# Patient Record
Sex: FEMALE | Race: WHITE | NOT HISPANIC OR LATINO | Employment: OTHER | URBAN - METROPOLITAN AREA
[De-identification: names, ages, dates, MRNs, and addresses within clinical notes are randomized per-mention and may not be internally consistent; named-entity substitution may affect disease eponyms.]

---

## 2017-01-23 ENCOUNTER — AMBULATORY - HEALTHEAST (OUTPATIENT)
Dept: LAB | Facility: CLINIC | Age: 62
End: 2017-01-23

## 2017-01-23 DIAGNOSIS — Z48.298 AFTERCARE FOLLOWING ORGAN TRANSPLANT: ICD-10-CM

## 2017-01-23 DIAGNOSIS — Z94.0 KIDNEY REPLACED BY TRANSPLANT: ICD-10-CM

## 2017-01-23 DIAGNOSIS — Z79.899 ENCOUNTER FOR LONG-TERM CURRENT USE OF MEDICATION: ICD-10-CM

## 2017-01-23 PROCEDURE — 80158 DRUG ASSAY CYCLOSPORINE: CPT | Performed by: INTERNAL MEDICINE

## 2017-01-24 LAB
CYCLOSPORINE BLD LC/MS/MS-MCNC: 109 UG/L (ref 50–400)
TME LAST DOSE: NORMAL H

## 2017-01-26 ENCOUNTER — AMBULATORY - HEALTHEAST (OUTPATIENT)
Dept: LAB | Facility: CLINIC | Age: 62
End: 2017-01-26

## 2017-01-26 DIAGNOSIS — Z94.0 KIDNEY REPLACED BY TRANSPLANT: ICD-10-CM

## 2017-01-26 DIAGNOSIS — Z79.899 ENCOUNTER FOR LONG-TERM (CURRENT) USE OF OTHER MEDICATIONS: ICD-10-CM

## 2017-01-26 DIAGNOSIS — Z48.288 ENCOUNTER FOR AFTERCARE FOLLOWING MULTIPLE ORGAN TRANSPLANT: ICD-10-CM

## 2017-02-27 ENCOUNTER — AMBULATORY - HEALTHEAST (OUTPATIENT)
Dept: LAB | Facility: CLINIC | Age: 62
End: 2017-02-27

## 2017-02-27 DIAGNOSIS — Z48.288 ENCOUNTER FOR AFTERCARE FOLLOWING MULTIPLE ORGAN TRANSPLANT: ICD-10-CM

## 2017-02-27 DIAGNOSIS — Z48.298 AFTERCARE FOLLOWING ORGAN TRANSPLANT: ICD-10-CM

## 2017-02-27 DIAGNOSIS — Z94.0 KIDNEY REPLACED BY TRANSPLANT: ICD-10-CM

## 2017-02-27 DIAGNOSIS — Z79.899 ENCOUNTER FOR LONG-TERM CURRENT USE OF MEDICATION: ICD-10-CM

## 2017-02-27 DIAGNOSIS — Z79.899 ENCOUNTER FOR LONG-TERM (CURRENT) USE OF OTHER MEDICATIONS: ICD-10-CM

## 2017-02-27 PROCEDURE — 80158 DRUG ASSAY CYCLOSPORINE: CPT | Performed by: INTERNAL MEDICINE

## 2017-02-28 LAB
CYCLOSPORINE BLD LC/MS/MS-MCNC: 82 UG/L (ref 50–400)
TME LAST DOSE: NORMAL H

## 2017-03-13 DIAGNOSIS — Z94.0 KIDNEY TRANSPLANTED: ICD-10-CM

## 2017-03-13 DIAGNOSIS — Z94.0 KIDNEY REPLACED BY TRANSPLANT: Primary | ICD-10-CM

## 2017-03-13 DIAGNOSIS — Z79.899 HIGH RISK MEDICATIONS (NOT ANTICOAGULANTS) LONG-TERM USE: ICD-10-CM

## 2017-03-14 DIAGNOSIS — Z79.899 HIGH RISK MEDICATIONS (NOT ANTICOAGULANTS) LONG-TERM USE: ICD-10-CM

## 2017-03-14 DIAGNOSIS — Z94.0 KIDNEY TRANSPLANTED: ICD-10-CM

## 2017-03-14 DIAGNOSIS — Z94.0 KIDNEY REPLACED BY TRANSPLANT: ICD-10-CM

## 2017-03-14 RX ORDER — CYCLOSPORINE 25 MG/1
100 CAPSULE, LIQUID FILLED ORAL 2 TIMES DAILY
Qty: 720 CAPSULE | Refills: 3 | Status: SHIPPED | OUTPATIENT
Start: 2017-03-14 | End: 2017-12-22

## 2017-03-14 RX ORDER — MYCOPHENOLATE MOFETIL 250 MG/1
1000 CAPSULE ORAL 2 TIMES DAILY
Qty: 720 CAPSULE | Refills: 3 | Status: SHIPPED | OUTPATIENT
Start: 2017-03-14 | End: 2017-03-14

## 2017-03-14 RX ORDER — MYCOPHENOLATE MOFETIL 250 MG/1
250 CAPSULE ORAL 2 TIMES DAILY
Qty: 180 CAPSULE | Refills: 3 | Status: SHIPPED | OUTPATIENT
Start: 2017-03-14 | End: 2018-04-27

## 2017-03-27 ENCOUNTER — AMBULATORY - HEALTHEAST (OUTPATIENT)
Dept: LAB | Facility: CLINIC | Age: 62
End: 2017-03-27

## 2017-03-27 DIAGNOSIS — Z48.298 AFTERCARE FOLLOWING ORGAN TRANSPLANT: ICD-10-CM

## 2017-03-27 DIAGNOSIS — Z79.899 ENCOUNTER FOR LONG-TERM CURRENT USE OF MEDICATION: ICD-10-CM

## 2017-03-27 DIAGNOSIS — Z79.899 ENCOUNTER FOR LONG-TERM (CURRENT) USE OF OTHER MEDICATIONS: ICD-10-CM

## 2017-03-27 DIAGNOSIS — Z94.0 KIDNEY REPLACED BY TRANSPLANT: ICD-10-CM

## 2017-03-27 DIAGNOSIS — Z48.288 ENCOUNTER FOR AFTERCARE FOLLOWING MULTIPLE ORGAN TRANSPLANT: ICD-10-CM

## 2017-03-27 PROCEDURE — 80158 DRUG ASSAY CYCLOSPORINE: CPT | Performed by: INTERNAL MEDICINE

## 2017-03-28 LAB
CYCLOSPORINE BLD LC/MS/MS-MCNC: 99 UG/L (ref 50–400)
TME LAST DOSE: NORMAL H

## 2017-04-03 ENCOUNTER — TELEPHONE (OUTPATIENT)
Dept: TRANSPLANT | Facility: CLINIC | Age: 62
End: 2017-04-03

## 2017-04-03 NOTE — LETTER
PHYSICIAN ORDERS      DATE & TIME ISSUED: April 3, 2017 12:56 PM  PATIENT NAME: Zulma Lovell   : 1955     [facility] MR#  [id number]     DIAGNOSIS:  Kidney Transplant  ICD-10 CODE: Z94.0      Please have the following lab checked within the next 2 weeks of 4-3-2017    EBV PCR QT    Any questions please call: [phone]    Please fax these results to [phone].    .

## 2017-04-03 NOTE — TELEPHONE ENCOUNTER
Call placed to patient: Voice message left informing patient of the need to have lab work done. Order sent.

## 2017-04-03 NOTE — TELEPHONE ENCOUNTER
Patient returned call to verbalize understanding to have her EBV PCR QT level checked in 2 weeks.

## 2017-04-18 ENCOUNTER — RECORDS - HEALTHEAST (OUTPATIENT)
Dept: LAB | Facility: CLINIC | Age: 62
End: 2017-04-18

## 2017-04-20 LAB
MISCELLANEOUS TEST DEPT. - HE HISTORICAL: NORMAL
PERFORMING LAB: NORMAL
SPECIMEN STATUS: NORMAL
TEST NAME: NORMAL

## 2017-04-28 ENCOUNTER — AMBULATORY - HEALTHEAST (OUTPATIENT)
Dept: LAB | Facility: CLINIC | Age: 62
End: 2017-04-28

## 2017-04-28 DIAGNOSIS — Z94.0 KIDNEY REPLACED BY TRANSPLANT: ICD-10-CM

## 2017-04-28 DIAGNOSIS — Z48.298 AFTERCARE FOLLOWING ORGAN TRANSPLANT: ICD-10-CM

## 2017-04-28 DIAGNOSIS — Z79.899 ENCOUNTER FOR LONG-TERM CURRENT USE OF MEDICATION: ICD-10-CM

## 2017-04-28 DIAGNOSIS — Z48.288 ENCOUNTER FOR AFTERCARE FOLLOWING MULTIPLE ORGAN TRANSPLANT: ICD-10-CM

## 2017-04-28 DIAGNOSIS — Z79.899 ENCOUNTER FOR LONG-TERM (CURRENT) USE OF OTHER MEDICATIONS: ICD-10-CM

## 2017-04-28 PROCEDURE — 80158 DRUG ASSAY CYCLOSPORINE: CPT | Performed by: INTERNAL MEDICINE

## 2017-04-29 LAB
CYCLOSPORINE BLD LC/MS/MS-MCNC: 94 UG/L (ref 50–400)
TME LAST DOSE: NORMAL H

## 2017-05-02 ENCOUNTER — AMBULATORY - HEALTHEAST (OUTPATIENT)
Dept: LAB | Facility: CLINIC | Age: 62
End: 2017-05-02

## 2017-05-02 DIAGNOSIS — Z79.899 ENCOUNTER FOR LONG-TERM (CURRENT) USE OF OTHER MEDICATIONS: ICD-10-CM

## 2017-05-02 DIAGNOSIS — Z94.0 KIDNEY REPLACED BY TRANSPLANT: ICD-10-CM

## 2017-05-03 ENCOUNTER — TELEPHONE (OUTPATIENT)
Dept: TRANSPLANT | Facility: CLINIC | Age: 62
End: 2017-05-03

## 2017-05-03 ENCOUNTER — DOCUMENTATION ONLY (OUTPATIENT)
Dept: TRANSPLANT | Facility: CLINIC | Age: 62
End: 2017-05-03

## 2017-05-03 NOTE — TELEPHONE ENCOUNTER
I spoke with rossana she is taking 100mg 2x aday.  She understands to decrease her CSA to 75mg 2x aday. And recheck in one-two weeks.

## 2017-05-03 NOTE — LETTER
OUTPATIENT LABORATORY TEST ORDER    DATE & TIME ISSUED: May 3, 2017 1:18 PM  PATIENT NAME: Zulma Lovell   : 1955     [facility] MR#  [id number]     DIAGNOSIS / ICD - 10 CODES    Kidney Transplanted (Z94.0)    After Care Following Organ Transplant (Z48.298)    Long Term Use of Medication (Z79.899)    Complications Kidney Transplant (T86.10)    Creatinine Elevation (R79.89)    Kidney Transplant Rejection (T86.11)      Recheck Cyclosporine levels in 1 - 2 weeks.      Patient should release information to the [facility].   Please fax to the Transplant Center at [phone].  Any questions please call [phone].

## 2017-05-03 NOTE — PROGRESS NOTES
TXP External Lab Result   Status:  Edited Result - FINAL   Visible to patient:  Yes (MyChart) Order: [id number]       Notes Recorded by Rod Lopez MD on 4/29/2017 at 9:34 AM  Decreased EBV viremia, of low level now.  Would target cyclosporine level at 50-75.  ------  Notes Recorded by Stephanie Schmidt RN on 4/24/2017 at 4:03 PM  4/18/17 result: EBV (7580)     PLAN:  Need to target CSA 50 -75. Last level 94.  Confirm current dose is 100 mg BID and decrease to 75 mg BID.  Will recheck CSA levels in 1 - 2 weeks after dose change.  Message left re: plan and instructions to call back to confirm message was received.    Lab orders faxed to:  [facility] - MEDICAL LABORATORY [phone] (Phone)  [phone] (Fax)

## 2017-05-16 ENCOUNTER — HOSPITAL ENCOUNTER (OUTPATIENT)
Dept: MAMMOGRAPHY | Facility: CLINIC | Age: 62
Discharge: HOME | End: 2017-05-16
Attending: FAMILY MEDICINE

## 2017-05-16 DIAGNOSIS — Z12.31 VISIT FOR SCREENING MAMMOGRAM: ICD-10-CM

## 2017-05-22 ENCOUNTER — AMBULATORY - HEALTHEAST (OUTPATIENT)
Dept: LAB | Facility: CLINIC | Age: 62
End: 2017-05-22

## 2017-05-22 DIAGNOSIS — Z94.9 TRANSPLANT: ICD-10-CM

## 2017-05-22 DIAGNOSIS — Z79.899 ENCOUNTER FOR LONG-TERM (CURRENT) USE OF OTHER MEDICATIONS: ICD-10-CM

## 2017-05-22 DIAGNOSIS — Z94.0 KIDNEY REPLACED BY TRANSPLANT: ICD-10-CM

## 2017-05-22 DIAGNOSIS — Z48.298 AFTERCARE FOLLOWING ORGAN TRANSPLANT: ICD-10-CM

## 2017-05-22 DIAGNOSIS — Z79.899 ENCOUNTER FOR LONG-TERM CURRENT USE OF MEDICATION: ICD-10-CM

## 2017-05-22 PROCEDURE — 80158 DRUG ASSAY CYCLOSPORINE: CPT | Performed by: INTERNAL MEDICINE

## 2017-05-23 LAB
CYCLOSPORINE BLD LC/MS/MS-MCNC: 61 UG/L (ref 50–400)
TME LAST DOSE: NORMAL H

## 2017-06-20 ENCOUNTER — AMBULATORY - HEALTHEAST (OUTPATIENT)
Dept: LAB | Facility: CLINIC | Age: 62
End: 2017-06-20

## 2017-06-20 DIAGNOSIS — Z79.899 ENCOUNTER FOR LONG-TERM (CURRENT) USE OF OTHER MEDICATIONS: ICD-10-CM

## 2017-06-20 DIAGNOSIS — Z48.288 ENCOUNTER FOR AFTERCARE FOLLOWING MULTIPLE ORGAN TRANSPLANT: ICD-10-CM

## 2017-06-20 DIAGNOSIS — Z94.0 KIDNEY REPLACED BY TRANSPLANT: ICD-10-CM

## 2017-06-27 ENCOUNTER — TELEPHONE (OUTPATIENT)
Dept: TRANSPLANT | Facility: CLINIC | Age: 62
End: 2017-06-27

## 2017-06-28 NOTE — TELEPHONE ENCOUNTER
Call received from Stephanie with [facility]: Lab not able to run test on blood in lab. Call placed to patient: Patient verbalize understanding to complete lab work with in one week. New order sent.

## 2017-06-28 NOTE — TELEPHONE ENCOUNTER
EBV PCR quantitative  [facility] - MEDICAL LABORATORY [phone] (Phone)  [phone] (Fax)

## 2017-06-28 NOTE — TELEPHONE ENCOUNTER
Call placed to [facility]: Spoke to representative, states that she will call [facility] to check to see if they could run the QT on blood in lab. Ana will return call with the information.

## 2017-07-07 ENCOUNTER — AMBULATORY - HEALTHEAST (OUTPATIENT)
Dept: LAB | Facility: CLINIC | Age: 62
End: 2017-07-07

## 2017-07-07 DIAGNOSIS — Z94.0 KIDNEY REPLACED BY TRANSPLANT: ICD-10-CM

## 2017-07-13 ENCOUNTER — TELEPHONE (OUTPATIENT)
Dept: TRANSPLANT | Facility: CLINIC | Age: 62
End: 2017-07-13

## 2017-07-13 NOTE — TELEPHONE ENCOUNTER
Please phone patients lab and ask if they still have blood from the EBV drawn on 7/7/17.   We need a EBV quantitative PCR.  Resend lab letter indicating we need EBV Quantitive PCR.

## 2017-08-03 ENCOUNTER — TELEPHONE (OUTPATIENT)
Dept: NEPHROLOGY | Facility: CLINIC | Age: 62
End: 2017-08-03

## 2017-08-03 NOTE — TELEPHONE ENCOUNTER
Pt called back. Accidentally deleted VM left. Requesting call back at [phone]. Sent to STEPHEN Mendoza.

## 2017-08-03 NOTE — TELEPHONE ENCOUNTER
Spoke with patient. States she will get labs done tomorrow. Denied any hospitalizations, infections, or fevers over the last year.     Kelly Almodovar RN

## 2017-08-04 ENCOUNTER — AMBULATORY - HEALTHEAST (OUTPATIENT)
Dept: LAB | Facility: CLINIC | Age: 62
End: 2017-08-04

## 2017-08-04 DIAGNOSIS — Z94.0 KIDNEY REPLACED BY TRANSPLANT: ICD-10-CM

## 2017-08-04 DIAGNOSIS — Z48.298 AFTERCARE FOLLOWING ORGAN TRANSPLANT: ICD-10-CM

## 2017-08-04 DIAGNOSIS — Z48.288 ENCOUNTER FOR AFTERCARE FOLLOWING MULTIPLE ORGAN TRANSPLANT: ICD-10-CM

## 2017-08-04 DIAGNOSIS — Z79.899 ENCOUNTER FOR LONG-TERM (CURRENT) USE OF OTHER MEDICATIONS: ICD-10-CM

## 2017-08-04 DIAGNOSIS — Z79.899 ENCOUNTER FOR LONG-TERM CURRENT USE OF MEDICATION: ICD-10-CM

## 2017-08-04 PROCEDURE — 80158 DRUG ASSAY CYCLOSPORINE: CPT | Performed by: INTERNAL MEDICINE

## 2017-08-05 LAB
CYCLOSPORINE BLD LC/MS/MS-MCNC: 56 UG/L (ref 50–400)
TME LAST DOSE: NORMAL H

## 2017-08-06 ASSESSMENT — ENCOUNTER SYMPTOMS
SEIZURES: 0
MUSCLE WEAKNESS: 1
DOUBLE VISION: 0
EYE IRRITATION: 1
DIZZINESS: 1
EYE PAIN: 0
TREMORS: 0
SINUS PAIN: 0
EYE WATERING: 0
SPEECH CHANGE: 0
TASTE DISTURBANCE: 0
HOARSE VOICE: 0
BACK PAIN: 1
DISTURBANCES IN COORDINATION: 0
SORE THROAT: 0
JOINT SWELLING: 0
STIFFNESS: 1
EYE REDNESS: 0
WEAKNESS: 0
MEMORY LOSS: 0
PARALYSIS: 0
NUMBNESS: 1
SINUS CONGESTION: 0
ARTHRALGIAS: 0
MUSCLE CRAMPS: 1
TINGLING: 0
MYALGIAS: 1
SMELL DISTURBANCE: 0
HEADACHES: 0
NECK PAIN: 1
LOSS OF CONSCIOUSNESS: 0
TROUBLE SWALLOWING: 0
NECK MASS: 0

## 2017-08-07 ENCOUNTER — OFFICE VISIT (OUTPATIENT)
Dept: NEPHROLOGY | Facility: CLINIC | Age: 62
End: 2017-08-07
Attending: INTERNAL MEDICINE
Payer: COMMERCIAL

## 2017-08-07 VITALS
BODY MASS INDEX: 24.21 KG/M2 | HEIGHT: 64 IN | OXYGEN SATURATION: 98 % | DIASTOLIC BLOOD PRESSURE: 85 MMHG | WEIGHT: 141.8 LBS | SYSTOLIC BLOOD PRESSURE: 154 MMHG | HEART RATE: 55 BPM

## 2017-08-07 DIAGNOSIS — B27.00 EBV (EPSTEIN-BARR VIRUS) VIREMIA: ICD-10-CM

## 2017-08-07 DIAGNOSIS — Z94.0 KIDNEY REPLACED BY TRANSPLANT: ICD-10-CM

## 2017-08-07 DIAGNOSIS — D63.1 ANEMIA IN STAGE 3 CHRONIC KIDNEY DISEASE (H): ICD-10-CM

## 2017-08-07 DIAGNOSIS — N18.30 ANEMIA IN STAGE 3 CHRONIC KIDNEY DISEASE (H): ICD-10-CM

## 2017-08-07 DIAGNOSIS — E55.9 VITAMIN D DEFICIENCY: ICD-10-CM

## 2017-08-07 DIAGNOSIS — D84.9 IMMUNOSUPPRESSED STATUS (H): Primary | ICD-10-CM

## 2017-08-07 DIAGNOSIS — Z48.298 AFTERCARE FOLLOWING ORGAN TRANSPLANT: ICD-10-CM

## 2017-08-07 LAB
DEPRECATED CALCIDIOL+CALCIFEROL SERPL-MC: 50 UG/L (ref 20–75)
MISCELLANEOUS TEST DEPT. - HE HISTORICAL: NORMAL
PERFORMING LAB: NORMAL
SPECIMEN STATUS: NORMAL
TEST NAME: NORMAL

## 2017-08-07 PROCEDURE — 99212 OFFICE O/P EST SF 10 MIN: CPT | Mod: ZF

## 2017-08-07 PROCEDURE — 82306 VITAMIN D 25 HYDROXY: CPT | Performed by: INTERNAL MEDICINE

## 2017-08-07 PROCEDURE — 87799 DETECT AGENT NOS DNA QUANT: CPT | Performed by: INTERNAL MEDICINE

## 2017-08-07 PROCEDURE — 36415 COLL VENOUS BLD VENIPUNCTURE: CPT | Performed by: INTERNAL MEDICINE

## 2017-08-07 ASSESSMENT — PAIN SCALES - GENERAL: PAINLEVEL: NO PAIN (0)

## 2017-08-07 NOTE — LETTER
"8/7/2017      RE: Zulma Lovell  [address]  [facility] [place] [address]       Assessment and Plan:  1. DDKT - baseline Cr ~ 0.9-1.1, which has remained stable.  Minimal proteinuria.  No DSA with last check.  Will make no changes in immunosuppression.  2. BP - okay control at target of less than 140/90 off antihypertensive medications.  No changes.  3. Anemia in chronic renal disease - stable Hgb, near normal.  Will follow.  4. Vitamin D deficiency - will recheck vitamin D level and continue on cholecalciferol.  5. 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  Will recheck EBV PCR.  6. Fatigue - improved over the last year.  7. Skin cancer risk - no new skin lesions.  Recommend regular follow up with Dermatology.  8. Recommend return visit in 12 months.    Assessment and plan was discussed with patient and she voiced her understanding and agreement.    Reason for Visit:  Ms. Lovell is here for routine follow up.    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         Transplant Hx:       Tx: DDKT  Date: 8/19/07       Present Maintenance IS: Cyclosporine and Mycophenolate mofetil       Baseline Creatinine: 0.9-1.1       Recent DSA: No  Date last checked: 8/2013       Biopsy: No    Ms. Lovell reports feeling good overall with some medical complaints.  Her energy level has been a bit better over the last year and pretty much near normal now.  She is active and gets a little exercise.  Denies any chest pain or shortness of breath with exertion.  Appetite is \"too good\" and weight has been stable.  No nausea, vomiting or diarrhea.  No fever, sweats or chills.  No night sweats.  No leg swelling.    Home BP: Not checked, but generally runs 130/70s with recent outside MD visits.      ROS:   A comprehensive review of systems was obtained and negative, except as noted in the HPI or PMH.    Active Medical Problems:  Patient " "Active Problem List   Diagnosis     Kidney replaced by transplant     Immunosuppressed status (H)     Anemia in chronic renal disease     Dyslipidemia     Aftercare following organ transplant     EBV (Bandar-Barr virus) viremia     Vitamin D deficiency       Personal Hx:  Social History     Social History     Marital status:      Spouse name: N/A     Number of children: N/A     Years of education: N/A     Occupational History     Not on file.     Social History Main Topics     Smoking status: Never Smoker     Smokeless tobacco: Not on file     Alcohol use No     Drug use: No     Sexual activity: Not on file     Other Topics Concern     Not on file     Social History Narrative       Allergies:  Allergies   Allergen Reactions     Simvastatin Cramps and Muscle Pain (Myalgia)     Tricor        Medications:  Prior to Admission medications    Medication Sig Start Date End Date Taking? Authorizing Provider   cycloSPORINE modified (GENGRAF) 100 MG capsule Take 1 capsule (100 mg) by mouth 2 times daily 8/12/13  Yes Rod Lopez MD   mycophenolate (CELLCEPT-GENERIC EQUIVALENT) 250 MG capsule Take 4 capsules by mouth 2 times daily. 4/5/13  Yes Rod Lopez MD   sulfamethoxazole-trimethoprim (BACTRIM,SEPTRA) 400-80 MG per tablet Take 1 tablet by mouth three times a week. 2/7/13  Yes Rod Lopez MD   B Complex-C-Folic Acid (RENAL SOFTGELS) 1 MG CAPS Take  by mouth.   Yes Reported, Patient   Cholecalciferol (VITAMIN D-3 PO) Take 1,000 Units by mouth.   Yes Reported, Patient   simvastatin (ZOCOR) 10 MG tablet Take 1 tablet by mouth daily.   Yes Reported, Patient   Omega-3-acid Ethyl Esters (LOVAZA PO) Take 2 capsules by mouth 2 times daily.   Yes Reported, Patient   amoxicillin (AMOXIL) 500 MG tablet Take 4 tablets by mouth. Prior to dental work   Yes Reported, Patient       Vitals:  /85  Pulse 55  Ht 1.619 m (5' 3.75\")  Wt 64.3 kg (141 lb 12.8 oz)  SpO2 98%  BMI 24.53 kg/m2    Exam:   GENERAL " APPEARANCE: alert and no distress  HENT: mouth without ulcers or lesions  LYMPHATICS: no cervical or supraclavicular nodes  RESP: lungs clear to auscultation - no rales, rhonchi or wheezes  CV: regular rhythm, normal rate, no rub, no murmur  EDEMA: no LE edema bilaterally  ABDOMEN: soft, nontender, and bowel sounds normal  MS: extremities normal - no gross deformities noted, no evidence of inflammation in joints, no muscle tenderness  SKIN: no rash  TX KIDNEY: normal    Results:   Recent Results (from the past 336 hour(s))   Cyclosporine    Collection Time: 08/04/17  9:33 AM   Result Value Ref Range    Cyclosporine Last Dose 08/03/2017 10:45     Cyclosporine Level 56 50 - 400 ug/L       Rod Lopez MD

## 2017-08-07 NOTE — PROGRESS NOTES
"Assessment and Plan:  1. DDKT - baseline Cr ~ 0.9-1.1, which has remained stable.  Minimal proteinuria.  No DSA with last check.  Will make no changes in immunosuppression.  2. BP - okay control at target of less than 140/90 off antihypertensive medications.  No changes.  3. Anemia in chronic renal disease - stable Hgb, near normal.  Will follow.  4. Vitamin D deficiency - will recheck vitamin D level and continue on cholecalciferol.  5. 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  Will recheck EBV PCR.  6. Fatigue - improved over the last year.  7. Skin cancer risk - no new skin lesions.  Recommend regular follow up with Dermatology.  8. Recommend return visit in 12 months.    Assessment and plan was discussed with patient and she voiced her understanding and agreement.    Reason for Visit:  Ms. Lovell is here for routine follow up.    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         Transplant Hx:       Tx: DDKT  Date: 8/19/07       Present Maintenance IS: Cyclosporine and Mycophenolate mofetil       Baseline Creatinine: 0.9-1.1       Recent DSA: No  Date last checked: 8/2013       Biopsy: No    Ms. Lovell reports feeling good overall with some medical complaints.  Her energy level has been a bit better over the last year and pretty much near normal now.  She is active and gets a little exercise.  Denies any chest pain or shortness of breath with exertion.  Appetite is \"too good\" and weight has been stable.  No nausea, vomiting or diarrhea.  No fever, sweats or chills.  No night sweats.  No leg swelling.    Home BP: Not checked, but generally runs 130/70s with recent outside MD visits.      ROS:   A comprehensive review of systems was obtained and negative, except as noted in the HPI or PMH.    Active Medical Problems:  Patient Active Problem List   Diagnosis     Kidney replaced by transplant     Immunosuppressed " "status (H)     Anemia in chronic renal disease     Dyslipidemia     Aftercare following organ transplant     EBV (Bandar-Barr virus) viremia     Vitamin D deficiency       Personal Hx:  Social History     Social History     Marital status:      Spouse name: N/A     Number of children: N/A     Years of education: N/A     Occupational History     Not on file.     Social History Main Topics     Smoking status: Never Smoker     Smokeless tobacco: Not on file     Alcohol use No     Drug use: No     Sexual activity: Not on file     Other Topics Concern     Not on file     Social History Narrative       Allergies:  Allergies   Allergen Reactions     Simvastatin Cramps and Muscle Pain (Myalgia)     Tricor        Medications:  Prior to Admission medications    Medication Sig Start Date End Date Taking? Authorizing Provider   cycloSPORINE modified (GENGRAF) 100 MG capsule Take 1 capsule (100 mg) by mouth 2 times daily 8/12/13  Yes Rod Lopez MD   mycophenolate (CELLCEPT-GENERIC EQUIVALENT) 250 MG capsule Take 4 capsules by mouth 2 times daily. 4/5/13  Yes Rod Lopez MD   sulfamethoxazole-trimethoprim (BACTRIM,SEPTRA) 400-80 MG per tablet Take 1 tablet by mouth three times a week. 2/7/13  Yes Rod Lopez MD   B Complex-C-Folic Acid (RENAL SOFTGELS) 1 MG CAPS Take  by mouth.   Yes Reported, Patient   Cholecalciferol (VITAMIN D-3 PO) Take 1,000 Units by mouth.   Yes Reported, Patient   simvastatin (ZOCOR) 10 MG tablet Take 1 tablet by mouth daily.   Yes Reported, Patient   Omega-3-acid Ethyl Esters (LOVAZA PO) Take 2 capsules by mouth 2 times daily.   Yes Reported, Patient   amoxicillin (AMOXIL) 500 MG tablet Take 4 tablets by mouth. Prior to dental work   Yes Reported, Patient       Vitals:  /85  Pulse 55  Ht 1.619 m (5' 3.75\")  Wt 64.3 kg (141 lb 12.8 oz)  SpO2 98%  BMI 24.53 kg/m2    Exam:   GENERAL APPEARANCE: alert and no distress  HENT: mouth without ulcers or lesions  LYMPHATICS: no " cervical or supraclavicular nodes  RESP: lungs clear to auscultation - no rales, rhonchi or wheezes  CV: regular rhythm, normal rate, no rub, no murmur  EDEMA: no LE edema bilaterally  ABDOMEN: soft, nontender, and bowel sounds normal  MS: extremities normal - no gross deformities noted, no evidence of inflammation in joints, no muscle tenderness  SKIN: no rash  TX KIDNEY: normal    Results:   Recent Results (from the past 336 hour(s))   Cyclosporine    Collection Time: 08/04/17  9:33 AM   Result Value Ref Range    Cyclosporine Last Dose 08/03/2017 10:45     Cyclosporine Level 56 50 - 400 ug/L

## 2017-08-07 NOTE — MR AVS SNAPSHOT
After Visit Summary   8/7/2017    Zulma Lovell    MRN: [id number]           Patient Information     Date Of Birth          1955        Visit Information        Provider Department      8/7/2017 1:05 PM Rod Lopez MD [facility]        Today's Diagnoses     Immunosuppressed status (H)    -  1    EBV (Bandar-Barr virus) viremia        Vitamin D deficiency           Follow-ups after your visit        Follow-up notes from your care team     Return in about 1 year (around 8/7/2018).      Your next 10 appointments already scheduled     Aug 07, 2017  1:30 PM CDT   Lab with  LAB   [facility] ([facility])    [address] [place] [address]  1st [facility] [id number] [phone]            Aug 06, 2018  1:05 PM CDT   (Arrive by 12:35 PM)   Return Kidney Transplant with Rod Lopez MD   [facility] ([facility])    [address] [place] [address] [phone]              Future tests that were ordered for you today     Open Future Orders        Priority Expected Expires Ordered    EBV DNA PCR Quantitative Whole Blood Routine  9/6/2017 8/7/2017    Vitamin D Deficiency Routine  9/6/2017 8/7/2017            Who to contact     If you have questions or need follow up information about today's clinic visit or your schedule please contact [facility] directly at [phone].  Normal or non-critical lab and imaging results will be communicated to you by MyChart, letter or phone within 4 business days after the clinic has received the results. If you do not hear from us within 7 days, please contact the clinic through MyChart or phone. If you have a critical or abnormal lab result, we will notify you by phone as soon as possible.  Submit refill requests through [employer] [facility] or call your pharmacy and they will forward the refill request to us. Please allow 3 business days for your refill  "to be completed.          Additional Information About Your Visit        [facility] Information     [email or web address] gives you secure access to your electronic health record. If you see a primary care provider, you can also send messages to your care team and make appointments. If you have questions, please call your primary care clinic.  If you do not have a primary care provider, please call [phone] and they will assist you.        Care EveryWhere ID     This is your Care EveryWhere ID. This could be used by other organizations to access your [place] medical records  [id number]        Your Vitals Were     Pulse Height Pulse Oximetry BMI (Body Mass Index)          55 1.619 m (5' 3.75\") 98% 24.53 kg/m2         Blood Pressure from Last 3 Encounters:   08/07/17 154/85   08/01/16 153/80   08/10/15 152/79    Weight from Last 3 Encounters:   08/07/17 64.3 kg (141 lb 12.8 oz)   08/01/16 64 kg (141 lb)   08/10/15 63 kg (139 lb)               Primary Care Provider Office Phone # Fax #    Rene Ruggiero [phone]       [facility] [address] [address]        Equal Access to Services     CORNELIA ADAME AH: Hadii jacob montenegroo Soneelam, waaxda luqadaha, qaybta kaalmada adeegyada, chidi lai. So [facility] [phone].    ATENCIÓN: Si habla español, tiene a valdivia disposición servicios gratuitos de asistencia lingüística. Llame al [phone].    We comply with applicable federal civil rights laws and [place] laws. We do not discriminate on the basis of race, color, national origin, age, disability sex, sexual orientation or gender identity.            Thank you!     Thank you for choosing [facility]  for your care. Our goal is always to provide you with excellent care. Hearing back from our patients is one way we can continue to improve our services. Please take a few minutes to complete the written survey that you may receive in the mail after " your visit with us. Thank you!             Your Updated Medication List - Protect others around you: Learn how to safely use, store and throw away your medicines at www.disposemymeds.org.          This list is accurate as of: 8/7/17  1:11 PM.  Always use your most recent med list.                   Brand Name Dispense Instructions for use Diagnosis    ACETAMINOPHEN PO      Take 1,000 mg by mouth as needed for pain        brimonidine-timolol 0.2-0.5 % ophthalmic solution    COMBIGAN     Place 1 drop into both eyes daily In morning        CENTRUM SILVER per tablet      Take 1 tablet by mouth daily        cycloSPORINE modified 25 MG capsule    GENERIC EQUIVALENT    720 capsule    Take 4 capsules (100 mg) by mouth 2 times daily    Kidney transplanted       latanoprost 0.005 % ophthalmic solution    XALATAN     Place 1 drop Into the left eye At Bedtime        loteprednol 0.5 % ophthalmic susp    LOTEMAX     Place 1 drop Into the left eye 4 times daily        LOVAZA PO      Take 2 capsules by mouth 2 times daily.        LUCENTIS IO      Eye injections given every 11 weeks        mycophenolate 250 MG capsule    CELLCEPT - GENERIC EQUIVALENT    180 capsule    Take 1 capsule (250 mg) by mouth 2 times daily    Kidney replaced by transplant, High risk medications (not anticoagulants) long-term use, Kidney transplanted       NONFORMULARY      daily as needed Myocalm Herbal Muscle relaxer        PRAVASTATIN SODIUM PO      Take 10 mg by mouth daily        sulfamethoxazole-trimethoprim 400-80 MG per tablet    BACTRIM/SEPTRA    39 tablet    Take 1 tablet by mouth three times a week    Kidney replaced by transplant       VITAMIN D-3 PO      Take 1,000 Units by mouth.

## 2017-08-07 NOTE — NURSING NOTE
"Chief Complaint   Patient presents with     RECHECK     Annual follow up for Kidney Tx        Initial /85  Pulse 55  Ht 1.619 m (5' 3.75\")  Wt 64.3 kg (141 lb 12.8 oz)  SpO2 98%  BMI 24.53 kg/m2 Estimated body mass index is 24.53 kg/(m^2) as calculated from the following:    Height as of this encounter: 1.619 m (5' 3.75\").    Weight as of this encounter: 64.3 kg (141 lb 12.8 oz).  Medication Reconciliation: complete   Hermelinda Roth CMA    "

## 2017-08-08 LAB
EBV DNA # SPEC NAA+PROBE: ABNORMAL {COPIES}/ML
EBV DNA SPEC NAA+PROBE-LOG#: 5.2 {LOG_COPIES}/ML

## 2017-08-09 ENCOUNTER — TELEPHONE (OUTPATIENT)
Dept: TRANSPLANT | Facility: CLINIC | Age: 62
End: 2017-08-09

## 2017-08-09 NOTE — TELEPHONE ENCOUNTER
Please arrange for CT of the chest, abdomen and pelvis at [facility] in [place].   Next Monday or Tues appointment would work for patient.  Aug 14 or 15.  Increase in EBV viremia.  Also I made appointment with ID Transplant.

## 2017-08-10 NOTE — TELEPHONE ENCOUNTER
Patient scheduled for CT chest/abdomin.pelvis Monday 8/14/2017 at 12:25 at [facility]. Voice message message left making patient aware.

## 2017-08-14 ENCOUNTER — HOSPITAL ENCOUNTER (OUTPATIENT)
Dept: CT IMAGING | Facility: CLINIC | Age: 62
Discharge: HOME | End: 2017-08-14

## 2017-08-14 DIAGNOSIS — B27.90 EBV INFECTION: ICD-10-CM

## 2017-09-10 ENCOUNTER — HEALTH MAINTENANCE LETTER (OUTPATIENT)
Age: 62
End: 2017-09-10

## 2017-09-27 ENCOUNTER — AMBULATORY - HEALTHEAST (OUTPATIENT)
Dept: LAB | Facility: CLINIC | Age: 62
End: 2017-09-27

## 2017-09-27 ENCOUNTER — TELEPHONE (OUTPATIENT)
Dept: TRANSPLANT | Facility: CLINIC | Age: 62
End: 2017-09-27

## 2017-09-27 DIAGNOSIS — Z79.899 ENCOUNTER FOR LONG-TERM CURRENT USE OF MEDICATION: ICD-10-CM

## 2017-09-27 DIAGNOSIS — Z94.0 KIDNEY REPLACED BY TRANSPLANT: ICD-10-CM

## 2017-09-27 DIAGNOSIS — Z79.899 ENCOUNTER FOR LONG-TERM (CURRENT) USE OF OTHER MEDICATIONS: ICD-10-CM

## 2017-09-27 DIAGNOSIS — Z48.298 AFTERCARE FOLLOWING ORGAN TRANSPLANT: ICD-10-CM

## 2017-09-27 LAB
CYCLOSPORINE BLD LC/MS/MS-MCNC: 48 UG/L (ref 50–400)
TME LAST DOSE: ABNORMAL H

## 2017-09-27 PROCEDURE — 80158 DRUG ASSAY CYCLOSPORINE: CPT | Performed by: INTERNAL MEDICINE

## 2017-09-27 NOTE — TELEPHONE ENCOUNTER
ebv level pt is having ebv checked but lab is only giving pos or neg  And dr arevalo wants quantity please call pt to discuss

## 2017-10-02 DIAGNOSIS — Z94.0 KIDNEY TRANSPLANTED: Primary | ICD-10-CM

## 2017-10-02 NOTE — TELEPHONE ENCOUNTER
Requested that patient have her EBV rechecked at a [facility].    Patient denies ever being contacted for an ID Transplant appointment.    Resent appt.  a message to arrange an appointment with ID Transplant.

## 2017-10-31 ASSESSMENT — ENCOUNTER SYMPTOMS
DECREASED CONCENTRATION: 0
LEG PAIN: 0
COUGH DISTURBING SLEEP: 0
DIZZINESS: 1
PANIC: 0
NERVOUS/ANXIOUS: 0
EYE PAIN: 0
ARTHRALGIAS: 1
POSTURAL DYSPNEA: 0
BACK PAIN: 1
POLYDIPSIA: 0
INCREASED ENERGY: 0
MYALGIAS: 1
DISTURBANCES IN COORDINATION: 0
CHILLS: 0
HOARSE VOICE: 1
SINUS CONGESTION: 1
MUSCLE WEAKNESS: 1
NUMBNESS: 1
MUSCLE CRAMPS: 1
NAUSEA: 0
CONSTIPATION: 0
HYPERTENSION: 0
PARALYSIS: 0
FEVER: 1
LIGHT-HEADEDNESS: 1
SEIZURES: 0
SPUTUM PRODUCTION: 1
TINGLING: 1
ALTERED TEMPERATURE REGULATION: 0
MEMORY LOSS: 0
NECK MASS: 0
EYE IRRITATION: 0
DECREASED APPETITE: 0
HYPOTENSION: 0
EYE REDNESS: 0
ORTHOPNEA: 0
HALLUCINATIONS: 0
HEADACHES: 1
DIFFICULTY URINATING: 0
FATIGUE: 1
EYE WATERING: 0
NECK PAIN: 1
SINUS PAIN: 0
BLOATING: 1
DIARRHEA: 0
EXERCISE INTOLERANCE: 0
LOSS OF CONSCIOUSNESS: 0
HEARTBURN: 1
SORE THROAT: 1
JOINT SWELLING: 0
TASTE DISTURBANCE: 0
ABDOMINAL PAIN: 0
WHEEZING: 0
DEPRESSION: 0
DYSPNEA ON EXERTION: 0
HEMATURIA: 0
PALPITATIONS: 1
TROUBLE SWALLOWING: 0
DYSURIA: 0
SNORES LOUDLY: 1
FLANK PAIN: 0
VOMITING: 0
INSOMNIA: 0
POLYPHAGIA: 0
RECTAL PAIN: 0
SMELL DISTURBANCE: 0
TREMORS: 0
BOWEL INCONTINENCE: 0
JAUNDICE: 0
NIGHT SWEATS: 0
SPEECH CHANGE: 0
DOUBLE VISION: 0
SHORTNESS OF BREATH: 0
BLOOD IN STOOL: 0
SLEEP DISTURBANCES DUE TO BREATHING: 0
WEAKNESS: 1
WEIGHT LOSS: 0
STIFFNESS: 1
COUGH: 0
HEMOPTYSIS: 0
WEIGHT GAIN: 0
SYNCOPE: 0

## 2017-11-03 ENCOUNTER — OFFICE VISIT (OUTPATIENT)
Dept: INFECTIOUS DISEASES | Facility: CLINIC | Age: 62
End: 2017-11-03
Payer: COMMERCIAL

## 2017-11-03 VITALS
OXYGEN SATURATION: 98 % | BODY MASS INDEX: 26.13 KG/M2 | WEIGHT: 147.5 LBS | HEART RATE: 64 BPM | SYSTOLIC BLOOD PRESSURE: 137 MMHG | HEIGHT: 63 IN | DIASTOLIC BLOOD PRESSURE: 84 MMHG

## 2017-11-03 DIAGNOSIS — B27.00 EBV (EPSTEIN-BARR VIRUS) VIREMIA: Primary | ICD-10-CM

## 2017-11-03 DIAGNOSIS — Z94.0 KIDNEY TRANSPLANTED: ICD-10-CM

## 2017-11-03 LAB
BASOPHILS # BLD AUTO: 0 10E9/L (ref 0–0.2)
BASOPHILS NFR BLD AUTO: 0.3 %
CD3 CELLS # BLD: 1412 CELLS/UL (ref 603–2990)
CD3 CELLS NFR BLD: 72 % (ref 49–84)
CD3+CD4+ CELLS # BLD: 469 CELLS/UL (ref 441–2156)
CD3+CD4+ CELLS NFR BLD: 24 % (ref 28–63)
CD3+CD4+ CELLS/CD3+CD8+ CLL BLD: 0.53 % (ref 1.4–2.6)
CD3+CD8+ CELLS # BLD: 893 CELLS/UL (ref 125–1312)
CD3+CD8+ CELLS NFR BLD: 45 % (ref 10–40)
DIFFERENTIAL METHOD BLD: ABNORMAL
EOSINOPHIL # BLD AUTO: 0.1 10E9/L (ref 0–0.7)
EOSINOPHIL NFR BLD AUTO: 1.8 %
ERYTHROCYTE [DISTWIDTH] IN BLOOD BY AUTOMATED COUNT: 12.9 % (ref 10–15)
HCT VFR BLD AUTO: 35.9 % (ref 35–47)
HGB BLD-MCNC: 11.5 G/DL (ref 11.7–15.7)
IFC SPECIMEN: ABNORMAL
IMM GRANULOCYTES # BLD: 0 10E9/L (ref 0–0.4)
IMM GRANULOCYTES NFR BLD: 0 %
LYMPHOCYTES # BLD AUTO: 1.8 10E9/L (ref 0.8–5.3)
LYMPHOCYTES NFR BLD AUTO: 45.3 %
MCH RBC QN AUTO: 29.8 PG (ref 26.5–33)
MCHC RBC AUTO-ENTMCNC: 32 G/DL (ref 31.5–36.5)
MCV RBC AUTO: 93 FL (ref 78–100)
MONOCYTES # BLD AUTO: 0.6 10E9/L (ref 0–1.3)
MONOCYTES NFR BLD AUTO: 15.8 %
NEUTROPHILS # BLD AUTO: 1.5 10E9/L (ref 1.6–8.3)
NEUTROPHILS NFR BLD AUTO: 36.8 %
NRBC # BLD AUTO: 0 10*3/UL
NRBC BLD AUTO-RTO: 0 /100
PLATELET # BLD AUTO: 183 10E9/L (ref 150–450)
RBC # BLD AUTO: 3.86 10E12/L (ref 3.8–5.2)
WBC # BLD AUTO: 4 10E9/L (ref 4–11)

## 2017-11-03 PROCEDURE — 86360 T CELL ABSOLUTE COUNT/RATIO: CPT | Performed by: INTERNAL MEDICINE

## 2017-11-03 PROCEDURE — 90686 IIV4 VACC NO PRSV 0.5 ML IM: CPT | Mod: ZF | Performed by: INTERNAL MEDICINE

## 2017-11-03 PROCEDURE — 25000128 H RX IP 250 OP 636: Mod: ZF | Performed by: INTERNAL MEDICINE

## 2017-11-03 PROCEDURE — 36415 COLL VENOUS BLD VENIPUNCTURE: CPT | Performed by: INTERNAL MEDICINE

## 2017-11-03 PROCEDURE — 85025 COMPLETE CBC W/AUTO DIFF WBC: CPT | Performed by: INTERNAL MEDICINE

## 2017-11-03 PROCEDURE — 87799 DETECT AGENT NOS DNA QUANT: CPT | Performed by: INTERNAL MEDICINE

## 2017-11-03 PROCEDURE — 99212 OFFICE O/P EST SF 10 MIN: CPT

## 2017-11-03 PROCEDURE — 90670 PCV13 VACCINE IM: CPT | Mod: ZF | Performed by: INTERNAL MEDICINE

## 2017-11-03 PROCEDURE — G0009 ADMIN PNEUMOCOCCAL VACCINE: HCPCS | Mod: ZF

## 2017-11-03 PROCEDURE — 86359 T CELLS TOTAL COUNT: CPT | Performed by: INTERNAL MEDICINE

## 2017-11-03 PROCEDURE — G0008 ADMIN INFLUENZA VIRUS VAC: HCPCS | Mod: ZF

## 2017-11-03 RX ADMIN — PNEUMOCOCCAL 13-VALENT CONJUGATE VACCINE 0.5 ML: 2.2; 2.2; 2.2; 2.2; 2.2; 4.4; 2.2; 2.2; 2.2; 2.2; 2.2; 2.2; 2.2 INJECTION, SUSPENSION INTRAMUSCULAR at 12:11

## 2017-11-03 RX ADMIN — INFLUENZA A VIRUS A/MICHIGAN/45/2015 X-275 (H1N1) ANTIGEN (FORMALDEHYDE INACTIVATED), INFLUENZA A VIRUS A/HONG KONG/4801/2014 X-263B (H3N2) ANTIGEN (FORMALDEHYDE INACTIVATED), INFLUENZA B VIRUS B/PHUKET/3073/2013 ANTIGEN (FORMALDEHYDE INACTIVATED), AND INFLUENZA B VIRUS B/BRISBANE/60/2008 ANTIGEN (FORMALDEHYDE INACTIVATED) 0.5 ML: 15; 15; 15; 15 INJECTION, SUSPENSION INTRAMUSCULAR at 12:11

## 2017-11-03 ASSESSMENT — PAIN SCALES - GENERAL: PAINLEVEL: NO PAIN (0)

## 2017-11-03 NOTE — LETTER
11/3/2017       RE: Zulma Lovell  [address]  [facility] [place] [address]     Dear Colleague,    Thank you for referring your patient, Zulma Lovell, to the [facility] AND INFECTIOUS DISEASES at [facility]. Please see a copy of my visit note below.    [facility]  Transplant Infectious Disease Clinic Note - New Patient     Patient:  Zulma Lovell, Date of birth 1955, Medical record number [id number]  Date of Visit:  11/03/2017  Consult requested by Dr. Rod Lopez for evaluation of EBV viremia.         Assessment and Recommendations:   Recommendations:  - CBC with diff, CD4  - EBV DNA PCR serum  - Vaccines for flu and PCV13 given today.  - Return to ID clinic in 3 months.     Transplant Infectious Disease will continue to follow with you. We will call Zulma with results, further management, and follow up.    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 In summer 2018, Zulma had a period of severe fatigue and 8/1/2016 she had an EBV DNA PCR that was 850985 (5.1). Her values have been stable with her most recent level of 912648 (log 5.2). Her physical exam is normal and PET/[facility]s on 8/14/2017 at [facility] that showed no lymphadenopathy. Her immunosuppression was reduced in 2016 and has been stable without any rejection. We discussed possible treatments for EBV if her levels remain high including IVIG and rituximab. We also discussed referral to Dr. Julio Saez.  - QTc interval: 436msec in 8/2007  - Pneumocystis prophylaxis: Bactrim.  - Viral serostatus: CMV D-/R+, EBV D+/R-, VZV+, hep B immune.  - Immunization status: flu and PCV13 given today  - Gamma  globulin status: unknown.  - Isolation status: Good hand hygiene.    Patient seen and discussed with transplant ID attending, Dr. Abril Perez.  Criss Alamo MD, [id number]  Fellow in Pediatric and Adult Infectious Disease  pager:  [phone]     Attestation:  Zulma Lovell was seen in clinic on 11/3/2017, with Criss Alamo MD, Infectious Diseases Fellow, pager [phone]. I reviewed the history & exam, vital signs, medications, labs. Assessment and plan were jointly made. I agree with the fellow's note and plan of care. Zulma Lovell is a 62 year old woman who underwent DDKTx 2007. In her first checks of EBV viremia with our latest assay, her values are running much higher than before, as we have experienced as a center with this particular assay. There is no adenopathy on exam or imaging. Now that she is on lower immune suppression, it is worth rechecking the viral load here, and in addition checking a CD4 count to review what her level is given contributions by several immune suppressing agents. There is no hard treatment direction here: watchful waiting would be fine, IV IG may bring down the viral load (although the reduction in immune suppression will have longer lasting effects), and rituxan could also be used to bring down the viral load if there is a substantial rate of rise in viral load despite immune suppression. Based on results of today's viral load check, we may or may not proceed with a referral to Dr. Julio Saez in hem/onc. Abril Perez MD. 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 Zulma reports that she has been generally healthy following her transplantation without any major complications. She was EBV negative and her donor was EBV positive. She and her  transplant team have been having difficulty with appropriate monitoring laboratories through [facility] ([facility]). Zulma reported having increased fatigue during the summer of 2016 at which point an EBV DNA PCR was checked in serum and found to be 129575 copies (log 5.1) on 8/1/2016. Her immunosuppression was adjusted down, and her fatigue slowly resolved. Most recently, her drug monitoring revealed that her CSA and MMF levels were on the low end of therapeutic. Repeat EBV DNA PCR was checked on 8/7/2017 at which point her level was 501686 copies (log 5.2). Zulma reports that she has been feeling well this year but recently developed an upper respiratory infection one week ago. Since that time she has felt mildly feverish and fatigued. She has a stuffy nose and sinus pressure with a mild non-productive cough. She denied any other new symptoms at this time. She has not noticed any bumps in her neck, armpits, or groin.    Transplants:  8/19/2007 (Kidney), Postoperative day:  3729.  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  ENT:  negative for acute hearing loss, tinnitus, sore throat, currently with stuffy nose and sinus pressure for 1 week  RESPIRATORY:  negative for cough, sputum or dyspnea  CARDIOVASCULAR:  negative for chest pain, palpitations  GASTROINTESTINAL:  negative for nausea, vomiting, diarrhea or constipation  GENITOURINARY:  negative for dysuria or hematuria  HEME:  No easy bruising or bleeding  INTEGUMENT:  negative for rash or pruritus  NEURO:  Negative for headache or tremor    Past Medical History:   Diagnosis Date     Anemia in chronic kidney disease(285.21)      Dyslipidemia      High risk medications (not anticoagulants) long-term use      Hypertension       Immunosuppressed status (H)      Kidney replaced by transplant      Shingles        Past Surgical History:   Procedure Laterality Date     CATARACT IOL, RT/LT Bilateral      LAPAROSCOPY         Family History   Problem Relation Age of Onset     Alzheimer Disease Mother      Alzheimer Disease Father        Social History     Social History Narrative    11/3/2017 - [place] with her . Two children that live nearby. Three grandchildren (5 years old to infant). Used to work as a . Daughter has a dog. No other animal exposures. [place] cruise last year. Travelled to [facility] 8 years ago.      Social History   Substance Use Topics     Smoking status: Never Smoker     Smokeless tobacco: Not on file     Alcohol use No       Immunization History   Administered Date(s) Administered     HepA-Adult 07/14/2008     HepB-Adult 02/05/2004, 05/03/2004, 07/14/2008     Influenza (H1N1) 11/06/2009     Influenza (IIV3) 10/11/2011, 10/17/2012, 10/14/2013, 10/20/2014, 10/26/2015, 10/10/2016     Pneumococcal 23 valent 02/03/2013     Poliovirus, inactivated (IPV) 07/14/2008     Td/Tdap (adult) Unspecified Formulation 07/02/1997, 07/14/2008     Typhoid Oral 07/14/2008       Patient Active Problem List   Diagnosis     Kidney replaced by transplant     Immunosuppressed status (H)     Anemia in chronic renal disease     Dyslipidemia     Aftercare following organ transplant     EBV (Bandar-Barr virus) viremia     Vitamin D deficiency            Current Medications & Allergies:     Current Outpatient Prescriptions on File Prior to Visit:  Ranibizumab (LUCENTIS IO) Eye injections given every 11 weeks   cycloSPORINE modified (GENERIC EQUIVALENT) 25 MG capsule Take 4 capsules (100 mg) by mouth 2 times daily   mycophenolate (CELLCEPT - GENERIC EQUIVALENT) 250 MG capsule Take 1 capsule (250 mg) by mouth 2 times daily   brimonidine-timolol (COMBIGAN) 0.2-0.5 % ophthalmic solution Place 1 drop into both eyes  "daily In morning   ACETAMINOPHEN PO Take 1,000 mg by mouth as needed for pain   sulfamethoxazole-trimethoprim (BACTRIM,SEPTRA) 400-80 MG per tablet Take 1 tablet by mouth three times a week   PRAVASTATIN SODIUM PO Take 10 mg by mouth daily   Multiple Vitamins-Minerals (CENTRUM SILVER) per tablet Take 1 tablet by mouth daily   latanoprost (XALATAN) 0.005 % ophthalmic solution Place 1 drop Into the left eye At Bedtime   loteprednol (LOTEMAX) 0.5 % ophthalmic suspension Place 1 drop Into the left eye 4 times daily   NONFORMULARY daily as needed Myocalm Herbal Muscle relaxer   Cholecalciferol (VITAMIN D-3 PO) Take 1,000 Units by mouth.   Omega-3-acid Ethyl Esters (LOVAZA PO) Take 2 capsules by mouth 2 times daily.     No current facility-administered medications on file prior to visit.       Allergies   Allergen Reactions     Fenofibrate      Other reaction(s): Pancreatitis     Simvastatin Cramps and Muscle Pain (Myalgia)     Tricor             Physical Exam:   Vitals were reviewed.  All vitals stable.  /84  Pulse 64  Ht 1.6 m (5' 3\")  Wt 66.9 kg (147 lb 8 oz)  SpO2 98%  BMI 26.13 kg/m2    Physical Examination:   GENERAL:  well-developed, well-nourished, sitting on chair in no acute distress. Breathing comfortably on room air.  HEAD:  Head is normocephalic, atraumatic.  EYES:  Eyes have anicteric sclerae without conjunctival injection.  ENT:  Oropharynx is moist without exudates or ulcers. Tongue is midline. No thrush.  NECK:  Supple.   LUNGS:  Clear to auscultation bilaterally. No increased work of breathing.   CARDIOVASCULAR:  Regular rate and rhythm with no murmurs, gallops or rubs.  ABDOMEN:  Normal bowel sounds, soft, nontender. No appreciable hepatosplenomegaly.  SKIN:  No acute rashes.  NEUROLOGIC:  Grossly nonfocal. Active x4 extremities  LYMPH: no cervical, axillary, or inguinal lymphadenopathy.         Laboratory Data:     Absolute CD4   Date Value Ref Range Status   11/03/2017 464 441 - 0046 " cells/uL Final       Metabolic Studies     Recent Labs   Lab Test  08/01/16   1552  10/09/14   0001   12/21/09 0835   NA  137  138   < >  138   POTASSIUM  4.6  4.2   < >  5.9*   CHLORIDE  104  107   < >  105   CO2  26  25   < >  23   ANIONGAP  7  6   < >  9   BUN  36*  27   < >  55*   CR  1.08*  0.97   < >  1.41*   GFRESTIMATED  52*  59*   < >  39*   GLC  96  61*   < >  103*   AICHA  9.6  9.5   < >  10.3   PHOS   --    --    --   4.2    < > = values in this interval not displayed.       Hepatic Studies  Recent Labs   Lab Test  12/21/09 0835 09/04/09 0743 09/01/09   1526   BILITOTAL  0.7  0.9   --    --    BILIDELTA  0.0  0.0   < >   --    BILICONJ  0.0  0.0   < >   --    ALKPHOS  81  91   --    --    PROTTOTAL  9.1*  8.1   --    --    ALBUMIN  4.7  4.3   --   4.6   AST  34  30   --    --    ALT  12  17   --    --     < > = values in this interval not displayed.       Hematology Studies    Recent Labs   Lab Test  08/01/16   1552  10/31/11   0945  03/31/11 0930  12/21/09 0835 09/04/09 0743 09/01/09   1526   WBC  4.4  3.2  3.4*  3.6*   --   3.2*  4.3   ANEU   --    --    --   2.6   --   1.9   --    ALYM   --    --    --   0.6*   --   0.7*   --    NILSA   --    --    --   0.4   --   0.5   --    AEOS   --    --    --   0.1   --   0.1   --    HGB  10.0*  9.3*  10.0*  9.9*   < >  9.6*  10.2*   HCT  31.8*  27.2  32.2*  32.0*   --   31.3*  32.9*   PLT  155  169  191  212   --   191  212    < > = values in this interval not displayed.       Clotting Studies    Recent Labs   Lab Test  12/21/09   0835  09/04/09   0743   INR  0.95  1.02       Thyroid Studies     Recent Labs   Lab Test  11/08/10   1721   TSH  2.16       Urine Studies   Recent Labs   Lab Test  12/21/09   0820  09/04/09   0907  09/04/09   0721   URINEPH  5.0  5.0  Duplicate request   NITRITE  Negative  Negative  Duplicate request*   LEUKEST  Small*  Large*  Duplicate request*   WBCU  2  37*  Duplicate request*       Medication levels  Recent Labs    Lab Test  09/27/17   0900   12/21/09   0835   CYCLSP  48*   < >  85   MPACID   --    --   1.50   MPAG   --    --   76.8    < > = values in this interval not displayed.       Microbiology:  Last Culture results with specimen source  Culture Micro   Date Value Ref Range Status   12/21/2009 No yeast isolated  Final   12/21/2009 10 to 50,000 colonies/mL Mixed gram positive filemon  Final     Comment:     Multiple species present, probable perineal contamination.  Susceptibility testing not routinely done   09/04/2009 Duplicate request  Final     Comment:     Canceled, Test credited   09/04/2009 No yeast isolated  Final   09/04/2009   Final    <10,000 colonies/mL Lactose fermenting gram negative rods Susceptibility testing     Comment:      not routinely done  <10,000 colonies/mL Non lactose fermenting gram negative rods Susceptibility   testing not routinely done  10 to 50,000 colonies/mL Gram positive cocci in chains Susceptibility testing   not   routinely done  Multiple species present, probable perineal contamination.   10/01/2007 <10,000 colonies/mL Gram positive cocci  Final     Comment:     <10,000 colonies/mL Staphylococcus species  Susceptibility testing not routinely done   10/01/2007 No growth  Final   10/01/2007 No growth  Final   09/12/2007 No growth  Final   09/12/2007 No anaerobes isolated  Final   08/27/2007 No growth  Final   08/26/2007 No growth  Final   08/26/2007 No growth  Final   08/26/2007 No growth  Final   08/26/2007 No growth  Final   08/22/2007 No growth  Final   08/19/2007   Final    10 to 50,000 colonies/mL Staphylococcus species Susceptibility testing not     Comment:      routinely done  <10,000 colonies/mL Gram positive cocci Susceptibility testing not routinely   done    Specimen Description   Date Value Ref Range Status   12/21/2009 Midstream Urine  Final   12/21/2009 Midstream Urine  Final   12/21/2009 Midstream Urine  Final   09/04/2009 Unspecified Urine  Final   09/04/2009 Midstream  Urine  Final   09/04/2009 Midstream Urine  Final   09/04/2009 Midstream Urine  Final   10/01/2007 Midstream Urine  Final   10/01/2007 Blood Left Arm  Final   10/01/2007 Blood Right Arm  Final   09/12/2007 Fluid LYMPHOCELE  Final   09/12/2007 Fluid LYMPHOCELE  Final   09/12/2007 Fluid LYMPHOCELE  Final   08/27/2007 Unspecified Urine  Final   08/26/2007 Blood  Final   08/26/2007 Blood Right Arm  Final   08/26/2007 Blood  Final   08/26/2007 Blood  Final   08/22/2007 Catheterized Urine  Final          Virology:  Log IU/mL of CMVQNT   Date Value Ref Range Status   08/01/2016 Not Calculated <2.1 [Log_IU]/mL Final       EBV DNA Copies/mL   Date Value Ref Range Status   08/07/2017 549418 (A) EBVNEG [Copies]/mL Final   08/01/2016 [id number] (A) EBVNEG [Copies]/mL Final       BK Virus Result   Date Value Ref Range Status   12/21/2009 <1000 <1000 copies/mL Final   09/04/2009 <1000 <1000 copies/mL Final   08/22/2007 <1000 <1000 copies/mL Final        Hepatitis C Antibody   Date Value Ref Range Status   08/19/2007 Negative NEG Final   05/09/2007 Negative NEG Final       CMV IgG Antibody   Date Value Ref Range Status   08/19/2007 >160.0 EU/mL Final     Comment:     Positive for anti-CMV IgG       EBV IgG Antibody Interpretation   Date Value Ref Range Status   08/19/2007 Negative, suggests no immunologic exposure.  Final   05/09/2007 Negative, suggests no immunologic exposure.  Final       Imaging:  None in [facility] since 2007.     [facility] Records   CT/PET Chest, Abdomen, and Pelvis (8/14/2017)   CONCLUSION:  1.  No lymphadenopathy. Normal appearance of the spleen. Multiple small splenules are present, which is unusual.  2.  Colonic diverticulosis.  3.  Native kidney atrophy with a right lower quadrant renal transplant.  4.  Additional findings are detailed above.       Again, thank you for allowing me to participate in the care of your patient.      Sincerely,    Criss Alamo MD

## 2017-11-03 NOTE — PATIENT INSTRUCTIONS
Zulma was seen today (July 24, 2017) at the Transplant Infectious Diseases clinic for evaluation of EBV viremia.    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 Zulma has done remarkably well after her transplantation but did have an episode of severe fatigue in the summer of 2016 at which point she was found to have EBV DNA PCR level of 702885 (log 5.1) in 8/2016. Her values have been stable with her most recent level of 793965 (log 5.2). Her physical exam is normal today. She had recent PET/CTs on 8/14/2017 at [facility] that showed no lymphadenopathy and some diverticulosis. Her immunosuppression was reduced in 2016 and has been stable without any rejection. We discussed possible treatments for EBV if her levels remain high including IVIG and rituximab. We also discussed referral to Dr. Julio Saez.    We ordered the following laboratory tests: CBC with diff, CD4 count, and EBV DNA PCR from serum    We will contact you with any pertinent results as we get them. Meanwhile feel free to contact our clinic at any time with questions and  clarifications.    A follow up appointment wasn't scheduled. We will call you on the phone about next steps depending on the results of these levels.    Thank you,    Criss Alamo MD

## 2017-11-03 NOTE — NURSING NOTE
Zulma Lovell      1.  Has the patient received the information for the influenza vaccine? YES    2.  Does the patient have any of the following contraindications?     Allergy to eggs? No     Allergic reaction to previous influenza vaccines? No     Any other problems to previous influenza vaccines? No     Paralyzed by Guillain-[place] syndrome? No     Currently pregnant? NO     Current moderate or severe illness? No     Allergy to contact lens solution? No    3.  The vaccine has been administered in the usual fashion and the patient was instructed to wait 20 minutes before leaving the building in the event of an allergic reaction: YES    Vaccination given by cindy aldana.  Recorded by Sierra Rodriguez

## 2017-11-03 NOTE — MR AVS SNAPSHOT
After Visit Summary   11/3/2017    Zulma Lovell    MRN: [id number]           Patient Information     Date Of Birth          1955        Visit Information        Provider Department      11/3/2017 11:00 AM Criss Alamo MD [facility] and Infectious Diseases        Today's Diagnoses     EBV (Bandar-Barr virus) viremia    -  1    Kidney transplanted          Care Instructions    Zulma was seen today (July 24, 2017) at the Transplant Infectious Diseases clinic for evaluation of EBV viremia.    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 Zulma has done remarkably well after her transplantation but did have an episode of severe fatigue in the summer of 2016 at which point she was found to have EBV DNA PCR level of 348815 (log 5.1) in 8/2016. Her values have been stable with her most recent level of 691504 (log 5.2). Her physical exam is normal today. She had recent PET/CTs on 8/14/2017 at [facility] that showed no lymphadenopathy and some diverticulosis. Her immunosuppression was reduced in 2016 and has been stable without any rejection. We discussed possible treatments for EBV if her levels remain high including IVIG and rituximab. We also discussed referral to Dr. Julio Saez.    We ordered the following laboratory tests: CBC with diff, CD4 count, and EBV DNA PCR from serum    We will contact you with any pertinent results as we get them. Meanwhile feel free to contact our clinic at any time with questions and  clarifications.    A follow up appointment wasn't scheduled. We will call you on the phone about next steps depending on the results of these levels.    Thank you,    Criss Alamo MD            Follow-ups after your visit        Follow-up notes from your care team     Return in about  "3 months (around 2/3/2018), or if symptoms worsen or fail to improve, for EBV viremia.      Your next 10 appointments already scheduled     Aug 06, 2018  1:05 PM CDT   (Arrive by 12:35 PM)   Return Kidney Transplant with Rod Lopez MD   [facility]ph[facility] ([facility])    [address]  [address] [facility] [address] [phone]              Who to contact     If you have questions or need follow up information about today's clinic visit or your schedule please contact [facility] AND INFECTIOUS DISEASES directly at [phone].  Normal or non-critical lab and imaging results will be communicated to you by MyChart, letter or phone within 4 business days after the clinic has received the results. If you do not hear from us within 7 days, please contact the clinic through [employer] or phone. If you have a critical or abnormal lab result, we will notify you by phone as soon as possible.  Submit refill requests through [employer] or call your pharmacy and they will forward the refill request to us. Please allow 3 business days for your refill to be completed.          Additional Information About Your Visit        [employer] Information     [employer] gives you secure access to your electronic health record. If you see a primary care provider, you can also send messages to your care team and make appointments. If you have questions, please call your primary care clinic.  If you do not have a primary care provider, please call [phone] and they will assist you.        Care EveryWhere ID     This is your Care EveryWhere ID. This could be used by other organizations to access your [place] medical records  [id number]        Your Vitals Were     Pulse Height Pulse Oximetry BMI (Body Mass Index)          64 1.6 m (5' 3\") 98% 26.13 kg/m2         Blood Pressure from Last 3 Encounters:   11/03/17 137/84   08/07/17 154/85   08/01/16 153/80    Weight from Last 3 " Encounters:   11/03/17 66.9 kg (147 lb 8 oz)   08/07/17 64.3 kg (141 lb 12.8 oz)   08/01/16 64 kg (141 lb)              Today, you had the following     No orders found for display       Primary Care Provider Office Phone # Fax #    Rene Ruggiero [phone]       [facility] [address] [place] [address]        Equal Access to Services     CORNELIA ADAME AH: Hadii aad ku hadasho Soomaali, waaxda luqadaha, qaybta kaalmada adeegyada, waxay idiin haymelvinn geri lai. So [facility] [phone].    ATENCIÓN: Si ace roy, tiene a valdivia disposición servicios gratuitos de asistencia lingüística. Llame al [phone].    We comply with applicable federal civil rights laws and [place] laws. We do not discriminate on the basis of race, color, national origin, age, disability, sex, sexual orientation, or gender identity.            Thank you!     Thank you for choosing [facility] AND INFECTIOUS DISEASES  for your care. Our goal is always to provide you with excellent care. Hearing back from our patients is one way we can continue to improve our services. Please take a few minutes to complete the written survey that you may receive in the mail after your visit with us. Thank you!             Your Updated Medication List - Protect others around you: Learn how to safely use, store and throw away your medicines at www.disposemymeds.org.          This list is accurate as of: 11/3/17 12:12 PM.  Always use your most recent med list.                   Brand Name Dispense Instructions for use Diagnosis    ACETAMINOPHEN PO      Take 1,000 mg by mouth as needed for pain        brimonidine-timolol 0.2-0.5 % ophthalmic solution    COMBIGAN     Place 1 drop into both eyes daily In morning        CENTRUM SILVER per tablet      Take 1 tablet by mouth daily        cycloSPORINE modified 25 MG capsule    GENERIC EQUIVALENT    720 capsule    Take 4 capsules (100 mg) by mouth 2 times  daily    Kidney transplanted       latanoprost 0.005 % ophthalmic solution    XALATAN     Place 1 drop Into the left eye At Bedtime        loteprednol 0.5 % ophthalmic susp    LOTEMAX     Place 1 drop Into the left eye 4 times daily        LOVAZA PO      Take 2 capsules by mouth 2 times daily.        LUCENTIS IO      Eye injections given every 11 weeks        mycophenolate 250 MG capsule    GENERIC EQUIVALENT    180 capsule    Take 1 capsule (250 mg) by mouth 2 times daily    Kidney replaced by transplant, High risk medications (not anticoagulants) long-term use, Kidney transplanted       NONFORMULARY      daily as needed Myocalm Herbal Muscle relaxer        PRAVASTATIN SODIUM PO      Take 10 mg by mouth daily        sulfamethoxazole-trimethoprim 400-80 MG per tablet    BACTRIM/SEPTRA    39 tablet    Take 1 tablet by mouth three times a week    Kidney replaced by transplant       VITAMIN D-3 PO      Take 1,000 Units by mouth.

## 2017-11-03 NOTE — NURSING NOTE
"Chief Complaint   Patient presents with     Consult For     Referal for Kidney transplanted. Scheduled per Makenzie @Transplant.        Initial /84  Pulse 64  Ht 1.6 m (5' 3\")  Wt 66.9 kg (147 lb 8 oz)  SpO2 98%  BMI 26.13 kg/m2 Estimated body mass index is 26.13 kg/(m^2) as calculated from the following:    Height as of this encounter: 1.6 m (5' 3\").    Weight as of this encounter: 66.9 kg (147 lb 8 oz).  Medication Reconciliation: complete   Steph Seth. CMA    "

## 2017-11-03 NOTE — PROGRESS NOTES
[facility]  Transplant Infectious Disease Clinic Note - New Patient     Patient:  Zulma Lovell, Date of birth 1955, Medical record number [id number]  Date of Visit:  11/03/2017  Consult requested by Dr. Rod Lopez for evaluation of EBV viremia.         Assessment and Recommendations:   Recommendations:  - CBC with diff, CD4  - EBV DNA PCR serum  - Vaccines for flu and PCV13 given today.  - Return to ID clinic in 3 months.     Transplant Infectious Disease will continue to follow with you. We will call Zulma with results, further management, and follow up.    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 In summer 2018, Zulma had a period of severe fatigue and 8/1/2016 she had an EBV DNA PCR that was 763925 (5.1). Her values have been stable with her most recent level of 004423 (log 5.2). Her physical exam is normal and PET/[facility] on 8/14/2017 at [facility] that showed no lymphadenopathy. Her immunosuppression was reduced in 2016 and has been stable without any rejection. We discussed possible treatments for EBV if her levels remain high including IVIG and rituximab. We also discussed referral to Dr. Julio Saez.  - QTc interval: 436msec in 8/2007  - Pneumocystis prophylaxis: Bactrim.  - Viral serostatus: CMV D-/R+, EBV D+/R-, VZV+, hep B immune.  - Immunization status: flu and PCV13 given today  - Gamma globulin status: unknown.  - Isolation status: Good hand hygiene.    Patient seen and discussed with transplant ID attending, Dr. Abril Perez.  Cirss Alamo MD, pgy7  Fellow in Pediatric and Adult Infectious Disease  pager:  [phone]     Attestation:  Zulma Lovell was seen in clinic on 11/3/2017,  with Criss Alamo MD, Infectious Diseases Fellow, pager [phone]. I reviewed the history & exam, vital signs, medications, labs. Assessment and plan were jointly made. I agree with the fellow's note and plan of care. Zulma Lovell is a 62 year old woman who underwent DDKTx 2007. In her first checks of EBV viremia with our latest assay, her values are running much higher than before, as we have experienced as a center with this particular assay. There is no adenopathy on exam or imaging. Now that she is on lower immune suppression, it is worth rechecking the viral load here, and in addition checking a CD4 count to review what her level is given contributions by several immune suppressing agents. There is no hard treatment direction here: watchful waiting would be fine, IV IG may bring down the viral load (although the reduction in immune suppression will have longer lasting effects), and rituxan could also be used to bring down the viral load if there is a substantial rate of rise in viral load despite immune suppression. Based on results of today's viral load check, we may or may not proceed with a referral to Dr. Julio Saez in hem/onc. Abril Perez MD. 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 Zulma reports that she has been generally healthy following her transplantation without any major complications. She was EBV negative and her donor was EBV positive. She and her transplant team have been having difficulty with appropriate monitoring laboratories through [facility] [employer]). Zulma reported having increased fatigue during the summer of 2016 at which point an EBV DNA PCR was checked in serum and found to be 814884 copies (log 5.1) on 8/1/2016. Her  immunosuppression was adjusted down, and her fatigue slowly resolved. Most recently, her drug monitoring revealed that her CSA and MMF levels were on the low end of therapeutic. Repeat EBV DNA PCR was checked on 8/7/2017 at which point her level was 965314 copies (log 5.2). Zulma reports that she has been feeling well this year but recently developed an upper respiratory infection one week ago. Since that time she has felt mildly feverish and fatigued. She has a stuffy nose and sinus pressure with a mild non-productive cough. She denied any other new symptoms at this time. She has not noticed any bumps in her neck, armpits, or groin.    Transplants:  8/19/2007 (Kidney), Postoperative day:  3729.  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  ENT:  negative for acute hearing loss, tinnitus, sore throat, currently with stuffy nose and sinus pressure for 1 week  RESPIRATORY:  negative for cough, sputum or dyspnea  CARDIOVASCULAR:  negative for chest pain, palpitations  GASTROINTESTINAL:  negative for nausea, vomiting, diarrhea or constipation  GENITOURINARY:  negative for dysuria or hematuria  HEME:  No easy bruising or bleeding  INTEGUMENT:  negative for rash or pruritus  NEURO:  Negative for headache or tremor    Past Medical History:   Diagnosis Date     Anemia in chronic kidney disease(285.21)      Dyslipidemia      High risk medications (not anticoagulants) long-term use      Hypertension      Immunosuppressed status (H)      Kidney replaced by transplant      Shingles        Past Surgical History:   Procedure Laterality Date     CATARACT IOL, RT/LT Bilateral      LAPAROSCOPY         Family History   Problem Relation Age of Onset     Alzheimer Disease Mother      Alzheimer Disease Father        Social  History     Social History Narrative    11/3/2017 - [place] with her . Two children that live nearby. Three grandchildren (5 years old to infant). Used to work as a . Daughter has a dog. No other animal exposures. [place] cruise last year. Travelled to [place] [place] 8 years ago.      Social History   Substance Use Topics     Smoking status: Never Smoker     Smokeless tobacco: Not on file     Alcohol use No       Immunization History   Administered Date(s) Administered     HepA-Adult 07/14/2008     HepB-Adult 02/05/2004, 05/03/2004, 07/14/2008     Influenza (H1N1) 11/06/2009     Influenza (IIV3) 10/11/2011, 10/17/2012, 10/14/2013, 10/20/2014, 10/26/2015, 10/10/2016     Pneumococcal 23 valent 02/03/2013     Poliovirus, inactivated (IPV) 07/14/2008     Td/Tdap (adult) Unspecified Formulation 07/02/1997, 07/14/2008     Typhoid Oral 07/14/2008       Patient Active Problem List   Diagnosis     Kidney replaced by transplant     Immunosuppressed status (H)     Anemia in chronic renal disease     Dyslipidemia     Aftercare following organ transplant     EBV (Bandar-Barr virus) viremia     Vitamin D deficiency            Current Medications & Allergies:     Current Outpatient Prescriptions on File Prior to Visit:  Ranibizumab (LUCENTIS IO) Eye injections given every 11 weeks   cycloSPORINE modified (GENERIC EQUIVALENT) 25 MG capsule Take 4 capsules (100 mg) by mouth 2 times daily   mycophenolate (CELLCEPT - GENERIC EQUIVALENT) 250 MG capsule Take 1 capsule (250 mg) by mouth 2 times daily   brimonidine-timolol (COMBIGAN) 0.2-0.5 % ophthalmic solution Place 1 drop into both eyes daily In morning   ACETAMINOPHEN PO Take 1,000 mg by mouth as needed for pain   sulfamethoxazole-trimethoprim (BACTRIM,SEPTRA) 400-80 MG per tablet Take 1 tablet by mouth three times a week   PRAVASTATIN SODIUM PO Take 10 mg by mouth daily   Multiple Vitamins-Minerals (CENTRUM SILVER) per tablet Take 1 tablet by  "mouth daily   latanoprost (XALATAN) 0.005 % ophthalmic solution Place 1 drop Into the left eye At Bedtime   loteprednol (LOTEMAX) 0.5 % ophthalmic suspension Place 1 drop Into the left eye 4 times daily   NONFORMULARY daily as needed Myocalm Herbal Muscle relaxer   Cholecalciferol (VITAMIN D-3 PO) Take 1,000 Units by mouth.   Omega-3-acid Ethyl Esters (LOVAZA PO) Take 2 capsules by mouth 2 times daily.     No current facility-administered medications on file prior to visit.       Allergies   Allergen Reactions     Fenofibrate      Other reaction(s): Pancreatitis     Simvastatin Cramps and Muscle Pain (Myalgia)     Tricor             Physical Exam:   Vitals were reviewed.  All vitals stable.  /84  Pulse 64  Ht 1.6 m (5' 3\")  Wt 66.9 kg (147 lb 8 oz)  SpO2 98%  BMI 26.13 kg/m2    Physical Examination:   GENERAL:  well-developed, well-nourished, sitting on chair in no acute distress. Breathing comfortably on room air.  HEAD:  Head is normocephalic, atraumatic.  EYES:  Eyes have anicteric sclerae without conjunctival injection.  ENT:  Oropharynx is moist without exudates or ulcers. Tongue is midline. No thrush.  NECK:  Supple.   LUNGS:  Clear to auscultation bilaterally. No increased work of breathing.   CARDIOVASCULAR:  Regular rate and rhythm with no murmurs, gallops or rubs.  ABDOMEN:  Normal bowel sounds, soft, nontender. No appreciable hepatosplenomegaly.  SKIN:  No acute rashes.  NEUROLOGIC:  Grossly nonfocal. Active x4 extremities  LYMPH: no cervical, axillary, or inguinal lymphadenopathy.         Laboratory Data:     Absolute CD4   Date Value Ref Range Status   11/03/2017 469 441 - 2156 cells/uL Final       Metabolic Studies     Recent Labs   Lab Test  08/01/16   1552  10/09/14   0001   12/21/09   0835   NA  137  138   < >  138   POTASSIUM  4.6  4.2   < >  5.9*   CHLORIDE  104  107   < >  105   CO2  26  25   < >  23   ANIONGAP  7  6   < >  9   BUN  36*  27   < >  55*   CR  1.08*  0.97   < >  1.41* "   GFRESTIMATED  52*  59*   < >  39*   GLC  96  61*   < >  103*   AICHA  9.6  9.5   < >  10.3   PHOS   --    --    --   4.2    < > = values in this interval not displayed.       Hepatic Studies  Recent Labs   Lab Test  12/21/09 0835 09/04/09 0743 09/01/09   1526   BILITOTAL  0.7  0.9   --    --    BILIDELTA  0.0  0.0   < >   --    BILICONJ  0.0  0.0   < >   --    ALKPHOS  81  91   --    --    PROTTOTAL  9.1*  8.1   --    --    ALBUMIN  4.7  4.3   --   4.6   AST  34  30   --    --    ALT  12  17   --    --     < > = values in this interval not displayed.       Hematology Studies    Recent Labs   Lab Test  08/01/16   1552  10/31/11   0945  03/31/11 0930  12/21/09 0835 09/04/09 0743  09/01/09   1526   WBC  4.4  3.2  3.4*  3.6*   --   3.2*  4.3   ANEU   --    --    --   2.6   --   1.9   --    ALYM   --    --    --   0.6*   --   0.7*   --    NILSA   --    --    --   0.4   --   0.5   --    AEOS   --    --    --   0.1   --   0.1   --    HGB  10.0*  9.3*  10.0*  9.9*   < >  9.6*  10.2*   HCT  31.8*  27.2  32.2*  32.0*   --   31.3*  32.9*   PLT  155  169  191  212   --   191  212    < > = values in this interval not displayed.       Clotting Studies    Recent Labs   Lab Test  12/21/09 0835 09/04/09   0743   INR  0.95  1.02       Thyroid Studies     Recent Labs   Lab Test  11/08/10   1721   TSH  2.16       Urine Studies   Recent Labs   Lab Test  12/21/09 0820 09/04/09   0907  09/04/09   0721   URINEPH  5.0  5.0  Duplicate request   NITRITE  Negative  Negative  Duplicate request*   LEUKEST  Small*  Large*  Duplicate request*   WBCU  2  37*  Duplicate request*       Medication levels  Recent Labs   Lab Test  09/27/17   0900   12/21/09   0835   CYCLSP  48*   < >  85   MPACID   --    --   1.50   MPAG   --    --   76.8    < > = values in this interval not displayed.       Microbiology:  Last Culture results with specimen source  Culture Micro   Date Value Ref Range Status   12/21/2009 No yeast isolated  Final    12/21/2009 10 to 50,000 colonies/mL Mixed gram positive filemon  Final     Comment:     Multiple species present, probable perineal contamination.  Susceptibility testing not routinely done   09/04/2009 Duplicate request  Final     Comment:     Canceled, Test credited   09/04/2009 No yeast isolated  Final   09/04/2009   Final    <10,000 colonies/mL Lactose fermenting gram negative rods Susceptibility testing     Comment:      not routinely done  <10,000 colonies/mL Non lactose fermenting gram negative rods Susceptibility   testing not routinely done  10 to 50,000 colonies/mL Gram positive cocci in chains Susceptibility testing   not   routinely done  Multiple species present, probable perineal contamination.   10/01/2007 <10,000 colonies/mL Gram positive cocci  Final     Comment:     <10,000 colonies/mL Staphylococcus species  Susceptibility testing not routinely done   10/01/2007 No growth  Final   10/01/2007 No growth  Final   09/12/2007 No growth  Final   09/12/2007 No anaerobes isolated  Final   08/27/2007 No growth  Final   08/26/2007 No growth  Final   08/26/2007 No growth  Final   08/26/2007 No growth  Final   08/26/2007 No growth  Final   08/22/2007 No growth  Final   08/19/2007   Final    10 to 50,000 colonies/mL Staphylococcus species Susceptibility testing not     Comment:      routinely done  <10,000 colonies/mL Gram positive cocci Susceptibility testing not routinely   done    Specimen Description   Date Value Ref Range Status   12/21/2009 Midstream Urine  Final   12/21/2009 Midstream Urine  Final   12/21/2009 Midstream Urine  Final   09/04/2009 Unspecified Urine  Final   09/04/2009 Midstream Urine  Final   09/04/2009 Midstream Urine  Final   09/04/2009 Midstream Urine  Final   10/01/2007 Midstream Urine  Final   10/01/2007 Blood Left Arm  Final   10/01/2007 Blood Right Arm  Final   09/12/2007 Fluid LYMPHOCELE  Final   09/12/2007 Fluid LYMPHOCELE  Final   09/12/2007 Fluid LYMPHOCELE  Final   08/27/2007  Unspecified Urine  Final   08/26/2007 Blood  Final   08/26/2007 Blood Right Arm  Final   08/26/2007 Blood  Final   08/26/2007 Blood  Final   08/22/2007 Catheterized Urine  Final          Virology:  Log IU/mL of CMVQNT   Date Value Ref Range Status   08/01/2016 Not Calculated <2.1 [Log_IU]/mL Final       EBV DNA Copies/mL   Date Value Ref Range Status   08/07/2017 880251 (A) EBVNEG [Copies]/mL Final   08/01/2016 894995 (A) EBVNEG [Copies]/mL Final       BK Virus Result   Date Value Ref Range Status   12/21/2009 <1000 <1000 copies/mL Final   09/04/2009 <1000 <1000 copies/mL Final   08/22/2007 <1000 <1000 copies/mL Final        Hepatitis C Antibody   Date Value Ref Range Status   08/19/2007 Negative NEG Final   05/09/2007 Negative NEG Final       CMV IgG Antibody   Date Value Ref Range Status   08/19/2007 >160.0 EU/mL Final     Comment:     Positive for anti-CMV IgG       EBV IgG Antibody Interpretation   Date Value Ref Range Status   08/19/2007 Negative, suggests no immunologic exposure.  Final   05/09/2007 Negative, suggests no immunologic exposure.  Final       Imaging:  None in [facility] since 2007.     [facility] Records   CT/PET Chest, Abdomen, and Pelvis (8/14/2017)   CONCLUSION:  1.  No lymphadenopathy. Normal appearance of the spleen. Multiple small splenules are present, which is unusual.  2.  Colonic diverticulosis.  3.  Native kidney atrophy with a right lower quadrant renal transplant.  4.  Additional findings are detailed above.

## 2017-11-06 ENCOUNTER — RECORDS - HEALTHEAST (OUTPATIENT)
Dept: LAB | Facility: CLINIC | Age: 62
End: 2017-11-06

## 2017-11-06 LAB
CHOLEST SERPL-MCNC: 219 MG/DL
EBV DNA # SPEC NAA+PROBE: ABNORMAL {COPIES}/ML
EBV DNA SPEC NAA+PROBE-LOG#: 5.1 {LOG_COPIES}/ML
FASTING STATUS PATIENT QL REPORTED: ABNORMAL
HDLC SERPL-MCNC: 49 MG/DL
LDLC SERPL CALC-MCNC: 143 MG/DL
TRIGL SERPL-MCNC: 134 MG/DL

## 2017-11-07 NOTE — PROGRESS NOTES
Zulma's EBV level is similar to the previous values obtained with our newer assay despite her reduced immunosuppression. Her absolute CD4 is 469, which means it would likely be safe to stop PJP prophylaxis. We will refer Zulma to Dr. Saez for further evaluation and treatment of her EBV viremia. We discussed possible treatments with Zulma, including watchful waiting, IVIG, and rituximab.

## 2017-11-09 DIAGNOSIS — B27.00 EBV (EPSTEIN-BARR VIRUS) VIREMIA: Primary | ICD-10-CM

## 2017-11-13 ENCOUNTER — TELEPHONE (OUTPATIENT)
Dept: TRANSPLANT | Facility: CLINIC | Age: 62
End: 2017-11-13

## 2017-11-20 ENCOUNTER — TELEPHONE (OUTPATIENT)
Dept: TRANSPLANT | Facility: CLINIC | Age: 62
End: 2017-11-20

## 2017-11-20 DIAGNOSIS — Z48.298 AFTERCARE FOLLOWING ORGAN TRANSPLANT: Primary | ICD-10-CM

## 2017-11-20 DIAGNOSIS — Z94.0 KIDNEY REPLACED BY TRANSPLANT: ICD-10-CM

## 2017-11-20 DIAGNOSIS — Z79.899 ENCOUNTER FOR LONG-TERM CURRENT USE OF MEDICATION: ICD-10-CM

## 2017-11-20 NOTE — TELEPHONE ENCOUNTER
Patient Call: Transplant Lab  Reason for call: Annual lab reorder   Is now using [facility] Is going to have labs drawn tomorrow 11/21/17  Please add labs into EPIC

## 2017-11-22 DIAGNOSIS — Z94.0 KIDNEY REPLACED BY TRANSPLANT: ICD-10-CM

## 2017-11-22 DIAGNOSIS — Z48.298 AFTERCARE FOLLOWING ORGAN TRANSPLANT: ICD-10-CM

## 2017-11-22 DIAGNOSIS — Z79.899 ENCOUNTER FOR LONG-TERM CURRENT USE OF MEDICATION: ICD-10-CM

## 2017-11-22 LAB
ANION GAP SERPL CALCULATED.3IONS-SCNC: 6 MMOL/L (ref 3–14)
BUN SERPL-MCNC: 27 MG/DL (ref 7–30)
CALCIUM SERPL-MCNC: 9.6 MG/DL (ref 8.5–10.1)
CHLORIDE SERPL-SCNC: 107 MMOL/L (ref 94–109)
CO2 SERPL-SCNC: 24 MMOL/L (ref 20–32)
CREAT SERPL-MCNC: 1.06 MG/DL (ref 0.52–1.04)
CYCLOSPORINE BLD LC/MS/MS-MCNC: 49 UG/L (ref 50–400)
ERYTHROCYTE [DISTWIDTH] IN BLOOD BY AUTOMATED COUNT: 12.2 % (ref 10–15)
GFR SERPL CREATININE-BSD FRML MDRD: 53 ML/MIN/1.7M2
GLUCOSE SERPL-MCNC: 115 MG/DL (ref 70–99)
HCT VFR BLD AUTO: 35.5 % (ref 35–47)
HGB BLD-MCNC: 11.4 G/DL (ref 11.7–15.7)
MCH RBC QN AUTO: 30.2 PG (ref 26.5–33)
MCHC RBC AUTO-ENTMCNC: 32.1 G/DL (ref 31.5–36.5)
MCV RBC AUTO: 94 FL (ref 78–100)
PLATELET # BLD AUTO: 185 10E9/L (ref 150–450)
POTASSIUM SERPL-SCNC: 5.1 MMOL/L (ref 3.4–5.3)
RBC # BLD AUTO: 3.78 10E12/L (ref 3.8–5.2)
SODIUM SERPL-SCNC: 137 MMOL/L (ref 133–144)
TME LAST DOSE: ABNORMAL H
WBC # BLD AUTO: 3.9 10E9/L (ref 4–11)

## 2017-11-22 PROCEDURE — 80180 DRUG SCRN QUAN MYCOPHENOLATE: CPT | Performed by: INTERNAL MEDICINE

## 2017-11-22 PROCEDURE — 87799 DETECT AGENT NOS DNA QUANT: CPT | Performed by: INTERNAL MEDICINE

## 2017-11-22 PROCEDURE — 80048 BASIC METABOLIC PNL TOTAL CA: CPT | Performed by: INTERNAL MEDICINE

## 2017-11-22 PROCEDURE — 85027 COMPLETE CBC AUTOMATED: CPT | Performed by: INTERNAL MEDICINE

## 2017-11-22 PROCEDURE — 80158 DRUG ASSAY CYCLOSPORINE: CPT | Performed by: INTERNAL MEDICINE

## 2017-11-22 PROCEDURE — 36415 COLL VENOUS BLD VENIPUNCTURE: CPT | Performed by: INTERNAL MEDICINE

## 2017-11-23 LAB
MYCOPHENOLATE SERPL LC/MS/MS-MCNC: 0.49 MG/L (ref 1–3.5)
MYCOPHENOLATE-G SERPL LC/MS/MS-MCNC: 16.5 MG/L (ref 30–95)
TME LAST DOSE: ABNORMAL H

## 2017-11-24 LAB
EBV DNA # SPEC NAA+PROBE: ABNORMAL {COPIES}/ML
EBV DNA SPEC NAA+PROBE-LOG#: 5.2 {LOG_COPIES}/ML

## 2017-11-27 DIAGNOSIS — Z79.899 ENCOUNTER FOR LONG-TERM CURRENT USE OF MEDICATION: ICD-10-CM

## 2017-11-27 DIAGNOSIS — Z94.0 KIDNEY REPLACED BY TRANSPLANT: ICD-10-CM

## 2017-11-27 DIAGNOSIS — Z48.298 AFTERCARE FOLLOWING ORGAN TRANSPLANT: ICD-10-CM

## 2017-11-27 LAB
PROT UR-MCNC: 0.06 G/L
PROT/CREAT 24H UR: 0.16 G/G CR (ref 0–0.2)

## 2017-11-27 PROCEDURE — 84156 ASSAY OF PROTEIN URINE: CPT | Performed by: INTERNAL MEDICINE

## 2017-11-30 ENCOUNTER — OFFICE VISIT (OUTPATIENT)
Dept: TRANSPLANT | Facility: CLINIC | Age: 62
End: 2017-11-30
Attending: INTERNAL MEDICINE
Payer: COMMERCIAL

## 2017-11-30 VITALS
SYSTOLIC BLOOD PRESSURE: 155 MMHG | OXYGEN SATURATION: 98 % | RESPIRATION RATE: 16 BRPM | HEART RATE: 72 BPM | WEIGHT: 146.4 LBS | TEMPERATURE: 98.2 F | DIASTOLIC BLOOD PRESSURE: 90 MMHG | HEIGHT: 63 IN | BODY MASS INDEX: 25.94 KG/M2

## 2017-11-30 DIAGNOSIS — B27.00 EBV (EPSTEIN-BARR VIRUS) VIREMIA: ICD-10-CM

## 2017-11-30 PROCEDURE — 99212 OFFICE O/P EST SF 10 MIN: CPT | Mod: ZF

## 2017-11-30 ASSESSMENT — PAIN SCALES - GENERAL: PAINLEVEL: NO PAIN (0)

## 2017-11-30 NOTE — MR AVS SNAPSHOT
After Visit Summary   11/30/2017    Zulma Lovell    MRN: [id number]           Patient Information     Date Of Birth          1955        Visit Information        Provider Department      11/30/2017 8:00 AM Taye Saez MD [facility] Blood and Marrow Transplant        Today's Diagnoses     EBV (Bandar-Barr virus) viremia              [facility] ([facility])  [address] [place] [address]  Phone: [phone]  Clinic Hours:   Monday-Thursday:7am to 7pm   Friday: 7am to 5pm   Weekends and holidays:    8am to noon (in general)  If your fever is 100.5  or greater,   call the clinic.  After hours call the   hospital at [phone] or   [phone]. Ask for the BMT   fellow on-call            Follow-ups after your visit        Your next 10 appointments already scheduled     Aug 06, 2018  1:05 PM CDT   (Arrive by 12:35 PM)   Return Kidney Transplant with Rod Lopez MD   [facility] Nephrology ([facility])    [address] [place] [id number] [phone]              Who to contact     If you have questions or need follow up information about today's clinic visit or your schedule please contact [facility] BLOOD AND MARROW TRANSPLANT directly at [phone].  Normal or non-critical lab and imaging results will be communicated to you by [employer], letter or phone within 4 business days after the clinic has received the results. If you do not hear from us within 7 days, please contact the clinic through [employer] or phone. If you have a critical or abnormal lab result, we will notify you by phone as soon as possible.  Submit refill requests through [employer] or call your pharmacy and they will forward the refill request to us. Please allow 3 business days for your refill to be completed.          Additional Information About Your Visit        [employer] Information     [employer] gives you secure access to your electronic  "health record. If you see a primary care provider, you can also send messages to your care team and make appointments. If you have questions, please call your primary care clinic.  If you do not have a primary care provider, please call [phone] and they will assist you.        Care EveryWhere ID     This is your Care EveryWhere ID. This could be used by other organizations to access your [place] medical records  [id number]        Your Vitals Were     Pulse Temperature Respirations Height Last Period Pulse Oximetry    72 98.2  F (36.8  C) (Oral) 16 1.6 m (5' 2.99\") (LMP Unknown) 98%    Breastfeeding? BMI (Body Mass Index)                No 25.94 kg/m2           Blood Pressure from Last 3 Encounters:   11/30/17 155/90   11/03/17 137/84   08/07/17 154/85    Weight from Last 3 Encounters:   11/30/17 66.4 kg (146 lb 6.4 oz)   11/03/17 66.9 kg (147 lb 8 oz)   08/07/17 64.3 kg (141 lb 12.8 oz)              Today, you had the following     No orders found for display       Recent Review Flowsheet Data     BMT Recent Results Latest Ref Rng & Units 12/21/2009 3/31/2011 10/31/2011 10/9/2014 8/1/2016 11/3/2017 11/22/2017    WBC 4.0 - 11.0 10e9/L 3.6(L) 3.4(L) 3.2 - 4.4 4.0 3.9(L)    Hemoglobin 11.7 - 15.7 g/dL 9.9(L) 10.0(L) 9.3(A) - 10.0(L) 11.5(L) 11.4(L)    Platelet Count 150 - 450 10e9/L 212 191 169 - 155 183 185    Neutrophils (Absolute) 1.6 - 8.3 10e9/L 2.6 - - - - 1.5(L) -    INR 0.86 - 1.14 0.95 - - - - - -    Sodium 133 - 144 mmol/L 138 139 133 138 137 - 137    Potassium 3.4 - 5.3 mmol/L 5.9(H) 6.6(HH) 5.3 4.2 4.6 - 5.1    Chloride 94 - 109 mmol/L 105 103 103 107 104 - 107    Glucose 70 - 99 mg/dL 103(H) 54(L) 98 61(L) 96 - 115(H)    Urea Nitrogen 7 - 30 mg/dL 55(H) 38(H) 49 27 36(H) - 27    Creatinine 0.52 - 1.04 mg/dL 1.41(H) 1.39(H) 1.62 0.97 1.08(H) - 1.06(H)    Calcium (Total) 8.5 - 10.1 mg/dL 10.3 10.6(H) 9.9 9.5 9.6 - 9.6    Protein (Total) 6.8 - 8.8 g/dL 9.1(H) - - - - - -    Albumin 3.9 - 5.1 g/dL 4.7 " - - - - - -    Alkaline Phosphatase 40 - 150 U/L 81 - - - - - -    AST 0 - 45 U/L 34 - - - - - -    ALT 0 - 50 U/L 12 - - - - - -    MCV 78 - 100 fl 91 91 - - 94 93 94               Primary Care Provider Office Phone # Fax #    Rene Ruggiero [phone]       [facility] [address] [facility] [address]        Equal Access to Services     CORNELIA ADAME : Hadii aad ku hadasho Soomaali, waaxda luqadaha, qaybta kaalmada adeegyada, waxay jonathanin haymelvinn geri lairdyovanyrani soliz . So [facility] [phone].    ATENCIÓN: Si ace roy, tiene a valdivia disposición servicios gratuitos de asistencia lingüística. [facility] [phone].    We comply with applicable federal civil rights laws and [place] laws. We do not discriminate on the basis of race, color, national origin, age, disability, sex, sexual orientation, or gender identity.            Thank you!     Thank you for choosing [facility] BLOOD AND MARROW TRANSPLANT  for your care. Our goal is always to provide you with excellent care. Hearing back from our patients is one way we can continue to improve our services. Please take a few minutes to complete the written survey that you may receive in the mail after your visit with us. Thank you!             Your Updated Medication List - Protect others around you: Learn how to safely use, store and throw away your medicines at www.disposemymeds.org.          This list is accurate as of: 11/30/17 11:59 PM.  Always use your most recent med list.                   Brand Name Dispense Instructions for use Diagnosis    ACETAMINOPHEN PO      Take 1,000 mg by mouth as needed for pain        brimonidine-timolol 0.2-0.5 % ophthalmic solution    COMBIGAN     Place 1 drop into both eyes daily In morning        CENTRUM SILVER per tablet      Take 1 tablet by mouth daily        cycloSPORINE modified 25 MG capsule    GENERIC EQUIVALENT    720 capsule    Take 4 capsules (100 mg) by mouth 2 times daily    Kidney  transplanted       latanoprost 0.005 % ophthalmic solution    XALATAN     Place 1 drop Into the left eye At Bedtime        loteprednol 0.5 % ophthalmic susp    LOTEMAX     Place 1 drop Into the left eye 4 times daily        LOVAZA PO      Take 2 capsules by mouth 2 times daily.        LUCENTIS IO      Eye injections given every 11 weeks        mycophenolate 250 MG capsule    GENERIC EQUIVALENT    180 capsule    Take 1 capsule (250 mg) by mouth 2 times daily    Kidney replaced by transplant, High risk medications (not anticoagulants) long-term use, Kidney transplanted       NONFORMULARY      daily as needed Myocalm Herbal Muscle relaxer        PRAVASTATIN SODIUM PO      Take 10 mg by mouth daily        sulfamethoxazole-trimethoprim 400-80 MG per tablet    BACTRIM/SEPTRA    39 tablet    Take 1 tablet by mouth three times a week    Kidney replaced by transplant       VITAMIN D-3 PO      Take 1,000 Units by mouth.

## 2017-11-30 NOTE — PROGRESS NOTES
REASON FOR VISIT:  Referral for history of EBV viremia.        REFERRING PHYSICIANS:  Dr. Abril Perez, Dr. Lopez     HISTORY OF PRESENT ILLNESS/REVIEW OF SYSTEMS:  Ms. Lovell is a very pleasant 62-year-old female with a prior history of end-stage renal disease due to acute pancreatitis (attributed to tricor).  She received a  donor kidney transplant back in  with immunosuppression including cyclosporine and mycophenolate.  Apparently, her mycophenolate dose was reported to be 250 mg b.i.d., and she was taking cyclosporine 100 mg b.i.d. up until August of this year, when immunosuppression was reduced due to EBV viremia.      The patient had noticed feeling more fatigued for the past year.  Apparently, her EBV titers were checked in 2016 demonstrating elevated titers at around 138,000 copies.  Her subsequent viral load in August of this year was in the 150,000 range, with repeat values fairly similar in November of this year.      Upon further discussion with the patient and her spouse, her energy level has been slightly decreased for the past few years since she has been retired.        She denied any fevers, chills or drenching night sweats.  She reports no recurrent infections for the past year.  She reported no weight loss.  She is unaware about any lumps across her body.  She did undergo a CT scan with contrast in 2017 at [facility], which demonstrated no lymphadenopathy, and evidence of some diverticular disease.      She has been recently seen and evaluated by our Infectious Disease colleagues for her persistent CMV viremia, and presents for a second opinion here in the Lymphoma Clinic at the [facility].      The rest of 12-points of ROS were reviewed and found to be negative, unless as mentioned above.      Of note, her donor kidney CMV serostatus was donor negative, recipient positive, and EBV serostatus was donor positive and recipient negative.      PAST MEDICAL  HISTORY:  History of osteoarthritis.      PAST SURGICAL HISTORY:  See Epic.      ALLERGIES:  Tricor, simvastatin, fenofibrate.      FAMILY HISTORY:     - No history of hematologic malignancies in first-degree relatives.     - The patient has 2 children in good health.      SOCIAL HISTORY:     - No prior smoking or ethanol use.   - No reported occupational exposures.      PHYSICAL EXAMINATION:   VITAL SIGNS:  Reviewed in Epic and found to be acceptable.   GENERAL:  Not in acute distress, alert and oriented, well-nourished and hydrated.   ECOG PERFORMANCE STATUS:  0.   KPS:  100%.   HEENT:  Moist mucous membranes with good dentition.  Pupils are equally round and reactive to light with no conjunctival erythema or jaundice.   PULMONARY:  Clear to auscultation bilaterally.   CARDIOVASCULAR:  Regular rate and rhythm, no murmurs.   ABDOMEN:  Soft, nontender and nondistended, no palpable hepatosplenomegaly.   EXTREMITIES:  No lower extremity edema.   LYMPHATICS:  No palpable lymph nodes in the axilla, groin or neck.   NEUROLOGIC:  Grossly nonfocal.      LABORATORY DATA:     - I reviewed available labs from early November demonstrating slight anemia, but otherwise unremarkable.   - LDH was ordered from today and pending.   - Her EBV titer from 11/22/2017 was 143,000 copies.   - T-cell subsets from early November demonstrated an absolute CD4 count of 469, but decreased percent of CD4 T helpers and increase of CD8 suppresser T-cells with impaired CD4/CD8 ratio of 0.5.      IMAGING:  See [facility] for outside CT of the chest, abdomen and pelvis in 08/2017 with no evidence of lymphadenopathy.      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  Presenting for a second opinion on  treatment of her EBV viremia.     - EBV reactivation:  We discussed with the patient the natural history of EBV reactivation in yje solid organ transplant setting with a particular emphasis on the risk of development of PTLD. I explained to the patient that most of late-onset PTLDs are actually EBV negative; however, given her discordant EBV serostatus at the time of transplant, she is certainly at risk for EBV reactivation post-transplant with EBV-associated lymphoproliferative disorder.  There has been no evidence of lymphoma based on her symptoms and exam today.  In addition, her most recent CT imaging did not demonstrate any lymphadenopathy.  Her viral titers have remained stable for the past 3 months.  Apparently, the patient underwent reduction of immunosuppression with no interval meaningful improvement in her EBV viremia control.  We then discussed the data on preemptive therapy of EBV viremia with Rituxan in the setting of solid organ transplant.  I recommended giving a single infusion of Rituxan consideration, given persistent viremia, no response to reduction of immunosuppression, and certainly potential risk of PTLD development down the road given discordant serostatus between donor and recipient to EBV.  The patient had an opportunity to ask multiple questions, all of which were answered to the best of my ability.  She voiced understanding and agreement with this plan.  I will communicate with Dr. Perez and Spong my recommendation and agreement to proceed with a single infusion of Rituxan with repeat check of her EBV titers.  Should her titers resolve, I would recommend early discontinuation of Rituxan.  Otherwise, it can be continued up to 4 infusions.   Thank you very much for this referral, I will be glad to contribute to the care of this pt in the future should she be found to have a PTLD.     I spent altogether over 50% of this over an hour encounter in reviewing her complex medical situation and  providing recommendations.      Taye Saez MD  PhD  Hematology, Oncology and Transplantation     [facility]

## 2017-11-30 NOTE — NURSING NOTE
"Oncology Rooming Note    November 30, 2017 8:29 AM   Zulma Lovell is a 62 year old female who presents for:    Chief Complaint   Patient presents with     RECHECK     Immunosuppressed status      Initial Vitals: /90 (BP Location: Right arm, Patient Position: Chair, Cuff Size: Adult Regular)  Pulse 72  Temp 98.2  F (36.8  C) (Oral)  Resp 16  Ht 1.6 m (5' 2.99\")  Wt 66.4 kg (146 lb 6.4 oz)  LMP  (LMP Unknown)  SpO2 98%  Breastfeeding? No  BMI 25.94 kg/m2 Estimated body mass index is 25.94 kg/(m^2) as calculated from the following:    Height as of this encounter: 1.6 m (5' 2.99\").    Weight as of this encounter: 66.4 kg (146 lb 6.4 oz). Body surface area is 1.72 meters squared.  No Pain (0) Comment: Data Unavailable   No LMP recorded (lmp unknown). Patient is postmenopausal.  Allergies reviewed: Yes  Medications reviewed: Yes    Medications: Medication refills not needed today.  Pharmacy name entered into [facility]:    [facility] - [place], [place] - [address] [facility] [place], [place] - [address]    Clinical concerns:  No new concerns  Provider was notified.    7 minutes for nursing intake (face to face time)     Mely Herrera MA                        "

## 2017-11-30 NOTE — LETTER
2017       RE: Zulma Lovell  [address]  [facility] [place] [address]     Dear Colleague,    Thank you for referring your patient, Zulma Lovell, to the [facility] BLOOD AND MARROW TRANSPLANT at [facility]. Please see a copy of my visit note below.    REASON FOR VISIT:  Referral for history of EBV viremia.        REFERRING PHYSICIANS:  Dr. Abril Perez, Dr. Lopez     HISTORY OF PRESENT ILLNESS/REVIEW OF SYSTEMS:  Ms. Lovell is a very pleasant 62-year-old female with a prior history of end-stage renal disease due to acute pancreatitis (attributed to tricor).  She received a  donor kidney transplant back in  with immunosuppression including cyclosporine and mycophenolate.  Apparently, her mycophenolate dose was reported to be 250 mg b.i.d., and she was taking cyclosporine 100 mg b.i.d. up until August of this year, when immunosuppression was reduced due to EBV viremia.      The patient had noticed feeling more fatigued for the past year.  Apparently, her EBV titers were checked in 2016 demonstrating elevated titers at around 138,000 copies.  Her subsequent viral load in August of this year was in the 150,000 range, with repeat values fairly similar in November of this year.      Upon further discussion with the patient and her spouse, her energy level has been slightly decreased for the past few years since she has been retired.        She denied any fevers, chills or drenching night sweats.  She reports no recurrent infections for the past year.  She reported no weight loss.  She is unaware about any lumps across her body.  She did undergo a CT scan with contrast in 2017 at [facility] [place] [facility], which demonstrated no lymphadenopathy, and evidence of some diverticular disease.      She has been recently seen and evaluated by our Infectious Disease colleagues for her persistent CMV viremia, and presents for a second opinion here in the Lymphoma Clinic  at the [facility].      The rest of 12-points of ROS were reviewed and found to be negative, unless as mentioned above.      Of note, her donor kidney CMV serostatus was donor negative, recipient positive, and EBV serostatus was donor positive and recipient negative.      PAST MEDICAL HISTORY:  History of osteoarthritis.      PAST SURGICAL HISTORY:  See Epic.      ALLERGIES:  Tricor, simvastatin, fenofibrate.      FAMILY HISTORY:     - No history of hematologic malignancies in first-degree relatives.     - The patient has 2 children in good health.      SOCIAL HISTORY:     - No prior smoking or ethanol use.   - No reported occupational exposures.      PHYSICAL EXAMINATION:   VITAL SIGNS:  Reviewed in Epic and found to be acceptable.   GENERAL:  Not in acute distress, alert and oriented, well-nourished and hydrated.   ECOG PERFORMANCE STATUS:  0.   KPS:  100%.   HEENT:  Moist mucous membranes with good dentition.  Pupils are equally round and reactive to light with no conjunctival erythema or jaundice.   PULMONARY:  Clear to auscultation bilaterally.   CARDIOVASCULAR:  Regular rate and rhythm, no murmurs.   ABDOMEN:  Soft, nontender and nondistended, no palpable hepatosplenomegaly.   EXTREMITIES:  No lower extremity edema.   LYMPHATICS:  No palpable lymph nodes in the axilla, groin or neck.   NEUROLOGIC:  Grossly nonfocal.      LABORATORY DATA:     - I reviewed available labs from early November demonstrating slight anemia, but otherwise unremarkable.   - LDH was ordered from today and pending.   - Her EBV titer from 11/22/2017 was 143,000 copies.   - T-cell subsets from early November demonstrated an absolute CD4 count of 469, but decreased percent of CD4 T helpers and increase of CD8 suppresser T-cells with impaired CD4/CD8 ratio of 0.5.      IMAGING:  See [facility] for outside CT of the chest, abdomen and pelvis in 08/2017 with no evidence of lymphadenopathy.      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  Presenting for a second opinion on treatment of her EBV viremia.     - EBV reactivation:  We discussed with the patient the natural history of EBV reactivation in yje solid organ transplant setting with a particular emphasis on the risk of development of PTLD. I explained to the patient that most of late-onset PTLDs are actually EBV negative; however, given her discordant EBV serostatus at the time of transplant, she is certainly at risk for EBV reactivation post-transplant with EBV-associated lymphoproliferative disorder.  There has been no evidence of lymphoma based on her symptoms and exam today.  In addition, her most recent CT imaging did not demonstrate any lymphadenopathy.  Her viral titers have remained stable for the past 3 months.  Apparently, the patient underwent reduction of immunosuppression with no interval meaningful improvement in her EBV viremia control.  We then discussed the data on preemptive therapy of EBV viremia with Rituxan in the setting of solid organ transplant.  I recommended giving a single infusion of Rituxan consideration, given persistent viremia, no response to reduction of immunosuppression, and certainly potential risk of PTLD development down the road given discordant serostatus between donor and recipient to EBV.  The patient had an opportunity to ask multiple questions, all of which were answered to the best of my ability.  She voiced understanding and agreement with this plan.  I will communicate with Dr. Perez and Spong my recommendation and agreement to proceed with a single infusion of Rituxan with repeat check of her EBV titers.  Should her titers resolve, I would recommend early discontinuation of Rituxan.   Otherwise, it can be continued up to 4 infusions.   Thank you very much for this referral, I will be glad to contribute to the care of this pt in the future should she be found to have a PTLD.     I spent altogether over 50% of this over an hour encounter in reviewing her complex medical situation and providing recommendations.      Taye Saez MD  PhD  Hematology, Oncology and Transplantation     [facility]

## 2017-12-21 DIAGNOSIS — Z48.298 AFTERCARE FOLLOWING ORGAN TRANSPLANT: ICD-10-CM

## 2017-12-21 DIAGNOSIS — Z79.899 ENCOUNTER FOR LONG-TERM CURRENT USE OF MEDICATION: ICD-10-CM

## 2017-12-21 DIAGNOSIS — Z94.0 KIDNEY REPLACED BY TRANSPLANT: ICD-10-CM

## 2017-12-21 LAB
ANION GAP SERPL CALCULATED.3IONS-SCNC: 10 MMOL/L (ref 3–14)
BUN SERPL-MCNC: 28 MG/DL (ref 7–30)
CALCIUM SERPL-MCNC: 9.7 MG/DL (ref 8.5–10.1)
CHLORIDE SERPL-SCNC: 107 MMOL/L (ref 94–109)
CO2 SERPL-SCNC: 24 MMOL/L (ref 20–32)
CREAT SERPL-MCNC: 1.1 MG/DL (ref 0.52–1.04)
CYCLOSPORINE BLD LC/MS/MS-MCNC: 46 UG/L (ref 50–400)
ERYTHROCYTE [DISTWIDTH] IN BLOOD BY AUTOMATED COUNT: 12.2 % (ref 10–15)
GFR SERPL CREATININE-BSD FRML MDRD: 50 ML/MIN/1.7M2
GLUCOSE SERPL-MCNC: 91 MG/DL (ref 70–99)
HCT VFR BLD AUTO: 36.9 % (ref 35–47)
HGB BLD-MCNC: 11.7 G/DL (ref 11.7–15.7)
MCH RBC QN AUTO: 30 PG (ref 26.5–33)
MCHC RBC AUTO-ENTMCNC: 31.7 G/DL (ref 31.5–36.5)
MCV RBC AUTO: 95 FL (ref 78–100)
PLATELET # BLD AUTO: 182 10E9/L (ref 150–450)
POTASSIUM SERPL-SCNC: 5.2 MMOL/L (ref 3.4–5.3)
RBC # BLD AUTO: 3.9 10E12/L (ref 3.8–5.2)
SODIUM SERPL-SCNC: 141 MMOL/L (ref 133–144)
TME LAST DOSE: ABNORMAL H
WBC # BLD AUTO: 3.5 10E9/L (ref 4–11)

## 2017-12-21 PROCEDURE — 80180 DRUG SCRN QUAN MYCOPHENOLATE: CPT | Performed by: INTERNAL MEDICINE

## 2017-12-21 PROCEDURE — 87799 DETECT AGENT NOS DNA QUANT: CPT | Performed by: INTERNAL MEDICINE

## 2017-12-21 PROCEDURE — 80158 DRUG ASSAY CYCLOSPORINE: CPT | Performed by: INTERNAL MEDICINE

## 2017-12-21 PROCEDURE — 80048 BASIC METABOLIC PNL TOTAL CA: CPT | Performed by: INTERNAL MEDICINE

## 2017-12-21 PROCEDURE — 36415 COLL VENOUS BLD VENIPUNCTURE: CPT | Performed by: INTERNAL MEDICINE

## 2017-12-21 PROCEDURE — 85027 COMPLETE CBC AUTOMATED: CPT | Performed by: INTERNAL MEDICINE

## 2017-12-22 ENCOUNTER — TELEPHONE (OUTPATIENT)
Dept: TRANSPLANT | Facility: CLINIC | Age: 62
End: 2017-12-22

## 2017-12-22 DIAGNOSIS — Z94.0 KIDNEY TRANSPLANTED: ICD-10-CM

## 2017-12-22 LAB
EBV DNA # SPEC NAA+PROBE: ABNORMAL {COPIES}/ML
EBV DNA SPEC NAA+PROBE-LOG#: 5.3 {LOG_COPIES}/ML

## 2017-12-22 RX ORDER — CYCLOSPORINE 25 MG/1
125 CAPSULE, LIQUID FILLED ORAL 2 TIMES DAILY
Qty: 900 CAPSULE | Refills: 3 | Status: SHIPPED | OUTPATIENT
Start: 2017-12-22 | End: 2018-02-09

## 2017-12-22 RX ORDER — ACETAMINOPHEN 325 MG/1
650 TABLET ORAL ONCE
Status: CANCELLED
Start: 2017-12-22

## 2017-12-22 RX ORDER — METHYLPREDNISOLONE SODIUM SUCCINATE 125 MG/2ML
125 INJECTION, POWDER, LYOPHILIZED, FOR SOLUTION INTRAMUSCULAR; INTRAVENOUS ONCE
Status: CANCELLED | OUTPATIENT
Start: 2017-12-22

## 2017-12-22 RX ORDER — DIPHENHYDRAMINE HCL 25 MG
50 CAPSULE ORAL ONCE
Status: CANCELLED
Start: 2017-12-22

## 2017-12-22 NOTE — LETTER
PHYSICIAN ORDERS      DATE & TIME ISSUED: 2017 12:12 PM  PATIENT NAME: Zulma Lovell   : 1955     [facility] MR# [if applicable]: [id number]     DIAGNOSIS:  Kidney transplant  ICD-9 CODE: Z94.0      Please recheck the following lab work within 1-2 weeks of dose change.   Cyclosporine level   Creatinine level    Any questions please call: [phone]    Please fax these results to [phone].    .

## 2017-12-22 NOTE — TELEPHONE ENCOUNTER
Cyclosporine level 46, previous level 49.  Goal 50-75 for persistent EBV viremia  Mycophenolate reduced to 250 mg twice daily    PLAN:  Increase cyclosporine dose to 125 mg twice daily  Check cyclosporine level and creatinine 1-2 weeks after dose change    TASK:  Call with instructions per plan.

## 2017-12-22 NOTE — PROGRESS NOTES
Received call from patient with question about timing of infusion. She reported that she was awaiting a call from our office to schedule a rituximab infusion following her appointment with Dr. Saez. We discussed the procedure involved with a rituximab infusion as well as the potential amount of time that the infusion can take. Therapy plan was ordered. We will contact the patient with the appointment.    Criss Alamo MD  Pediatric and Adult Transplant ID  Pager [phone]    Patient discussed with Dr. Abril Perez.

## 2017-12-22 NOTE — TELEPHONE ENCOUNTER
Call placed to patient: No answer. Detailed voice message left informing patient to increase cyclosporine dose to 125 mg twice daily and recheck level in 1-2 weeks of dose change

## 2017-12-23 LAB
MYCOPHENOLATE SERPL LC/MS/MS-MCNC: 0.83 MG/L (ref 1–3.5)
MYCOPHENOLATE-G SERPL LC/MS/MS-MCNC: 19.3 MG/L (ref 30–95)
TME LAST DOSE: ABNORMAL H

## 2017-12-26 NOTE — TELEPHONE ENCOUNTER
F\U call placed to patient: Patient verbalize understanding to increase CSA dose to 125 mg twice daily and recheck lab work in 1-2 weeks after dose change. Order/rx sent

## 2017-12-29 DIAGNOSIS — Z94.0 KIDNEY REPLACED BY TRANSPLANT: ICD-10-CM

## 2017-12-29 DIAGNOSIS — Z79.899 ENCOUNTER FOR LONG-TERM CURRENT USE OF MEDICATION: ICD-10-CM

## 2017-12-29 DIAGNOSIS — Z48.298 AFTERCARE FOLLOWING ORGAN TRANSPLANT: ICD-10-CM

## 2017-12-29 LAB
ANION GAP SERPL CALCULATED.3IONS-SCNC: 8 MMOL/L (ref 3–14)
BUN SERPL-MCNC: 28 MG/DL (ref 7–30)
CALCIUM SERPL-MCNC: 9.3 MG/DL (ref 8.5–10.1)
CHLORIDE SERPL-SCNC: 105 MMOL/L (ref 94–109)
CO2 SERPL-SCNC: 26 MMOL/L (ref 20–32)
CREAT SERPL-MCNC: 1.03 MG/DL (ref 0.52–1.04)
CYCLOSPORINE BLD LC/MS/MS-MCNC: 110 UG/L (ref 50–400)
GFR SERPL CREATININE-BSD FRML MDRD: 54 ML/MIN/1.7M2
GLUCOSE SERPL-MCNC: 101 MG/DL (ref 70–99)
POTASSIUM SERPL-SCNC: 4.7 MMOL/L (ref 3.4–5.3)
SODIUM SERPL-SCNC: 139 MMOL/L (ref 133–144)
TME LAST DOSE: NORMAL H

## 2017-12-29 PROCEDURE — 36415 COLL VENOUS BLD VENIPUNCTURE: CPT | Performed by: INTERNAL MEDICINE

## 2017-12-29 PROCEDURE — 80158 DRUG ASSAY CYCLOSPORINE: CPT | Performed by: INTERNAL MEDICINE

## 2017-12-29 PROCEDURE — 80048 BASIC METABOLIC PNL TOTAL CA: CPT | Performed by: INTERNAL MEDICINE

## 2018-01-04 ENCOUNTER — TELEPHONE (OUTPATIENT)
Dept: INFECTIOUS DISEASES | Facility: CLINIC | Age: 63
End: 2018-01-04

## 2018-01-04 NOTE — TELEPHONE ENCOUNTER
Called this morning by Mavis Haji RN, regarding a my chart communication from Ms. Lovell. She has been having two days of congestion and runny nose and is wondering whether or not we should postpone her rituximab infusion. In addition, her insurance company has not yet approved her rituximab infusion which was scheduled for 1/5. I advised that we wait at least a week to see if her symptoms are improving. Her rituximab infusion is not urgent. Mavis will contact Ms. Lovell as well as the infusion center to arrange further details.

## 2018-01-23 ENCOUNTER — TELEPHONE (OUTPATIENT)
Dept: TRANSPLANT | Facility: CLINIC | Age: 63
End: 2018-01-23

## 2018-01-23 ENCOUNTER — INFUSION THERAPY VISIT (OUTPATIENT)
Dept: INFUSION THERAPY | Facility: CLINIC | Age: 63
End: 2018-01-23
Attending: FAMILY MEDICINE
Payer: COMMERCIAL

## 2018-01-23 ENCOUNTER — TELEPHONE (OUTPATIENT)
Dept: INFECTIOUS DISEASES | Facility: CLINIC | Age: 63
End: 2018-01-23

## 2018-01-23 VITALS
HEART RATE: 63 BPM | TEMPERATURE: 99.6 F | RESPIRATION RATE: 16 BRPM | WEIGHT: 144.18 LBS | DIASTOLIC BLOOD PRESSURE: 71 MMHG | OXYGEN SATURATION: 96 % | BODY MASS INDEX: 25.55 KG/M2 | SYSTOLIC BLOOD PRESSURE: 167 MMHG

## 2018-01-23 DIAGNOSIS — Z94.0 KIDNEY REPLACED BY TRANSPLANT: ICD-10-CM

## 2018-01-23 DIAGNOSIS — Z79.899 ENCOUNTER FOR LONG-TERM CURRENT USE OF MEDICATION: ICD-10-CM

## 2018-01-23 DIAGNOSIS — B27.00 EBV (EPSTEIN-BARR VIRUS) VIREMIA: Primary | ICD-10-CM

## 2018-01-23 DIAGNOSIS — Z48.298 AFTERCARE FOLLOWING ORGAN TRANSPLANT: ICD-10-CM

## 2018-01-23 DIAGNOSIS — Z94.0 KIDNEY REPLACED BY TRANSPLANT: Primary | ICD-10-CM

## 2018-01-23 LAB
ANION GAP SERPL CALCULATED.3IONS-SCNC: 6 MMOL/L (ref 3–14)
BUN SERPL-MCNC: 27 MG/DL (ref 7–30)
CALCIUM SERPL-MCNC: 9.2 MG/DL (ref 8.5–10.1)
CHLORIDE SERPL-SCNC: 106 MMOL/L (ref 94–109)
CO2 SERPL-SCNC: 24 MMOL/L (ref 20–32)
CREAT SERPL-MCNC: 1.07 MG/DL (ref 0.52–1.04)
CYCLOSPORINE BLD LC/MS/MS-MCNC: 114 UG/L (ref 50–400)
ERYTHROCYTE [DISTWIDTH] IN BLOOD BY AUTOMATED COUNT: 12.5 % (ref 10–15)
GFR SERPL CREATININE-BSD FRML MDRD: 52 ML/MIN/1.7M2
GLUCOSE SERPL-MCNC: 111 MG/DL (ref 70–99)
HCT VFR BLD AUTO: 33.9 % (ref 35–47)
HGB BLD-MCNC: 10.8 G/DL (ref 11.7–15.7)
MCH RBC QN AUTO: 29.6 PG (ref 26.5–33)
MCHC RBC AUTO-ENTMCNC: 31.9 G/DL (ref 31.5–36.5)
MCV RBC AUTO: 93 FL (ref 78–100)
PLATELET # BLD AUTO: 201 10E9/L (ref 150–450)
POTASSIUM SERPL-SCNC: 4.6 MMOL/L (ref 3.4–5.3)
RBC # BLD AUTO: 3.65 10E12/L (ref 3.8–5.2)
SODIUM SERPL-SCNC: 136 MMOL/L (ref 133–144)
TME LAST DOSE: NORMAL H
WBC # BLD AUTO: 6 10E9/L (ref 4–11)

## 2018-01-23 PROCEDURE — 25000128 H RX IP 250 OP 636: Mod: ZF | Performed by: INTERNAL MEDICINE

## 2018-01-23 PROCEDURE — 96375 TX/PRO/DX INJ NEW DRUG ADDON: CPT

## 2018-01-23 PROCEDURE — 87799 DETECT AGENT NOS DNA QUANT: CPT | Performed by: INTERNAL MEDICINE

## 2018-01-23 PROCEDURE — 85027 COMPLETE CBC AUTOMATED: CPT | Performed by: INTERNAL MEDICINE

## 2018-01-23 PROCEDURE — 80180 DRUG SCRN QUAN MYCOPHENOLATE: CPT | Performed by: INTERNAL MEDICINE

## 2018-01-23 PROCEDURE — 25000132 ZZH RX MED GY IP 250 OP 250 PS 637: Mod: ZF | Performed by: INTERNAL MEDICINE

## 2018-01-23 PROCEDURE — 80158 DRUG ASSAY CYCLOSPORINE: CPT | Performed by: INTERNAL MEDICINE

## 2018-01-23 PROCEDURE — 96415 CHEMO IV INFUSION ADDL HR: CPT

## 2018-01-23 PROCEDURE — 96413 CHEMO IV INFUSION 1 HR: CPT

## 2018-01-23 PROCEDURE — 80048 BASIC METABOLIC PNL TOTAL CA: CPT | Performed by: INTERNAL MEDICINE

## 2018-01-23 RX ORDER — DIPHENHYDRAMINE HYDROCHLORIDE 50 MG/ML
50 INJECTION INTRAMUSCULAR; INTRAVENOUS ONCE
Status: COMPLETED | OUTPATIENT
Start: 2018-01-23 | End: 2018-01-23

## 2018-01-23 RX ORDER — ALBUTEROL SULFATE 90 UG/1
2 AEROSOL, METERED RESPIRATORY (INHALATION)
Status: DISCONTINUED | OUTPATIENT
Start: 2018-01-23 | End: 2018-01-23 | Stop reason: HOSPADM

## 2018-01-23 RX ORDER — DIPHENHYDRAMINE HYDROCHLORIDE 50 MG/ML
50 INJECTION INTRAMUSCULAR; INTRAVENOUS
Status: DISCONTINUED | OUTPATIENT
Start: 2018-01-23 | End: 2018-01-23 | Stop reason: HOSPADM

## 2018-01-23 RX ORDER — METHYLPREDNISOLONE SODIUM SUCCINATE 125 MG/2ML
125 INJECTION, POWDER, LYOPHILIZED, FOR SOLUTION INTRAMUSCULAR; INTRAVENOUS ONCE
Status: COMPLETED | OUTPATIENT
Start: 2018-01-23 | End: 2018-01-23

## 2018-01-23 RX ORDER — METHYLPREDNISOLONE SODIUM SUCCINATE 125 MG/2ML
125 INJECTION, POWDER, LYOPHILIZED, FOR SOLUTION INTRAMUSCULAR; INTRAVENOUS
Status: DISCONTINUED | OUTPATIENT
Start: 2018-01-23 | End: 2018-01-23 | Stop reason: HOSPADM

## 2018-01-23 RX ORDER — METHYLPREDNISOLONE SODIUM SUCCINATE 125 MG/2ML
125 INJECTION, POWDER, LYOPHILIZED, FOR SOLUTION INTRAMUSCULAR; INTRAVENOUS ONCE
Status: CANCELLED | OUTPATIENT
Start: 2018-01-23

## 2018-01-23 RX ORDER — ACETAMINOPHEN 325 MG/1
650 TABLET ORAL ONCE
Status: COMPLETED | OUTPATIENT
Start: 2018-01-23 | End: 2018-01-23

## 2018-01-23 RX ORDER — ALBUTEROL SULFATE 0.83 MG/ML
2.5 SOLUTION RESPIRATORY (INHALATION)
Status: DISCONTINUED | OUTPATIENT
Start: 2018-01-23 | End: 2018-01-23 | Stop reason: HOSPADM

## 2018-01-23 RX ORDER — MEPERIDINE HYDROCHLORIDE 25 MG/ML
25 INJECTION INTRAMUSCULAR; INTRAVENOUS; SUBCUTANEOUS EVERY 30 MIN PRN
Status: DISCONTINUED | OUTPATIENT
Start: 2018-01-23 | End: 2018-01-23 | Stop reason: HOSPADM

## 2018-01-23 RX ORDER — SODIUM CHLORIDE 9 MG/ML
INJECTION, SOLUTION INTRAVENOUS CONTINUOUS PRN
Status: DISCONTINUED | OUTPATIENT
Start: 2018-01-23 | End: 2018-01-23 | Stop reason: HOSPADM

## 2018-01-23 RX ORDER — DIPHENHYDRAMINE HCL 25 MG
50 CAPSULE ORAL ONCE
Status: CANCELLED
Start: 2018-01-23

## 2018-01-23 RX ORDER — ACETAMINOPHEN 325 MG/1
650 TABLET ORAL ONCE
Status: CANCELLED
Start: 2018-01-23

## 2018-01-23 RX ADMIN — SODIUM CHLORIDE: 9 INJECTION, SOLUTION INTRAVENOUS at 09:58

## 2018-01-23 RX ADMIN — METHYLPREDNISOLONE SODIUM SUCCINATE 125 MG: 125 INJECTION, POWDER, FOR SOLUTION INTRAMUSCULAR; INTRAVENOUS at 10:39

## 2018-01-23 RX ADMIN — ACETAMINOPHEN 650 MG: 325 TABLET, FILM COATED ORAL at 11:52

## 2018-01-23 RX ADMIN — RITUXIMAB 640 MG: 10 INJECTION, SOLUTION INTRAVENOUS at 11:55

## 2018-01-23 RX ADMIN — DIPHENHYDRAMINE HYDROCHLORIDE 50 MG: 50 INJECTION INTRAMUSCULAR; INTRAVENOUS at 09:46

## 2018-01-23 NOTE — MR AVS SNAPSHOT
After Visit Summary   1/23/2018    Zulma Lovell    MRN: [id number]           Patient Information     Date Of Birth          1955        Visit Information        Provider Department      1/23/2018 9:00 AM UC 46 ATC; UC SPEC INFUSION [facility]        Today's Diagnoses     EBV (Bandar-Barr virus) viremia    -  1    Kidney replaced by transplant        Aftercare following organ transplant        Encounter for long-term current use of medication          Care Instructions    Dear Zulma Lovell    Thank you for choosing [facility] ([facility]) for your infusion.  The following information is a summary of our appointment as well as important reminders.      POST-INFUSION OF BIOLOGICAL MEDICATION:    Reviewed with patient.  Given biologic medication or medication hand-out. Inform patient if any fever, chills or signs of infection, new symptoms, abdominal pain, heart palpitations, shortness of breath, reaction, weakness, neurological changes, seek medical attention immediately and should not receive infusions. No live virus vaccines prior to or during treatment or up to 6 months post infusion. If the patient has an upcoming procedure or surgery, this should be discussed with the rheumatologist and surgeon or provider.        Rituximab Solution for injection  What is this medicine?  RITUXIMAB (ri TUX i mab) is a monoclonal antibody. This medicine changes the way the body's immune system works. It is used commonly to treat non-Hodgkin lymphoma and other conditions. In cancer cells, this drug targets a specific protein within cancer cells and stops the cancer cells from growing. It is also used to treat rheumatoid arthritis (RA). In RA, this medicine slows the inflammatory process and help reduce joint pain and swelling. This medicine is often used with other cancer or arthritis medications.  This  medicine may be used for other purposes; ask your health care provider or pharmacist if you have questions.  What should I tell my health care provider before I take this medicine?  They need to know if you have any of these conditions:    blood disorders    heart disease    history of hepatitis B    infection (especially a virus infection such as chickenpox, cold sores, or herpes)    irregular heartbeat    kidney disease    lung or breathing disease, like asthma    lupus    an unusual or allergic reaction to rituximab, mouse proteins, other medicines, foods, dyes, or preservatives    pregnant or trying to get pregnant    breast-feeding  How should I use this medicine?  This medicine is for infusion into a vein. It is administered in a hospital or clinic by a specially trained health care professional.  A special MedGuide will be given to you by the pharmacist with each prescription and refill. Be sure to read this information carefully each time.  Talk to your pediatrician regarding the use of this medicine in children. This medicine is not approved for use in children.  Overdosage: If you think you have taken too much of this medicine contact a poison control center or emergency room at once.  NOTE: This medicine is only for you. Do not share this medicine with others.  What if I miss a dose?  It is important not to miss a dose. Call your doctor or health care professional if you are unable to keep an appointment.  What may interact with this medicine?    cisplatin    medicines for blood pressure    some other medicines for arthritis    vaccines  This list may not describe all possible interactions. Give your health care provider a list of all the medicines, herbs, non-prescription drugs, or dietary supplements you use. Also tell them if you smoke, drink alcohol, or use illegal drugs. Some items may interact with your medicine.  What should I watch for while using this medicine?  Report any side effects that you  notice during your treatment right away, such as changes in your breathing, fever, chills, dizziness or lightheadedness. These effects are more common with the first dose.  Visit your prescriber or health care professional for checks on your progress. You will need to have regular blood work. Report any other side effects. The side effects of this medicine can continue after you finish your treatment. Continue your course of treatment even though you feel ill unless your doctor tells you to stop.  Call your doctor or health care professional for advice if you get a fever, chills or sore throat, or other symptoms of a cold or flu. Do not treat yourself. This drug decreases your body's ability to fight infections. Try to avoid being around people who are sick.  This medicine may increase your risk to bruise or bleed. Call your doctor or health care professional if you notice any unusual bleeding.  Be careful brushing and flossing your teeth or using a toothpick because you may get an infection or bleed more easily. If you have any dental work done, tell your dentist you are receiving this medicine.  Avoid taking products that contain aspirin, acetaminophen, ibuprofen, naproxen, or ketoprofen unless instructed by your doctor. These medicines may hide a fever.  Do not become pregnant while taking this medicine. Women should inform their doctor if they wish to become pregnant or think they might be pregnant. There is a potential for serious side effects to an unborn child. Talk to your health care professional or pharmacist for more information. Do not breast-feed an infant while taking this medicine.  What side effects may I notice from receiving this medicine?  Side effects that you should report to your doctor or health care professional as soon as possible:    allergic reactions like skin rash, itching or hives, swelling of the face, lips, or tongue    low blood counts - this medicine may decrease the number of white  blood cells, red blood cells and platelets. You may be at increased risk for infections and bleeding.    signs of infection - fever or chills, cough, sore throat, pain or difficulty passing urine    signs of decreased platelets or bleeding - bruising, pinpoint red spots on the skin, black, tarry stools, blood in the urine    signs of decreased red blood cells - unusually weak or tired, fainting spells, lightheadedness    breathing problems    confused, not responsive    chest pain    fast, irregular heartbeat    feeling faint or lightheaded, falls    mouth sores    redness, blistering, peeling or loosening of the skin, including inside the mouth    stomach pain    swelling of the ankles, feet, or hands    trouble passing urine or change in the amount of urine  Side effects that usually do not require medical attention (report to your doctor or other health care professional if they continue or are bothersome):    anxiety    headache    loss of appetite    muscle aches    nausea    night sweats  This list may not describe all possible side effects. Call your doctor for medical advice about side effects. You may report side effects to FDA at 1-120-FDA-5881.  Where should I keep my medicine?  This drug is given in a hospital or clinic and will not be stored at home.  NOTE:This sheet is a summary. It may not cover all possible information. If you have questions about this medicine, talk to your doctor, pharmacist, or health care provider. Copyright  2016 Gold Standard           We look forward in seeing you on your next appointment here at [facility].  Please don t hesitate to call us at [phone] to reschedule any of your appointments or to speak with one of the [facility] registered nurses.  It was a pleasure taking care of you today.    Sincerely,    [facility]  Specialty Infusion [facility] Procedure Center  [address] [place]  [address]  Phone:  [phone]            Follow-ups after  your visit        Your next 10 appointments already scheduled     Feb 06, 2018 11:00 AM CST   Infusion 360 with UC SPEC INFUSION, UC 44 ATC   [facility] Specialty and Procedure ([facility])    [address]  [address]  [place] [address] [phone]            Aug 06, 2018  1:05 PM CDT   (Arrive by 12:35 PM)   Return Kidney Transplant with Rod Lopez MD   [facility] ([facility])    [address]  [address]  [place] [address] [phone]              Who to contact     If you have questions or need follow up information about today's clinic visit or your schedule please contact [facility] SPECIALTY AND PROCEDURE directly at [phone].  Normal or non-critical lab and imaging results will be communicated to you by Rolltechhart, letter or phone within 4 business days after the clinic has received the results. If you do not hear from us within 7 days, please contact the clinic through Rolltechhart or phone. If you have a critical or abnormal lab result, we will notify you by phone as soon as possible.  Submit refill requests through HomeStay or call your pharmacy and they will forward the refill request to us. Please allow 3 business days for your refill to be completed.          Additional Information About Your Visit        HomeStay Information     HomeStay gives you secure access to your electronic health record. If you see a primary care provider, you can also send messages to your care team and make appointments. If you have questions, please call your primary care clinic.  If you do not have a primary care provider, please call [phone] and they will assist you.        Care EveryWhere ID     This is your Care EveryWhere ID. This could be used by other organizations to access your [place] medical records  [id number]        Your Vitals Were     Pulse Temperature  Respirations Last Period Pulse Oximetry BMI (Body Mass Index)    63 98.7  F (37.1  C) (Oral) 17 (LMP Unknown) 96% 25.55 kg/m2       Blood Pressure from Last 3 Encounters:   01/23/18 145/78   11/30/17 155/90   11/03/17 137/84    Weight from Last 3 Encounters:   01/23/18 65.4 kg (144 lb 2.9 oz)   11/30/17 66.4 kg (146 lb 6.4 oz)   11/03/17 66.9 kg (147 lb 8 oz)              We Performed the Following     Basic metabolic panel     CBC with platelets     Cyclosporine     EBV DNA PCR Quantitative Whole Blood     Mycophenolic acid     Nursing Communication 1     Nursing Communication 1     Nursing Communication 1        Primary Care Provider Office Phone # Fax #    Rene Ruggiero [phone]       [facility] [[email or web address]] [address] [facility] [address]        Equal Access to Services     CORNELIA ADAME : Giselle Cummins, wabrittney garcia, qaybta kaalmada cosmo, chidi soliz . So [facility] [phone].    ATENCIÓN: Si habla español, tiene a valdivia disposición servicios gratuitos de asistencia lingüística. Llame al [phone].    We comply with applicable federal civil rights laws and [place] laws. We do not discriminate on the basis of race, color, national origin, age, disability, sex, sexual orientation, or gender identity.            Thank you!     Thank you for choosing [facility] SPECIALTY AND PROCEDURE  for your care. Our goal is always to provide you with excellent care. Hearing back from our patients is one way we can continue to improve our services. Please take a few minutes to complete the written survey that you may receive in the mail after your visit with us. Thank you!             Your Updated Medication List - Protect others around you: Learn how to safely use, store and throw away your medicines at www.disposemymeds.org.          This list is accurate as of: 1/23/18  2:44 PM.  Always use your most recent med list.                    Brand Name Dispense Instructions for use Diagnosis    ACETAMINOPHEN PO      Take 1,000 mg by mouth as needed for pain        brimonidine-timolol 0.2-0.5 % ophthalmic solution    COMBIGAN     Place 1 drop into both eyes daily In morning        CENTRUM SILVER per tablet      Take 1 tablet by mouth daily        cycloSPORINE modified 25 MG capsule    GENERIC EQUIVALENT    900 capsule    Take 5 capsules (125 mg) by mouth 2 times daily    Kidney transplanted       latanoprost 0.005 % ophthalmic solution    XALATAN     Place 1 drop Into the left eye At Bedtime        loteprednol 0.5 % ophthalmic susp    LOTEMAX     Place 1 drop Into the left eye 4 times daily        LOVAZA PO      Take 2 capsules by mouth 2 times daily.        LUCENTIS IO      Eye injections given every 11 weeks        mycophenolate 250 MG capsule    GENERIC EQUIVALENT    180 capsule    Take 1 capsule (250 mg) by mouth 2 times daily    Kidney replaced by transplant, High risk medications (not anticoagulants) long-term use, Kidney transplanted       NONFORMULARY      daily as needed Myocalm Herbal Muscle relaxer        PRAVASTATIN SODIUM PO      Take 10 mg by mouth daily        sulfamethoxazole-trimethoprim 400-80 MG per tablet    BACTRIM/SEPTRA    39 tablet    Take 1 tablet by mouth three times a week    Kidney replaced by transplant       VITAMIN D-3 PO      Take 1,000 Units by mouth.

## 2018-01-23 NOTE — PROGRESS NOTES
Nursing Note  Zulma Lovell [facility] for:   Chief Complaint   Patient presents with     Infusion     Rituxan infusion     During today's [facility] appointment, orders from Dr. Abril Perez were completed.  Frequency: today is dose 1 of 2 total. Next appt scheduled.    Progress note:  Patient identification verified by name and date of birth.  Assessment completed.  Vitals recorded in Doc Flowsheets.  Patient was provided with education regarding infusion and possible side effects.  Patient verbalized understanding.      needed: No  Premedications: Pt took Tylenol 100 mg po at 0730.   *Benadryl 50 mg administered slow IVP and half way through administration (approximately 25 mg) pt reported feeling severely lightheaded and dry mouth. NS started at 200 cc/hour and MD notified. Remainder of Benadryl NOT administered. Pt denies any difficulty breathing, chest pressure, itching, tingling in mouth or throat.     Dr. Abril Perez notified and gave orders to proceed with the Solu Medrol and continue to monitor pt and update MD with patient status. Order received to proceed with Rituxan infusion and to give pt 650 mg of Tylenol prior to starting Rituxan.     Infusion Rates: infusion starts at 50 ml/hr, then increased by 50 ml/hr every 30 minutes to final rate of 350 ml/hr until infusion complete.   Approximate Infusion length:4 hours.     Labs: were drawn per orders.   Vascular access: peripheral IV placed today.  Treatment Conditions: Biologic/Chemo Checklist     ~~~ NOTE: If the patient answers yes to any of the questions below, hold the infusion and contact ordering provider or on-call provider.    1. Have you recently had an elevated temperature, fever, chills, productive cough, coughing for 3 weeks or longer or hemoptysis,  abnormal vital signs, night sweats,  chest pain or have you noticed a decrease in your appetite,  unexplained weight loss or fatigue? No  2. Do you have any open wounds or new incisions? No  3. Do you have any recent or upcoming hospitalizations, surgeries or dental procedures? No  4. Do you currently have or recently have had any signs of illness or infection or are you on any antibiotics? No  5. Have you had any new, sudden or worsening abdominal pain? No  6. Have you or anyone in your household received a live vaccination in the past 4 weeks? Please note:  No live vaccines while on biologic/chemotherapy until 6 months after the last treatment.  Patient can receive the flu vaccine (shot only) and the pneumovax.  It is optimal for the patient to get these vaccines mid cycle, but they can be given at any time as long as it is not on the day of the infusion. No  7. Have you recently been diagnosed with any new nervous system diseases (ie. Multiple sclerosis, Guillain [place], seizures, neurological changes) or cancer diagnosis? Are you on any form of radiation or chemotherapy? No  8. Are you pregnant or breast feeding or do you have plans of pregnancy in the future? No  9. Have you been having any signs of worsening depression or suicidal ideations?  (benlysta only) No  10. Have there been any other new onset medical symptoms? No     Pt was recently treated with Z-pack for sinus infection approximately 2 weeks ago, not currently taking any antibiotics and     Patient tolerated Rituxan infusion well. No adverse reaction noted and VS remained stable. Informed Dr. Perez that pt tolerated infusion well. New orders received for pt to receive oral Benadryl as pre med for next Rituxan infusion and because pt tolerated Rituxan well we can use the increased rates as well.  Also reviewed lab frequency and per Dr. Perez, pt does not need labs with next infusion and should continue to have her routine transplant labs and EBV drawn monthly per standing orders. Pt notified of the above.         Discharge Plan:   Follow up plan  of care with: ongoing infusions at [facility].  Discharge instructions were reviewed with patient.  Patient/representative verbalized understanding of discharge instructions and all questions answered.  Patient discharged from [facility] in stable condition.    Purvi Baugh RN        BP (!) 169/95  Pulse 61  Temp 98.7  F (37.1  C) (Oral)  Wt 65.4 kg (144 lb 2.9 oz)  LMP  (LMP Unknown)  SpO2 97%  BMI 25.55 kg/m2

## 2018-01-23 NOTE — TELEPHONE ENCOUNTER
Cyclosporine 114, goal 50-75  Please call pt to verify this was a good trough level. Confirm dose 125 mg BID. Repeat level next week. Will make dose changes at that time if necessary

## 2018-01-23 NOTE — TELEPHONE ENCOUNTER
----- Message from Criss Alamo MD sent at 1/23/2018  3:24 PM CST -----  Regarding: RE: SHELLEY Kahn  That sounds fine to me Mavis. Thanks for keeping us posted!    ~Criss~    ----- Message -----     From: Mavis Haji RN     Sent: 1/22/2018  10:25 AM       To: Criss Alamo MD  Subject: SHELLEY KahnZulma is all set for her first Rituxan tomorrow, however she wants you to know she still has runny nose and sinus stuff but she feels much better than she did before but the runny nose and sinus stuff is still working it's way through....     She is planning on going to her Rituxan tomorrow unless you say she should not.     Mavis Haji RN

## 2018-01-23 NOTE — PATIENT INSTRUCTIONS
Dear Zulma Lovell    Thank you for choosing [facility] ([facility]) for your infusion.  The following information is a summary of our appointment as well as important reminders.      POST-INFUSION OF BIOLOGICAL MEDICATION:    Reviewed with patient.  Given biologic medication or medication hand-out. Inform patient if any fever, chills or signs of infection, new symptoms, abdominal pain, heart palpitations, shortness of breath, reaction, weakness, neurological changes, seek medical attention immediately and should not receive infusions. No live virus vaccines prior to or during treatment or up to 6 months post infusion. If the patient has an upcoming procedure or surgery, this should be discussed with the rheumatologist and surgeon or provider.        Rituximab Solution for injection  What is this medicine?  RITUXIMAB (ri TUX i mab) is a monoclonal antibody. This medicine changes the way the body's immune system works. It is used commonly to treat non-Hodgkin lymphoma and other conditions. In cancer cells, this drug targets a specific protein within cancer cells and stops the cancer cells from growing. It is also used to treat rheumatoid arthritis (RA). In RA, this medicine slows the inflammatory process and help reduce joint pain and swelling. This medicine is often used with other cancer or arthritis medications.  This medicine may be used for other purposes; ask your health care provider or pharmacist if you have questions.  What should I tell my health care provider before I take this medicine?  They need to know if you have any of these conditions:    blood disorders    heart disease    history of hepatitis B    infection (especially a virus infection such as chickenpox, cold sores, or herpes)    irregular heartbeat    kidney disease    lung or breathing disease, like asthma    lupus    an unusual or allergic reaction to rituximab, mouse proteins, other  medicines, foods, dyes, or preservatives    pregnant or trying to get pregnant    breast-feeding  How should I use this medicine?  This medicine is for infusion into a vein. It is administered in a hospital or clinic by a specially trained health care professional.  A special MedGuide will be given to you by the pharmacist with each prescription and refill. Be sure to read this information carefully each time.  Talk to your pediatrician regarding the use of this medicine in children. This medicine is not approved for use in children.  Overdosage: If you think you have taken too much of this medicine contact a poison control center or emergency room at once.  NOTE: This medicine is only for you. Do not share this medicine with others.  What if I miss a dose?  It is important not to miss a dose. Call your doctor or health care professional if you are unable to keep an appointment.  What may interact with this medicine?    cisplatin    medicines for blood pressure    some other medicines for arthritis    vaccines  This list may not describe all possible interactions. Give your health care provider a list of all the medicines, herbs, non-prescription drugs, or dietary supplements you use. Also tell them if you smoke, drink alcohol, or use illegal drugs. Some items may interact with your medicine.  What should I watch for while using this medicine?  Report any side effects that you notice during your treatment right away, such as changes in your breathing, fever, chills, dizziness or lightheadedness. These effects are more common with the first dose.  Visit your prescriber or health care professional for checks on your progress. You will need to have regular blood work. Report any other side effects. The side effects of this medicine can continue after you finish your treatment. Continue your course of treatment even though you feel ill unless your doctor tells you to stop.  Call your doctor or health care professional  for advice if you get a fever, chills or sore throat, or other symptoms of a cold or flu. Do not treat yourself. This drug decreases your body's ability to fight infections. Try to avoid being around people who are sick.  This medicine may increase your risk to bruise or bleed. Call your doctor or health care professional if you notice any unusual bleeding.  Be careful brushing and flossing your teeth or using a toothpick because you may get an infection or bleed more easily. If you have any dental work done, tell your dentist you are receiving this medicine.  Avoid taking products that contain aspirin, acetaminophen, ibuprofen, naproxen, or ketoprofen unless instructed by your doctor. These medicines may hide a fever.  Do not become pregnant while taking this medicine. Women should inform their doctor if they wish to become pregnant or think they might be pregnant. There is a potential for serious side effects to an unborn child. Talk to your health care professional or pharmacist for more information. Do not breast-feed an infant while taking this medicine.  What side effects may I notice from receiving this medicine?  Side effects that you should report to your doctor or health care professional as soon as possible:    allergic reactions like skin rash, itching or hives, swelling of the face, lips, or tongue    low blood counts - this medicine may decrease the number of white blood cells, red blood cells and platelets. You may be at increased risk for infections and bleeding.    signs of infection - fever or chills, cough, sore throat, pain or difficulty passing urine    signs of decreased platelets or bleeding - bruising, pinpoint red spots on the skin, black, tarry stools, blood in the urine    signs of decreased red blood cells - unusually weak or tired, fainting spells, lightheadedness    breathing problems    confused, not responsive    chest pain    fast, irregular heartbeat    feeling faint or lightheaded,  falls    mouth sores    redness, blistering, peeling or loosening of the skin, including inside the mouth    stomach pain    swelling of the ankles, feet, or hands    trouble passing urine or change in the amount of urine  Side effects that usually do not require medical attention (report to your doctor or other health care professional if they continue or are bothersome):    anxiety    headache    loss of appetite    muscle aches    nausea    night sweats  This list may not describe all possible side effects. Call your doctor for medical advice about side effects. You may report side effects to FDA at 2-711-PXS-8931.  Where should I keep my medicine?  This drug is given in a hospital or clinic and will not be stored at home.  NOTE:This sheet is a summary. It may not cover all possible information. If you have questions about this medicine, talk to your doctor, pharmacist, or health care provider. Copyright  2016 [employer]           We look forward in seeing you on your next appointment here at [facility].  Please don t hesitate to call us at [phone] to reschedule any of your appointments or to speak with one of the [facility] registered nurses.  It was a pleasure taking care of you today.    Sincerely,    [facility]  [facility]  [address] [place] [address]  [address]  Phone:  [phone]

## 2018-01-23 NOTE — LETTER
PHYSICIAN ORDERS      DATE & TIME ISSUED: 2018 1:42 PM  PATIENT NAME: Zulma Lovell   : 1955     [facility] MR# [if applicable]: [id number]     DIAGNOSIS:  Kidney transplant  ICD-9 CODE: Z94.0      Please repeat the tacrolimus level within one week  Tacrolimus level     Any questions please call: [phone] Option #5    Please fax these results to [phone].

## 2018-01-24 LAB
EBV DNA # SPEC NAA+PROBE: ABNORMAL {COPIES}/ML
EBV DNA SPEC NAA+PROBE-LOG#: 5.3 {LOG_COPIES}/ML

## 2018-01-24 NOTE — TELEPHONE ENCOUNTER
Call placed to patient: Patient confirms current dose and accurate twelve hour lab draw. Patient verbalize understanding to repeat cyclosporine level in one week. Order sent

## 2018-01-25 LAB
MYCOPHENOLATE SERPL LC/MS/MS-MCNC: 0.42 MG/L (ref 1–3.5)
MYCOPHENOLATE-G SERPL LC/MS/MS-MCNC: 19.9 MG/L (ref 30–95)
TME LAST DOSE: ABNORMAL H

## 2018-02-06 ENCOUNTER — INFUSION THERAPY VISIT (OUTPATIENT)
Dept: INFUSION THERAPY | Facility: CLINIC | Age: 63
End: 2018-02-06
Attending: FAMILY MEDICINE
Payer: COMMERCIAL

## 2018-02-06 VITALS
WEIGHT: 144 LBS | RESPIRATION RATE: 16 BRPM | TEMPERATURE: 97.8 F | SYSTOLIC BLOOD PRESSURE: 147 MMHG | OXYGEN SATURATION: 99 % | DIASTOLIC BLOOD PRESSURE: 66 MMHG | BODY MASS INDEX: 25.51 KG/M2 | HEART RATE: 59 BPM

## 2018-02-06 DIAGNOSIS — Z94.0 KIDNEY REPLACED BY TRANSPLANT: ICD-10-CM

## 2018-02-06 DIAGNOSIS — B27.00 EBV (EPSTEIN-BARR VIRUS) VIREMIA: Primary | ICD-10-CM

## 2018-02-06 PROCEDURE — 96375 TX/PRO/DX INJ NEW DRUG ADDON: CPT

## 2018-02-06 PROCEDURE — 25000128 H RX IP 250 OP 636: Mod: ZF | Performed by: INTERNAL MEDICINE

## 2018-02-06 PROCEDURE — 96415 CHEMO IV INFUSION ADDL HR: CPT

## 2018-02-06 PROCEDURE — 25000132 ZZH RX MED GY IP 250 OP 250 PS 637: Mod: ZF | Performed by: INTERNAL MEDICINE

## 2018-02-06 PROCEDURE — 96413 CHEMO IV INFUSION 1 HR: CPT

## 2018-02-06 RX ORDER — ACETAMINOPHEN 325 MG/1
650 TABLET ORAL ONCE
Status: COMPLETED | OUTPATIENT
Start: 2018-02-06 | End: 2018-02-06

## 2018-02-06 RX ORDER — METHYLPREDNISOLONE SODIUM SUCCINATE 125 MG/2ML
125 INJECTION, POWDER, LYOPHILIZED, FOR SOLUTION INTRAMUSCULAR; INTRAVENOUS ONCE
Status: COMPLETED | OUTPATIENT
Start: 2018-02-06 | End: 2018-02-06

## 2018-02-06 RX ORDER — DIPHENHYDRAMINE HCL 25 MG
50 CAPSULE ORAL ONCE
Status: COMPLETED | OUTPATIENT
Start: 2018-02-06 | End: 2018-02-06

## 2018-02-06 RX ORDER — METHYLPREDNISOLONE SODIUM SUCCINATE 125 MG/2ML
125 INJECTION, POWDER, LYOPHILIZED, FOR SOLUTION INTRAMUSCULAR; INTRAVENOUS ONCE
Status: CANCELLED | OUTPATIENT
Start: 2018-02-06

## 2018-02-06 RX ORDER — ACETAMINOPHEN 325 MG/1
650 TABLET ORAL ONCE
Status: CANCELLED
Start: 2018-02-06

## 2018-02-06 RX ORDER — DIPHENHYDRAMINE HCL 25 MG
50 CAPSULE ORAL ONCE
Status: CANCELLED
Start: 2018-02-06

## 2018-02-06 RX ADMIN — METHYLPREDNISOLONE SODIUM SUCCINATE 125 MG: 125 INJECTION, POWDER, FOR SOLUTION INTRAMUSCULAR; INTRAVENOUS at 11:50

## 2018-02-06 RX ADMIN — RITUXIMAB 600 MG: 10 INJECTION, SOLUTION INTRAVENOUS at 12:36

## 2018-02-06 RX ADMIN — DIPHENHYDRAMINE HYDROCHLORIDE 25 MG: 25 CAPSULE ORAL at 11:47

## 2018-02-06 RX ADMIN — ACETAMINOPHEN 650 MG: 325 TABLET, FILM COATED ORAL at 11:47

## 2018-02-06 NOTE — PROGRESS NOTES
Nursing Note  [facility]ul[facility] Lovell [facility] for:   Chief Complaint   Patient presents with     Infusion     Rituxan     During [facility]'s [facility] appointment, orders from Dr. Elida Perez were completed.  Frequency: 14 days apart,  today is dose 2 of 2 total.    Progress note:  Patient identification verified by name and date of birth.  Assessment completed.  Vitals recorded in Doc Flowsheets.  Patient was provided with education regarding infusion and possible side effects.  Patient verbalized understanding.      needed: No  Premedications: administered per order. Due to previous sensitivity to Benadryl, patient prefers to have only 25 mg PO.  Infusion Rates: infusion starts at 100 ml/hr, then increased by 100 ml/hr every 30 minutes to final rate of 400 ml/hr.  Approximate Infusion length:2.5 hours.   Labs: were not ordered for this appointment.  Vascular access: peripheral IV placed today.  Treatment Conditions: Biologic/Chemo Checklist ~~~ NOTE: If the patient answers yes to any of the questions below, hold the infusion and contact ordering provider or on-call provider.    1. Have you recently had an elevated temperature, fever, chills, productive cough, coughing for 3 weeks or longer or hemoptysis,  abnormal vital signs, night sweats,  chest pain or have you noticed a decrease in your appetite, unexplained weight loss or fatigue? No  2. Do you have any open wounds or new incisions? No  3. Do you have any recent or upcoming hospitalizations, surgeries or dental procedures? No  4. Do you currently have or recently have had any signs of illness or infection or are you on any antibiotics? No  5. Have you had any new, sudden or worsening abdominal pain? No  6. Have you or anyone in your household received a live vaccination in the past 4 weeks? Please note:  No live vaccines while on biologic/chemotherapy until 6 months after the last  treatment.  Patient can receive the flu vaccine (shot only) and the pneumovax.  It is optimal for the patient to get these vaccines mid cycle, but they can be given at any time as long as it is not on the day of the infusion. No  7. Have you recently been diagnosed with any new nervous system diseases (ie. Multiple sclerosis, Guillain [place], seizures, neurological changes) or cancer diagnosis? Are you on any form of radiation or chemotherapy? No  8. Are you pregnant or breast feeding or do you have plans of pregnancy in the future? No  9. Have you been having any signs of worsening depression or suicidal ideations?  (benlysta only) N/A  10. Have there been any other new onset medical symptoms? No  Patient reports mildly increased fatigue today. Denies dizziness/ lightheadedness. BP today 140's/ 44-66, pulse 47-50. Started metoprolol 75 mg twice daily approximately 2 weeks ago. Ordering provider is based at outside clinic. Patient will follow up with prescribing provider about possible dose decrease.    Patient tolerated infusion: well.    Discharge Plan:   Follow up plan of care with: primary medical doctor.  Discharge instructions were reviewed with patient.  Patient/representative verbalized understanding of discharge instructions and all questions answered.  [facility] in stable condition.    Natali Perez RN     Administrations This Visit     acetaminophen (TYLENOL) tablet 650 mg     Admin Date Action Dose Route Administered By             02/06/2018 Given 650 mg Oral Natali Perez RN                    diphenhydrAMINE (BENADRYL) capsule 50 mg     Admin Date Action Dose Route Administered By             02/06/2018 Given 25 mg Oral Natali Perez RN                    methylPREDNISolone sodium succinate (solu-MEDROL) injection 125 mg     Admin Date Action Dose Route Administered By             02/06/2018 Given 125 mg Intravenous Natali Perez RN                     riTUXimab (RITUXAN) 600 mg in NaCl 0.9 % 600 mL non-oncology use     Admin Date Action Dose Route Administered By             02/06/2018 New Bag 600 mg Intravenous Natali Perez, RN                        /66 (BP Location: Right arm)  Pulse (!) 47  Temp 97.8  F (36.6  C) (Oral)  Resp 16  LMP  (LMP Unknown)  SpO2 99%

## 2018-02-06 NOTE — PATIENT INSTRUCTIONS
Dear Zulma Lovell    Thank you for choosing [facility] ([facility]) for your infusion.  The following information is a summary of our appointment as well as important reminders.      Please follow up with ordering provider of metoprolol in regards to low blood pressure/pulse.      Rituximab Solution for injection  What is this medicine?  RITUXIMAB (ri TUX i mab) is a monoclonal antibody. This medicine changes the way the body's immune system works. It is used commonly to treat non-Hodgkin lymphoma and other conditions. In cancer cells, this drug targets a specific protein within cancer cells and stops the cancer cells from growing. It is also used to treat rheumatoid arthritis (RA). In RA, this medicine slows the inflammatory process and help reduce joint pain and swelling. This medicine is often used with other cancer or arthritis medications.  This medicine may be used for other purposes; ask your health care provider or pharmacist if you have questions.  What should I tell my health care provider before I take this medicine?  They need to know if you have any of these conditions:    blood disorders    heart disease    history of hepatitis B    infection (especially a virus infection such as chickenpox, cold sores, or herpes)    irregular heartbeat    kidney disease    lung or breathing disease, like asthma    lupus    an unusual or allergic reaction to rituximab, mouse proteins, other medicines, foods, dyes, or preservatives    pregnant or trying to get pregnant    breast-feeding  How should I use this medicine?  This medicine is for infusion into a vein. It is administered in a hospital or clinic by a specially trained health care professional.  A special MedGuide will be given to you by the pharmacist with each prescription and refill. Be sure to read this information carefully each time.  Talk to your pediatrician regarding the use of this medicine in  children. This medicine is not approved for use in children.  Overdosage: If you think you have taken too much of this medicine contact a poison control center or emergency room at once.  NOTE: This medicine is only for you. Do not share this medicine with others.  What if I miss a dose?  It is important not to miss a dose. Call your doctor or health care professional if you are unable to keep an appointment.  What may interact with this medicine?    cisplatin    medicines for blood pressure    some other medicines for arthritis    vaccines  This list may not describe all possible interactions. Give your health care provider a list of all the medicines, herbs, non-prescription drugs, or dietary supplements you use. Also tell them if you smoke, drink alcohol, or use illegal drugs. Some items may interact with your medicine.  What should I watch for while using this medicine?  Report any side effects that you notice during your treatment right away, such as changes in your breathing, fever, chills, dizziness or lightheadedness. These effects are more common with the first dose.  Visit your prescriber or health care professional for checks on your progress. You will need to have regular blood work. Report any other side effects. The side effects of this medicine can continue after you finish your treatment. Continue your course of treatment even though you feel ill unless your doctor tells you to stop.  Call your doctor or health care professional for advice if you get a fever, chills or sore throat, or other symptoms of a cold or flu. Do not treat yourself. This drug decreases your body's ability to fight infections. Try to avoid being around people who are sick.  This medicine may increase your risk to bruise or bleed. Call your doctor or health care professional if you notice any unusual bleeding.  Be careful brushing and flossing your teeth or using a toothpick because you may get an infection or bleed more easily.  If you have any dental work done, tell your dentist you are receiving this medicine.  Avoid taking products that contain aspirin, acetaminophen, ibuprofen, naproxen, or ketoprofen unless instructed by your doctor. These medicines may hide a fever.  Do not become pregnant while taking this medicine. Women should inform their doctor if they wish to become pregnant or think they might be pregnant. There is a potential for serious side effects to an unborn child. Talk to your health care professional or pharmacist for more information. Do not breast-feed an infant while taking this medicine.  What side effects may I notice from receiving this medicine?  Side effects that you should report to your doctor or health care professional as soon as possible:    allergic reactions like skin rash, itching or hives, swelling of the face, lips, or tongue    low blood counts - this medicine may decrease the number of white blood cells, red blood cells and platelets. You may be at increased risk for infections and bleeding.    signs of infection - fever or chills, cough, sore throat, pain or difficulty passing urine    signs of decreased platelets or bleeding - bruising, pinpoint red spots on the skin, black, tarry stools, blood in the urine    signs of decreased red blood cells - unusually weak or tired, fainting spells, lightheadedness    breathing problems    confused, not responsive    chest pain    fast, irregular heartbeat    feeling faint or lightheaded, falls    mouth sores    redness, blistering, peeling or loosening of the skin, including inside the mouth    stomach pain    swelling of the ankles, feet, or hands    trouble passing urine or change in the amount of urine  Side effects that usually do not require medical attention (report to your doctor or other health care professional if they continue or are bothersome):    anxiety    headache    loss of appetite    muscle aches    nausea    night sweats  This list may not  describe all possible side effects. Call your doctor for medical advice about side effects. You may report side effects to FDA at 4-222-SDG-3196.  Where should I keep my medicine?  This drug is given in a hospital or clinic and will not be stored at home.  NOTE:This sheet is a summary. It may not cover all possible information. If you have questions about this medicine, talk to your doctor, pharmacist, or health care provider. Copyright  2016 [employer]          We look forward in seeing you on your next appointment here at [facility].  Please don t hesitate to call us at [phone] to reschedule any of your appointments or to speak with one of the [facility] registered nurses.  It was a pleasure taking care of you today.    Sincerely,    [facility]  [facility]  [address] [place] [address]  [address]  Phone:  [phone]

## 2018-02-06 NOTE — MR AVS SNAPSHOT
After Visit Summary   2/6/2018    Zulma Lovell    MRN: [id number]           Patient Information     Date Of Birth          1955        Visit Information        Provider Department      2/6/2018 11:00 AM  45 ATC;  [facility]        Today's Diagnoses     EBV (Bandar-Barr virus) viremia    -  1    Kidney replaced by transplant          Care Instructions    Dear Zulma Lovell    Thank you for choosing [facility] ([facility]) for your infusion.  The following information is a summary of our appointment as well as important reminders.      Please follow up with ordering provider of metoprolol in regards to low blood pressure/pulse.      Rituximab Solution for injection  What is this medicine?  RITUXIMAB (ri TUX i mab) is a monoclonal antibody. This medicine changes the way the body's immune system works. It is used commonly to treat non-Hodgkin lymphoma and other conditions. In cancer cells, this drug targets a specific protein within cancer cells and stops the cancer cells from growing. It is also used to treat rheumatoid arthritis (RA). In RA, this medicine slows the inflammatory process and help reduce joint pain and swelling. This medicine is often used with other cancer or arthritis medications.  This medicine may be used for other purposes; ask your health care provider or pharmacist if you have questions.  What should I tell my health care provider before I take this medicine?  They need to know if you have any of these conditions:    blood disorders    heart disease    history of hepatitis B    infection (especially a virus infection such as chickenpox, cold sores, or herpes)    irregular heartbeat    kidney disease    lung or breathing disease, like asthma    lupus    an unusual or allergic reaction to rituximab, mouse proteins, other medicines, foods, dyes, or  preservatives    pregnant or trying to get pregnant    breast-feeding  How should I use this medicine?  This medicine is for infusion into a vein. It is administered in a hospital or clinic by a specially trained health care professional.  A special MedGuide will be given to you by the pharmacist with each prescription and refill. Be sure to read this information carefully each time.  Talk to your pediatrician regarding the use of this medicine in children. This medicine is not approved for use in children.  Overdosage: If you think you have taken too much of this medicine contact a poison control center or emergency room at once.  NOTE: This medicine is only for you. Do not share this medicine with others.  What if I miss a dose?  It is important not to miss a dose. Call your doctor or health care professional if you are unable to keep an appointment.  What may interact with this medicine?    cisplatin    medicines for blood pressure    some other medicines for arthritis    vaccines  This list may not describe all possible interactions. Give your health care provider a list of all the medicines, herbs, non-prescription drugs, or dietary supplements you use. Also tell them if you smoke, drink alcohol, or use illegal drugs. Some items may interact with your medicine.  What should I watch for while using this medicine?  Report any side effects that you notice during your treatment right away, such as changes in your breathing, fever, chills, dizziness or lightheadedness. These effects are more common with the first dose.  Visit your prescriber or health care professional for checks on your progress. You will need to have regular blood work. Report any other side effects. The side effects of this medicine can continue after you finish your treatment. Continue your course of treatment even though you feel ill unless your doctor tells you to stop.  Call your doctor or health care professional for advice if you get a  fever, chills or sore throat, or other symptoms of a cold or flu. Do not treat yourself. This drug decreases your body's ability to fight infections. Try to avoid being around people who are sick.  This medicine may increase your risk to bruise or bleed. Call your doctor or health care professional if you notice any unusual bleeding.  Be careful brushing and flossing your teeth or using a toothpick because you may get an infection or bleed more easily. If you have any dental work done, tell your dentist you are receiving this medicine.  Avoid taking products that contain aspirin, acetaminophen, ibuprofen, naproxen, or ketoprofen unless instructed by your doctor. These medicines may hide a fever.  Do not become pregnant while taking this medicine. Women should inform their doctor if they wish to become pregnant or think they might be pregnant. There is a potential for serious side effects to an unborn child. Talk to your health care professional or pharmacist for more information. Do not breast-feed an infant while taking this medicine.  What side effects may I notice from receiving this medicine?  Side effects that you should report to your doctor or health care professional as soon as possible:    allergic reactions like skin rash, itching or hives, swelling of the face, lips, or tongue    low blood counts - this medicine may decrease the number of white blood cells, red blood cells and platelets. You may be at increased risk for infections and bleeding.    signs of infection - fever or chills, cough, sore throat, pain or difficulty passing urine    signs of decreased platelets or bleeding - bruising, pinpoint red spots on the skin, black, tarry stools, blood in the urine    signs of decreased red blood cells - unusually weak or tired, fainting spells, lightheadedness    breathing problems    confused, not responsive    chest pain    fast, irregular heartbeat    feeling faint or lightheaded, falls    mouth  sores    redness, blistering, peeling or loosening of the skin, including inside the mouth    stomach pain    swelling of the ankles, feet, or hands    trouble passing urine or change in the amount of urine  Side effects that usually do not require medical attention (report to your doctor or other health care professional if they continue or are bothersome):    anxiety    headache    loss of appetite    muscle aches    nausea    night sweats  This list may not describe all possible side effects. Call your doctor for medical advice about side effects. You may report side effects to FDA at 7-902-HLH-5586.  Where should I keep my medicine?  This drug is given in a hospital or clinic and will not be stored at home.  NOTE:This sheet is a summary. It may not cover all possible information. If you have questions about this medicine, talk to your doctor, pharmacist, or health care provider. Copyright  2016 Gold Standard          We look forward in seeing you on your next appointment here at [facility].  Please don t hesitate to call us at [phone] to reschedule any of your appointments or to speak with one of the [facility] registered nurses.  It was a pleasure taking care of you today.    Sincerely,    [facility]  Specialty [facility]fusion [facility] Procedure Center  [address] [place] [address]  [address]  Phone:  [phone]            Follow-ups after your visit        Your next 10 appointments already scheduled     Aug 06, 2018  1:05 PM CDT   (Arrive by 12:35 PM)   Return Kidney Transplant with Rod Lopez MD   [facility] ([facility])    [address] [place] [address]  [address]  [facility] [address] [phone]              Who to contact     If you have questions or need follow up information about today's clinic visit or your schedule please contact [facility] PROCED[facility] directly at [phone].  Normal or non-critical lab  and imaging results will be communicated to you by MyChart, letter or phone within 4 business days after the clinic has received the results. If you do not hear from us within 7 days, please contact the clinic through BT[employer] or phone. If you have a critical or abnormal lab result, we will notify you by phone as soon as possible.  Submit refill requests through BTIG or call your pharmacy and they will forward the refill request to us. Please allow 3 business days for your refill to be completed.          Additional Information About Your Visit        [employer] Information     BT[employer] gives you secure access to your electronic health record. If you see a primary care provider, you can also send messages to your care team and make appointments. If you have questions, please call your primary care clinic.  If you do not have a primary care provider, please call [phone] and they will assist you.        Care EveryWhere ID     This is your Care EveryWhere ID. This could be used by other organizations to access your [place] medical records  [id number]        Your Vitals Were     Pulse Temperature Respirations Last Period Pulse Oximetry BMI (Body Mass Index)    47 97.8  F (36.6  C) (Oral) 16 (LMP Unknown) 99% 25.51 kg/m2       Blood Pressure from Last 3 Encounters:   02/06/18 145/66   01/23/18 167/71   11/30/17 155/90    Weight from Last 3 Encounters:   02/06/18 65.3 kg (144 lb)   01/23/18 65.4 kg (144 lb 2.9 oz)   11/30/17 66.4 kg (146 lb 6.4 oz)              We Performed the Following     Nursing Communication 1        Primary Care Provider Office Phone # Fax #    Rene Bahman [phone]       [facility] [address] [facility] [address]        Equal Access to Services     CORNELIA ADAME : Giselle Cummins, viola garcia, chidi grey. So [facility] [phone].    ATENCIÓN: Si ace roy, russ kunz valdivia  disposición servicios gratuitos de asistencia lingüística. Frandy esquivel [phone].    We comply with applicable federal civil rights laws and [place] laws. We do not discriminate on the basis of race, color, national origin, age, disability, sex, sexual orientation, or gender identity.            Thank you!     Thank you for choosing [facility] SPECIALTY AND PROCEDURE  for your care. Our goal is always to provide you with excellent care. Hearing back from our patients is one way we can continue to improve our services. Please take a few minutes to complete the written survey that you may receive in the mail after your visit with us. Thank you!             Your Updated Medication List - Protect others around you: Learn how to safely use, store and throw away your medicines at www.disposemymeds.org.          This list is accurate as of 2/6/18 12:08 PM.  Always use your most recent med list.                   Brand Name Dispense Instructions for use Diagnosis    ACETAMINOPHEN PO      Take 1,000 mg by mouth as needed for pain        brimonidine-timolol 0.2-0.5 % ophthalmic solution    COMBIGAN     Place 1 drop into both eyes daily In morning        CENTRUM SILVER per tablet      Take 1 tablet by mouth daily        cycloSPORINE modified 25 MG capsule    GENERIC EQUIVALENT    900 capsule    Take 5 capsules (125 mg) by mouth 2 times daily    Kidney transplanted       latanoprost 0.005 % ophthalmic solution    XALATAN     Place 1 drop Into the left eye At Bedtime        loteprednol 0.5 % ophthalmic susp    LOTEMAX     Place 1 drop Into the left eye 4 times daily        LOVAZA PO      Take 2 capsules by mouth 2 times daily.        LUCENTIS IO      Eye injections given every 11 weeks        METOPROLOL TARTRATE PO      Take 75 mg by mouth 2 times daily        mycophenolate 250 MG capsule    GENERIC EQUIVALENT    180 capsule    Take 1 capsule (250 mg) by mouth 2 times daily    Kidney replaced by  transplant, High risk medications (not anticoagulants) long-term use, Kidney transplanted       NONFORMULARY      daily as needed Myocalm Herbal Muscle relaxer        PRAVASTATIN SODIUM PO      Take 10 mg by mouth daily        sulfamethoxazole-trimethoprim 400-80 MG per tablet    BACTRIM/SEPTRA    39 tablet    Take 1 tablet by mouth three times a week    Kidney replaced by transplant       VITAMIN D-3 PO      Take 1,000 Units by mouth.

## 2018-02-08 DIAGNOSIS — Z48.298 AFTERCARE FOLLOWING ORGAN TRANSPLANT: ICD-10-CM

## 2018-02-08 DIAGNOSIS — Z94.0 KIDNEY REPLACED BY TRANSPLANT: ICD-10-CM

## 2018-02-08 DIAGNOSIS — Z79.899 ENCOUNTER FOR LONG-TERM CURRENT USE OF MEDICATION: ICD-10-CM

## 2018-02-08 LAB
CYCLOSPORINE BLD LC/MS/MS-MCNC: 127 UG/L (ref 50–400)
ERYTHROCYTE [DISTWIDTH] IN BLOOD BY AUTOMATED COUNT: 12.8 % (ref 10–15)
HCT VFR BLD AUTO: 36.4 % (ref 35–47)
HGB BLD-MCNC: 11.6 G/DL (ref 11.7–15.7)
MCH RBC QN AUTO: 30.6 PG (ref 26.5–33)
MCHC RBC AUTO-ENTMCNC: 31.9 G/DL (ref 31.5–36.5)
MCV RBC AUTO: 96 FL (ref 78–100)
PLATELET # BLD AUTO: 185 10E9/L (ref 150–450)
RBC # BLD AUTO: ABNORMAL 10E12/L (ref 3.8–5.2)
TME LAST DOSE: NORMAL H
WBC # BLD AUTO: 5 10E9/L (ref 4–11)

## 2018-02-08 PROCEDURE — 87799 DETECT AGENT NOS DNA QUANT: CPT | Performed by: INTERNAL MEDICINE

## 2018-02-08 PROCEDURE — 80048 BASIC METABOLIC PNL TOTAL CA: CPT | Performed by: INTERNAL MEDICINE

## 2018-02-08 PROCEDURE — 80158 DRUG ASSAY CYCLOSPORINE: CPT | Performed by: INTERNAL MEDICINE

## 2018-02-08 PROCEDURE — 80180 DRUG SCRN QUAN MYCOPHENOLATE: CPT | Performed by: INTERNAL MEDICINE

## 2018-02-08 PROCEDURE — 85027 COMPLETE CBC AUTOMATED: CPT | Performed by: INTERNAL MEDICINE

## 2018-02-08 PROCEDURE — 36415 COLL VENOUS BLD VENIPUNCTURE: CPT | Performed by: INTERNAL MEDICINE

## 2018-02-09 DIAGNOSIS — Z94.0 KIDNEY TRANSPLANTED: ICD-10-CM

## 2018-02-09 LAB
ANION GAP SERPL CALCULATED.3IONS-SCNC: 5 MMOL/L (ref 3–14)
BUN SERPL-MCNC: 29 MG/DL (ref 7–30)
CALCIUM SERPL-MCNC: 9.6 MG/DL (ref 8.5–10.1)
CHLORIDE SERPL-SCNC: 105 MMOL/L (ref 94–109)
CO2 SERPL-SCNC: 28 MMOL/L (ref 20–32)
CREAT SERPL-MCNC: 1.12 MG/DL (ref 0.52–1.04)
EBV DNA # SPEC NAA+PROBE: 1256 {COPIES}/ML
EBV DNA SPEC NAA+PROBE-LOG#: 3.1 {LOG_COPIES}/ML
GFR SERPL CREATININE-BSD FRML MDRD: 49 ML/MIN/1.7M2
GLUCOSE SERPL-MCNC: 89 MG/DL (ref 70–99)
POTASSIUM SERPL-SCNC: 4.4 MMOL/L (ref 3.4–5.3)
SODIUM SERPL-SCNC: 138 MMOL/L (ref 133–144)

## 2018-02-09 RX ORDER — CYCLOSPORINE 25 MG/1
100 CAPSULE, LIQUID FILLED ORAL 2 TIMES DAILY
Qty: 720 CAPSULE | Refills: 3 | Status: SHIPPED | OUTPATIENT
Start: 2018-02-09 | End: 2019-04-14

## 2018-02-09 NOTE — TELEPHONE ENCOUNTER
Cyclosporine remains elevated.   Please decrease to 100 mg BID. Repeat level in 1-2 weeks. Will make further dose changes as necessary

## 2018-02-09 NOTE — TELEPHONE ENCOUNTER
Patient called to notify [facility] that message was received regarding decreasing her dosage.

## 2018-02-09 NOTE — TELEPHONE ENCOUNTER
Call placed to patient: No answer. Detailed voice message left requesting patient decrease her CSA to 100 BID and repeat level in 1-2 weeks. Order/rx sent

## 2018-02-09 NOTE — LETTER
PHYSICIAN ORDERS      DATE & TIME ISSUED: 2018 10:41 AM  PATIENT NAME: Zulma Lovell   : 1955     [facility] MR# [if applicable]: [id number]     DIAGNOSIS:  Kidney transplant  ICD-9 CODE: Z94.0      Please decrease cyclosporine dose to 100 mg twice daily and repeat level in 1-2 weeks of dose change    Cyclosporine level    Any questions please call: [phone] Option #5    Please fax these results to [phone].

## 2018-02-10 LAB
MYCOPHENOLATE SERPL LC/MS/MS-MCNC: 0.33 MG/L (ref 1–3.5)
MYCOPHENOLATE-G SERPL LC/MS/MS-MCNC: 25.5 MG/L (ref 30–95)
TME LAST DOSE: ABNORMAL H

## 2018-03-01 DIAGNOSIS — Z48.298 AFTERCARE FOLLOWING ORGAN TRANSPLANT: ICD-10-CM

## 2018-03-01 DIAGNOSIS — Z79.899 ENCOUNTER FOR LONG-TERM CURRENT USE OF MEDICATION: ICD-10-CM

## 2018-03-01 DIAGNOSIS — Z94.0 KIDNEY REPLACED BY TRANSPLANT: ICD-10-CM

## 2018-03-01 LAB
CYCLOSPORINE BLD LC/MS/MS-MCNC: 96 UG/L (ref 50–400)
TME LAST DOSE: 2215 H

## 2018-03-01 PROCEDURE — 80158 DRUG ASSAY CYCLOSPORINE: CPT | Performed by: INTERNAL MEDICINE

## 2018-03-01 PROCEDURE — 36415 COLL VENOUS BLD VENIPUNCTURE: CPT | Performed by: INTERNAL MEDICINE

## 2018-03-28 DIAGNOSIS — Z79.899 ENCOUNTER FOR LONG-TERM CURRENT USE OF MEDICATION: ICD-10-CM

## 2018-03-28 DIAGNOSIS — Z94.0 KIDNEY REPLACED BY TRANSPLANT: ICD-10-CM

## 2018-03-28 DIAGNOSIS — Z48.298 AFTERCARE FOLLOWING ORGAN TRANSPLANT: ICD-10-CM

## 2018-03-28 LAB
ERYTHROCYTE [DISTWIDTH] IN BLOOD BY AUTOMATED COUNT: 12.5 % (ref 10–15)
HCT VFR BLD AUTO: 33.5 % (ref 35–47)
HGB BLD-MCNC: 10.8 G/DL (ref 11.7–15.7)
MCH RBC QN AUTO: 30.2 PG (ref 26.5–33)
MCHC RBC AUTO-ENTMCNC: 32.2 G/DL (ref 31.5–36.5)
MCV RBC AUTO: 94 FL (ref 78–100)
PLATELET # BLD AUTO: 179 10E9/L (ref 150–450)
RBC # BLD AUTO: 3.58 10E12/L (ref 3.8–5.2)
WBC # BLD AUTO: 3.4 10E9/L (ref 4–11)

## 2018-03-28 PROCEDURE — 80158 DRUG ASSAY CYCLOSPORINE: CPT | Performed by: INTERNAL MEDICINE

## 2018-03-28 PROCEDURE — 85027 COMPLETE CBC AUTOMATED: CPT | Performed by: INTERNAL MEDICINE

## 2018-03-28 PROCEDURE — 80180 DRUG SCRN QUAN MYCOPHENOLATE: CPT | Performed by: INTERNAL MEDICINE

## 2018-03-28 PROCEDURE — 87799 DETECT AGENT NOS DNA QUANT: CPT | Performed by: INTERNAL MEDICINE

## 2018-03-28 PROCEDURE — 36415 COLL VENOUS BLD VENIPUNCTURE: CPT | Performed by: INTERNAL MEDICINE

## 2018-03-28 PROCEDURE — 80048 BASIC METABOLIC PNL TOTAL CA: CPT | Performed by: INTERNAL MEDICINE

## 2018-03-29 LAB
ANION GAP SERPL CALCULATED.3IONS-SCNC: 7 MMOL/L (ref 3–14)
BUN SERPL-MCNC: 38 MG/DL (ref 7–30)
CALCIUM SERPL-MCNC: 9.1 MG/DL (ref 8.5–10.1)
CHLORIDE SERPL-SCNC: 105 MMOL/L (ref 94–109)
CO2 SERPL-SCNC: 25 MMOL/L (ref 20–32)
CREAT SERPL-MCNC: 1.33 MG/DL (ref 0.52–1.04)
CYCLOSPORINE BLD LC/MS/MS-MCNC: 88 UG/L (ref 50–400)
GFR SERPL CREATININE-BSD FRML MDRD: 41 ML/MIN/1.7M2
GLUCOSE SERPL-MCNC: 110 MG/DL (ref 70–99)
POTASSIUM SERPL-SCNC: 5.6 MMOL/L (ref 3.4–5.3)
SODIUM SERPL-SCNC: 137 MMOL/L (ref 133–144)
TME LAST DOSE: NORMAL H

## 2018-03-30 ENCOUNTER — TELEPHONE (OUTPATIENT)
Dept: TRANSPLANT | Facility: CLINIC | Age: 63
End: 2018-03-30

## 2018-03-30 LAB
EBV DNA # SPEC NAA+PROBE: NORMAL {COPIES}/ML
EBV DNA SPEC NAA+PROBE-LOG#: NORMAL {LOG_COPIES}/ML
MYCOPHENOLATE SERPL LC/MS/MS-MCNC: 0.29 MG/L (ref 1–3.5)
MYCOPHENOLATE-G SERPL LC/MS/MS-MCNC: 16.9 MG/L (ref 30–95)
TME LAST DOSE: ABNORMAL H

## 2018-03-30 NOTE — TELEPHONE ENCOUNTER
Patient Call: Patient Call: Transplant Lab/Orders  Route to LPN  Post Transplant Days: 3876  When patient is less than 60 days post-transplant, route high priority  Reason for Call: Discuss lab results; which results? potassium 03/28/18   =  5.6  Callback needed? Yes  Return Call Needed  Same as documented in contacts section  When to return call?: Same day: Route High Priority

## 2018-03-30 NOTE — TELEPHONE ENCOUNTER
Call returned to patient: Patient verbalize understanding to decrease her intake of high potassium containing foods

## 2018-04-24 DIAGNOSIS — Z79.899 ENCOUNTER FOR LONG-TERM CURRENT USE OF MEDICATION: ICD-10-CM

## 2018-04-24 DIAGNOSIS — Z94.0 KIDNEY REPLACED BY TRANSPLANT: ICD-10-CM

## 2018-04-24 DIAGNOSIS — Z48.298 AFTERCARE FOLLOWING ORGAN TRANSPLANT: ICD-10-CM

## 2018-04-24 LAB
ANION GAP SERPL CALCULATED.3IONS-SCNC: 8 MMOL/L (ref 3–14)
BUN SERPL-MCNC: 37 MG/DL (ref 7–30)
CALCIUM SERPL-MCNC: 9.7 MG/DL (ref 8.5–10.1)
CHLORIDE SERPL-SCNC: 112 MMOL/L (ref 94–109)
CO2 SERPL-SCNC: 20 MMOL/L (ref 20–32)
CREAT SERPL-MCNC: 0.92 MG/DL (ref 0.52–1.04)
CYCLOSPORINE BLD LC/MS/MS-MCNC: 95 UG/L (ref 50–400)
ERYTHROCYTE [DISTWIDTH] IN BLOOD BY AUTOMATED COUNT: 12.5 % (ref 10–15)
GFR SERPL CREATININE-BSD FRML MDRD: 62 ML/MIN/1.7M2
GLUCOSE SERPL-MCNC: 98 MG/DL (ref 70–99)
HCT VFR BLD AUTO: 35.9 % (ref 35–47)
HGB BLD-MCNC: 11.6 G/DL (ref 11.7–15.7)
MCH RBC QN AUTO: 30.1 PG (ref 26.5–33)
MCHC RBC AUTO-ENTMCNC: 32.3 G/DL (ref 31.5–36.5)
MCV RBC AUTO: 93 FL (ref 78–100)
PLATELET # BLD AUTO: 170 10E9/L (ref 150–450)
POTASSIUM SERPL-SCNC: 4.8 MMOL/L (ref 3.4–5.3)
RBC # BLD AUTO: 3.86 10E12/L (ref 3.8–5.2)
SODIUM SERPL-SCNC: 140 MMOL/L (ref 133–144)
TME LAST DOSE: 2215 H
WBC # BLD AUTO: 3.9 10E9/L (ref 4–11)

## 2018-04-24 PROCEDURE — 85027 COMPLETE CBC AUTOMATED: CPT | Performed by: INTERNAL MEDICINE

## 2018-04-24 PROCEDURE — 80158 DRUG ASSAY CYCLOSPORINE: CPT | Performed by: INTERNAL MEDICINE

## 2018-04-24 PROCEDURE — 87799 DETECT AGENT NOS DNA QUANT: CPT | Performed by: INTERNAL MEDICINE

## 2018-04-24 PROCEDURE — 80048 BASIC METABOLIC PNL TOTAL CA: CPT | Performed by: INTERNAL MEDICINE

## 2018-04-24 PROCEDURE — 80180 DRUG SCRN QUAN MYCOPHENOLATE: CPT | Performed by: INTERNAL MEDICINE

## 2018-04-24 PROCEDURE — 36415 COLL VENOUS BLD VENIPUNCTURE: CPT | Performed by: INTERNAL MEDICINE

## 2018-04-24 RX ORDER — SULFAMETHOXAZOLE AND TRIMETHOPRIM 400; 80 MG/1; MG/1
1 TABLET ORAL
Qty: 39 TABLET | Refills: 3 | Status: SHIPPED | OUTPATIENT
Start: 2018-04-24 | End: 2018-08-06

## 2018-04-25 LAB
EBV DNA # SPEC NAA+PROBE: NORMAL {COPIES}/ML
EBV DNA SPEC NAA+PROBE-LOG#: NORMAL {LOG_COPIES}/ML

## 2018-04-27 DIAGNOSIS — Z94.0 KIDNEY TRANSPLANTED: ICD-10-CM

## 2018-04-27 DIAGNOSIS — Z79.899 HIGH RISK MEDICATIONS (NOT ANTICOAGULANTS) LONG-TERM USE: ICD-10-CM

## 2018-04-27 DIAGNOSIS — Z94.0 KIDNEY REPLACED BY TRANSPLANT: ICD-10-CM

## 2018-04-27 LAB
MYCOPHENOLATE SERPL LC/MS/MS-MCNC: <0.25 MG/L (ref 1–3.5)
MYCOPHENOLATE-G SERPL LC/MS/MS-MCNC: 16.2 MG/L (ref 30–95)
TME LAST DOSE: 2215 H

## 2018-04-27 RX ORDER — MYCOPHENOLATE MOFETIL 250 MG/1
500 CAPSULE ORAL 2 TIMES DAILY
Qty: 360 CAPSULE | Refills: 3 | Status: SHIPPED | OUTPATIENT
Start: 2018-04-27 | End: 2019-05-12

## 2018-04-27 RX ORDER — MYCOPHENOLATE MOFETIL 250 MG/1
CAPSULE ORAL
Qty: 180 CAPSULE | Refills: 3 | Status: CANCELLED | OUTPATIENT
Start: 2018-04-27

## 2018-04-27 NOTE — TELEPHONE ENCOUNTER
Call placed to patient: Patient confirms current dose and verbalize understanding to increase Cellcept dose to 500 mg BID and repeat level in one month with standing lab work. Order placed

## 2018-04-27 NOTE — TELEPHONE ENCOUNTER
ISSUE:  MPA level undetectable    PLAN/TASK  Confirm current dose of 250mg BID Cellcept  Inc dose to 500mg BID and recheck MPA level in 2 months with other blood work, per Dr. Lopez  Enter order and change rx thanks!

## 2018-04-27 NOTE — LETTER
PHYSICIAN ORDERS      DATE & TIME ISSUED: 2018 12:58 PM  PATIENT NAME: Zulma Lovell   : 1955     [facility] MR# [if applicable]: [id number]     DIAGNOSIS:  Kidney transplant  ICD-9 CODE: Z94.0      Please repeat Mycophenolate level in one month with next standing lab draw.    Any questions please call: [phone] Option #5    Please fax these results to [phone].

## 2018-04-27 NOTE — TELEPHONE ENCOUNTER
Rod Lopez MD Withrow, Angela, RN                     Okay on q3 month labs.         Called pt back and notified her that she can have all labs q3 months including MPA level with next check.   Orders in place.

## 2018-05-09 ENCOUNTER — RECORDS - HEALTHEAST (OUTPATIENT)
Dept: LAB | Facility: CLINIC | Age: 63
End: 2018-05-09

## 2018-05-09 LAB
ALBUMIN SERPL-MCNC: 3.9 G/DL (ref 3.5–5)
ALP SERPL-CCNC: 58 U/L (ref 45–120)
ALT SERPL W P-5'-P-CCNC: 16 U/L (ref 0–45)
ANION GAP SERPL CALCULATED.3IONS-SCNC: 10 MMOL/L (ref 5–18)
AST SERPL W P-5'-P-CCNC: 24 U/L (ref 0–40)
BILIRUB SERPL-MCNC: 1 MG/DL (ref 0–1)
BUN SERPL-MCNC: 34 MG/DL (ref 8–22)
CALCIUM SERPL-MCNC: 9.9 MG/DL (ref 8.5–10.5)
CHLORIDE BLD-SCNC: 106 MMOL/L (ref 98–107)
CO2 SERPL-SCNC: 21 MMOL/L (ref 22–31)
CREAT SERPL-MCNC: 1.26 MG/DL (ref 0.6–1.1)
GFR SERPL CREATININE-BSD FRML MDRD: 43 ML/MIN/1.73M2
GLUCOSE BLD-MCNC: 104 MG/DL (ref 70–125)
POTASSIUM BLD-SCNC: 5.4 MMOL/L (ref 3.5–5)
PROT SERPL-MCNC: 7.8 G/DL (ref 6–8)
SODIUM SERPL-SCNC: 137 MMOL/L (ref 136–145)

## 2018-05-10 ENCOUNTER — RECORDS - HEALTHEAST (OUTPATIENT)
Dept: LAB | Facility: CLINIC | Age: 63
End: 2018-05-10

## 2018-05-10 LAB
CHOLEST SERPL-MCNC: 249 MG/DL
FASTING STATUS PATIENT QL REPORTED: ABNORMAL
HDLC SERPL-MCNC: 50 MG/DL
LDLC SERPL CALC-MCNC: 154 MG/DL
TRIGL SERPL-MCNC: 223 MG/DL

## 2018-07-23 ENCOUNTER — TELEPHONE (OUTPATIENT)
Dept: NEPHROLOGY | Facility: CLINIC | Age: 63
End: 2018-07-23

## 2018-07-23 DIAGNOSIS — Z94.0 KIDNEY TRANSPLANTED: ICD-10-CM

## 2018-07-23 DIAGNOSIS — Z94.0 KIDNEY REPLACED BY TRANSPLANT: ICD-10-CM

## 2018-07-23 LAB
CYCLOSPORINE BLD LC/MS/MS-MCNC: 83 UG/L (ref 50–400)
ERYTHROCYTE [DISTWIDTH] IN BLOOD BY AUTOMATED COUNT: 11.9 % (ref 10–15)
HCT VFR BLD AUTO: 35.6 % (ref 35–47)
HGB BLD-MCNC: 11.3 G/DL (ref 11.7–15.7)
MCH RBC QN AUTO: 30.4 PG (ref 26.5–33)
MCHC RBC AUTO-ENTMCNC: 31.7 G/DL (ref 31.5–36.5)
MCV RBC AUTO: 96 FL (ref 78–100)
PLATELET # BLD AUTO: 193 10E9/L (ref 150–450)
RBC # BLD AUTO: 3.72 10E12/L (ref 3.8–5.2)
TME LAST DOSE: NORMAL H
WBC # BLD AUTO: 3.3 10E9/L (ref 4–11)

## 2018-07-23 PROCEDURE — 80048 BASIC METABOLIC PNL TOTAL CA: CPT | Performed by: INTERNAL MEDICINE

## 2018-07-23 PROCEDURE — 85027 COMPLETE CBC AUTOMATED: CPT | Performed by: INTERNAL MEDICINE

## 2018-07-23 PROCEDURE — 80158 DRUG ASSAY CYCLOSPORINE: CPT | Performed by: INTERNAL MEDICINE

## 2018-07-23 PROCEDURE — 36415 COLL VENOUS BLD VENIPUNCTURE: CPT | Performed by: INTERNAL MEDICINE

## 2018-07-23 PROCEDURE — 80180 DRUG SCRN QUAN MYCOPHENOLATE: CPT | Performed by: INTERNAL MEDICINE

## 2018-07-23 PROCEDURE — 87799 DETECT AGENT NOS DNA QUANT: CPT | Performed by: INTERNAL MEDICINE

## 2018-07-23 NOTE — TELEPHONE ENCOUNTER
Left voicemail for patient to call back (follow up from last transplant appointment).    Kelly Almodovar RN

## 2018-07-24 LAB
ANION GAP SERPL CALCULATED.3IONS-SCNC: 7 MMOL/L (ref 3–14)
BUN SERPL-MCNC: 31 MG/DL (ref 7–30)
CALCIUM SERPL-MCNC: 9.3 MG/DL (ref 8.5–10.1)
CHLORIDE SERPL-SCNC: 107 MMOL/L (ref 94–109)
CO2 SERPL-SCNC: 26 MMOL/L (ref 20–32)
CREAT SERPL-MCNC: 1 MG/DL (ref 0.52–1.04)
EBV DNA # SPEC NAA+PROBE: NORMAL {COPIES}/ML
EBV DNA SPEC NAA+PROBE-LOG#: NORMAL {LOG_COPIES}/ML
GFR SERPL CREATININE-BSD FRML MDRD: 56 ML/MIN/1.7M2
GLUCOSE SERPL-MCNC: 95 MG/DL (ref 70–99)
POTASSIUM SERPL-SCNC: 4.8 MMOL/L (ref 3.4–5.3)
SODIUM SERPL-SCNC: 140 MMOL/L (ref 133–144)

## 2018-07-25 LAB
MYCOPHENOLATE SERPL LC/MS/MS-MCNC: 1.62 MG/L (ref 1–3.5)
MYCOPHENOLATE-G SERPL LC/MS/MS-MCNC: 51.3 MG/L (ref 30–95)
TME LAST DOSE: NORMAL H

## 2018-08-06 ENCOUNTER — OFFICE VISIT (OUTPATIENT)
Dept: NEPHROLOGY | Facility: CLINIC | Age: 63
End: 2018-08-06
Attending: INTERNAL MEDICINE
Payer: COMMERCIAL

## 2018-08-06 VITALS
SYSTOLIC BLOOD PRESSURE: 137 MMHG | HEART RATE: 55 BPM | BODY MASS INDEX: 24.95 KG/M2 | DIASTOLIC BLOOD PRESSURE: 80 MMHG | WEIGHT: 140.8 LBS | OXYGEN SATURATION: 99 % | TEMPERATURE: 98.9 F

## 2018-08-06 DIAGNOSIS — Z29.89 NEED FOR PNEUMOCYSTIS PROPHYLAXIS: ICD-10-CM

## 2018-08-06 DIAGNOSIS — I15.1 HYPERTENSION SECONDARY TO OTHER RENAL DISORDERS: ICD-10-CM

## 2018-08-06 DIAGNOSIS — Z94.0 KIDNEY REPLACED BY TRANSPLANT: ICD-10-CM

## 2018-08-06 DIAGNOSIS — D72.819 LEUKOPENIA, UNSPECIFIED TYPE: ICD-10-CM

## 2018-08-06 DIAGNOSIS — Z48.298 AFTERCARE FOLLOWING ORGAN TRANSPLANT: Primary | ICD-10-CM

## 2018-08-06 DIAGNOSIS — E55.9 VITAMIN D DEFICIENCY: ICD-10-CM

## 2018-08-06 DIAGNOSIS — D84.9 IMMUNOSUPPRESSION (H): ICD-10-CM

## 2018-08-06 DIAGNOSIS — D70.2 DRUG-INDUCED LEUKOPENIA (H): ICD-10-CM

## 2018-08-06 DIAGNOSIS — E87.5 HYPERKALEMIA: ICD-10-CM

## 2018-08-06 DIAGNOSIS — B27.00 EBV (EPSTEIN-BARR VIRUS) VIREMIA: ICD-10-CM

## 2018-08-06 PROCEDURE — G0463 HOSPITAL OUTPT CLINIC VISIT: HCPCS | Mod: ZF

## 2018-08-06 RX ORDER — CIDER VINEGAR 300 MG
2000 TABLET ORAL 2 TIMES DAILY
COMMUNITY

## 2018-08-06 ASSESSMENT — PAIN SCALES - GENERAL: PAINLEVEL: NO PAIN (0)

## 2018-08-06 NOTE — MR AVS SNAPSHOT
After Visit Summary   8/6/2018    Zulma Lovell    MRN: [id number]           Patient Information     Date Of Birth          1955        Visit Information        Provider Department      8/6/2018 1:05 PM Recipient, [facility]        Today's Diagnoses     Aftercare following organ transplant    -  1    Kidney replaced by transplant        Need for pneumocystis prophylaxis        Immunosuppression (H)        Hypertension secondary to other renal disorders        Leukopenia, unspecified type        Drug-induced leukopenia (H)        Vitamin D deficiency        Hyperkalemia        EBV (Bandar-Barr virus) viremia           Follow-ups after your visit        Follow-up notes from your care team     Return in about 1 year (around 8/6/2019) for Routine Visit.      Your next 10 appointments already scheduled     Aug 05, 2019  1:05 PM CDT   (Arrive by 12:35 PM)   Return Kidney Transplant with Uc Kidney/Pancreas Recipient   [facility] ([facility])    [address] [place] [address]  [address]  [facility] [address] [phone]              Who to contact     If you have questions or need follow up information about today's clinic visit or your schedule please contact [facility] NEPHROLOG[facility] directly at [phone].  Normal or non-critical lab and imaging results will be communicated to you by MyChart, letter or phone within 4 business days after the clinic has received the results. If you do not hear from us within 7 days, please contact the clinic through [employer] or phone. If you have a critical or abnormal lab result, we will notify you by phone as soon as possible.  Submit refill requests through [employer] or call your pharmacy and they will forward the refill request to us. Please allow 3 business days for your refill to be completed.          Additional Information About Your Visit        MyChart Information     [employer] gives you secure access to your  electronic health record. If you see a primary care provider, you can also send messages to your care team and make appointments. If you have questions, please call your primary care clinic.  If you do not have a primary care provider, please call [phone] and they will assist you.        Care EveryWhere ID     This is your Care EveryWhere ID. This could be used by other organizations to access your [place] medical records  [id number]        Your Vitals Were     Pulse Temperature Last Period Pulse Oximetry BMI (Body Mass Index)       55 98.9  F (37.2  C) (Oral) (LMP Unknown) 99% 24.95 kg/m2        Blood Pressure from Last 3 Encounters:   08/06/18 137/80   02/06/18 147/66   01/23/18 167/71    Weight from Last 3 Encounters:   08/06/18 63.9 kg (140 lb 12.8 oz)   02/06/18 65.3 kg (144 lb)   01/23/18 65.4 kg (144 lb 2.9 oz)              Today, you had the following     No orders found for display         Today's Medication Changes          These changes are accurate as of 8/6/18  1:38 PM.  If you have any questions, ask your nurse or doctor.               Stop taking these medicines if you haven't already. Please contact your care team if you have questions.     LOVAZA PO           sulfamethoxazole-trimethoprim 400-80 MG per tablet   Commonly known as:  BACTRIM/SEPTRA                    Primary Care Provider Office Phone # Fax #    Rene Ruggiero [phone]       [place] [address] [address] [facility] [address]        Equal Access to Services     [facility] WENDI AH: Hadii jacob montenegroo Sokenrickali, waaxda luqadaha, qaybta kaalmada cosmo, chidi lai. So [facility] [phone].    ATENCIÓN: Si habla español, tiene a valdivia disposición servicios gratuitos de asistencia lingüística. Llame al [phone].    We comply with applicable federal civil rights laws and [place] laws. We do not discriminate on the basis of race, color, national origin, age, disability,  sex, sexual orientation, or gender identity.            Thank you!     Thank you for choosing [facility]  for your care. Our goal is always to provide you with excellent care. Hearing back from our patients is one way we can continue to improve our services. Please take a few minutes to complete the written survey that you may receive in the mail after your visit with us. Thank you!             Your Updated Medication List - Protect others around you: Learn how to safely use, store and throw away your medicines at www.disposemymeds.org.          This list is accurate as of 8/6/18  1:38 PM.  Always use your most recent med list.                   Brand Name Dispense Instructions for use Diagnosis    ACETAMINOPHEN PO      Take 1,000 mg by mouth as needed for pain        brimonidine-timolol 0.2-0.5 % ophthalmic solution    COMBIGAN     Place 1 drop into both eyes daily In morning        CENTRUM SILVER per tablet      Take 1 tablet by mouth daily        cycloSPORINE modified 25 MG capsule    GENERIC EQUIVALENT    720 capsule    Take 4 capsules (100 mg) by mouth 2 times daily    Kidney transplanted       Fish Oil 1000 MG Cpdr      Take 2,000 mg by mouth 2 times daily        latanoprost 0.005 % ophthalmic solution    XALATAN     Place 1 drop Into the left eye At Bedtime        loteprednol 0.5 % ophthalmic susp    LOTEMAX     Place 1 drop Into the left eye 4 times daily        LUCENTIS IO      Eye injections given every 11 weeks        METOPROLOL TARTRATE PO      Take 75 mg by mouth 2 times daily        mycophenolate 250 MG capsule    GENERIC EQUIVALENT    360 capsule    Take 2 capsules (500 mg) by mouth 2 times daily    Kidney replaced by transplant, High risk medications (not anticoagulants) long-term use, Kidney transplanted       NONFORMULARY      daily as needed Myocalm Herbal Muscle relaxer        PRAVASTATIN SODIUM PO      Take 10 mg by mouth daily        VITAMIN D-3 PO      Take 1,000 Units by mouth.

## 2018-08-06 NOTE — NURSING NOTE
Chief Complaint   Patient presents with     RECHECK     follow up kidney Tx 1 year     /80 (BP Location: Right arm)  Pulse 55  Temp 98.9  F (37.2  C) (Oral)  Wt 63.9 kg (140 lb 12.8 oz)  LMP  (LMP Unknown)  SpO2 99%  BMI 24.95 kg/m2   Darshana Perez

## 2018-08-06 NOTE — LETTER
2018      RE: Zulma Lovell  [address]  [facility] [address]       TRANSPLANT NEPHROLOGY VISIT    Assessment & Plan   # DDKT: basline Cr ~ 0.9-1.1; Stable. Slight increase in creatinine in 3/2018 likely pre-renal effects.  Otherwise things have been stable.  Will continue to monitor.   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 Treated with rituximab 2018 and 2018 and now undetectable.    # Prophylaxis:   - PJP: Bactrim.  CD4 count is normal.  Will stop bactrim.   - CMV: none    # Transplant History:    Transplant: 2007 (Kidney)    Donor Type:  - Brain Death Donor Class: Standard Criteria Donor   Crossmatch at time of Tx: negative    DSA at time of Tx: No    Latest DSA:No Date of last check: 2013   Significant changes in immunosuppression: None    Biopsy: Yes - 2007 with diabetic changes (donor derived) and recurrent FSGS Rejection History:No   CMV IgG Ab Discordance (D+/R-): No    EBV IgG Ab Discordance (D+/R-): No    History of BK Viremia: No    Significant Complications: EBV viremia    Transplant Office Phone Number: [phone]    # Hypertension: controlled; Goal BP: 140/90 on metoprolol 75 mg bid   - Changes: No    # Anemia in chronic renal disease: Hgb: Stable - likely secondary to immunosuppressive medications   - Iron studies: replete    # Mineral Bone Disorder:   - Secondary renal hyperparathyroidism: normal PTH level   - Vitamin D: normal vitamin d level on supplementation   - Calcium: normal calcium level   - Phosphorus: not recently checked    # Electrolytes:    - Magnesium: not recently checked   - Potassium: on eipsode of hyperkalemia 3/2018 with abnormal renal function    # Other Medical Issues: None    Return visit: No Follow-up on  file.    Assessment and plan was discussed with the patient and she voiced her understanding and agreement.    Viral Dwaine Steiner MD    Chief Complaint   Ms. 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 followed by ESRD and transplant 2007  Was treated with ebv viremia in 1/2018 and 2/2018 with rituximab, now undetectable  In march 20188 just came back from [place] and had creatinine up to 1.3 , improved with hydration    Recent Hospitalizations:  [x] No [] Yes    New Medical Issues: [x] No [] Yes    Decreased energy: [] No [x] Yes Still present from last year and , dealing with issues with oa in cervical spine and ddeconditioning   Chest pain or SOB with exertion:  [x] No [] Yes    Appetite change or weight change: [x] No [] Yes    Nausea, vomiting or diarrhea:  [x] No [] Yes    Fever, sweats or chills: [x] No [] Yes    Leg swelling: [x] No [] Yes      Other medical issues:  Yes - chronic oa. Mri 2015 , getting therapyies.    Home BP: 130-140's    Review of Systems   A comprehensive review of systems was obtained and negative, except as noted in the HPI or PMH.    Problem List    Patient Active Problem List   Diagnosis     Kidney replaced by transplant     Immunosuppressed status (H)     Anemia in chronic renal disease     Dyslipidemia     Aftercare following organ transplant     EBV (Bandar-Barr virus) viremia     Vitamin D deficiency        Social History   Social History   Substance Use Topics     Smoking status: Never Smoker     Smokeless tobacco: Not on file     Alcohol use No       Allergies   Allergies   Allergen Reactions     Fenofibrate      Other reaction(s): Pancreatitis     Simvastatin Cramps and Muscle Pain (Myalgia)     Tricor        Medications   Current Outpatient Prescriptions   Medication Sig     ACETAMINOPHEN PO Take 1,000 mg by mouth as needed for pain     brimonidine-timolol (COMBIGAN) 0.2-0.5 % ophthalmic solution  Place 1 drop into both eyes daily In morning     Cholecalciferol (VITAMIN D-3 PO) Take 1,000 Units by mouth.     cycloSPORINE modified (GENERIC EQUIVALENT) 25 MG capsule Take 4 capsules (100 mg) by mouth 2 times daily     latanoprost (XALATAN) 0.005 % ophthalmic solution Place 1 drop Into the left eye At Bedtime     loteprednol (LOTEMAX) 0.5 % ophthalmic suspension Place 1 drop Into the left eye 4 times daily     METOPROLOL TARTRATE PO Take 75 mg by mouth 2 times daily     Multiple Vitamins-Minerals (CENTRUM SILVER) per tablet Take 1 tablet by mouth daily     mycophenolate (GENERIC EQUIVALENT) 250 MG capsule Take 2 capsules (500 mg) by mouth 2 times daily     NONFORMULARY daily as needed Myocalm Herbal Muscle relaxer     Omega-3 Fatty Acids (FISH OIL) 1000 MG CPDR Take 2,000 mg by mouth 2 times daily     PRAVASTATIN SODIUM PO Take 10 mg by mouth daily     Ranibizumab (LUCENTIS IO) Eye injections given every 11 weeks     sulfamethoxazole-trimethoprim (BACTRIM/SEPTRA) 400-80 MG per tablet Take 1 tablet by mouth three times a week     No current facility-administered medications for this visit.      There are no discontinued medications.    Physical Exam   Vital Signs: /80 (BP Location: Right arm)  Pulse 55  Temp 98.9  F (37.2  C) (Oral)  Wt 63.9 kg (140 lb 12.8 oz)  LMP  (LMP Unknown)  SpO2 99%  BMI 24.95 kg/m2  GENERAL APPEARANCE: alert and no distress  HENT: mouth without ulcers or lesions  LYMPHATICS: no cervical or supraclavicular nodes  RESP: lungs clear to auscultation - no rales, rhonchi or wheezes  CV: regular rhythm, normal rate, no rub, no murmur  EDEMA: no LE edema bilaterally  ABDOMEN: soft, nondistended, nontender, bowel sounds normal  MS: extremities normal - no gross deformities noted, no evidence of inflammation in joints, no muscle tenderness  SKIN: no rash  TX KIDNEY: normal    Data   Renal -   Recent Labs   Lab Test  07/23/18   1020  04/24/18   1034  03/28/18   1006   NA  140  140  137    POTASSIUM  4.8  4.8  5.6*   CHLORIDE  107  112*  105   CO2  26  20  25   BUN  31*  37*  38*   CR  1.00  0.92  1.33*   GLC  95  98  110*   AICHA  9.3  9.7  9.1     No lab results found.  No lab results found.  Recent Labs   Lab Test  08/07/17   1324   VITDT  50       CBC -   Recent Labs   Lab Test  07/23/18   1020  04/24/18   1034  03/28/18   1006   WBC  3.3*  3.9*  3.4*   HGB  11.3*  11.6*  10.8*   PLT  193  170  179       LFTs - No lab results found.    Pancreas -No lab results found.  No lab results found.    Iron Panel - No lab results found.    Transplant -   Recent Labs   Lab Test  08/01/16   1551   CSPEC  Plasma, EDTA anticoagulant   CMVQNT  CMV DNA Not Detected   The RENEE AmpliPrep/RENEE TaqMan CMV Test is an FDA-approved in vitro nucleic   acid amplification test for the quantitation of cytomegalovirus DNA in human   plasma (EDTA plasma) using the RENEE AmpliPrep Instrument for automated viral   nucleic acid extraction and the [employer] TaqMan Analyzer or Spire for   automated Real Time amplification and detection of the viral nucleic acid   target.   Titer results are reported in International Units/mL (IU/mL using 1st WHO   International standard for Human Cytomegalovirus for Nucleic Acid Amplification   based assays. The conversion factor between CMV DNA copis/mL (as defined by the   Roche RENEE TaqMan CMV test) and International Units is the CMV DNA   concentration in IU/mL x 1.1 copies/IU = CMV DNA in copies/mL.   This assay has received FDA approval for the testing of human plasma only. The   Infect[facility] Disease Diagnostic Laboratory at the [facility], [place], has validated the pe rformance characteristics of the Roche   CMV assay for plasma, bronchial alveolar lavage/wash and urine.     CMVLOG  Not Calculated     Recent Labs   Lab Test  07/23/18   1020  04/24/18   1034  03/28/18   1006   EBRES  EBV DNA Not Detected  EBV DNA Not Detected  EBV DNA Not Detected   EBLOG   Not Calculated  Not Calculated  Not Calculated     No lab results found.    No lab results found.  Recent Labs   Lab Test  07/23/18   1020  04/24/18   1035  03/28/18   1007   DOSMPA  Not Provided  [id number]   MPACID  1.62  <0.25*  0.29*   MPAG  51.3  16.2*  16.9*       Kidney/Pancreas Recipient

## 2018-08-06 NOTE — PROGRESS NOTES
TRANSPLANT NEPHROLOGY VISIT    Assessment & Plan   # DDKT: basline Cr ~ 0.9-1.1; Stable. Slight increase in creatinine in 3/2018 likely pre-renal effects.  Otherwise things have been stable.  Will continue to monitor.   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 Treated with rituximab 2018 and 2018 and now undetectable.    # Prophylaxis:   - PJP: Bactrim.  CD4 count is normal.  Will stop bactrim.   - CMV: none    # Transplant History:    Transplant: 2007 (Kidney)    Donor Type:  - Brain Death Donor Class: Standard Criteria Donor   Crossmatch at time of Tx: negative    DSA at time of Tx: No    Latest DSA:No Date of last check: 2013   Significant changes in immunosuppression: None    Biopsy: Yes - 2007 with diabetic changes (donor derived) and recurrent FSGS Rejection History:No   CMV IgG Ab Discordance (D+/R-): No    EBV IgG Ab Discordance (D+/R-): No    History of BK Viremia: No    Significant Complications: EBV viremia    Transplant Office Phone Number: [phone]    # Hypertension: controlled; Goal BP: 140/90 on metoprolol 75 mg bid   - Changes: No    # Anemia in chronic renal disease: Hgb: Stable - likely secondary to immunosuppressive medications   - Iron studies: replete    # Mineral Bone Disorder:   - Secondary renal hyperparathyroidism: normal PTH level   - Vitamin D: normal vitamin d level on supplementation   - Calcium: normal calcium level   - Phosphorus: not recently checked    # Electrolytes:    - Magnesium: not recently checked   - Potassium: on eipsode of hyperkalemia 3/2018 with abnormal renal function    # Other Medical Issues: None    Return visit: No Follow-up on file.    Assessment and plan was discussed with the patient and she voiced her understanding and  agreement.    Viral Dwaine Steiner MD    Chief Complaint   Ms. 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 followed by ESRD and transplant 2007  Was treated with ebv viremia in 1/2018 and 2/2018 with rituximab, now undetectable  In march 20188 just came back from [place] and had creatinine up to 1.3 , improved with hydration    Recent Hospitalizations:  [x] No [] Yes    New Medical Issues: [x] No [] Yes    Decreased energy: [] No [x] Yes Still present from last year and , dealing with issues with oa in cervical spine and ddeconditioning   Chest pain or SOB with exertion:  [x] No [] Yes    Appetite change or weight change: [x] No [] Yes    Nausea, vomiting or diarrhea:  [x] No [] Yes    Fever, sweats or chills: [x] No [] Yes    Leg swelling: [x] No [] Yes      Other medical issues:  Yes - chronic oa. Mri 2015 , getting therapyies.    Home BP: 130-140's    Review of Systems   A comprehensive review of systems was obtained and negative, except as noted in the HPI or PMH.    Problem List    Patient Active Problem List   Diagnosis     Kidney replaced by transplant     Immunosuppressed status (H)     Anemia in chronic renal disease     Dyslipidemia     Aftercare following organ transplant     EBV (Bandar-Barr virus) viremia     Vitamin D deficiency        Social History   Social History   Substance Use Topics     Smoking status: Never Smoker     Smokeless tobacco: Not on file     Alcohol use No       Allergies   Allergies   Allergen Reactions     Fenofibrate      Other reaction(s): Pancreatitis     Simvastatin Cramps and Muscle Pain (Myalgia)     Tricor        Medications   Current Outpatient Prescriptions   Medication Sig     ACETAMINOPHEN PO Take 1,000 mg by mouth as needed for pain     brimonidine-timolol (COMBIGAN) 0.2-0.5 % ophthalmic solution Place 1 drop into both eyes daily In morning     Cholecalciferol (VITAMIN D-3 PO) Take 1,000  Units by mouth.     cycloSPORINE modified (GENERIC EQUIVALENT) 25 MG capsule Take 4 capsules (100 mg) by mouth 2 times daily     latanoprost (XALATAN) 0.005 % ophthalmic solution Place 1 drop Into the left eye At Bedtime     loteprednol (LOTEMAX) 0.5 % ophthalmic suspension Place 1 drop Into the left eye 4 times daily     METOPROLOL TARTRATE PO Take 75 mg by mouth 2 times daily     Multiple Vitamins-Minerals (CENTRUM SILVER) per tablet Take 1 tablet by mouth daily     mycophenolate (GENERIC EQUIVALENT) 250 MG capsule Take 2 capsules (500 mg) by mouth 2 times daily     NONFORMULARY daily as needed Myocalm Herbal Muscle relaxer     Omega-3 Fatty Acids (FISH OIL) 1000 MG CPDR Take 2,000 mg by mouth 2 times daily     PRAVASTATIN SODIUM PO Take 10 mg by mouth daily     Ranibizumab (LUCENTIS IO) Eye injections given every 11 weeks     sulfamethoxazole-trimethoprim (BACTRIM/SEPTRA) 400-80 MG per tablet Take 1 tablet by mouth three times a week     No current facility-administered medications for this visit.      There are no discontinued medications.    Physical Exam   Vital Signs: /80 (BP Location: Right arm)  Pulse 55  Temp 98.9  F (37.2  C) (Oral)  Wt 63.9 kg (140 lb 12.8 oz)  LMP  (LMP Unknown)  SpO2 99%  BMI 24.95 kg/m2  GENERAL APPEARANCE: alert and no distress  HENT: mouth without ulcers or lesions  LYMPHATICS: no cervical or supraclavicular nodes  RESP: lungs clear to auscultation - no rales, rhonchi or wheezes  CV: regular rhythm, normal rate, no rub, no murmur  EDEMA: no LE edema bilaterally  ABDOMEN: soft, nondistended, nontender, bowel sounds normal  MS: extremities normal - no gross deformities noted, no evidence of inflammation in joints, no muscle tenderness  SKIN: no rash  TX KIDNEY: normal    Data   Renal -   Recent Labs   Lab Test  07/23/18   1020  04/24/18   1034  03/28/18   1006   NA  140  140  137   POTASSIUM  4.8  4.8  5.6*   CHLORIDE  107  112*  105   CO2  26  20  25   BUN  31*  37*  38*    CR  1.00  0.92  1.33*   GLC  95  98  110*   AICHA  9.3  9.7  9.1     No lab results found.  No lab results found.  Recent Labs   Lab Test  08/07/17   1324   VITDT  50       CBC -   Recent Labs   Lab Test  07/23/18   1020  04/24/18   1034  03/28/18   1006   WBC  3.3*  3.9*  3.4*   HGB  11.3*  11.6*  10.8*   PLT  193  170  179       LFTs - No lab results found.    Pancreas -No lab results found.  No lab results found.    Iron Panel - No lab results found.    Transplant -   Recent Labs   Lab Test  08/01/16   1551   CSPEC  Plasma, EDTA anticoagulant   CMVQNT  CMV DNA Not Detected   The RENEE AmpliPrep/RENEE TaqMan CMV Test is an FDA-approved in vitro nucleic   acid amplification test for the quantitation of cytomegalovirus DNA in human   plasma (EDTA plasma) using the RENEE AmpliPrep Instrument for automated viral   nucleic acid extraction and the RENEE TaqMan Analyzer or [employer] TaqMan for   automated Real Time amplification and detection of the viral nucleic acid   target.   Titer results are reported in International Units/mL (IU/mL using 1st WHO   International standard for Human Cytomegalovirus for Nucleic Acid Amplification   based assays. The conversion factor between CMV DNA copis/mL (as defined by the   Roche RENEE TaqMan CMV test) and International Units is the CMV DNA   concentration in IU/mL x 1.1 copies/IU = CMV DNA in copies/mL.   This assay has received FDA approval for the testing of human plasma only. The   Infectious Disease Diagnostic Laboratory at the [facility], [place], has validated the pe rformance characteristics of the Roche   CMV assay for plasma, bronchial alveolar lavage/wash and urine.     CMVLOG  Not Calculated     Recent Labs   Lab Test  07/23/18   1020  04/24/18   1034  03/28/18   1006   EBRES  EBV DNA Not Detected  EBV DNA Not Detected  EBV DNA Not Detected   EBLOG  Not Calculated  Not Calculated  Not Calculated     No lab results found.    No lab results  found.  Recent Labs   Lab Test  07/23/18   1020  04/24/18   1035  03/28/18   1007   DOSMPA  Not Provided  [id number]   MPACID  1.62  <0.25*  0.29*   MPAG  51.3  16.2*  16.9*

## 2018-09-04 ENCOUNTER — TELEPHONE (OUTPATIENT)
Dept: TRANSPLANT | Facility: CLINIC | Age: 63
End: 2018-09-04

## 2018-09-04 NOTE — TELEPHONE ENCOUNTER
Call returned to pt. I explained that general precautions should be practiced. Good hand hygeine is the number one thing she can practice. I also asked that she use gloves if emptying or handling any bodily waste.   Pt verbalizes understanding of plan, pt has no further questions.

## 2018-09-04 NOTE — TELEPHONE ENCOUNTER
Patient Call: General      Reason for call: Patient called to get information on potential precautions she should take during assisting her sister with her Chemo treatment appointments. She plans on helping her sister during her chemo treatments and wants to make sure she is ok to do help.    Call back needed? Yes    Return Call Needed  Same as documented in contacts section  When to return call?: Greater than one day: Route standard priority

## 2018-09-26 DIAGNOSIS — Z94.0 KIDNEY TRANSPLANTED: ICD-10-CM

## 2018-09-26 DIAGNOSIS — Z94.0 KIDNEY REPLACED BY TRANSPLANT: ICD-10-CM

## 2018-09-26 LAB
ANION GAP SERPL CALCULATED.3IONS-SCNC: 13 MMOL/L (ref 3–14)
BUN SERPL-MCNC: 25 MG/DL (ref 7–30)
CALCIUM SERPL-MCNC: 9.3 MG/DL (ref 8.5–10.1)
CHLORIDE SERPL-SCNC: 106 MMOL/L (ref 94–109)
CO2 SERPL-SCNC: 19 MMOL/L (ref 20–32)
CREAT SERPL-MCNC: 0.92 MG/DL (ref 0.52–1.04)
CYCLOSPORINE BLD LC/MS/MS-MCNC: 66 UG/L (ref 50–400)
ERYTHROCYTE [DISTWIDTH] IN BLOOD BY AUTOMATED COUNT: 11.9 % (ref 10–15)
GFR SERPL CREATININE-BSD FRML MDRD: 62 ML/MIN/1.7M2
GLUCOSE SERPL-MCNC: 86 MG/DL (ref 70–99)
HCT VFR BLD AUTO: 35.3 % (ref 35–47)
HGB BLD-MCNC: 11.3 G/DL (ref 11.7–15.7)
MCH RBC QN AUTO: 30.1 PG (ref 26.5–33)
MCHC RBC AUTO-ENTMCNC: 32 G/DL (ref 31.5–36.5)
MCV RBC AUTO: 94 FL (ref 78–100)
PLATELET # BLD AUTO: 167 10E9/L (ref 150–450)
POTASSIUM SERPL-SCNC: 4.1 MMOL/L (ref 3.4–5.3)
RBC # BLD AUTO: 3.76 10E12/L (ref 3.8–5.2)
SODIUM SERPL-SCNC: 138 MMOL/L (ref 133–144)
TME LAST DOSE: NORMAL H
WBC # BLD AUTO: 3.7 10E9/L (ref 4–11)

## 2018-09-26 PROCEDURE — 80158 DRUG ASSAY CYCLOSPORINE: CPT | Performed by: INTERNAL MEDICINE

## 2018-09-26 PROCEDURE — 80048 BASIC METABOLIC PNL TOTAL CA: CPT | Performed by: INTERNAL MEDICINE

## 2018-09-26 PROCEDURE — 87799 DETECT AGENT NOS DNA QUANT: CPT | Performed by: INTERNAL MEDICINE

## 2018-09-26 PROCEDURE — 85027 COMPLETE CBC AUTOMATED: CPT | Performed by: INTERNAL MEDICINE

## 2018-09-26 PROCEDURE — 36415 COLL VENOUS BLD VENIPUNCTURE: CPT | Performed by: INTERNAL MEDICINE

## 2018-09-27 ENCOUNTER — TELEPHONE (OUTPATIENT)
Dept: TRANSPLANT | Facility: CLINIC | Age: 63
End: 2018-09-27

## 2018-09-27 DIAGNOSIS — Z94.0 KIDNEY REPLACED BY TRANSPLANT: Primary | ICD-10-CM

## 2018-09-27 LAB
EBV DNA # SPEC NAA+PROBE: 8114 {COPIES}/ML
EBV DNA SPEC NAA+PROBE-LOG#: 3.9 {LOG_COPIES}/ML

## 2018-09-27 NOTE — TELEPHONE ENCOUNTER
ISSUE:  EBV back at low level    PLAN:  Call placed to pt. I asked that she collect monthly labs, including EBV PCR for the next 3 months to follow. Orders placed.  Pt verbalizes understanding of plan

## 2018-10-01 ENCOUNTER — DOCUMENTATION ONLY (OUTPATIENT)
Dept: TRANSPLANT | Facility: CLINIC | Age: 63
End: 2018-10-01

## 2018-10-01 ENCOUNTER — HOSPITAL ENCOUNTER (OUTPATIENT)
Dept: MAMMOGRAPHY | Facility: CLINIC | Age: 63
Discharge: HOME | End: 2018-10-01
Attending: OBSTETRICS & GYNECOLOGY

## 2018-10-01 DIAGNOSIS — Z12.31 VISIT FOR SCREENING MAMMOGRAM: ICD-10-CM

## 2018-10-15 ENCOUNTER — RECORDS - HEALTHEAST (OUTPATIENT)
Dept: LAB | Facility: CLINIC | Age: 63
End: 2018-10-15

## 2018-10-15 LAB
ALBUMIN SERPL-MCNC: 4 G/DL (ref 3.5–5)
ALP SERPL-CCNC: 61 U/L (ref 45–120)
ALT SERPL W P-5'-P-CCNC: 21 U/L (ref 0–45)
ANION GAP SERPL CALCULATED.3IONS-SCNC: 12 MMOL/L (ref 5–18)
AST SERPL W P-5'-P-CCNC: 27 U/L (ref 0–40)
BILIRUB SERPL-MCNC: 1.1 MG/DL (ref 0–1)
BUN SERPL-MCNC: 30 MG/DL (ref 8–22)
CALCIUM SERPL-MCNC: 10.2 MG/DL (ref 8.5–10.5)
CHLORIDE BLD-SCNC: 103 MMOL/L (ref 98–107)
CHOLEST SERPL-MCNC: 232 MG/DL
CO2 SERPL-SCNC: 24 MMOL/L (ref 22–31)
CREAT SERPL-MCNC: 1.12 MG/DL (ref 0.6–1.1)
FASTING STATUS PATIENT QL REPORTED: ABNORMAL
GFR SERPL CREATININE-BSD FRML MDRD: 49 ML/MIN/1.73M2
GLUCOSE BLD-MCNC: 108 MG/DL (ref 70–125)
HDLC SERPL-MCNC: 58 MG/DL
LDLC SERPL CALC-MCNC: 147 MG/DL
POTASSIUM BLD-SCNC: 4.8 MMOL/L (ref 3.5–5)
PROT SERPL-MCNC: 7.9 G/DL (ref 6–8)
SODIUM SERPL-SCNC: 139 MMOL/L (ref 136–145)
TRIGL SERPL-MCNC: 137 MG/DL

## 2018-10-26 DIAGNOSIS — Z94.0 KIDNEY REPLACED BY TRANSPLANT: ICD-10-CM

## 2018-10-26 LAB
ANION GAP SERPL CALCULATED.3IONS-SCNC: 8 MMOL/L (ref 3–14)
BUN SERPL-MCNC: 28 MG/DL (ref 7–30)
CALCIUM SERPL-MCNC: 9.5 MG/DL (ref 8.5–10.1)
CHLORIDE SERPL-SCNC: 106 MMOL/L (ref 94–109)
CO2 SERPL-SCNC: 24 MMOL/L (ref 20–32)
CREAT SERPL-MCNC: 0.93 MG/DL (ref 0.52–1.04)
CYCLOSPORINE BLD LC/MS/MS-MCNC: 66 UG/L (ref 50–400)
ERYTHROCYTE [DISTWIDTH] IN BLOOD BY AUTOMATED COUNT: 11.6 % (ref 10–15)
GFR SERPL CREATININE-BSD FRML MDRD: 61 ML/MIN/1.7M2
GLUCOSE SERPL-MCNC: 84 MG/DL (ref 70–99)
HCT VFR BLD AUTO: 35.8 % (ref 35–47)
HGB BLD-MCNC: 11.2 G/DL (ref 11.7–15.7)
MCH RBC QN AUTO: 29.5 PG (ref 26.5–33)
MCHC RBC AUTO-ENTMCNC: 31.3 G/DL (ref 31.5–36.5)
MCV RBC AUTO: 94 FL (ref 78–100)
PLATELET # BLD AUTO: 168 10E9/L (ref 150–450)
POTASSIUM SERPL-SCNC: 4.6 MMOL/L (ref 3.4–5.3)
RBC # BLD AUTO: 3.8 10E12/L (ref 3.8–5.2)
SODIUM SERPL-SCNC: 138 MMOL/L (ref 133–144)
TME LAST DOSE: NORMAL H
WBC # BLD AUTO: 4 10E9/L (ref 4–11)

## 2018-10-26 PROCEDURE — 85027 COMPLETE CBC AUTOMATED: CPT | Performed by: INTERNAL MEDICINE

## 2018-10-26 PROCEDURE — 80158 DRUG ASSAY CYCLOSPORINE: CPT | Performed by: INTERNAL MEDICINE

## 2018-10-26 PROCEDURE — 80048 BASIC METABOLIC PNL TOTAL CA: CPT | Performed by: INTERNAL MEDICINE

## 2018-10-26 PROCEDURE — 36415 COLL VENOUS BLD VENIPUNCTURE: CPT | Performed by: INTERNAL MEDICINE

## 2018-10-26 PROCEDURE — 87799 DETECT AGENT NOS DNA QUANT: CPT | Performed by: INTERNAL MEDICINE

## 2018-10-29 LAB
EBV DNA # SPEC NAA+PROBE: ABNORMAL {COPIES}/ML
EBV DNA SPEC NAA+PROBE-LOG#: 4 {LOG_COPIES}/ML

## 2018-10-31 ENCOUNTER — DOCUMENTATION ONLY (OUTPATIENT)
Dept: TRANSPLANT | Facility: CLINIC | Age: 63
End: 2018-10-31

## 2018-10-31 NOTE — PROGRESS NOTES
Message  Received: Yesterday       Anthony Chacon MD [place], Angela RN                     Repeat in a month if stable repeat in 3 month x 2 if stable change to only as needed for symptoms

## 2018-11-28 DIAGNOSIS — Z94.0 KIDNEY REPLACED BY TRANSPLANT: ICD-10-CM

## 2018-11-28 LAB
ERYTHROCYTE [DISTWIDTH] IN BLOOD BY AUTOMATED COUNT: 11.9 % (ref 10–15)
HCT VFR BLD AUTO: 37.1 % (ref 35–47)
HGB BLD-MCNC: 11.7 G/DL (ref 11.7–15.7)
MCH RBC QN AUTO: 29.4 PG (ref 26.5–33)
MCHC RBC AUTO-ENTMCNC: 31.5 G/DL (ref 31.5–36.5)
MCV RBC AUTO: 93 FL (ref 78–100)
PLATELET # BLD AUTO: 185 10E9/L (ref 150–450)
RBC # BLD AUTO: 3.98 10E12/L (ref 3.8–5.2)
WBC # BLD AUTO: 4 10E9/L (ref 4–11)

## 2018-11-28 PROCEDURE — 85027 COMPLETE CBC AUTOMATED: CPT | Performed by: INTERNAL MEDICINE

## 2018-11-28 PROCEDURE — 80048 BASIC METABOLIC PNL TOTAL CA: CPT | Performed by: INTERNAL MEDICINE

## 2018-11-28 PROCEDURE — 87799 DETECT AGENT NOS DNA QUANT: CPT | Performed by: INTERNAL MEDICINE

## 2018-11-28 PROCEDURE — 80158 DRUG ASSAY CYCLOSPORINE: CPT | Performed by: INTERNAL MEDICINE

## 2018-11-28 PROCEDURE — 36415 COLL VENOUS BLD VENIPUNCTURE: CPT | Performed by: INTERNAL MEDICINE

## 2018-11-29 LAB
ANION GAP SERPL CALCULATED.3IONS-SCNC: 8 MMOL/L (ref 3–14)
BUN SERPL-MCNC: 31 MG/DL (ref 7–30)
CALCIUM SERPL-MCNC: 9.7 MG/DL (ref 8.5–10.1)
CHLORIDE SERPL-SCNC: 106 MMOL/L (ref 94–109)
CO2 SERPL-SCNC: 24 MMOL/L (ref 20–32)
CREAT SERPL-MCNC: 0.94 MG/DL (ref 0.52–1.04)
CYCLOSPORINE BLD LC/MS/MS-MCNC: 58 UG/L (ref 50–400)
EBV DNA # SPEC NAA+PROBE: 8619 {COPIES}/ML
EBV DNA SPEC NAA+PROBE-LOG#: 3.9 {LOG_COPIES}/ML
GFR SERPL CREATININE-BSD FRML MDRD: 60 ML/MIN/1.7M2
GLUCOSE SERPL-MCNC: 98 MG/DL (ref 70–99)
POTASSIUM SERPL-SCNC: 5 MMOL/L (ref 3.4–5.3)
SODIUM SERPL-SCNC: 138 MMOL/L (ref 133–144)
TME LAST DOSE: NORMAL H

## 2019-01-02 DIAGNOSIS — Z94.0 KIDNEY REPLACED BY TRANSPLANT: ICD-10-CM

## 2019-01-02 LAB
ANION GAP SERPL CALCULATED.3IONS-SCNC: 7 MMOL/L (ref 3–14)
BUN SERPL-MCNC: 27 MG/DL (ref 7–30)
CALCIUM SERPL-MCNC: 9.7 MG/DL (ref 8.5–10.1)
CHLORIDE SERPL-SCNC: 106 MMOL/L (ref 94–109)
CO2 SERPL-SCNC: 24 MMOL/L (ref 20–32)
CREAT SERPL-MCNC: 0.94 MG/DL (ref 0.52–1.04)
CYCLOSPORINE BLD LC/MS/MS-MCNC: 73 UG/L (ref 50–400)
ERYTHROCYTE [DISTWIDTH] IN BLOOD BY AUTOMATED COUNT: 11.9 % (ref 10–15)
GFR SERPL CREATININE-BSD FRML MDRD: 65 ML/MIN/{1.73_M2}
GLUCOSE SERPL-MCNC: 102 MG/DL (ref 70–99)
HCT VFR BLD AUTO: 37.8 % (ref 35–47)
HGB BLD-MCNC: 11.8 G/DL (ref 11.7–15.7)
MCH RBC QN AUTO: 29 PG (ref 26.5–33)
MCHC RBC AUTO-ENTMCNC: 31.2 G/DL (ref 31.5–36.5)
MCV RBC AUTO: 93 FL (ref 78–100)
PLATELET # BLD AUTO: 181 10E9/L (ref 150–450)
POTASSIUM SERPL-SCNC: 4.4 MMOL/L (ref 3.4–5.3)
RBC # BLD AUTO: 4.07 10E12/L (ref 3.8–5.2)
SODIUM SERPL-SCNC: 137 MMOL/L (ref 133–144)
TME LAST DOSE: NORMAL H
WBC # BLD AUTO: 3.9 10E9/L (ref 4–11)

## 2019-01-02 PROCEDURE — 85027 COMPLETE CBC AUTOMATED: CPT | Performed by: INTERNAL MEDICINE

## 2019-01-02 PROCEDURE — 36415 COLL VENOUS BLD VENIPUNCTURE: CPT | Performed by: INTERNAL MEDICINE

## 2019-01-02 PROCEDURE — 80158 DRUG ASSAY CYCLOSPORINE: CPT | Performed by: INTERNAL MEDICINE

## 2019-01-02 PROCEDURE — 80048 BASIC METABOLIC PNL TOTAL CA: CPT | Performed by: INTERNAL MEDICINE

## 2019-01-02 PROCEDURE — 87799 DETECT AGENT NOS DNA QUANT: CPT | Performed by: INTERNAL MEDICINE

## 2019-01-03 LAB
EBV DNA # SPEC NAA+PROBE: ABNORMAL {COPIES}/ML
EBV DNA SPEC NAA+PROBE-LOG#: 4.3 {LOG_COPIES}/ML

## 2019-02-27 DIAGNOSIS — Z94.0 KIDNEY TRANSPLANTED: ICD-10-CM

## 2019-02-27 DIAGNOSIS — Z94.0 KIDNEY REPLACED BY TRANSPLANT: ICD-10-CM

## 2019-02-27 LAB
ANION GAP SERPL CALCULATED.3IONS-SCNC: 9 MMOL/L (ref 3–14)
BUN SERPL-MCNC: 31 MG/DL (ref 7–30)
CALCIUM SERPL-MCNC: 9.7 MG/DL (ref 8.5–10.1)
CHLORIDE SERPL-SCNC: 106 MMOL/L (ref 94–109)
CO2 SERPL-SCNC: 22 MMOL/L (ref 20–32)
CREAT SERPL-MCNC: 0.92 MG/DL (ref 0.52–1.04)
CYCLOSPORINE BLD LC/MS/MS-MCNC: 54 UG/L (ref 50–400)
ERYTHROCYTE [DISTWIDTH] IN BLOOD BY AUTOMATED COUNT: 12.1 % (ref 10–15)
GFR SERPL CREATININE-BSD FRML MDRD: 66 ML/MIN/{1.73_M2}
GLUCOSE SERPL-MCNC: 111 MG/DL (ref 70–99)
HCT VFR BLD AUTO: 36.5 % (ref 35–47)
HGB BLD-MCNC: 11.5 G/DL (ref 11.7–15.7)
MCH RBC QN AUTO: 29.3 PG (ref 26.5–33)
MCHC RBC AUTO-ENTMCNC: 31.5 G/DL (ref 31.5–36.5)
MCV RBC AUTO: 93 FL (ref 78–100)
PLATELET # BLD AUTO: 209 10E9/L (ref 150–450)
POTASSIUM SERPL-SCNC: 4.6 MMOL/L (ref 3.4–5.3)
RBC # BLD AUTO: 3.93 10E12/L (ref 3.8–5.2)
SODIUM SERPL-SCNC: 137 MMOL/L (ref 133–144)
TME LAST DOSE: 2230 H
WBC # BLD AUTO: 4.3 10E9/L (ref 4–11)

## 2019-02-27 PROCEDURE — 85027 COMPLETE CBC AUTOMATED: CPT | Performed by: INTERNAL MEDICINE

## 2019-02-27 PROCEDURE — 36415 COLL VENOUS BLD VENIPUNCTURE: CPT | Performed by: INTERNAL MEDICINE

## 2019-02-27 PROCEDURE — 87799 DETECT AGENT NOS DNA QUANT: CPT | Performed by: INTERNAL MEDICINE

## 2019-02-27 PROCEDURE — 80048 BASIC METABOLIC PNL TOTAL CA: CPT | Performed by: INTERNAL MEDICINE

## 2019-02-27 PROCEDURE — 80158 DRUG ASSAY CYCLOSPORINE: CPT | Performed by: INTERNAL MEDICINE

## 2019-02-28 DIAGNOSIS — Z94.0 KIDNEY REPLACED BY TRANSPLANT: Primary | ICD-10-CM

## 2019-02-28 LAB
EBV DNA # SPEC NAA+PROBE: ABNORMAL {COPIES}/ML
EBV DNA SPEC NAA+PROBE-LOG#: 4 {LOG_COPIES}/ML

## 2019-03-18 ENCOUNTER — RECORDS - HEALTHEAST (OUTPATIENT)
Dept: LAB | Facility: CLINIC | Age: 64
End: 2019-03-18

## 2019-03-18 LAB
ALBUMIN SERPL-MCNC: 3.6 G/DL (ref 3.5–5)
ALP SERPL-CCNC: 58 U/L (ref 45–120)
ALT SERPL W P-5'-P-CCNC: 17 U/L (ref 0–45)
ANION GAP SERPL CALCULATED.3IONS-SCNC: 12 MMOL/L (ref 5–18)
AST SERPL W P-5'-P-CCNC: 22 U/L (ref 0–40)
BILIRUB SERPL-MCNC: 0.7 MG/DL (ref 0–1)
BUN SERPL-MCNC: 35 MG/DL (ref 8–22)
CALCIUM SERPL-MCNC: 9.8 MG/DL (ref 8.5–10.5)
CHLORIDE BLD-SCNC: 102 MMOL/L (ref 98–107)
CHOLEST SERPL-MCNC: 195 MG/DL
CO2 SERPL-SCNC: 24 MMOL/L (ref 22–31)
CREAT SERPL-MCNC: 1.13 MG/DL (ref 0.6–1.1)
FASTING STATUS PATIENT QL REPORTED: ABNORMAL
GFR SERPL CREATININE-BSD FRML MDRD: 49 ML/MIN/1.73M2
GLUCOSE BLD-MCNC: 92 MG/DL (ref 70–125)
HDLC SERPL-MCNC: 49 MG/DL
LDLC SERPL CALC-MCNC: 100 MG/DL
POTASSIUM BLD-SCNC: 4.6 MMOL/L (ref 3.5–5)
PROT SERPL-MCNC: 7.6 G/DL (ref 6–8)
SODIUM SERPL-SCNC: 138 MMOL/L (ref 136–145)
TRIGL SERPL-MCNC: 228 MG/DL

## 2019-03-29 ENCOUNTER — TELEPHONE (OUTPATIENT)
Dept: TRANSPLANT | Facility: CLINIC | Age: 64
End: 2019-03-29

## 2019-03-29 NOTE — TELEPHONE ENCOUNTER
Patient Call: Transplant Lab/Orders  Route to LPN  Post Transplant Days: [id number]  When patient is less than 60 days post-transplant, route high priority    Reason for Call: Clarification; which order? Gary- monthly vs every 3 months-Pt went to lab today 3/29/19 at  [facility] and was told her labs where every 3 moths. Please call to verify    Callback needed? Yes    Return Call Needed  Same as documented in contacts section  When to return call?: Greater than one day: Route standard priority

## 2019-03-29 NOTE — TELEPHONE ENCOUNTER
Call placed to patient. No answer. Voice message left informing patient to complete transplant labs including EBV level every 3 months.

## 2019-03-29 NOTE — TELEPHONE ENCOUNTER
Call placed to patient. Patient v\u that based on her being > 5 years post transplant her standing labs should be every 3 months.

## 2019-04-14 DIAGNOSIS — Z94.0 KIDNEY TRANSPLANTED: Primary | ICD-10-CM

## 2019-04-15 RX ORDER — CYCLOSPORINE 25 MG/1
100 CAPSULE, LIQUID FILLED ORAL 2 TIMES DAILY
Qty: 720 CAPSULE | Refills: 3 | Status: SHIPPED | OUTPATIENT
Start: 2019-04-15 | End: 2019-05-28

## 2019-05-12 DIAGNOSIS — Z94.0 KIDNEY REPLACED BY TRANSPLANT: Primary | ICD-10-CM

## 2019-05-12 DIAGNOSIS — Z94.0 KIDNEY TRANSPLANTED: ICD-10-CM

## 2019-05-12 DIAGNOSIS — Z79.899 HIGH RISK MEDICATIONS (NOT ANTICOAGULANTS) LONG-TERM USE: ICD-10-CM

## 2019-05-13 RX ORDER — MYCOPHENOLATE MOFETIL 250 MG/1
500 CAPSULE ORAL 2 TIMES DAILY
Qty: 360 CAPSULE | Refills: 3 | Status: SHIPPED | OUTPATIENT
Start: 2019-05-13 | End: 2020-04-20

## 2019-05-24 DIAGNOSIS — Z94.0 KIDNEY REPLACED BY TRANSPLANT: ICD-10-CM

## 2019-05-24 DIAGNOSIS — Z94.0 KIDNEY TRANSPLANTED: ICD-10-CM

## 2019-05-24 LAB
ANION GAP SERPL CALCULATED.3IONS-SCNC: 8 MMOL/L (ref 3–14)
BUN SERPL-MCNC: 31 MG/DL (ref 7–30)
CALCIUM SERPL-MCNC: 10.4 MG/DL (ref 8.5–10.1)
CHLORIDE SERPL-SCNC: 103 MMOL/L (ref 94–109)
CO2 SERPL-SCNC: 25 MMOL/L (ref 20–32)
CREAT SERPL-MCNC: 1.02 MG/DL (ref 0.52–1.04)
CYCLOSPORINE BLD LC/MS/MS-MCNC: 86 UG/L (ref 50–400)
ERYTHROCYTE [DISTWIDTH] IN BLOOD BY AUTOMATED COUNT: 12.5 % (ref 10–15)
GFR SERPL CREATININE-BSD FRML MDRD: 59 ML/MIN/{1.73_M2}
GLUCOSE SERPL-MCNC: 95 MG/DL (ref 70–99)
HCT VFR BLD AUTO: 36.4 % (ref 35–47)
HGB BLD-MCNC: 11.5 G/DL (ref 11.7–15.7)
MCH RBC QN AUTO: 28.8 PG (ref 26.5–33)
MCHC RBC AUTO-ENTMCNC: 31.6 G/DL (ref 31.5–36.5)
MCV RBC AUTO: 91 FL (ref 78–100)
PLATELET # BLD AUTO: 224 10E9/L (ref 150–450)
POTASSIUM SERPL-SCNC: 4.8 MMOL/L (ref 3.4–5.3)
RBC # BLD AUTO: 4 10E12/L (ref 3.8–5.2)
SODIUM SERPL-SCNC: 136 MMOL/L (ref 133–144)
TME LAST DOSE: NORMAL H
WBC # BLD AUTO: 5.4 10E9/L (ref 4–11)

## 2019-05-24 PROCEDURE — 80048 BASIC METABOLIC PNL TOTAL CA: CPT | Performed by: INTERNAL MEDICINE

## 2019-05-24 PROCEDURE — 36415 COLL VENOUS BLD VENIPUNCTURE: CPT | Performed by: INTERNAL MEDICINE

## 2019-05-24 PROCEDURE — 85027 COMPLETE CBC AUTOMATED: CPT | Performed by: INTERNAL MEDICINE

## 2019-05-24 PROCEDURE — 80158 DRUG ASSAY CYCLOSPORINE: CPT | Performed by: INTERNAL MEDICINE

## 2019-05-24 PROCEDURE — 87799 DETECT AGENT NOS DNA QUANT: CPT | Performed by: INTERNAL MEDICINE

## 2019-05-26 LAB
EBV DNA # SPEC NAA+PROBE: ABNORMAL {COPIES}/ML
EBV DNA SPEC NAA+PROBE-LOG#: 4.7 {LOG_COPIES}/ML

## 2019-05-28 DIAGNOSIS — Z94.0 KIDNEY TRANSPLANTED: ICD-10-CM

## 2019-05-28 RX ORDER — CYCLOSPORINE 25 MG/1
75 CAPSULE, LIQUID FILLED ORAL 2 TIMES DAILY
Qty: 540 CAPSULE | Refills: 3 | Status: ON HOLD | OUTPATIENT
Start: 2019-05-28 | End: 2020-07-30

## 2019-05-28 NOTE — TELEPHONE ENCOUNTER
Notes recorded by Rod Lopez MD on 5/27/2019 at 12:23 PM CDT  Stable kidney function, but increase serum calcium and would stop vitamin D supplement.  Also increased EBV viremia and would target cyclosporine level 50-75 and check an MPA level.    Zulma Nas v/u recommends by Dr. Lopez.  Will decrease CSA to 75 mg bid.     We discuss EBV viremia at length.  Zulma denies any s/s of EBV infection, aside from increased fatigue.  She v/u to recheck labs in 1-2 weeks.    LPN task:    Please update med list.  Send lab order to repeat tx labs as well as EBV and MPA in 1-2 weeks. Thanks.

## 2019-05-28 NOTE — LETTER
PHYSICIAN ORDERS      DATE & TIME ISSUED: May 28, 2019 3:18 PM  PATIENT NAME: Zulma Lovell   : 1955     [facility] MR# [if applicable]: [id number]     DIAGNOSIS:  Kidney transplant   ICD-10 CODE: Z94.0       Please complete the following labs in 1-2 weeks   CYCLOSPORINE LEVEL (ensure 12 hours between last dose and blood draw)  CBC with platelets and differential   BMP  EBV PCR QT  Mycophenolate level (ensure 12 hours between last dose and blood draw)    Any questions please call: [phone] option 5    Please fax results to [phone].

## 2019-05-28 NOTE — LETTER
PHYSICIAN ORDERS      DATE & TIME ISSUED: May 28, 2019 3:18 PM  PATIENT NAME: Zulma Lovell   : 1955     [facility] MR# [if applicable]: [id number]     DIAGNOSIS:  Kidney transplant   ICD-10 CODE: Z94.0      Please complete the following labs in 1-2 weeks  Tacrolimus level (ensure 12 hours between last dose and blood draw)  CBC with platelets and differential   BMP  EBV PCR QT  Mycophenolate level (ensure 12 hours between last dose and blood draw)    Any questions please call: [phone] option 5    Please fax results to [phone].

## 2019-06-11 DIAGNOSIS — Z94.0 KIDNEY REPLACED BY TRANSPLANT: ICD-10-CM

## 2019-06-11 PROCEDURE — 87799 DETECT AGENT NOS DNA QUANT: CPT | Performed by: INTERNAL MEDICINE

## 2019-06-12 LAB
EBV DNA # SPEC NAA+PROBE: ABNORMAL {COPIES}/ML
EBV DNA SPEC NAA+PROBE-LOG#: 4.2 {LOG_COPIES}/ML

## 2019-06-13 ENCOUNTER — TELEPHONE (OUTPATIENT)
Dept: TRANSPLANT | Facility: CLINIC | Age: 64
End: 2019-06-13

## 2019-06-13 DIAGNOSIS — Z48.298 AFTERCARE FOLLOWING ORGAN TRANSPLANT: ICD-10-CM

## 2019-06-13 DIAGNOSIS — Z94.0 KIDNEY TRANSPLANTED: Primary | ICD-10-CM

## 2019-06-13 NOTE — TELEPHONE ENCOUNTER
EBV results from 6/11/19:    EBVNEG^EBV DNA Not Detected Copies/mL 16,277      <2.7 Log_copies/mL 4.2     PLAN:  Recheck mpa levels and confirm if there is room to decrease dose.  Lab orders were faxed to:  [facility] [phone] (Phone)  [phone] (Fax)     Called [facility] lab but nothing pending. Last lab was from October 2018.  Per Zulma Lovell she does not go to that lab, now goes to  [facility].  Txp Orders + mpa level placed in Epic.  States she will schedule a lab appt and have her labs drawn.

## 2019-06-14 DIAGNOSIS — Z48.298 AFTERCARE FOLLOWING ORGAN TRANSPLANT: ICD-10-CM

## 2019-06-14 DIAGNOSIS — Z94.0 KIDNEY TRANSPLANTED: ICD-10-CM

## 2019-06-14 LAB
CYCLOSPORINE BLD LC/MS/MS-MCNC: 43 UG/L (ref 50–400)
ERYTHROCYTE [DISTWIDTH] IN BLOOD BY AUTOMATED COUNT: 12.5 % (ref 10–15)
HCT VFR BLD AUTO: 36.6 % (ref 35–47)
HGB BLD-MCNC: 11.4 G/DL (ref 11.7–15.7)
MCH RBC QN AUTO: 28.8 PG (ref 26.5–33)
MCHC RBC AUTO-ENTMCNC: 31.1 G/DL (ref 31.5–36.5)
MCV RBC AUTO: 92 FL (ref 78–100)
PLATELET # BLD AUTO: 200 10E9/L (ref 150–450)
PROT UR-MCNC: 0.24 G/L
PROT/CREAT 24H UR: 0.31 G/G CR (ref 0–0.2)
RBC # BLD AUTO: 3.96 10E12/L (ref 3.8–5.2)
TME LAST DOSE: 2230 H
WBC # BLD AUTO: 4.6 10E9/L (ref 4–11)

## 2019-06-14 PROCEDURE — 87799 DETECT AGENT NOS DNA QUANT: CPT | Performed by: INTERNAL MEDICINE

## 2019-06-14 PROCEDURE — 84156 ASSAY OF PROTEIN URINE: CPT | Performed by: INTERNAL MEDICINE

## 2019-06-14 PROCEDURE — 80158 DRUG ASSAY CYCLOSPORINE: CPT | Performed by: INTERNAL MEDICINE

## 2019-06-14 PROCEDURE — 85027 COMPLETE CBC AUTOMATED: CPT | Performed by: INTERNAL MEDICINE

## 2019-06-14 PROCEDURE — 36415 COLL VENOUS BLD VENIPUNCTURE: CPT | Performed by: INTERNAL MEDICINE

## 2019-06-14 PROCEDURE — 80048 BASIC METABOLIC PNL TOTAL CA: CPT | Performed by: INTERNAL MEDICINE

## 2019-06-14 PROCEDURE — 80180 DRUG SCRN QUAN MYCOPHENOLATE: CPT | Performed by: INTERNAL MEDICINE

## 2019-06-15 LAB
ANION GAP SERPL CALCULATED.3IONS-SCNC: 9 MMOL/L (ref 3–14)
BUN SERPL-MCNC: 37 MG/DL (ref 7–30)
CALCIUM SERPL-MCNC: 9.3 MG/DL (ref 8.5–10.1)
CHLORIDE SERPL-SCNC: 105 MMOL/L (ref 94–109)
CO2 SERPL-SCNC: 25 MMOL/L (ref 20–32)
CREAT SERPL-MCNC: 0.94 MG/DL (ref 0.52–1.04)
GFR SERPL CREATININE-BSD FRML MDRD: 65 ML/MIN/{1.73_M2}
GLUCOSE SERPL-MCNC: 80 MG/DL (ref 70–99)
MYCOPHENOLATE SERPL LC/MS/MS-MCNC: 0.59 MG/L (ref 1–3.5)
MYCOPHENOLATE-G SERPL LC/MS/MS-MCNC: 31.1 MG/L (ref 30–95)
POTASSIUM SERPL-SCNC: 4.9 MMOL/L (ref 3.4–5.3)
SODIUM SERPL-SCNC: 139 MMOL/L (ref 133–144)
TME LAST DOSE: 2230 H

## 2019-06-17 LAB
EBV DNA # SPEC NAA+PROBE: ABNORMAL {COPIES}/ML
EBV DNA SPEC NAA+PROBE-LOG#: 4.4 {LOG_COPIES}/ML

## 2019-09-30 DIAGNOSIS — Z48.298 AFTERCARE FOLLOWING ORGAN TRANSPLANT: ICD-10-CM

## 2019-09-30 DIAGNOSIS — Z94.0 KIDNEY TRANSPLANTED: ICD-10-CM

## 2019-09-30 LAB
ANION GAP SERPL CALCULATED.3IONS-SCNC: 5 MMOL/L (ref 3–14)
BUN SERPL-MCNC: 32 MG/DL (ref 7–30)
CALCIUM SERPL-MCNC: 9.6 MG/DL (ref 8.5–10.1)
CHLORIDE SERPL-SCNC: 104 MMOL/L (ref 94–109)
CO2 SERPL-SCNC: 26 MMOL/L (ref 20–32)
CREAT SERPL-MCNC: 0.93 MG/DL (ref 0.52–1.04)
CYCLOSPORINE BLD LC/MS/MS-MCNC: 45 UG/L (ref 50–400)
ERYTHROCYTE [DISTWIDTH] IN BLOOD BY AUTOMATED COUNT: 12.3 % (ref 10–15)
GFR SERPL CREATININE-BSD FRML MDRD: 65 ML/MIN/{1.73_M2}
GLUCOSE SERPL-MCNC: 91 MG/DL (ref 70–99)
HCT VFR BLD AUTO: 36.6 % (ref 35–47)
HGB BLD-MCNC: 11.2 G/DL (ref 11.7–15.7)
MCH RBC QN AUTO: 28.6 PG (ref 26.5–33)
MCHC RBC AUTO-ENTMCNC: 30.6 G/DL (ref 31.5–36.5)
MCV RBC AUTO: 94 FL (ref 78–100)
PLATELET # BLD AUTO: 190 10E9/L (ref 150–450)
POTASSIUM SERPL-SCNC: 4.3 MMOL/L (ref 3.4–5.3)
RBC # BLD AUTO: 3.91 10E12/L (ref 3.8–5.2)
SODIUM SERPL-SCNC: 135 MMOL/L (ref 133–144)
TME LAST DOSE: ABNORMAL H
WBC # BLD AUTO: 5.5 10E9/L (ref 4–11)

## 2019-09-30 PROCEDURE — 87799 DETECT AGENT NOS DNA QUANT: CPT | Performed by: INTERNAL MEDICINE

## 2019-09-30 PROCEDURE — 80048 BASIC METABOLIC PNL TOTAL CA: CPT | Performed by: INTERNAL MEDICINE

## 2019-09-30 PROCEDURE — 85027 COMPLETE CBC AUTOMATED: CPT | Performed by: INTERNAL MEDICINE

## 2019-09-30 PROCEDURE — 80158 DRUG ASSAY CYCLOSPORINE: CPT | Performed by: INTERNAL MEDICINE

## 2019-09-30 PROCEDURE — 36415 COLL VENOUS BLD VENIPUNCTURE: CPT | Performed by: INTERNAL MEDICINE

## 2019-10-01 LAB
EBV DNA # SPEC NAA+PROBE: ABNORMAL {COPIES}/ML
EBV DNA SPEC NAA+PROBE-LOG#: 4.6 {LOG_COPIES}/ML

## 2019-10-21 ENCOUNTER — OFFICE VISIT (OUTPATIENT)
Dept: NEPHROLOGY | Facility: CLINIC | Age: 64
End: 2019-10-21
Attending: INTERNAL MEDICINE
Payer: COMMERCIAL

## 2019-10-21 VITALS
SYSTOLIC BLOOD PRESSURE: 133 MMHG | DIASTOLIC BLOOD PRESSURE: 75 MMHG | HEIGHT: 64 IN | RESPIRATION RATE: 18 BRPM | TEMPERATURE: 98.5 F | HEART RATE: 57 BPM | BODY MASS INDEX: 23.03 KG/M2 | WEIGHT: 134.9 LBS | OXYGEN SATURATION: 99 %

## 2019-10-21 DIAGNOSIS — D63.1 ANEMIA IN STAGE 3 CHRONIC KIDNEY DISEASE (H): ICD-10-CM

## 2019-10-21 DIAGNOSIS — Z94.0 HTN, KIDNEY TRANSPLANT RELATED: Primary | ICD-10-CM

## 2019-10-21 DIAGNOSIS — N18.30 ANEMIA IN STAGE 3 CHRONIC KIDNEY DISEASE (H): ICD-10-CM

## 2019-10-21 DIAGNOSIS — D84.9 IMMUNOSUPPRESSION (H): ICD-10-CM

## 2019-10-21 DIAGNOSIS — B27.00 EBV (EPSTEIN-BARR VIRUS) VIREMIA: ICD-10-CM

## 2019-10-21 DIAGNOSIS — Z94.0 KIDNEY REPLACED BY TRANSPLANT: ICD-10-CM

## 2019-10-21 DIAGNOSIS — I15.1 HTN, KIDNEY TRANSPLANT RELATED: Primary | ICD-10-CM

## 2019-10-21 DIAGNOSIS — Z48.298 AFTERCARE FOLLOWING ORGAN TRANSPLANT: ICD-10-CM

## 2019-10-21 PROCEDURE — G0463 HOSPITAL OUTPT CLINIC VISIT: HCPCS | Mod: ZF

## 2019-10-21 RX ORDER — AMLODIPINE BESYLATE 2.5 MG/1
2.5 TABLET ORAL AT BEDTIME
Qty: 90 TABLET | Refills: 3 | Status: SHIPPED | OUTPATIENT
Start: 2019-10-21 | End: 2020-10-02

## 2019-10-21 ASSESSMENT — PAIN SCALES - GENERAL: PAINLEVEL: NO PAIN (0)

## 2019-10-21 ASSESSMENT — MIFFLIN-ST. JEOR: SCORE: 1151.9

## 2019-10-21 NOTE — NURSING NOTE
"Chief Complaint   Patient presents with     RECHECK     S/P Kidney TX       Vital signs:  Temp: 98.5  F (36.9  C) Temp src: Oral BP: 133/75 Pulse: 57   Resp: 18 SpO2: 99 %     Height: 162.6 cm (5' 4\") Weight: 61.2 kg (134 lb 14.4 oz)  Estimated body mass index is 23.16 kg/m  as calculated from the following:    Height as of this encounter: 1.626 m (5' 4\").    Weight as of this encounter: 61.2 kg (134 lb 14.4 oz).          Sofie Rojas, [facility]  10/21/2019 2:49 PM      "

## 2019-10-21 NOTE — LETTER
10/21/2019       RE: Zulma Lovell  [address]  [facility] [place] [address]     Dear Colleague,    Thank you for referring your patient, Zulma Lovell, to the [facility] NEPHROLOGY at [facility]. Please see a copy of my visit note below.    CHRONIC TRANSPLANT NEPHROLOGY VISIT    Assessment & Plan   # DDKT: Stable   - Baseline Cr ~ 0.9-1.1   - Proteinuria: Minimal (0.2-0.5 grams)   - Date DSA Last Checked: Aug/2013      Latest DSA: No   - BK Viremia: Not checked recently due to time from transplant   - Kidney Tx Biopsy: No    # Immunosuppression: Cyclosporine (goal 50-75) and Mycophenolate mofetil (goal not followed)   - Changes: No    # Infection Prophylaxis:   - PJP: Sulfa/TMP (Bactrim)    # Hypertension: Borderline control;  Goal BP: < 130/80   - Changes: Yes - Will start amlodipine 2.5mg once at night    # Anemia in Chronic Renal Disease: Hgb: Stable    - Iron studies: Not checked recently     # 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 Previously treated with rituximab in 1/2018 and 2/2018.     # Skin Cancer Risk:    - No new lesions. Discussed sun protection and recommend regular follow up with Dermatology.    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    Return visit: Return in about 1 year (around 10/21/2020).      Chief Complaint   Ms. Lovell is a 64 year old here for routine follow up.    History of Present Illness   Ms. 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 Patient was last evaluated by Dr. Steiner on 8/6/18; please see that note for further details.     Patient denies any recent hospitalizations or new medical issues. Denies chest pain or shortness of breath with exertion. Patient endorses a low energy level which is unchanged. No recent changes in appetite. No nausea, vomiting, or diarrhea. No fevers, chills, or sweats. No lower extremity edema. No recent skin lesions.     Patient notes that she recently retired and has enjoyed spending time with her grandchildren. Is also helping out her sister who was diagnosed with pancreatic cancer.       Recent Hospitalizations:  [x] No [] Yes    New Medical Issues: [x] No [] Yes    Decreased energy: [] No [x] Yes Unchanged   Chest pain or SOB with exertion:  [x] No [] Yes    Appetite change or weight change: [] No [x] Yes Patient lost about 5-6 pounds   Nausea, vomiting or diarrhea:  [x] No [] Yes    Fever, sweats or chills: [x] No [] Yes    Leg swelling: [x] No [] Yes      Home BP: 130s/70s-80s    Review of Systems   A comprehensive review of systems was obtained and negative, except as noted in the HPI or PMH.    Problem List   Patient Active Problem List   Diagnosis     Kidney replaced by transplant     Immunosuppressed status (H)     Anemia in chronic renal disease     Dyslipidemia     Aftercare following organ transplant     EBV (Bandar-Barr virus) viremia     Vitamin D deficiency       Social History   Social History     Tobacco Use     Smoking status: Never Smoker     Smokeless tobacco: Never Used   Substance Use Topics     Alcohol use: No     Alcohol/week: 0.0 standard drinks     Drug use: No       Allergies   Allergies   Allergen Reactions     Fenofibrate      Other reaction(s): Pancreatitis     Simvastatin Cramps and Muscle Pain (Myalgia)     Tricor        Medications   Current Outpatient Medications   Medication Sig  "    ACETAMINOPHEN PO Take 1,000 mg by mouth as needed for pain     amLODIPine (NORVASC) 2.5 MG tablet Take 1 tablet (2.5 mg) by mouth At Bedtime     brimonidine-timolol (COMBIGAN) 0.2-0.5 % ophthalmic solution Place 1 drop into both eyes daily In morning     cycloSPORINE modified (GENERIC EQUIVALENT) 25 MG capsule Take 3 capsules (75 mg) by mouth 2 times daily     latanoprost (XALATAN) 0.005 % ophthalmic solution Place 1 drop Into the left eye At Bedtime     loteprednol (LOTEMAX) 0.5 % ophthalmic suspension Place 1 drop Into the left eye At Bedtime      METOPROLOL TARTRATE PO Take 25 mg by mouth 2 times daily      Multiple Vitamins-Minerals (CENTRUM SILVER) per tablet Take 1 tablet by mouth daily     mycophenolate (GENERIC EQUIVALENT) 250 MG capsule Take 2 capsules (500 mg) by mouth 2 times daily     NONFORMULARY daily as needed Myocalm Herbal Muscle relaxer     Omega-3 Fatty Acids (FISH OIL) 1000 MG CPDR Take 2,000 mg by mouth 2 times daily     PRAVASTATIN SODIUM PO Take 20 mg by mouth daily      Ranibizumab (LUCENTIS IO) Eye injections given every 11 weeks     No current facility-administered medications for this visit.      There are no discontinued medications.    Physical Exam   Vital Signs: /75 (BP Location: Right arm, Patient Position: Sitting, Cuff Size: Adult Regular)   Pulse 57   Temp 98.5  F (36.9  C) (Oral)   Resp 18   Ht 1.626 m (5' 4\")   Wt 61.2 kg (134 lb 14.4 oz)   LMP  (LMP Unknown)   SpO2 99%   BMI 23.16 kg/m       GENERAL APPEARANCE: alert and no distress  HENT: mouth without ulcers or lesions  LYMPHATICS: no cervical or supraclavicular nodes  RESP: lungs clear to auscultation - no rales, rhonchi or wheezes  CV: regular rhythm, normal rate, no rub, no murmur  EDEMA: no LE edema bilaterally  ABDOMEN: soft, nondistended, nontender, bowel sounds normal  MS: extremities normal - no gross deformities noted, no evidence of inflammation in joints, no muscle tenderness  SKIN: no rash  TX " KIDNEY: normal  DIALYSIS ACCESS:  None      Data     Renal Latest Ref Rng & Units 9/30/2019 6/14/2019 5/24/2019   Na 133 - 144 mmol/L 135 139 136   Na (external) 136 - 145 mmol/L - - -   K 3.4 - 5.3 mmol/L 4.3 4.9 4.8   K (external) 3.5 - 5.0 mmol/L - - -   Cl 94 - 109 mmol/L 104 105 103   Cl (external) 98 - 107 mmol/L - - -   CO2 20 - 32 mmol/L 26 25 25   CO2 (external) 22 - 31 mmol/L - - -   BUN 7 - 30 mg/dL 32(H) 37(H) 31(H)   BUN (external) 8 - 22 mg/dL - - -   Cr 0.52 - 1.04 mg/dL 0.93 0.94 1.02   Cr (external) 0.60 - 1.10 mg/dL - - -   Glucose 70 - 99 mg/dL 91 80 95   Glucose (external) 70 - 125 mg/dL - - -   Ca  8.5 - 10.1 mg/dL 9.6 9.3 10.4(H)   Ca (external) 8.5 - 10.5 mg/dL - - -   Mg 1.6 - 2.3 mg/dL - - -     Bone Health Latest Ref Rng & Units 8/7/2017 12/8/2015 12/21/2009   Phos 2.5 - 4.5 mg/dL - - 4.2   Phos (external) 2.5 - 4.5 mg/dL - 3.1 -   PTHi 12 - 72 pg/mL - - -   Vit D Def 20 - 75 ug/L 50 - -     Heme Latest Ref Rng & Units 9/30/2019 6/14/2019 5/24/2019   WBC 4.0 - 11.0 10e9/L 5.5 4.6 5.4   WBC (external) 4.0 - 11.0 thou/ul - - -   Hgb 11.7 - 15.7 g/dL 11.2(L) 11.4(L) 11.5(L)   Hgb (external) 12.0 - 16.0 g/dL - - -   Plt 150 - 450 10e9/L 190 200 224   Plt (external) 140 - 440 thou/ul - - -     Liver Latest Ref Rng & Units 12/8/2015 7/20/2015 5/27/2015   AP 40 - 150 U/L - - -   AP (external) U/L - 55 -   TBili 0.2 - 1.3 mg/dL - - -   TBili (external) mg/dL - 1.0 -   DBili (external) mg/dL - 0.2 -   ALT 0 - 50 U/L - - -   ALT (external) U/L - 24 -   AST 0 - 45 U/L - - -   AST (external) U/L - 31 -   Tot Protein 6.8 - 8.8 g/dL - - -   Tot Protein (external) g/dL - 8.0 -   Albumin 3.9 - 5.1 g/dL - - -   Albumin (external) 3.5 - 5.0 g/dL 3.7 3.9 3.8     Pancreas Latest Ref Rng & Units 8/26/2007 5/9/2007   A1C 4.3 - 6.0 % - 4.2(L)   Amylase 30 - 110 U/L 58 -   Lipase 20 - 250 U/L 186 -     Iron studies Latest Ref Rng & Units 8/28/2007   Iron 35 - 180 ug/dL 23(L)   Ferritin 10 - 300 ng/mL 903(H)      P Txp Virology Latest Ref Rng & Units 8/1/2016 12/21/2009 9/4/2009   CMV IgG EU/mL - - -   CVM DNA Quant - Plasma, EDTA anticoagulant Whole blood, EDTA anticoagulant -   CMV Quant <100 Copies/mL - <100 -   CMV QT Log <2.0 Log copies/mL - <2.0 -   BK Spec - - Plasma, EDTA anticoagulant Plasma, EDTA anticoagulant   BK Res <1000 copies/mL - <1000 <1000   BK Log <3.0 Log copies/mL - <3.0 <3.0   EBV IgG - - - -   Hep B Core NEG - - -   Hep B Surf 0.0 - 4.9 mIU/mL - - -   HIV 1&2 NEG - - -            Recent Labs   Lab Test 04/24/18  1035 07/23/18  1020 06/14/19  1033   DOSMPA 2,215 Not Provided 2,230   MPACID <0.25* 1.62 0.59*   MPAG 16.2* 51.3 31.1       Scribe Disclosure:  I, Osman Ann, am serving as a scribe to document services personally performed by Dr. Lopez at this visit, based upon the provider's statements to me. All documentation has been reviewed by the aforementioned provider prior to being entered into the official medical record.    Again, thank you for allowing me to participate in the care of your patient.      Sincerely,    Kidney/Pancreas Recipient

## 2019-10-21 NOTE — LETTER
10/21/2019      RE: Zulma Lovell  [address]  [facility] [place] [address]       CHRONIC TRANSPLANT NEPHROLOGY VISIT    Assessment & Plan   # DDKT: Stable   - Baseline Cr ~ 0.9-1.1   - Proteinuria: Minimal (0.2-0.5 grams)   - Date DSA Last Checked: Aug/2013      Latest DSA: No   - BK Viremia: Not checked recently due to time from transplant   - Kidney Tx Biopsy: No    # Immunosuppression: Cyclosporine (goal 50-75) and Mycophenolate mofetil (goal not followed)   - Changes: No    # Infection Prophylaxis:   - PJP: Sulfa/TMP (Bactrim)    # Hypertension: Borderline control;  Goal BP: < 130/80   - Changes: Yes - Will start amlodipine 2.5mg once at night    # Anemia in Chronic Renal Disease: Hgb: Stable    - Iron studies: Not checked recently     # 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 Previously treated with rituximab in 1/2018 and 2/2018.     # Skin Cancer Risk:    - No new lesions. Discussed sun protection and recommend regular follow up with Dermatology.    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    Return visit: Return in about 1 year (around 10/21/2020).      Chief Complaint   Ms. Lovell is a 64 year old here for routine follow up.    History of Present Illness   Ms. 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 Patient was last evaluated by Dr. Steiner on 8/6/18; please see that note for further details.      Patient denies any recent hospitalizations or new medical issues. Denies chest pain or shortness of breath with exertion. Patient endorses a low energy level which is unchanged. No recent changes in appetite. No nausea, vomiting, or diarrhea. No fevers, chills, or sweats. No lower extremity edema. No recent skin lesions.     Patient notes that she recently retired and has enjoyed spending time with her grandchildren. Is also helping out her sister who was diagnosed with pancreatic cancer.       Recent Hospitalizations:  [x] No [] Yes    New Medical Issues: [x] No [] Yes    Decreased energy: [] No [x] Yes Unchanged   Chest pain or SOB with exertion:  [x] No [] Yes    Appetite change or weight change: [] No [x] Yes Patient lost about 5-6 pounds   Nausea, vomiting or diarrhea:  [x] No [] Yes    Fever, sweats or chills: [x] No [] Yes    Leg swelling: [x] No [] Yes      Home BP: 130s/70s-80s    Review of Systems   A comprehensive review of systems was obtained and negative, except as noted in the HPI or PMH.    Problem List   Patient Active Problem List   Diagnosis     Kidney replaced by transplant     Immunosuppressed status (H)     Anemia in chronic renal disease     Dyslipidemia     Aftercare following organ transplant     EBV (Bandar-Barr virus) viremia     Vitamin D deficiency       Social History   Social History     Tobacco Use     Smoking status: Never Smoker     Smokeless tobacco: Never Used   Substance Use Topics     Alcohol use: No     Alcohol/week: 0.0 standard drinks     Drug use: No       Allergies   Allergies   Allergen Reactions     Fenofibrate      Other reaction(s): Pancreatitis     Simvastatin Cramps and Muscle Pain (Myalgia)     Tricor        Medications   Current Outpatient Medications   Medication Sig     ACETAMINOPHEN PO Take 1,000 mg by mouth as needed for pain     amLODIPine (NORVASC) 2.5 MG tablet Take 1 tablet (2.5 mg) by mouth At Bedtime     brimonidine-timolol (COMBIGAN) 0.2-0.5 %  "ophthalmic solution Place 1 drop into both eyes daily In morning     cycloSPORINE modified (GENERIC EQUIVALENT) 25 MG capsule Take 3 capsules (75 mg) by mouth 2 times daily     latanoprost (XALATAN) 0.005 % ophthalmic solution Place 1 drop Into the left eye At Bedtime     loteprednol (LOTEMAX) 0.5 % ophthalmic suspension Place 1 drop Into the left eye At Bedtime      METOPROLOL TARTRATE PO Take 25 mg by mouth 2 times daily      Multiple Vitamins-Minerals (CENTRUM SILVER) per tablet Take 1 tablet by mouth daily     mycophenolate (GENERIC EQUIVALENT) 250 MG capsule Take 2 capsules (500 mg) by mouth 2 times daily     NONFORMULARY daily as needed Myocalm Herbal Muscle relaxer     Omega-3 Fatty Acids (FISH OIL) 1000 MG CPDR Take 2,000 mg by mouth 2 times daily     PRAVASTATIN SODIUM PO Take 20 mg by mouth daily      Ranibizumab (LUCENTIS IO) Eye injections given every 11 weeks     No current facility-administered medications for this visit.      There are no discontinued medications.    Physical Exam   Vital Signs: /75 (BP Location: Right arm, Patient Position: Sitting, Cuff Size: Adult Regular)   Pulse 57   Temp 98.5  F (36.9  C) (Oral)   Resp 18   Ht 1.626 m (5' 4\")   Wt 61.2 kg (134 lb 14.4 oz)   LMP  (LMP Unknown)   SpO2 99%   BMI 23.16 kg/m       GENERAL APPEARANCE: alert and no distress  HENT: mouth without ulcers or lesions  LYMPHATICS: no cervical or supraclavicular nodes  RESP: lungs clear to auscultation - no rales, rhonchi or wheezes  CV: regular rhythm, normal rate, no rub, no murmur  EDEMA: no LE edema bilaterally  ABDOMEN: soft, nondistended, nontender, bowel sounds normal  MS: extremities normal - no gross deformities noted, no evidence of inflammation in joints, no muscle tenderness  SKIN: no rash  TX KIDNEY: normal  DIALYSIS ACCESS:  None      Data     Renal Latest Ref Rng & Units 9/30/2019 6/14/2019 5/24/2019   Na 133 - 144 mmol/L 135 139 136   Na (external) 136 - 145 mmol/L - - -   K 3.4 " - 5.3 mmol/L 4.3 4.9 4.8   K (external) 3.5 - 5.0 mmol/L - - -   Cl 94 - 109 mmol/L 104 105 103   Cl (external) 98 - 107 mmol/L - - -   CO2 20 - 32 mmol/L 26 25 25   CO2 (external) 22 - 31 mmol/L - - -   BUN 7 - 30 mg/dL 32(H) 37(H) 31(H)   BUN (external) 8 - 22 mg/dL - - -   Cr 0.52 - 1.04 mg/dL 0.93 0.94 1.02   Cr (external) 0.60 - 1.10 mg/dL - - -   Glucose 70 - 99 mg/dL 91 80 95   Glucose (external) 70 - 125 mg/dL - - -   Ca  8.5 - 10.1 mg/dL 9.6 9.3 10.4(H)   Ca (external) 8.5 - 10.5 mg/dL - - -   Mg 1.6 - 2.3 mg/dL - - -     Bone Health Latest Ref Rng & Units 8/7/2017 12/8/2015 12/21/2009   Phos 2.5 - 4.5 mg/dL - - 4.2   Phos (external) 2.5 - 4.5 mg/dL - 3.1 -   PTHi 12 - 72 pg/mL - - -   Vit D Def 20 - 75 ug/L 50 - -     Heme Latest Ref Rng & Units 9/30/2019 6/14/2019 5/24/2019   WBC 4.0 - 11.0 10e9/L 5.5 4.6 5.4   WBC (external) 4.0 - 11.0 thou/ul - - -   Hgb 11.7 - 15.7 g/dL 11.2(L) 11.4(L) 11.5(L)   Hgb (external) 12.0 - 16.0 g/dL - - -   Plt 150 - 450 10e9/L 190 200 224   Plt (external) 140 - 440 thou/ul - - -     Liver Latest Ref Rng & Units 12/8/2015 7/20/2015 5/27/2015   AP 40 - 150 U/L - - -   AP (external) U/L - 55 -   TBili 0.2 - 1.3 mg/dL - - -   TBili (external) mg/dL - 1.0 -   DBili (external) mg/dL - 0.2 -   ALT 0 - 50 U/L - - -   ALT (external) U/L - 24 -   AST 0 - 45 U/L - - -   AST (external) U/L - 31 -   Tot Protein 6.8 - 8.8 g/dL - - -   Tot Protein (external) g/dL - 8.0 -   Albumin 3.9 - 5.1 g/dL - - -   Albumin (external) 3.5 - 5.0 g/dL 3.7 3.9 3.8     Pancreas Latest Ref Rng & Units 8/26/2007 5/9/2007   A1C 4.3 - 6.0 % - 4.2(L)   Amylase 30 - 110 U/L 58 -   Lipase 20 - 250 U/L 186 -     Iron studies Latest Ref Rng & Units 8/28/2007   Iron 35 - 180 ug/dL 23(L)   Ferritin 10 - 300 ng/mL 903(H)     [facility] Txp Virology Latest Ref Rng & Units 8/1/2016 12/21/2009 9/4/2009   CMV IgG EU/mL - - -   CVM DNA Quant - Plasma, EDTA anticoagulant Whole blood, EDTA anticoagulant -   CMV Quant <100  Copies/mL - <100 -   CMV QT Log <2.0 Log copies/mL - <2.0 -   BK Spec - - Plasma, EDTA anticoagulant Plasma, EDTA anticoagulant   BK Res <1000 copies/mL - <1000 <1000   BK Log <3.0 Log copies/mL - <3.0 <3.0   EBV IgG - - - -   Hep B Core NEG - - -   Hep B Surf 0.0 - 4.9 mIU/mL - - -   HIV 1&2 NEG - - -            Recent Labs   Lab Test 04/24/18  1035 07/23/18  1020 06/14/19  1033   DOSMPA 2,215 Not Provided 2,230   MPACID <0.25* 1.62 0.59*   MPAG 16.2* 51.3 31.1       Scribe Disclosure:  I, Osman Ann, am serving as a scribe to document services personally performed by Dr. Lopez at this visit, based upon the provider's statements to me. All documentation has been reviewed by the aforementioned provider prior to being entered into the official medical record.    Kidney/Pancreas Recipient

## 2019-10-21 NOTE — PROGRESS NOTES
CHRONIC TRANSPLANT NEPHROLOGY VISIT    Assessment & Plan   # DDKT: Stable   - Baseline Cr ~ 0.9-1.1   - Proteinuria: Minimal (0.2-0.5 grams)   - Date DSA Last Checked: Aug/2013      Latest DSA: No   - BK Viremia: Not checked recently due to time from transplant   - Kidney Tx Biopsy: No    # Immunosuppression: Cyclosporine (goal 50-75) and Mycophenolate mofetil (goal not followed)   - Changes: No    # Infection Prophylaxis:   - PJP: Sulfa/TMP (Bactrim)    # Hypertension: Borderline control;  Goal BP: < 130/80   - Changes: Yes - Will start amlodipine 2.5mg once at night    # Anemia in Chronic Renal Disease: Hgb: Stable    - Iron studies: Not checked recently     # 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 Previously treated with rituximab in 1/2018 and 2/2018.     # Skin Cancer Risk:    - No new lesions. Discussed sun protection and recommend regular follow up with Dermatology.    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    Return visit: Return in about 1 year (around 10/21/2020).      Chief Complaint   Ms. Lovell is a 64 year old here for routine follow up.    History of Present Illness   Ms. 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 Patient was last evaluated by Dr. Steiner on 8/6/18; please see that note for further details.     Patient denies any recent hospitalizations or new medical issues. Denies chest pain or  shortness of breath with exertion. Patient endorses a low energy level which is unchanged. No recent changes in appetite. No nausea, vomiting, or diarrhea. No fevers, chills, or sweats. No lower extremity edema. No recent skin lesions.     Patient notes that she recently retired and has enjoyed spending time with her grandchildren. Is also helping out her sister who was diagnosed with pancreatic cancer.       Recent Hospitalizations:  [x] No [] Yes    New Medical Issues: [x] No [] Yes    Decreased energy: [] No [x] Yes Unchanged   Chest pain or SOB with exertion:  [x] No [] Yes    Appetite change or weight change: [] No [x] Yes Patient lost about 5-6 pounds   Nausea, vomiting or diarrhea:  [x] No [] Yes    Fever, sweats or chills: [x] No [] Yes    Leg swelling: [x] No [] Yes      Home BP: 130s/70s-80s    Review of Systems   A comprehensive review of systems was obtained and negative, except as noted in the HPI or PMH.    Problem List   Patient Active Problem List   Diagnosis     Kidney replaced by transplant     Immunosuppression (H)     Anemia in chronic renal disease     Dyslipidemia     Aftercare following organ transplant     EBV (Bandar-Barr virus) viremia     Vitamin D deficiency     HTN, kidney transplant related       Social History   Social History     Tobacco Use     Smoking status: Never Smoker     Smokeless tobacco: Never Used   Substance Use Topics     Alcohol use: No     Alcohol/week: 0.0 standard drinks     Drug use: No       Allergies   Allergies   Allergen Reactions     Fenofibrate      Other reaction(s): Pancreatitis     Simvastatin Cramps and Muscle Pain (Myalgia)     Tricor        Medications   Current Outpatient Medications   Medication Sig     ACETAMINOPHEN PO Take 1,000 mg by mouth as needed for pain     amLODIPine (NORVASC) 2.5 MG tablet Take 1 tablet (2.5 mg) by mouth At Bedtime     brimonidine-timolol (COMBIGAN) 0.2-0.5 % ophthalmic solution Place 1 drop into both eyes daily In morning      "cycloSPORINE modified (GENERIC EQUIVALENT) 25 MG capsule Take 3 capsules (75 mg) by mouth 2 times daily     latanoprost (XALATAN) 0.005 % ophthalmic solution Place 1 drop Into the left eye At Bedtime     loteprednol (LOTEMAX) 0.5 % ophthalmic suspension Place 1 drop Into the left eye At Bedtime      METOPROLOL TARTRATE PO Take 25 mg by mouth 2 times daily      Multiple Vitamins-Minerals (CENTRUM SILVER) per tablet Take 1 tablet by mouth daily     mycophenolate (GENERIC EQUIVALENT) 250 MG capsule Take 2 capsules (500 mg) by mouth 2 times daily     NONFORMULARY daily as needed Myocalm Herbal Muscle relaxer     Omega-3 Fatty Acids (FISH OIL) 1000 MG CPDR Take 2,000 mg by mouth 2 times daily     PRAVASTATIN SODIUM PO Take 20 mg by mouth daily      Ranibizumab (LUCENTIS IO) Eye injections given every 11 weeks     No current facility-administered medications for this visit.      There are no discontinued medications.    Physical Exam   Vital Signs: /75 (BP Location: Right arm, Patient Position: Sitting, Cuff Size: Adult Regular)   Pulse 57   Temp 98.5  F (36.9  C) (Oral)   Resp 18   Ht 1.626 m (5' 4\")   Wt 61.2 kg (134 lb 14.4 oz)   LMP  (LMP Unknown)   SpO2 99%   BMI 23.16 kg/m      GENERAL APPEARANCE: alert and no distress  HENT: mouth without ulcers or lesions  LYMPHATICS: no cervical or supraclavicular nodes  RESP: lungs clear to auscultation - no rales, rhonchi or wheezes  CV: regular rhythm, normal rate, no rub, no murmur  EDEMA: no LE edema bilaterally  ABDOMEN: soft, nondistended, nontender, bowel sounds normal  MS: extremities normal - no gross deformities noted, no evidence of inflammation in joints, no muscle tenderness  SKIN: no rash  TX KIDNEY: normal  DIALYSIS ACCESS:  None      Data     Renal Latest Ref Rng & Units 9/30/2019 6/14/2019 5/24/2019   Na 133 - 144 mmol/L 135 139 136   Na (external) 136 - 145 mmol/L - - -   K 3.4 - 5.3 mmol/L 4.3 4.9 4.8   K (external) 3.5 - 5.0 mmol/L - - -   Cl 94 " - 109 mmol/L 104 105 103   Cl (external) 98 - 107 mmol/L - - -   CO2 20 - 32 mmol/L 26 25 25   CO2 (external) 22 - 31 mmol/L - - -   BUN 7 - 30 mg/dL 32(H) 37(H) 31(H)   BUN (external) 8 - 22 mg/dL - - -   Cr 0.52 - 1.04 mg/dL 0.93 0.94 1.02   Cr (external) 0.60 - 1.10 mg/dL - - -   Glucose 70 - 99 mg/dL 91 80 95   Glucose (external) 70 - 125 mg/dL - - -   Ca  8.5 - 10.1 mg/dL 9.6 9.3 10.4(H)   Ca (external) 8.5 - 10.5 mg/dL - - -   Mg 1.6 - 2.3 mg/dL - - -     Bone Health Latest Ref Rng & Units 8/7/2017 12/8/2015 12/21/2009   Phos 2.5 - 4.5 mg/dL - - 4.2   Phos (external) 2.5 - 4.5 mg/dL - 3.1 -   PTHi 12 - 72 pg/mL - - -   Vit D Def 20 - 75 ug/L 50 - -     Heme Latest Ref Rng & Units 9/30/2019 6/14/2019 5/24/2019   WBC 4.0 - 11.0 10e9/L 5.5 4.6 5.4   WBC (external) 4.0 - 11.0 thou/ul - - -   Hgb 11.7 - 15.7 g/dL 11.2(L) 11.4(L) 11.5(L)   Hgb (external) 12.0 - 16.0 g/dL - - -   Plt 150 - 450 10e9/L 190 200 224   Plt (external) 140 - 440 thou/ul - - -     Liver Latest Ref Rng & Units 12/8/2015 7/20/2015 5/27/2015   AP 40 - 150 U/L - - -   AP (external) U/L - 55 -   TBili 0.2 - 1.3 mg/dL - - -   TBili (external) mg/dL - 1.0 -   DBili (external) mg/dL - 0.2 -   ALT 0 - 50 U/L - - -   ALT (external) U/L - 24 -   AST 0 - 45 U/L - - -   AST (external) U/L - 31 -   Tot Protein 6.8 - 8.8 g/dL - - -   Tot Protein (external) g/dL - 8.0 -   Albumin 3.9 - 5.1 g/dL - - -   Albumin (external) 3.5 - 5.0 g/dL 3.7 3.9 3.8     Pancreas Latest Ref Rng & Units 8/26/2007 5/9/2007   A1C 4.3 - 6.0 % - 4.2(L)   Amylase 30 - 110 U/L 58 -   Lipase 20 - 250 U/L 186 -     Iron studies Latest Ref Rng & Units 8/28/2007   Iron 35 - 180 ug/dL 23(L)   Ferritin 10 - 300 ng/mL 903(H)     [facility] Txp Virology Latest Ref Rng & Units 8/1/2016 12/21/2009 9/4/2009   CMV IgG EU/mL - - -   CVM DNA Quant - Plasma, EDTA anticoagulant Whole blood, EDTA anticoagulant -   CMV Quant <100 Copies/mL - <100 -   CMV QT Log <2.0 Log copies/mL - <2.0 -   BK Spec - -  Plasma, EDTA anticoagulant Plasma, EDTA anticoagulant   BK Res <1000 copies/mL - <1000 <1000   BK Log <3.0 Log copies/mL - <3.0 <3.0   EBV IgG - - - -   Hep B Core NEG - - -   Hep B Surf 0.0 - 4.9 mIU/mL - - -   HIV 1&2 NEG - - -            Recent Labs   Lab Test 04/24/18  1035 07/23/18  1020 06/14/19  1033   DOSMPA 2,215 Not Provided 2,230   MPACID <0.25* 1.62 0.59*   MPAG 16.2* 51.3 31.1       Scribe Disclosure:  I, Osman Ann, am serving as a scribe to document services personally performed by Dr. Lopez at this visit, based upon the provider's statements to me. All documentation has been reviewed by the aforementioned provider prior to being entered into the official medical record.

## 2019-11-06 PROBLEM — Z94.0 HTN, KIDNEY TRANSPLANT RELATED: Status: ACTIVE | Noted: 2019-11-06

## 2019-11-06 PROBLEM — I15.1 HTN, KIDNEY TRANSPLANT RELATED: Status: ACTIVE | Noted: 2019-11-06

## 2019-11-08 ENCOUNTER — HEALTH MAINTENANCE LETTER (OUTPATIENT)
Age: 64
End: 2019-11-08

## 2019-11-15 ENCOUNTER — TELEPHONE (OUTPATIENT)
Dept: TRANSPLANT | Facility: CLINIC | Age: 64
End: 2019-11-15

## 2019-11-15 NOTE — TELEPHONE ENCOUNTER
RNCC called pt back. Pt okay to take Shingrix vaccination. Can not advise on the mammogram but referred her to her primary care provider to see which she needs & insurance covers.

## 2019-11-15 NOTE — TELEPHONE ENCOUNTER
Patient Call: General    Reason for call: Pt would like to know if she can get the shingles vaccination and if she needs a 3D mammogram or if she can have a regular mammogram     Call back needed? Yes    Return Call Needed  Same as documented in contacts section  When to return call?: Greater than one day: Route standard priority

## 2019-11-25 ENCOUNTER — RECORDS - HEALTHEAST (OUTPATIENT)
Dept: LAB | Facility: CLINIC | Age: 64
End: 2019-11-25

## 2019-11-25 LAB
ALBUMIN SERPL-MCNC: 4.3 G/DL (ref 3.5–5)
ALP SERPL-CCNC: 62 U/L (ref 45–120)
ALT SERPL W P-5'-P-CCNC: 16 U/L (ref 0–45)
ANION GAP SERPL CALCULATED.3IONS-SCNC: 11 MMOL/L (ref 5–18)
AST SERPL W P-5'-P-CCNC: 23 U/L (ref 0–40)
BILIRUB SERPL-MCNC: 0.8 MG/DL (ref 0–1)
BUN SERPL-MCNC: 31 MG/DL (ref 8–22)
CALCIUM SERPL-MCNC: 9.9 MG/DL (ref 8.5–10.5)
CHLORIDE BLD-SCNC: 103 MMOL/L (ref 98–107)
CHOLEST SERPL-MCNC: 176 MG/DL
CO2 SERPL-SCNC: 25 MMOL/L (ref 22–31)
CREAT SERPL-MCNC: 0.97 MG/DL (ref 0.6–1.1)
FASTING STATUS PATIENT QL REPORTED: NORMAL
GFR SERPL CREATININE-BSD FRML MDRD: 58 ML/MIN/1.73M2
GLUCOSE BLD-MCNC: 103 MG/DL (ref 70–125)
HDLC SERPL-MCNC: 55 MG/DL
LDLC SERPL CALC-MCNC: 103 MG/DL
POTASSIUM BLD-SCNC: 4.7 MMOL/L (ref 3.5–5)
PROT SERPL-MCNC: 8.1 G/DL (ref 6–8)
SODIUM SERPL-SCNC: 139 MMOL/L (ref 136–145)
TRIGL SERPL-MCNC: 92 MG/DL

## 2019-12-20 DIAGNOSIS — Z94.0 KIDNEY TRANSPLANTED: ICD-10-CM

## 2019-12-20 DIAGNOSIS — Z48.298 AFTERCARE FOLLOWING ORGAN TRANSPLANT: ICD-10-CM

## 2019-12-20 LAB
ANION GAP SERPL CALCULATED.3IONS-SCNC: 6 MMOL/L (ref 3–14)
BUN SERPL-MCNC: 24 MG/DL (ref 7–30)
CALCIUM SERPL-MCNC: 9.7 MG/DL (ref 8.5–10.1)
CHLORIDE SERPL-SCNC: 104 MMOL/L (ref 94–109)
CO2 SERPL-SCNC: 24 MMOL/L (ref 20–32)
CREAT SERPL-MCNC: 0.96 MG/DL (ref 0.52–1.04)
CYCLOSPORINE BLD LC/MS/MS-MCNC: 56 UG/L (ref 50–400)
ERYTHROCYTE [DISTWIDTH] IN BLOOD BY AUTOMATED COUNT: 12.3 % (ref 10–15)
GFR SERPL CREATININE-BSD FRML MDRD: 63 ML/MIN/{1.73_M2}
GLUCOSE SERPL-MCNC: 93 MG/DL (ref 70–99)
HCT VFR BLD AUTO: 37.1 % (ref 35–47)
HGB BLD-MCNC: 11.5 G/DL (ref 11.7–15.7)
MCH RBC QN AUTO: 28.7 PG (ref 26.5–33)
MCHC RBC AUTO-ENTMCNC: 31 G/DL (ref 31.5–36.5)
MCV RBC AUTO: 93 FL (ref 78–100)
PLATELET # BLD AUTO: 238 10E9/L (ref 150–450)
POTASSIUM SERPL-SCNC: 4.9 MMOL/L (ref 3.4–5.3)
PROT UR-MCNC: 0.15 G/L
PROT/CREAT 24H UR: 0.19 G/G CR (ref 0–0.2)
RBC # BLD AUTO: 4.01 10E12/L (ref 3.8–5.2)
SODIUM SERPL-SCNC: 134 MMOL/L (ref 133–144)
TME LAST DOSE: NORMAL H
WBC # BLD AUTO: 5.5 10E9/L (ref 4–11)

## 2019-12-20 PROCEDURE — 80158 DRUG ASSAY CYCLOSPORINE: CPT | Performed by: INTERNAL MEDICINE

## 2019-12-20 PROCEDURE — 87799 DETECT AGENT NOS DNA QUANT: CPT | Performed by: INTERNAL MEDICINE

## 2019-12-20 PROCEDURE — 36415 COLL VENOUS BLD VENIPUNCTURE: CPT | Performed by: INTERNAL MEDICINE

## 2019-12-20 PROCEDURE — 80048 BASIC METABOLIC PNL TOTAL CA: CPT | Performed by: INTERNAL MEDICINE

## 2019-12-20 PROCEDURE — 85027 COMPLETE CBC AUTOMATED: CPT | Performed by: INTERNAL MEDICINE

## 2019-12-20 PROCEDURE — 80180 DRUG SCRN QUAN MYCOPHENOLATE: CPT | Performed by: INTERNAL MEDICINE

## 2019-12-20 PROCEDURE — 84156 ASSAY OF PROTEIN URINE: CPT | Performed by: INTERNAL MEDICINE

## 2019-12-23 LAB
EBV DNA # SPEC NAA+PROBE: ABNORMAL {COPIES}/ML
EBV DNA SPEC NAA+PROBE-LOG#: 5.9 {LOG_COPIES}/ML

## 2019-12-24 LAB
MYCOPHENOLATE SERPL LC/MS/MS-MCNC: 1.22 MG/L (ref 1–3.5)
MYCOPHENOLATE-G SERPL LC/MS/MS-MCNC: 27.8 MG/L (ref 30–95)
TME LAST DOSE: ABNORMAL H

## 2019-12-26 ENCOUNTER — TELEPHONE (OUTPATIENT)
Dept: TRANSPLANT | Facility: CLINIC | Age: 64
End: 2019-12-26

## 2019-12-26 NOTE — TELEPHONE ENCOUNTER
Notes recorded by Stephanie Schmidt RN on 12/26/2019 at 12:34 PM CST  Mmf at goal on 500 mg bid.  EBV increased.  Sent to Dr. Lopez to review.     Zulma Lovell called asking about her increased EBV levels.  Made aware results have been forwarded to Dr. Lopez.  RNCC will call her with plan.

## 2019-12-27 ENCOUNTER — TELEPHONE (OUTPATIENT)
Dept: TRANSPLANT | Facility: CLINIC | Age: 64
End: 2019-12-27

## 2019-12-27 DIAGNOSIS — D84.9 IMMUNOSUPPRESSED STATUS (H): ICD-10-CM

## 2019-12-27 DIAGNOSIS — B27.00 EBV (EPSTEIN-BARR VIRUS) VIREMIA: Primary | ICD-10-CM

## 2019-12-27 DIAGNOSIS — Z48.298 AFTERCARE FOLLOWING ORGAN TRANSPLANT: ICD-10-CM

## 2019-12-27 DIAGNOSIS — Z79.899 IMMUNOSUPPRESSIVE MANAGEMENT ENCOUNTER FOLLOWING KIDNEY TRANSPLANT: ICD-10-CM

## 2019-12-27 DIAGNOSIS — Z94.0 IMMUNOSUPPRESSIVE MANAGEMENT ENCOUNTER FOLLOWING KIDNEY TRANSPLANT: ICD-10-CM

## 2019-12-27 DIAGNOSIS — Z94.0 KIDNEY TRANSPLANTED: ICD-10-CM

## 2019-12-27 RX ORDER — ACETAMINOPHEN 325 MG/1
650 TABLET ORAL ONCE
Status: CANCELLED
Start: 2019-12-27

## 2019-12-27 RX ORDER — DIPHENHYDRAMINE HCL 25 MG
50 CAPSULE ORAL ONCE
Status: CANCELLED
Start: 2019-12-27

## 2019-12-27 RX ORDER — METHYLPREDNISOLONE SODIUM SUCCINATE 125 MG/2ML
125 INJECTION, POWDER, LYOPHILIZED, FOR SOLUTION INTRAMUSCULAR; INTRAVENOUS ONCE
Status: CANCELLED | OUTPATIENT
Start: 2019-12-27

## 2019-12-27 NOTE — LETTER
PHYSICIAN ORDERS    DATE & TIME ISSUED: 2019 3:04 PM  PATIENT NAME: Zulma Lovell   : 1955     [facility] MR# [if applicable]: [id number]     DIAGNOSIS / ICD - 10 CODES    Kidney Transplanted (Z94.0)    After Care Following Organ Transplant (Z48.298)    Long Term Use of Medication (Z79.899)    Immunosuppressed Status (Z92.25)    EBV viremia (B27.90)      PROCEDURE:     CT chest / abdomen / pelvis without contrast    INDICATION:  Elevated EBV copies  R/O PTLD      Patient should release information to the [facility].   Please fax results to the Transplant Center at [phone].  Any questions please call [phone].      .

## 2019-12-27 NOTE — TELEPHONE ENCOUNTER
Rod Lopez MD Ututalum, Teresa, RN             Needs CT chest/abd/pelvis and referral to transplant ID.  Would obtain prior authorization for rituximab, but not give it yet.  See if she has any fever, sweats, chills or night sweats.  Any lumps or bumps?  Also check LDH.     Monthly EBV PCR.     Let's hold off on immunosuppression changes at this time.     Feliberto    Previous Messages      ----- Message -----   From: Stephanie Schmidt, STEPHEN   Sent: 12/26/2019  12:34 PM CST   To: Rod Lopez MD     Mmf at goal on 500 mg bid.   EBV increased.   Sent to Dr. Lopez to review.           Orders for Therapy plan for Ritux infusion placed.  Message sent to Dept-infusion-Finance-Specialist for Prior Auth.    PLAN:  Call Zulma Lovell and have CT of chest/pelvis/abdomen  Referral to transplant  ID

## 2019-12-30 NOTE — TELEPHONE ENCOUNTER
Spoke with Zulma Lovell and discussed Dr. Lopez's recommendations to complete CT chest/abd/pelvis and referral to Transplant.  Denies fevers, chills or night sweats. Had low grade fevers back last month.   Continues to have congestion. States she must have gotten it from her .  Have not noticed any lumps or bumps.  RNCC recommended rechecking transplant labs next month.  Verbalized understanding and agreement to plan.    Lab orders placed, monthly plus LDH.  Would like CT orders faxed to [facility]

## 2019-12-31 NOTE — TELEPHONE ENCOUNTER
Rod Lopez MD Ututalum, Teresa, RN             Okay to give IV contrast.  She should hydrate before and after the CT.     Would prefer to have CT done here in case we need to follow up on anything, but that is up to her.    Previous Messages      ----- Message -----   From: Stephanie Schmidt, STEPHEN   Sent: 12/30/2019   2:59 PM CST   To: Rod Lopez MD   Subject: confirming CT orders                             Dr. Lopez,     No other symptoms.   Only current issue is congestion.   Negative for influenza.     I have her recheck transplant labs next month.     CT chest/abd/pelvis WITHOUT contrast?   Let me know. She wants it completed at [facility].     Thanks,   Maxim         Called Zulma Lovell and discussed recommendations.  Verbalized undertsanding. Agreed to have CT completed here at the [place].  Orders placed. Message sent to scheduling pool to set up.

## 2020-01-07 ENCOUNTER — TELEPHONE (OUTPATIENT)
Dept: TRANSPLANT | Facility: CLINIC | Age: 65
End: 2020-01-07

## 2020-01-07 DIAGNOSIS — Z94.0 KIDNEY TRANSPLANTED: Primary | ICD-10-CM

## 2020-01-07 DIAGNOSIS — B27.00 EBV (EPSTEIN-BARR VIRUS) VIREMIA: ICD-10-CM

## 2020-01-07 DIAGNOSIS — Z48.298 AFTERCARE FOLLOWING ORGAN TRANSPLANT: ICD-10-CM

## 2020-01-07 NOTE — TELEPHONE ENCOUNTER
"Cuca Blackman Teresa, RN             Pt says she is claustrophobiic and need sedation. Please place order with sedation and I will reschedule tomorrow. I am leaving for the day now.    Previous Messages      ----- Message -----   From: Stephanie Schmidt RN   Sent: 12/31/2019   4:18 PM CST   To: Post Transplant Scheduling Pool   Subject: CT chest abd pelvis w/ contrast                   Appointment Request: CT of chest/abd/pelvis with contrast   Orders Placed: Yes   Patient Aware? Yes.   Physician Override Approved? N/A   Appointment Timeframe Requested: within the next 2-4 weeks.     Thank you,   Stephanie \"DECLAN\" STEPHEN Schmidt         CT chest/abd/pelvis with contrast    "

## 2020-01-09 ENCOUNTER — TELEPHONE (OUTPATIENT)
Dept: TRANSPLANT | Facility: CLINIC | Age: 65
End: 2020-01-09

## 2020-01-09 DIAGNOSIS — T86.10 COMPLICATIONS, KIDNEY TRANSPLANT: ICD-10-CM

## 2020-01-09 DIAGNOSIS — B27.00 EBV (EPSTEIN-BARR VIRUS) VIREMIA: ICD-10-CM

## 2020-01-09 DIAGNOSIS — Z94.0 KIDNEY TRANSPLANTED: Primary | ICD-10-CM

## 2020-01-09 DIAGNOSIS — Z48.298 AFTERCARE FOLLOWING ORGAN TRANSPLANT: ICD-10-CM

## 2020-01-09 RX ORDER — LORAZEPAM 0.5 MG/1
TABLET ORAL
Qty: 1 TABLET | Refills: 0 | Status: SHIPPED | OUTPATIENT
Start: 2020-01-09 | End: 2020-07-10

## 2020-01-09 NOTE — TELEPHONE ENCOUNTER
"Rod Lopez MD Ututalum, Teresa, RN             She can have Ativan 0.5 mg taken 30 minutes prior to test.  She shouldn't drive after taking.     I don't see a Transplant ID appointment yet.     Feliberto    Previous Messages      ----- Message -----   From: Stephanie Schmidt, RN   Sent: 1/8/2020   1:04 PM CST   To: Rod Lopez MD   Subject: claustrophobic                                   Dr. Lopez,     CT ordered. Patient is claustrophobic and requesting sedation. I used to be able to just add in the comments \"under sedation\" but Imaging requesting asking specific orders on what kind of sedation.   Let me know.   Thanks,   Maxim LIZARRAGA           PLAN:  Reorder CT one more time with instructions to take ativan for sedation.  Make sure she is not driving after. Will need to have a .  Call Zulma Lovell and make aware of plan.   Order ID referral again and send message to .    OUTCOME:  States she is scheduled for the 16th unless she hears otherwise.      "

## 2020-01-10 ENCOUNTER — HOSPITAL ENCOUNTER (OUTPATIENT)
Dept: MAMMOGRAPHY | Facility: CLINIC | Age: 65
Discharge: HOME | End: 2020-01-10
Attending: FAMILY MEDICINE

## 2020-01-10 DIAGNOSIS — Z12.31 VISIT FOR SCREENING MAMMOGRAM: ICD-10-CM

## 2020-01-14 ENCOUNTER — TELEPHONE (OUTPATIENT)
Dept: TRANSPLANT | Facility: CLINIC | Age: 65
End: 2020-01-14

## 2020-01-14 NOTE — TELEPHONE ENCOUNTER
Patient Call: Voicemail  Date/Time: 1/14/20 at 3:36 pm  Reason for call: called regarding a call she was to get regarding appointment with infectious Disease.

## 2020-01-15 NOTE — TELEPHONE ENCOUNTER
Called back Zulma Lovell and discussed calling back Transplant Center if nobody calls her regarding setting up ID appointment.  Verbalized understanding and agreement to plan.

## 2020-01-16 ENCOUNTER — TELEPHONE (OUTPATIENT)
Dept: TRANSPLANT | Facility: CLINIC | Age: 65
End: 2020-01-16

## 2020-01-16 ENCOUNTER — ANCILLARY PROCEDURE (OUTPATIENT)
Dept: CT IMAGING | Facility: CLINIC | Age: 65
End: 2020-01-16
Attending: INTERNAL MEDICINE
Payer: COMMERCIAL

## 2020-01-16 DIAGNOSIS — Z48.298 AFTERCARE FOLLOWING ORGAN TRANSPLANT: ICD-10-CM

## 2020-01-16 DIAGNOSIS — Z94.0 KIDNEY TRANSPLANTED: ICD-10-CM

## 2020-01-16 DIAGNOSIS — B27.00 EBV (EPSTEIN-BARR VIRUS) VIREMIA: ICD-10-CM

## 2020-01-16 DIAGNOSIS — T86.10 COMPLICATIONS, KIDNEY TRANSPLANT: ICD-10-CM

## 2020-01-16 RX ORDER — IOPAMIDOL 755 MG/ML
82 INJECTION, SOLUTION INTRAVASCULAR ONCE
Status: COMPLETED | OUTPATIENT
Start: 2020-01-16 | End: 2020-01-16

## 2020-01-16 RX ADMIN — IOPAMIDOL 82 ML: 755 INJECTION, SOLUTION INTRAVASCULAR at 13:40

## 2020-01-16 NOTE — TELEPHONE ENCOUNTER
Call placed to patient. Patient denies completing any imaging prior to [place] [facility]

## 2020-01-16 NOTE — TELEPHONE ENCOUNTER
ISSUE:  CT of chest abdomen pelvis denied    PLAN:  Call Zulma Lovell and ask if she had completed imaging recently and which facility.    OUTCOME:  Unable to complete call, does not received calls from blocked numbers.    LPN task:  Call Zulma Lovell and ask if she had completed CT recently within the last 6 months/ year.   If she has, ask which facility and have those results pushed/set to us.

## 2020-01-20 ENCOUNTER — TELEPHONE (OUTPATIENT)
Dept: TRANSPLANT | Facility: CLINIC | Age: 65
End: 2020-01-20

## 2020-01-20 NOTE — TELEPHONE ENCOUNTER
Result Notes for CT Chest/Abdomen/Pelvis w Contrast     Notes recorded by Rod Lopez MD on 1/19/2020 at 6:35 PM CST  Unremarkable CT scan with no evidence of cancer.  ------    Notes recorded by Stephanie Schmidt RN on 1/16/2020 at 4:41 PM CST  CT results sent to Dr. Lopez to review.

## 2020-01-21 NOTE — TELEPHONE ENCOUNTER
Patient Call: General  Route to LPN    Reason for call: .pt hasd her scan last week  She has still not heard from a  re ID appointment     Call back needed? Yes    Return Call Needed  Same as documented in contacts section  When to return call?: Greater than one day: Route standard priority

## 2020-01-28 DIAGNOSIS — Z94.0 IMMUNOSUPPRESSIVE MANAGEMENT ENCOUNTER FOLLOWING KIDNEY TRANSPLANT: ICD-10-CM

## 2020-01-28 DIAGNOSIS — D84.9 IMMUNOSUPPRESSED STATUS (H): ICD-10-CM

## 2020-01-28 DIAGNOSIS — B27.00 EBV (EPSTEIN-BARR VIRUS) VIREMIA: ICD-10-CM

## 2020-01-28 DIAGNOSIS — Z94.0 KIDNEY TRANSPLANTED: ICD-10-CM

## 2020-01-28 DIAGNOSIS — Z79.899 IMMUNOSUPPRESSIVE MANAGEMENT ENCOUNTER FOLLOWING KIDNEY TRANSPLANT: ICD-10-CM

## 2020-01-28 DIAGNOSIS — Z48.298 AFTERCARE FOLLOWING ORGAN TRANSPLANT: ICD-10-CM

## 2020-01-28 LAB
ERYTHROCYTE [DISTWIDTH] IN BLOOD BY AUTOMATED COUNT: 12.5 % (ref 10–15)
HCT VFR BLD AUTO: 34.5 % (ref 35–47)
HGB BLD-MCNC: 10.8 G/DL (ref 11.7–15.7)
LDH SERPL L TO P-CCNC: 180 U/L (ref 81–234)
MCH RBC QN AUTO: 28.6 PG (ref 26.5–33)
MCHC RBC AUTO-ENTMCNC: 31.3 G/DL (ref 31.5–36.5)
MCV RBC AUTO: 91 FL (ref 78–100)
PLATELET # BLD AUTO: 178 10E9/L (ref 150–450)
RBC # BLD AUTO: 3.78 10E12/L (ref 3.8–5.2)
WBC # BLD AUTO: 4.2 10E9/L (ref 4–11)

## 2020-01-28 PROCEDURE — 83615 LACTATE (LD) (LDH) ENZYME: CPT | Performed by: INTERNAL MEDICINE

## 2020-01-28 PROCEDURE — 36415 COLL VENOUS BLD VENIPUNCTURE: CPT | Performed by: INTERNAL MEDICINE

## 2020-01-28 PROCEDURE — 87799 DETECT AGENT NOS DNA QUANT: CPT | Performed by: INTERNAL MEDICINE

## 2020-01-28 PROCEDURE — 80158 DRUG ASSAY CYCLOSPORINE: CPT | Performed by: INTERNAL MEDICINE

## 2020-01-28 PROCEDURE — 85027 COMPLETE CBC AUTOMATED: CPT | Performed by: INTERNAL MEDICINE

## 2020-01-28 PROCEDURE — 80048 BASIC METABOLIC PNL TOTAL CA: CPT | Performed by: INTERNAL MEDICINE

## 2020-01-29 LAB
ANION GAP SERPL CALCULATED.3IONS-SCNC: 7 MMOL/L (ref 3–14)
BUN SERPL-MCNC: 32 MG/DL (ref 7–30)
CALCIUM SERPL-MCNC: 9.4 MG/DL (ref 8.5–10.1)
CHLORIDE SERPL-SCNC: 106 MMOL/L (ref 94–109)
CO2 SERPL-SCNC: 22 MMOL/L (ref 20–32)
CREAT SERPL-MCNC: 0.87 MG/DL (ref 0.52–1.04)
CYCLOSPORINE BLD LC/MS/MS-MCNC: 60 UG/L (ref 50–400)
GFR SERPL CREATININE-BSD FRML MDRD: 71 ML/MIN/{1.73_M2}
GLUCOSE SERPL-MCNC: 88 MG/DL (ref 70–99)
POTASSIUM SERPL-SCNC: 4.6 MMOL/L (ref 3.4–5.3)
SODIUM SERPL-SCNC: 135 MMOL/L (ref 133–144)
TME LAST DOSE: NORMAL H

## 2020-02-01 LAB
EBV DNA # SPEC NAA+PROBE: ABNORMAL {COPIES}/ML
EBV DNA SPEC NAA+PROBE-LOG#: 5.1 {LOG_COPIES}/ML

## 2020-02-06 ENCOUNTER — TELEPHONE (OUTPATIENT)
Dept: INFECTIOUS DISEASES | Facility: CLINIC | Age: 65
End: 2020-02-06

## 2020-02-06 ENCOUNTER — TELEPHONE (OUTPATIENT)
Dept: TRANSPLANT | Facility: CLINIC | Age: 65
End: 2020-02-06

## 2020-02-06 NOTE — TELEPHONE ENCOUNTER
Discussed with Zulma Lovell.  RNCC recommended having her call [employer] and see if they can fax us paperwork to fill out.  Also provided [employer] number.  Appreciates help.

## 2020-02-06 NOTE — TELEPHONE ENCOUNTER
Spoke with Zulma Lovell.  Reports [employer] requesting Peer to peer review.  States that would start the appeal rolling.  Other data needed:    Current clinical evaluation (not older than 60 days)    Physical exam.    Clinical history    X-rays    Will send message to Dr. Lopez.

## 2020-02-06 NOTE — TELEPHONE ENCOUNTER
Patient Call: Voicemail  Date/Time: 2/5/2020  [id number]  Reason for call: Pt stated had been talking with Mavis ALBERTO  Said she has follow up information  to give her

## 2020-02-06 NOTE — TELEPHONE ENCOUNTER
Patient called 2/4/20 regarding a her insurance needing further documentation to pay CT claim. Phone number to [facility] is [phone]. Fax is [phone]. Please call to see what documentation is needed.

## 2020-02-07 ENCOUNTER — OFFICE VISIT (OUTPATIENT)
Dept: INFECTIOUS DISEASES | Facility: CLINIC | Age: 65
End: 2020-02-07
Attending: INTERNAL MEDICINE
Payer: COMMERCIAL

## 2020-02-07 VITALS
TEMPERATURE: 98.7 F | WEIGHT: 133.8 LBS | OXYGEN SATURATION: 97 % | HEART RATE: 61 BPM | SYSTOLIC BLOOD PRESSURE: 139 MMHG | BODY MASS INDEX: 22.97 KG/M2 | DIASTOLIC BLOOD PRESSURE: 74 MMHG

## 2020-02-07 DIAGNOSIS — Z23 NEED FOR VACCINATION FOR STREP PNEUMONIAE: ICD-10-CM

## 2020-02-07 DIAGNOSIS — Z94.0 KIDNEY TRANSPLANTED: ICD-10-CM

## 2020-02-07 DIAGNOSIS — B27.00 EBV (EPSTEIN-BARR VIRUS) VIREMIA: Primary | ICD-10-CM

## 2020-02-07 DIAGNOSIS — J35.1 LARGE TONSILS: ICD-10-CM

## 2020-02-07 PROCEDURE — G0463 HOSPITAL OUTPT CLINIC VISIT: HCPCS | Mod: 25,ZF

## 2020-02-07 PROCEDURE — 90732 PPSV23 VACC 2 YRS+ SUBQ/IM: CPT | Mod: ZF | Performed by: INTERNAL MEDICINE

## 2020-02-07 PROCEDURE — 25000128 H RX IP 250 OP 636: Mod: ZF | Performed by: INTERNAL MEDICINE

## 2020-02-07 PROCEDURE — G0009 ADMIN PNEUMOCOCCAL VACCINE: HCPCS | Mod: ZF

## 2020-02-07 RX ORDER — TIMOLOL MALEATE 5 MG/ML
1 SOLUTION/ DROPS OPHTHALMIC EVERY MORNING
COMMUNITY
End: 2020-09-04

## 2020-02-07 RX ORDER — BRIMONIDINE TARTRATE 2 MG/ML
1 SOLUTION/ DROPS OPHTHALMIC EVERY MORNING
COMMUNITY

## 2020-02-07 RX ADMIN — PNEUMOCOCCAL VACCINE POLYVALENT 0.5 ML
25; 25; 25; 25; 25; 25; 25; 25; 25; 25; 25; 25; 25; 25; 25; 25; 25; 25; 25; 25; 25; 25; 25 INJECTION, SOLUTION INTRAMUSCULAR; SUBCUTANEOUS at 08:59

## 2020-02-07 ASSESSMENT — PAIN SCALES - GENERAL: PAINLEVEL: NO PAIN (0)

## 2020-02-07 NOTE — PROGRESS NOTES
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  - Pneumovax x 1 today.  - Return to ID clinic after tonsillectomy.     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 Zulma was EBV R-/D+ for her kidney transplantation. Details of viral loads elsewhere in this note. Her tonsils are very large on exam, and this could be contributing to what she thinks are nuisance infections that do not respond to antibiotics. At this point, I think that the tonsils are harboring EBV such that her viral load persists despite lowering of immunosuppression. She needs tonsillectomy to decrease viral loads, which will subsequently decrease her risk of transformation to lymphoma by the circulating EBV.   - Recurrent URI and nuisance infections, could be an anatomic obstruction from large tonsils contributing to recurrence.  - Hx shingles.   - QTc interval: 436 msec on 8/19/2007 EKG  - Pneumocystis prophylaxis: none needed, as CD4 count > 200.  - Viral serostatus: CMV D-/R+, EBV D+/R-, VZV+, hep B immune.  - Immunization status: due for 2nd pneumovax today.   - Gamma globulin status: unknown  - Isolation status: Good hand hygiene.    Abril Perez MD. 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 She had EBV serodiscordance and subsequent. She was EBV negative and her donor was EBV positive. My last visit with her was on 12/22/2017, and medical events reviewed to date. Her labs are done via [facility]. Zulma reported having increased fatigue during the summer of 2016 at which point an EBV DNA PCR was checked in serum and found to be 639538 copies (log 5.1) on 8/1/2016. Her immunosuppression was adjusted down, and her fatigue slowly improved. Followup EBV DNA PCR was checked on 8/7/2017 at which point her level was [id number] copies (log 5.2). With this increase she had upper respiratory infection, mildly feverish, and fatigued. She had 2 doses of rituxan, 1/23/2018 and 2/6/2018. The EBV value came down in blood, and she did feel modestly better, but it is hard to remember exactly how much better she felt. Over the fall of 2019, she was very tired & listless. She had a number of nuisance infections, for which she was treated with amoxicillin (did not help), doxycycline (helped), then flonase for stuffiness. Both her her maxillary sinus areas still feel full, and she points to both cheeks. For most of 2019, her EBV level fluctuated between 10K and 53K. However, at the quarterly check on 12/20/2019, her EBV jumped up to 810,485. She had a 3 mm lung nodule on 1/16/2020 CT imaging, which otherwise showed no massess or LAD. By 1/28/2020, it was down to 130,706. No fevers, chills, sweats, lumps. LDH checked and negative. Regarding immunosuppression dosing, MMF is at goal at 500 mg BID. In addition, cyclosporine goal levels are 50-75, which is low because a normal range at this point after transplant might be . Her actual cyclosporine levels have been running 45-60.       Transplants:  8/19/2007 (Kidney); Postoperative day:  4555.  Coordinator Stephanie Schmidt    Review of Systems:  CONSTITUTIONAL:  No fevers or chills. No night sweats.  EYES: negative for  icterus or acute vision changes. Her vision is affected by glaucoma, treated with drops and injection.   ENT:  Gradual worsening of known hearing loss since 2013, + tinnitus. She has an occ sore throat  RESPIRATORY:  negative for cough other than an occ throat clearing tickle, no sputum production, no dyspnea with mild exertion  CARDIOVASCULAR:  negative for chest pain. Every now and then her heart can flip-flop  GASTROINTESTINAL:  negative for nausea, vomiting, diarrhea. Once in a while constipation  GENITOURINARY:  negative for dysuria or hematuria.  HEME:  No easy bruising or bleeding  INTEGUMENT:  negative for rash, but some pruritus from dry skin   NEURO:  Negative for headache or tremor.    Past Medical History:   Diagnosis Date     Anemia in chronic kidney disease(285.21)      Dyslipidemia      High risk medications (not anticoagulants) long-term use      Hypertension      Immunosuppressed status (H)      Kidney replaced by transplant      Shingles        Past Surgical History:   Procedure Laterality Date     CATARACT IOL, RT/LT Bilateral      LAPAROSCOPY         Family History   Problem Relation Age of Onset     Alzheimer Disease Mother      Alzheimer Disease Father        Social History     Social History Narrative    Zulma lives in [place] with her . Two children that live nearby, 4 grandchildren. Used to work in medical transcription. Children have dogs. No other animal exposures. Foreign travel includes a [place], [place], [place], [place] (2003).      Social History     Tobacco Use     Smoking status: Never Smoker     Smokeless tobacco: Never Used   Substance Use Topics     Alcohol use: No     Alcohol/week: 0.0 standard drinks     Drug use: No       Immunization History   Administered Date(s) Administered     FLU 6-35 months 12/02/2004     Flu, Unspecified 11/08/2010     HepA-Adult 07/14/2008     HepB-Adult 02/05/2004, 05/03/2004, 07/14/2008     Influenza (H1N1) 11/06/2009      Influenza (IIV3) PF 11/24/2006, 10/11/2011, 10/17/2012, 10/14/2013, 10/20/2014, 10/26/2015, 10/10/2016     Influenza Vaccine IM > 6 months Valent IIV4 11/03/2017     Influenza Vaccine, 6+MO IM (QUADRIVALENT W/PRESERVATIVES) 11/16/2018, 10/22/2019     Pneumo Conj 13-V (2010&after) 11/03/2017     Pneumococcal 23 valent 02/03/2013     Poliovirus, inactivated (IPV) 07/14/2008     TDAP Vaccine (Boostrix) 11/16/2018     Tdap (Adult) Unspecified Formulation 07/02/1997, 07/14/2008     Typhoid Oral 07/14/2008       Patient Active Problem List   Diagnosis     Kidney replaced by transplant     Immunosuppression (H)     Anemia in chronic renal disease     Dyslipidemia     Aftercare following organ transplant     EBV (Bandar-Barr virus) viremia     Vitamin D deficiency     HTN, kidney transplant related     Immunosuppressed status (H)       Outpatient Medications Marked as Taking for the 2/7/20 encounter (Office Visit) with Abril Perez MD   Medication Sig     ACETAMINOPHEN PO Take 1,000 mg by mouth as needed for pain     amLODIPine (NORVASC) 2.5 MG tablet Take 1 tablet (2.5 mg) by mouth At Bedtime     brimonidine (ALPHAGAN) 0.2 % ophthalmic solution Place 1 drop Into the left eye daily     cycloSPORINE modified (GENERIC EQUIVALENT) 25 MG capsule Take 3 capsules (75 mg) by mouth 2 times daily     latanoprost (XALATAN) 0.005 % ophthalmic solution Place 1 drop Into the left eye At Bedtime     loteprednol (LOTEMAX) 0.5 % ophthalmic suspension Place 1 drop Into the left eye At Bedtime      METOPROLOL TARTRATE PO Take 25 mg by mouth 2 times daily      Multiple Vitamins-Minerals (CENTRUM SILVER) per tablet Take 1 tablet by mouth daily     mycophenolate (GENERIC EQUIVALENT) 250 MG capsule Take 2 capsules (500 mg) by mouth 2 times daily     NONFORMULARY daily as needed Myocalm Herbal Muscle relaxer     Omega-3 Fatty Acids (FISH OIL) 1000 MG CPDR Take 2,000 mg by mouth 2 times daily     PRAVASTATIN SODIUM PO Take 20 mg by  mouth daily      Ranibizumab (LUCENTIS IO) Eye injections given every 11 weeks     timolol maleate (TIMOPTIC) 0.5 % ophthalmic solution 1 drop daily       Allergies   Allergen Reactions     Fenofibrate Other (See Comments)     Pancreatitis     Tricor Other (See Comments)     Pancreatitis (this is fenofibrate)     Simvastatin Muscle Pain (Myalgia) and Cramps            Physical Exam:   Vitals were reviewed.  All vitals stable  /74 (BP Location: Right arm, Patient Position: Sitting, Cuff Size: Adult Regular)   Pulse 61   Temp 98.7  F (37.1  C) (Oral)   Wt 60.7 kg (133 lb 12.8 oz)   LMP  (LMP Unknown)   SpO2 97%   BMI 22.97 kg/m    Wt Readings from Last 4 Encounters:   02/07/20 60.7 kg (133 lb 12.8 oz)   10/21/19 61.2 kg (134 lb 14.4 oz)   08/06/18 63.9 kg (140 lb 12.8 oz)   02/06/18 65.3 kg (144 lb)       Exam:  GENERAL: well-developed, well-nourished woman, alert, oriented, in no acute distress.  HEAD: Head is normocephalic, atraumatic   EYES: Eyes have anicteric sclerae.    ENT: Oropharynx is dry without exudates or ulcers. Large tonsils. Nasal septum deviates left a little, no erythema or masses, mucosa is pink.   NECK: Supple.  LUNGS: Clear to auscultation.  CARDIOVASCULAR: Regular rate and rhythm with no murmur  ABDOMEN: Normal bowel sounds, soft, nontender over RLQ transplanted kidney.  SKIN: No acute rashes.   NEUROLOGIC: Grossly nonfocal.         Laboratory Data:     Absolute CD4   Date Value Ref Range Status   11/03/2017 469 441 - 2,156 cells/uL Final       Metabolic Studies    Recent Labs   Lab Test 01/28/20  1031 12/20/19  1112 09/30/19  1034  09/27/17  1123    134 135   < >  --    POTASSIUM 4.6 4.9 4.3   < >  --    CHLORIDE 106 104 104   < >  --    CO2 22 24 26   < >  --    ANIONGAP 7 6 5   < >  --    BUN 32* 24 32*   < >  --    CR 0.87 0.96 0.93   < >  --    84436  --   --   --   --  1.18*   GFRESTIMATED 71 63 65   < >  --    GLC 88 93 91   < >  --    AICHA 9.4 9.7 9.6   < >  --     < > =  values in this interval not displayed.       Hepatic Studies    Recent Labs   Lab Test 01/28/20  1031          Hematology Studies     Recent Labs   Lab Test 01/28/20  1031 12/20/19  1112 09/30/19  1034 06/14/19  1033  11/03/17  1035 09/27/17  1123   WBC 4.2 5.5 5.5 4.6   < > 4.0  --    95274  --   --   --   --   --   --  4.6   ANEU  --   --   --   --   --  1.5*  --    ALYM  --   --   --   --   --  1.8  --    NILSA  --   --   --   --   --  0.6  --    AEOS  --   --   --   --   --  0.1  --    HGB 10.8* 11.5* 11.2* 11.4*   < > 11.5*  --    53830  --   --   --   --   --   --  11.8*   HCT 34.5* 37.1 36.6 36.6   < > 35.9  --     238 190 200   < > 183  --    68284  --   --   --   --   --   --  199    < > = values in this interval not displayed.       Medication levels    Recent Labs   Lab Test 01/28/20  1032 12/20/19  1112   CYCLSP 60 56   MPACID  --  1.22   MPAG  --  27.8*       Microbiology:  Last Culture results with specimen source  Culture Micro   Date Value Ref Range Status   12/21/2009 No yeast isolated  Final   12/21/2009 10 to 50,000 colonies/mL Mixed gram positive filemon  Final     Comment:     Multiple species present, probable perineal contamination.  Susceptibility testing not routinely done   09/04/2009 Duplicate request  Final     Comment:     Canceled, Test credited   09/04/2009 No yeast isolated  Final   09/04/2009   Final    <10,000 colonies/mL Lactose fermenting gram negative rods Susceptibility testing     Comment:      not routinely done  <10,000 colonies/mL Non lactose fermenting gram negative rods Susceptibility   testing not routinely done  10 to 50,000 colonies/mL Gram positive cocci in chains Susceptibility testing   not   routinely done  Multiple species present, probable perineal contamination.   10/01/2007 <10,000 colonies/mL Gram positive cocci  Final     Comment:     <10,000 colonies/mL Staphylococcus species  Susceptibility testing not routinely done   10/01/2007 No growth  Final    10/01/2007 No growth  Final   09/12/2007 No growth  Final   09/12/2007 No anaerobes isolated  Final   08/27/2007 No growth  Final   08/26/2007 No growth  Final   08/26/2007 No growth  Final   08/26/2007 No growth  Final   08/26/2007 No growth  Final   08/22/2007 No growth  Final   08/19/2007   Final    10 to 50,000 colonies/mL Staphylococcus species Susceptibility testing not     Comment:      routinely done  <10,000 colonies/mL Gram positive cocci Susceptibility testing not routinely   done    Specimen Description   Date Value Ref Range Status   12/21/2009 Midstream Urine  Final   12/21/2009 Midstream Urine  Final   12/21/2009 Midstream Urine  Final   09/04/2009 Unspecified Urine  Final   09/04/2009 Midstream Urine  Final   09/04/2009 Midstream Urine  Final   09/04/2009 Midstream Urine  Final   10/01/2007 Midstream Urine  Final   10/01/2007 Blood Left Arm  Final   10/01/2007 Blood Right Arm  Final   09/12/2007 Fluid LYMPHOCELE  Final   09/12/2007 Fluid LYMPHOCELE  Final   09/12/2007 Fluid LYMPHOCELE  Final   08/27/2007 Unspecified Urine  Final   08/26/2007 Blood  Final   08/26/2007 Blood Right Arm  Final   08/26/2007 Blood  Final   08/26/2007 Blood  Final   08/22/2007 Catheterized Urine  Final   08/19/2007 Midstream Urine  Final          Virology:  Log IU/mL of CMVQNT   Date Value Ref Range Status   08/01/2016 Not Calculated <2.1 [Log_IU]/mL Final       EBV DNA Copies/mL   Date Value Ref Range Status   01/28/2020 130,706 (A) EBVNEG^EBV DNA Not Detected [Copies]/mL Final   12/20/2019 810,485 (A) EBVNEG^EBV DNA Not Detected [Copies]/mL Final   09/30/2019 35,733 (A) EBVNEG^EBV DNA Not Detected [Copies]/mL Final   06/14/2019 23,015 (A) EBVNEG^EBV DNA Not Detected [Copies]/mL Final   06/11/2019 16,277 (A) EBVNEG^EBV DNA Not Detected [Copies]/mL Final   05/24/2019 53,168 (A) EBVNEG^EBV DNA Not Detected [Copies]/mL Final   02/27/2019 10,323 (A) EBVNEG^EBV DNA Not Detected [Copies]/mL Final   01/02/2019 21,287 (A)  EBVNEG^EBV DNA Not Detected [Copies]/mL Final   11/28/2018 8,619 (A) EBVNEG^EBV DNA Not Detected [Copies]/mL Final   10/26/2018 10,028 (A) EBVNEG^EBV DNA Not Detected [Copies]/mL Final   09/26/2018 8,114 (A) EBVNEG^EBV DNA Not Detected [Copies]/mL Final   07/23/2018 EBV DNA Not Detected EBVNEG^EBV DNA Not Detected [Copies]/mL Final   04/24/2018 EBV DNA Not Detected EBVNEG^EBV DNA Not Detected [Copies]/mL Final   03/28/2018 EBV DNA Not Detected EBVNEG^EBV DNA Not Detected [Copies]/mL Final   02/08/2018 1,256 (A) EBVNEG^EBV DNA Not Detected [Copies]/mL Final   01/23/2018 178,701 (A) EBVNEG^EBV DNA Not Detected [Copies]/mL Final   12/21/2017 184,473 (A) EBVNEG^EBV DNA Not Detected [Copies]/mL Final   11/22/2017 143,449 (A) EBVNEG^EBV DNA Not Detected [Copies]/mL Final   11/03/2017 134,236 (A) EBVNEG^EBV DNA Not Detected [Copies]/mL Final   08/07/2017 154,014 (A) EBVNEG [Copies]/mL Final   08/01/2016 138,022 (A) EBVNEG [Copies]/mL Final       Hepatitis C Antibody   Date Value Ref Range Status   08/19/2007 Negative NEG Final   05/09/2007 Negative NEG Final       CMV IgG Antibody   Date Value Ref Range Status   08/19/2007 >160.0 EU/mL Final     Comment:     Positive for anti-CMV IgG       EBV IgG Antibody Interpretation   Date Value Ref Range Status   08/19/2007 Negative, suggests no immunologic exposure.  Final   05/09/2007 Negative, suggests no immunologic exposure.  Final       Imaging:   EXAMINATION: CT CHEST/ABDOMEN/PELVIS W CONTRAST, 1/16/2020 1:55 PM  HISTORY: ? Lymphoma; Kidney transplanted.  FINDINGS:  Chest: Heterogeneous, mildly enlarged thyroid parenchyma without discrete  nodule. The aortic branching pattern, heart size, pericardium, and  esophagus are normal. The ascending aorta and main pulmonary artery  are not dilated. No large central pulmonary embolism. No thoracic adenopathy.  The central tracheobronchial tree is patent. No pneumothorax or  pleural effusion. No airspace consolidation. Solid 3 mm nodule  in the  right upper lobe (series 6, image 106).  Abdomen and pelvis:   The liver, gallbladder, spleen, and pancreas appear normal. Mild  diffuse benign-appearing thickening of the adrenal glands. Atrophy of  the native kidneys with numerous subcentimeter hypodensities which are  too small to characterize. A punctate calcification in the mid left  kidney may represent a vascular calcification or nonobstructing tiny  stone. Right lower quadrant renal transplant is unremarkable.  Unremarkable CT appearance of the uterus and ovaries.  No intra-abdominal free air or free fluid. No abnormally dilated loops  of bowel. The appendix is not visualized. Mild colonic diverticulosis.  Periampullary duodenal diverticulum measuring up to 2.8 cm. Normal  caliber abdominal aorta. The major abdominal vasculature is patent.  Retroaortic left renal vein. No lymphadenopathy in the abdomen or  pelvis. Minimal mesenteric edema.    Impression    IMPRESSION:   1. No lymphadenopathy in the chest, abdomen, or pelvis.  2. Single 3 mm nodule in the right upper lobe. Consider follow-up CT  in one year if the patient is considered high risk for lung cancer,  correlation with previous outside imaging to determine chronicity.  3. Atrophy of the native kidneys with unremarkable appearance of the  right lower quadrant renal transplant.

## 2020-02-07 NOTE — NURSING NOTE
Chief Complaint   Patient presents with     RECHECK     EBV       /74 (BP Location: Right arm, Patient Position: Sitting, Cuff Size: Adult Regular)   Pulse 61   Temp 98.7  F (37.1  C) (Oral)   Wt 60.7 kg (133 lb 12.8 oz)   LMP  (LMP Unknown)   SpO2 97%   BMI 22.97 kg/m      Елена Orozco CMA    2/7/2020 7:55 AM

## 2020-02-07 NOTE — TELEPHONE ENCOUNTER
FUTURE VISIT INFORMATION      FUTURE VISIT INFORMATION:    Date: 3/5/2020    Time: 10AM    Location: [facility] [place]  REFERRAL INFORMATION:    Referring provider:  Abril Perez MD    Referring providers clinic:  [facility]     Reason for visit/diagnosis  Large tonsils     RECORDS REQUESTED FROM:       Clinic name Comments Records Status Imaging Status   [facility]  2/7/2020 referral and notes with Abril Peerz MD EPIC

## 2020-02-07 NOTE — LETTER
2/7/2020       RE: Zulma Lovell  [address]  [facility] [place] [address]     Dear Colleague,    Thank you for referring your patient, Zulma Lovell, to the [facility] AND INFECTIOUS DISEASES at [facility]. Please see a copy of my visit note below.    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  - Pneumovax x 1 today.  - Return to ID clinic after tonsillectomy.     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 Zulma was EBV R-/D+ for her kidney transplantation. Details of viral loads elsewhere in this note. Her tonsils are very large on exam, and this could be contributing to what she thinks are nuisance infections that do not respond to antibiotics. At this point, I think that the tonsils are harboring EBV such that her viral load persists despite lowering of immunosuppression. She needs tonsillectomy to decrease viral loads, which will subsequently decrease her risk of transformation to lymphoma by the circulating EBV.   - Recurrent URI and nuisance infections, could be an anatomic obstruction from large tonsils contributing to recurrence.  - Hx shingles.   - QTc interval: 436 msec on 8/19/2007 EKG  - Pneumocystis prophylaxis: none needed, as CD4 count > 200.  - Viral serostatus: CMV D-/R+, EBV D+/R-, VZV+, hep B immune.  -  Immunization status: due for 2nd pneumovax today.   - Gamma globulin status: unknown  - Isolation status: Good hand hygiene.    Abril Perez MD. 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 She had EBV serodiscordance and subsequent. She was EBV negative and her donor was EBV positive. My last visit with her was on 12/22/2017, and medical events reviewed to date. Her labs are done via [facility]. Zulma reported having increased fatigue during the summer of 2016 at which point an EBV DNA PCR was checked in serum and found to be 280631 copies (log 5.1) on 8/1/2016. Her immunosuppression was adjusted down, and her fatigue slowly improved. Followup EBV DNA PCR was checked on 8/7/2017 at which point her level was 766993 copies (log 5.2). With this increase she had upper respiratory infection, mildly feverish, and fatigued. She had 2 doses of rituxan, 1/23/2018 and 2/6/2018. The EBV value came down in blood, and she did feel modestly better, but it is hard to remember exactly how much better she felt. Over the fall of 2019, she was very tired & listless. She had a number of nuisance infections, for which she was treated with amoxicillin (did not help), doxycycline (helped), then flonase for stuffiness. Both her her maxillary sinus areas still feel full, and she points to both cheeks. For most of 2019, her EBV level fluctuated between 10K and 53K. However, at the quarterly check on 12/20/2019, her EBV jumped up to 810,485. She had a 3 mm lung nodule on 1/16/2020 CT imaging, which otherwise showed no massess or LAD. By 1/28/2020, it was down to 130,706. No fevers, chills, sweats, lumps. LDH checked and negative. Regarding immunosuppression dosing, MMF is at goal at 500 mg BID. In addition, cyclosporine goal levels are 50-75, which is low  because a normal range at this point after transplant might be . Her actual cyclosporine levels have been running 45-60.       Transplants:  8/19/2007 (Kidney); Postoperative day:  4555.  Coordinator Stephanie Schmidt    Review of Systems:  CONSTITUTIONAL:  No fevers or chills. No night sweats.  EYES: negative for icterus or acute vision changes. Her vision is affected by glaucoma, treated with drops and injection.   ENT:  Gradual worsening of known hearing loss since 2013, + tinnitus. She has an occ sore throat  RESPIRATORY:  negative for cough other than an occ throat clearing tickle, no sputum production, no dyspnea with mild exertion  CARDIOVASCULAR:  negative for chest pain. Every now and then her heart can flip-flop  GASTROINTESTINAL:  negative for nausea, vomiting, diarrhea. Once in a while constipation  GENITOURINARY:  negative for dysuria or hematuria.  HEME:  No easy bruising or bleeding  INTEGUMENT:  negative for rash, but some pruritus from dry skin   NEURO:  Negative for headache or tremor.    Past Medical History:   Diagnosis Date     Anemia in chronic kidney disease(285.21)      Dyslipidemia      High risk medications (not anticoagulants) long-term use      Hypertension      Immunosuppressed status (H)      Kidney replaced by transplant      Shingles        Past Surgical History:   Procedure Laterality Date     CATARACT IOL, RT/LT Bilateral      LAPAROSCOPY         Family History   Problem Relation Age of Onset     Alzheimer Disease Mother      Alzheimer Disease Father        Social History     Social History Narrative    Zulma lives in [place] with her . Two children that live nearby, 4 grandchildren. Used to work in medical transcription. Children have dogs. No other animal exposures. Foreign travel includes a [place] cruise, [place], [place], [place] (2003).      Social History     Tobacco Use     Smoking status: Never Smoker     Smokeless tobacco: Never Used   Substance  Use Topics     Alcohol use: No     Alcohol/week: 0.0 standard drinks     Drug use: No       Immunization History   Administered Date(s) Administered     FLU 6-35 months 12/02/2004     Flu, Unspecified 11/08/2010     HepA-Adult 07/14/2008     HepB-Adult 02/05/2004, 05/03/2004, 07/14/2008     Influenza (H1N1) 11/06/2009     Influenza (IIV3) PF 11/24/2006, 10/11/2011, 10/17/2012, 10/14/2013, 10/20/2014, 10/26/2015, 10/10/2016     Influenza Vaccine IM > 6 months Valent IIV4 11/03/2017     Influenza Vaccine, 6+MO IM (QUADRIVALENT W/PRESERVATIVES) 11/16/2018, 10/22/2019     Pneumo Conj 13-V (2010&after) 11/03/2017     Pneumococcal 23 valent 02/03/2013     Poliovirus, inactivated (IPV) 07/14/2008     TDAP Vaccine (Boostrix) 11/16/2018     Tdap (Adult) Unspecified Formulation 07/02/1997, 07/14/2008     Typhoid Oral 07/14/2008       Patient Active Problem List   Diagnosis     Kidney replaced by transplant     Immunosuppression (H)     Anemia in chronic renal disease     Dyslipidemia     Aftercare following organ transplant     EBV (Bandar-Barr virus) viremia     Vitamin D deficiency     HTN, kidney transplant related     Immunosuppressed status (H)       Outpatient Medications Marked as Taking for the 2/7/20 encounter (Office Visit) with Abril Perez MD   Medication Sig     ACETAMINOPHEN PO Take 1,000 mg by mouth as needed for pain     amLODIPine (NORVASC) 2.5 MG tablet Take 1 tablet (2.5 mg) by mouth At Bedtime     brimonidine (ALPHAGAN) 0.2 % ophthalmic solution Place 1 drop Into the left eye daily     cycloSPORINE modified (GENERIC EQUIVALENT) 25 MG capsule Take 3 capsules (75 mg) by mouth 2 times daily     latanoprost (XALATAN) 0.005 % ophthalmic solution Place 1 drop Into the left eye At Bedtime     loteprednol (LOTEMAX) 0.5 % ophthalmic suspension Place 1 drop Into the left eye At Bedtime      METOPROLOL TARTRATE PO Take 25 mg by mouth 2 times daily      Multiple Vitamins-Minerals (CENTRUM SILVER) per tablet  Take 1 tablet by mouth daily     mycophenolate (GENERIC EQUIVALENT) 250 MG capsule Take 2 capsules (500 mg) by mouth 2 times daily     NONFORMULARY daily as needed Myocalm Herbal Muscle relaxer     Omega-3 Fatty Acids (FISH OIL) 1000 MG CPDR Take 2,000 mg by mouth 2 times daily     PRAVASTATIN SODIUM PO Take 20 mg by mouth daily      Ranibizumab (LUCENTIS IO) Eye injections given every 11 weeks     timolol maleate (TIMOPTIC) 0.5 % ophthalmic solution 1 drop daily       Allergies   Allergen Reactions     Fenofibrate Other (See Comments)     Pancreatitis     Tricor Other (See Comments)     Pancreatitis (this is fenofibrate)     Simvastatin Muscle Pain (Myalgia) and Cramps            Physical Exam:   Vitals were reviewed.  All vitals stable  /74 (BP Location: Right arm, Patient Position: Sitting, Cuff Size: Adult Regular)   Pulse 61   Temp 98.7  F (37.1  C) (Oral)   Wt 60.7 kg (133 lb 12.8 oz)   LMP  (LMP Unknown)   SpO2 97%   BMI 22.97 kg/m     Wt Readings from Last 4 Encounters:   02/07/20 60.7 kg (133 lb 12.8 oz)   10/21/19 61.2 kg (134 lb 14.4 oz)   08/06/18 63.9 kg (140 lb 12.8 oz)   02/06/18 65.3 kg (144 lb)       Exam:  GENERAL: well-developed, well-nourished woman, alert, oriented, in no acute distress.  HEAD: Head is normocephalic, atraumatic   EYES: Eyes have anicteric sclerae.    ENT: Oropharynx is dry without exudates or ulcers. Large tonsils. Nasal septum deviates left a little, no erythema or masses, mucosa is pink.   NECK: Supple.  LUNGS: Clear to auscultation.  CARDIOVASCULAR: Regular rate and rhythm with no murmur  ABDOMEN: Normal bowel sounds, soft, nontender over RLQ transplanted kidney.  SKIN: No acute rashes.   NEUROLOGIC: Grossly nonfocal.         Laboratory Data:     Absolute CD4   Date Value Ref Range Status   11/03/2017 469 441 - 2,156 cells/uL Final       Metabolic Studies    Recent Labs   Lab Test 01/28/20  1031 12/20/19  1112 09/30/19  1034  09/27/17  1123    134 135   < >   --    POTASSIUM 4.6 4.9 4.3   < >  --    CHLORIDE 106 104 104   < >  --    CO2 22 24 26   < >  --    ANIONGAP 7 6 5   < >  --    BUN 32* 24 32*   < >  --    CR 0.87 0.96 0.93   < >  --    72621  --   --   --   --  1.18*   GFRESTIMATED 71 63 65   < >  --    GLC 88 93 91   < >  --    AICHA 9.4 9.7 9.6   < >  --     < > = values in this interval not displayed.       Hepatic Studies    Recent Labs   Lab Test 01/28/20  1031          Hematology Studies     Recent Labs   Lab Test 01/28/20  1031 12/20/19  1112 09/30/19  1034 06/14/19  1033  11/03/17  1035 09/27/17  1123   WBC 4.2 5.5 5.5 4.6   < > 4.0  --    44029  --   --   --   --   --   --  4.6   ANEU  --   --   --   --   --  1.5*  --    ALYM  --   --   --   --   --  1.8  --    NILSA  --   --   --   --   --  0.6  --    AEOS  --   --   --   --   --  0.1  --    HGB 10.8* 11.5* 11.2* 11.4*   < > 11.5*  --    75724  --   --   --   --   --   --  11.8*   HCT 34.5* 37.1 36.6 36.6   < > 35.9  --     238 190 200   < > 183  --    19653  --   --   --   --   --   --  199    < > = values in this interval not displayed.       Medication levels    Recent Labs   Lab Test 01/28/20  1032 12/20/19  1112   CYCLSP 60 56   MPACID  --  1.22   MPAG  --  27.8*       Microbiology:  Last Culture results with specimen source  Culture Micro   Date Value Ref Range Status   12/21/2009 No yeast isolated  Final   12/21/2009 10 to 50,000 colonies/mL Mixed gram positive filemon  Final     Comment:     Multiple species present, probable perineal contamination.  Susceptibility testing not routinely done   09/04/2009 Duplicate request  Final     Comment:     Canceled, Test credited   09/04/2009 No yeast isolated  Final   09/04/2009   Final    <10,000 colonies/mL Lactose fermenting gram negative rods Susceptibility testing     Comment:      not routinely done  <10,000 colonies/mL Non lactose fermenting gram negative rods Susceptibility   testing not routinely done  10 to 50,000 colonies/mL Gram  positive cocci in chains Susceptibility testing   not   routinely done  Multiple species present, probable perineal contamination.   10/01/2007 <10,000 colonies/mL Gram positive cocci  Final     Comment:     <10,000 colonies/mL Staphylococcus species  Susceptibility testing not routinely done   10/01/2007 No growth  Final   10/01/2007 No growth  Final   09/12/2007 No growth  Final   09/12/2007 No anaerobes isolated  Final   08/27/2007 No growth  Final   08/26/2007 No growth  Final   08/26/2007 No growth  Final   08/26/2007 No growth  Final   08/26/2007 No growth  Final   08/22/2007 No growth  Final   08/19/2007   Final    10 to 50,000 colonies/mL Staphylococcus species Susceptibility testing not     Comment:      routinely done  <10,000 colonies/mL Gram positive cocci Susceptibility testing not routinely   done    Specimen Description   Date Value Ref Range Status   12/21/2009 Midstream Urine  Final   12/21/2009 Midstream Urine  Final   12/21/2009 Midstream Urine  Final   09/04/2009 Unspecified Urine  Final   09/04/2009 Midstream Urine  Final   09/04/2009 Midstream Urine  Final   09/04/2009 Midstream Urine  Final   10/01/2007 Midstream Urine  Final   10/01/2007 Blood Left Arm  Final   10/01/2007 Blood Right Arm  Final   09/12/2007 Fluid LYMPHOCELE  Final   09/12/2007 Fluid LYMPHOCELE  Final   09/12/2007 Fluid LYMPHOCELE  Final   08/27/2007 Unspecified Urine  Final   08/26/2007 Blood  Final   08/26/2007 Blood Right Arm  Final   08/26/2007 Blood  Final   08/26/2007 Blood  Final   08/22/2007 Catheterized Urine  Final   08/19/2007 Midstream Urine  Final          Virology:  Log IU/mL of CMVQNT   Date Value Ref Range Status   08/01/2016 Not Calculated <2.1 [Log_IU]/mL Final       EBV DNA Copies/mL   Date Value Ref Range Status   01/28/2020 130,706 (A) EBVNEG^EBV DNA Not Detected [Copies]/mL Final   12/20/2019 810,485 (A) EBVNEG^EBV DNA Not Detected [Copies]/mL Final   09/30/2019 35,733 (A) EBVNEG^EBV DNA Not Detected  [Copies]/mL Final   06/14/2019 23,015 (A) EBVNEG^EBV DNA Not Detected [Copies]/mL Final   06/11/2019 16,277 (A) EBVNEG^EBV DNA Not Detected [Copies]/mL Final   05/24/2019 53,168 (A) EBVNEG^EBV DNA Not Detected [Copies]/mL Final   02/27/2019 10,323 (A) EBVNEG^EBV DNA Not Detected [Copies]/mL Final   01/02/2019 21,287 (A) EBVNEG^EBV DNA Not Detected [Copies]/mL Final   11/28/2018 8,619 (A) EBVNEG^EBV DNA Not Detected [Copies]/mL Final   10/26/2018 10,028 (A) EBVNEG^EBV DNA Not Detected [Copies]/mL Final   09/26/2018 8,114 (A) EBVNEG^EBV DNA Not Detected [Copies]/mL Final   07/23/2018 EBV DNA Not Detected EBVNEG^EBV DNA Not Detected [Copies]/mL Final   04/24/2018 EBV DNA Not Detected EBVNEG^EBV DNA Not Detected [Copies]/mL Final   03/28/2018 EBV DNA Not Detected EBVNEG^EBV DNA Not Detected [Copies]/mL Final   02/08/2018 1,256 (A) EBVNEG^EBV DNA Not Detected [Copies]/mL Final   01/23/2018 178,701 (A) EBVNEG^EBV DNA Not Detected [Copies]/mL Final   12/21/2017 184,473 (A) EBVNEG^EBV DNA Not Detected [Copies]/mL Final   11/22/2017 143,449 (A) EBVNEG^EBV DNA Not Detected [Copies]/mL Final   11/03/2017 134,236 (A) EBVNEG^EBV DNA Not Detected [Copies]/mL Final   08/07/2017 154,014 (A) EBVNEG [Copies]/mL Final   08/01/2016 138,022 (A) EBVNEG [Copies]/mL Final       Hepatitis C Antibody   Date Value Ref Range Status   08/19/2007 Negative NEG Final   05/09/2007 Negative NEG Final       CMV IgG Antibody   Date Value Ref Range Status   08/19/2007 >160.0 EU/mL Final     Comment:     Positive for anti-CMV IgG       EBV IgG Antibody Interpretation   Date Value Ref Range Status   08/19/2007 Negative, suggests no immunologic exposure.  Final   05/09/2007 Negative, suggests no immunologic exposure.  Final       Imaging:   EXAMINATION: CT CHEST/ABDOMEN/PELVIS W CONTRAST, 1/16/2020 1:55 PM  HISTORY: ? Lymphoma; Kidney transplanted.  FINDINGS:  Chest: Heterogeneous, mildly enlarged thyroid parenchyma without discrete  nodule. The aortic  branching pattern, heart size, pericardium, and  esophagus are normal. The ascending aorta and main pulmonary artery  are not dilated. No large central pulmonary embolism. No thoracic adenopathy.  The central tracheobronchial tree is patent. No pneumothorax or  pleural effusion. No airspace consolidation. Solid 3 mm nodule in the  right upper lobe (series 6, image 106).  Abdomen and pelvis:   The liver, gallbladder, spleen, and pancreas appear normal. Mild  diffuse benign-appearing thickening of the adrenal glands. Atrophy of  the native kidneys with numerous subcentimeter hypodensities which are  too small to characterize. A punctate calcification in the mid left  kidney may represent a vascular calcification or nonobstructing tiny  stone. Right lower quadrant renal transplant is unremarkable.  Unremarkable CT appearance of the uterus and ovaries.  No intra-abdominal free air or free fluid. No abnormally dilated loops  of bowel. The appendix is not visualized. Mild colonic diverticulosis.  Periampullary duodenal diverticulum measuring up to 2.8 cm. Normal  caliber abdominal aorta. The major abdominal vasculature is patent.  Retroaortic left renal vein. No lymphadenopathy in the abdomen or  pelvis. Minimal mesenteric edema.    Impression    IMPRESSION:   1. No lymphadenopathy in the chest, abdomen, or pelvis.  2. Single 3 mm nodule in the right upper lobe. Consider follow-up CT  in one year if the patient is considered high risk for lung cancer,  correlation with previous outside imaging to determine chronicity.  3. Atrophy of the native kidneys with unremarkable appearance of the  right lower quadrant renal transplant.        Abril Perez MD

## 2020-02-23 ENCOUNTER — HEALTH MAINTENANCE LETTER (OUTPATIENT)
Age: 65
End: 2020-02-23

## 2020-03-05 ENCOUNTER — PRE VISIT (OUTPATIENT)
Dept: OTOLARYNGOLOGY | Facility: CLINIC | Age: 65
End: 2020-03-05

## 2020-03-05 ENCOUNTER — OFFICE VISIT (OUTPATIENT)
Dept: OTOLARYNGOLOGY | Facility: CLINIC | Age: 65
End: 2020-03-05
Attending: INTERNAL MEDICINE
Payer: COMMERCIAL

## 2020-03-05 VITALS — HEIGHT: 64 IN | BODY MASS INDEX: 22.71 KG/M2 | WEIGHT: 133 LBS

## 2020-03-05 DIAGNOSIS — Z94.0 KIDNEY TRANSPLANTED: ICD-10-CM

## 2020-03-05 DIAGNOSIS — B27.00 EBV (EPSTEIN-BARR VIRUS) VIREMIA: ICD-10-CM

## 2020-03-05 DIAGNOSIS — J35.1 LARGE TONSILS: ICD-10-CM

## 2020-03-05 RX ORDER — FLUTICASONE PROPIONATE 50 MCG
1 SPRAY, SUSPENSION (ML) NASAL DAILY
COMMUNITY
End: 2020-08-13

## 2020-03-05 ASSESSMENT — PAIN SCALES - GENERAL: PAINLEVEL: NO PAIN (0)

## 2020-03-05 ASSESSMENT — MIFFLIN-ST. JEOR: SCORE: 1143.28

## 2020-03-05 NOTE — LETTER
3/5/2020       RE: Zulma Lovell  [address]  [facility] [place] [address]     Dear Colleague,    Thank you for referring your patient, Zulma Lovell, to the [facility] EAR NOSE AND THROAT at [facility]. Please see a copy of my visit note below.    Otolaryngology Adult Consultation    Patient: Zulma Lovell  : 1955        HPI:  Zulma Lovell is a 64 year old female seen today in the Otolaryngology Clinic for tonsillectomy evaluation.  Patient's past medical history is significant for kidney transplant due to end-stage renal disease.  She is maintained on cyclosporine and mycophenolate.  She was EBV negative and her donor was EBV positive.  She has had increasing fatigue during the summer  at which point an EBV DNA PCR was checked in the serum and found to be elevated.  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  An ENT referral was made for tonsillectomy due to persisting EBV viremia.  Felt the tonsils are harboring the EBV.  Her viral load continues to persist despite lowering her immunosuppression.  Patient also reports that she has had issues with recurrent sinus symptoms over the wintertime.    Medications:  Current Outpatient Rx   Medication Sig Dispense Refill     ACETAMINOPHEN PO Take 1,000 mg by mouth as needed for pain       amLODIPine (NORVASC) 2.5 MG tablet Take 1 tablet (2.5 mg) by mouth At Bedtime 90 tablet 3     brimonidine (ALPHAGAN) 0.2 % ophthalmic solution Place 1 drop Into the left eye daily       brimonidine-timolol (COMBIGAN) 0.2-0.5 % ophthalmic solution Place 1 drop into both eyes daily In morning       cycloSPORINE modified (GENERIC EQUIVALENT) 25 MG capsule Take 3 capsules (75 mg) by mouth 2 times daily 540 capsule 3     fluticasone (FLONASE) 50 MCG/ACT nasal spray Spray 1 spray into both nostrils daily        latanoprost (XALATAN) 0.005 % ophthalmic solution Place 1 drop Into the left eye At Bedtime       LORazepam (ATIVAN) 0.5 MG tablet Take 0.5 mg 30 mis prior to scan. 1 tablet 0     loteprednol (LOTEMAX) 0.5 % ophthalmic suspension Place 1 drop Into the left eye At Bedtime        METOPROLOL TARTRATE PO Take 25 mg by mouth 2 times daily        Multiple Vitamins-Minerals (CENTRUM SILVER) per tablet Take 1 tablet by mouth daily       mycophenolate (GENERIC EQUIVALENT) 250 MG capsule Take 2 capsules (500 mg) by mouth 2 times daily 360 capsule 3     NONFORMULARY daily as needed Myocalm Herbal Muscle relaxer       Omega-3 Fatty Acids (FISH OIL) 1000 MG CPDR Take 2,000 mg by mouth 2 times daily       PRAVASTATIN SODIUM PO Take 20 mg by mouth daily        Ranibizumab (LUCENTIS IO) Eye injections given every 11 weeks       timolol maleate (TIMOPTIC) 0.5 % ophthalmic solution 1 drop daily         Allergies: Fenofibrate; Tricor; and Simvastatin     PMH:  Past Medical History:   Diagnosis Date     Anemia in chronic kidney disease(285.21)      Dyslipidemia      High risk medications (not anticoagulants) long-term use      Hypertension      Immunosuppressed status (H)      Kidney replaced by transplant      Shingles        PSH:  Past Surgical History:   Procedure Laterality Date     CATARACT IOL, RT/LT Bilateral      LAPAROSCOPY         FH:  Family History   Problem Relation Age of Onset     Alzheimer Disease Mother      Alzheimer Disease Father         SH:  Social History     Tobacco Use     Smoking status: Never Smoker     Smokeless tobacco: Never Used   Substance Use Topics     Alcohol use: No     Alcohol/week: 0.0 standard drinks     Drug use: No       Review of Systems  [facility] E[facility] ROS 3/3/2020   Constitutional Unexplained fatigue, Problems with sleep   Ears, Nose, Throat Hearing loss, Ear pain, Ringing/noise in ears, Nasal congestion or drainage   Musculoskeletal Neck pain       Physical Exam:    GEN:  The patient is alert,  oriented and in no acute distress.  HEAD:  Head, face scalp is grossly normal.  ORAL:  Oral cavity shows healthy mucosa with out ulceration, masses or other lesions                involving the tongue, palate, buccal mucosa, floor of mouth or gingiva.    Tonsils are 3+, symmetric              Assessment/Plan: patient presents for tonsillectomy in the setting persistent EBV viremia.  Medical management has not been successful.Risks of surgery were discussed with the patient ,which include sever pain, bleeding 7-10 days after surgery (~8% risk), tongue numbness, tongue swelling, taste change, VPI, and nasopharyngeal stenosis. Expected post-operative recovery was also discussed and includes soft diet, pain control, and time off of work (7-14 days).    I did discuss with the patient that 1 of the risks of surgery additionally would be failure to improve her EBV viremia.  It is likely that the tonsils are harboring the virus but it is not 100% certainty.  I also discussed with the patient that tonsillectomy may or may not improve her sinus symptoms as they are technically 2 separate parts of the body and not necessarily affected by 1 another.    Due to her kidney transplant we will not be able to give her nonsteroidal anti-inflammatories.  Additionally due to her kidney transplant I would want to make sure that the patient is able to stay hydrated through her recovery.  I would recommend keeping her in the hospital 1 night to monitor pain and as well as oral intake.    I spent a total of 35 minutes face-to-face with Zulma Lovell during today's office visit.  Over 50% of this time was spent counseling the patient on and/or coordinating care as documented in my assessment and plan.        Again, thank you for allowing me to participate in the care of your patient.      Sincerely,    Tammy Haines MD

## 2020-03-05 NOTE — NURSING NOTE
Teaching Flowsheet - ENT   Relevant Diagnosis: Enlarged tonsils, EBV  Teaching Topic: Pre-surgical teaching for Bilateral Tonsillectomy  Person(s) involved in teaching: patient  Motivation Level:   Asks Questions: Yes  Eager to Learn: Yes  Cooperative: Yes  Receptive (willing/able to accept information): Yes  Comments: Reviewed   Pre-op H&P requirements   NPO guidelines prior to surgery  Pre-op scrub directions (given Hibiclens)   Reviewed post-op cares  Activity and pain.  Patient demonstrates understanding of the following:  Reason for the appointment, diagnosis and treatment plan: Yes  Knowledge of proper use of medications and conditions for which they are ordered (with special attention to potential side effects or drug interactions): Yes  Which situations necessitate calling provider: Yes   Whom to contact: Yes  Nutritional needs and diet plan: Yes  Pain management techniques: Yes  Patient instructed on hand hygiene: Yes  How and/when to access community resources: Yes     Infection Prevention:  Signs and symptoms of infection taught: Yes  Instructional Materials Used/Given: Pre-op booklet, verbal Instruction covering the contents provided.     Patient unsure if she would like to move forward at this time. Patient will call RN should she wish to schedule.     Lilibeth Acosta RN

## 2020-03-05 NOTE — NURSING NOTE
"Chief Complaint   Patient presents with     Consult     Possible tonsillectomy         Height 1.626 m (5' 4\"), weight 60.3 kg (133 lb), not currently breastfeeding.    Janki Ngo, EMT    "

## 2020-03-05 NOTE — PROGRESS NOTES
Otolaryngology Adult Consultation    Patient: Zulma Lovell  : 1955        HPI:  Zulma Lovell is a 64 year old female seen today in the Otolaryngology Clinic for tonsillectomy evaluation.  Patient's past medical history is significant for kidney transplant due to end-stage renal disease.  She is maintained on cyclosporine and mycophenolate.  She was EBV negative and her donor was EBV positive.  She has had increasing fatigue during the summer  at which point an EBV DNA PCR was checked in the serum and found to be elevated.  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  An ENT referral was made for tonsillectomy due to persisting EBV viremia.  Felt the tonsils are harboring the EBV.  Her viral load continues to persist despite lowering her immunosuppression.  Patient also reports that she has had issues with recurrent sinus symptoms over the wintertime.    Medications:  Current Outpatient Rx   Medication Sig Dispense Refill     ACETAMINOPHEN PO Take 1,000 mg by mouth as needed for pain       amLODIPine (NORVASC) 2.5 MG tablet Take 1 tablet (2.5 mg) by mouth At Bedtime 90 tablet 3     brimonidine (ALPHAGAN) 0.2 % ophthalmic solution Place 1 drop Into the left eye daily       brimonidine-timolol (COMBIGAN) 0.2-0.5 % ophthalmic solution Place 1 drop into both eyes daily In morning       cycloSPORINE modified (GENERIC EQUIVALENT) 25 MG capsule Take 3 capsules (75 mg) by mouth 2 times daily 540 capsule 3     fluticasone (FLONASE) 50 MCG/ACT nasal spray Spray 1 spray into both nostrils daily       latanoprost (XALATAN) 0.005 % ophthalmic solution Place 1 drop Into the left eye At Bedtime       LORazepam (ATIVAN) 0.5 MG tablet Take 0.5 mg 30 mis prior to scan. 1 tablet 0     loteprednol (LOTEMAX) 0.5 % ophthalmic suspension Place 1 drop Into the left eye At Bedtime        METOPROLOL  TARTRATE PO Take 25 mg by mouth 2 times daily        Multiple Vitamins-Minerals (CENTRUM SILVER) per tablet Take 1 tablet by mouth daily       mycophenolate (GENERIC EQUIVALENT) 250 MG capsule Take 2 capsules (500 mg) by mouth 2 times daily 360 capsule 3     NONFORMULARY daily as needed Myocalm Herbal Muscle relaxer       Omega-3 Fatty Acids (FISH OIL) 1000 MG CPDR Take 2,000 mg by mouth 2 times daily       PRAVASTATIN SODIUM PO Take 20 mg by mouth daily        Ranibizumab (LUCENTIS IO) Eye injections given every 11 weeks       timolol maleate (TIMOPTIC) 0.5 % ophthalmic solution 1 drop daily         Allergies: Fenofibrate; Tricor; and Simvastatin     PMH:  Past Medical History:   Diagnosis Date     Anemia in chronic kidney disease(285.21)      Dyslipidemia      High risk medications (not anticoagulants) long-term use      Hypertension      Immunosuppressed status (H)      Kidney replaced by transplant      Shingles        PSH:  Past Surgical History:   Procedure Laterality Date     CATARACT IOL, RT/LT Bilateral      LAPAROSCOPY         FH:  Family History   Problem Relation Age of Onset     Alzheimer Disease Mother      Alzheimer Disease Father         SH:  Social History     Tobacco Use     Smoking status: Never Smoker     Smokeless tobacco: Never Used   Substance Use Topics     Alcohol use: No     Alcohol/week: 0.0 standard drinks     Drug use: No       Review of Systems  [facility] ROS 3/3/2020   Constitutional Unexplained fatigue, Problems with sleep   Ears, Nose, Throat Hearing loss, Ear pain, Ringing/noise in ears, Nasal congestion or drainage   Musculoskeletal Neck pain       Physical Exam:    GEN:  The patient is alert, oriented and in no acute distress.  HEAD:  Head, face scalp is grossly normal.  ORAL:  Oral cavity shows healthy mucosa with out ulceration, masses or other lesions                involving the tongue, palate, buccal mucosa, floor of mouth or gingiva.    Tonsils are 3+, symmetric               Assessment/Plan: patient presents for tonsillectomy in the setting persistent EBV viremia.  Medical management has not been successful.Risks of surgery were discussed with the patient ,which include sever pain, bleeding 7-10 days after surgery (~8% risk), tongue numbness, tongue swelling, taste change, VPI, and nasopharyngeal stenosis. Expected post-operative recovery was also discussed and includes soft diet, pain control, and time off of work (7-14 days).    I did discuss with the patient that 1 of the risks of surgery additionally would be failure to improve her EBV viremia.  It is likely that the tonsils are harboring the virus but it is not 100% certainty.  I also discussed with the patient that tonsillectomy may or may not improve her sinus symptoms as they are technically 2 separate parts of the body and not necessarily affected by 1 another.    Due to her kidney transplant we will not be able to give her nonsteroidal anti-inflammatories.  Additionally due to her kidney transplant I would want to make sure that the patient is able to stay hydrated through her recovery.  I would recommend keeping her in the hospital 1 night to monitor pain and as well as oral intake.    I spent a total of 35 minutes face-to-face with Zulma Lovell during today's office visit.  Over 50% of this time was spent counseling the patient on and/or coordinating care as documented in my assessment and plan.

## 2020-03-05 NOTE — PATIENT INSTRUCTIONS
1.  You were seen in the ENT Clinic today by Dr. Haines.  If you have any questions or concerns after your appointment, please call [phone].    2.  Plan is to move forward with a Bilateral Tonsillectomy. This is an outpatient procedure that is done in the operating room. You will need a  the day of surgery.    3. You will need to consult with your primary care MD for a pre-op physical.  Forms for this were sent home with you today.    4. Giovana, our surgery scheduler will call you to schedule surgery. You can also reach her at [phone]    5.  Please return to the clinic 3 weeks after surgery       Please call our clinic for any questions, concerns, and/or worsening symptoms.      Clinic #[phone]       Option 1 for scheduling.    Thank you for allowing us to be apart of your care!    Lilibeth RODRIGUEZ RNCC    If you need to reach me my direct line is: [phone]     I am out of the office on Thursday's.BEFORE SURGERY:    -NO IBUPROFEN , MOTRIN, ALEVE, GARLIC SUPPLEMENTS, ASPIRIN PRODUCTS  OR FISH OIL FOR 7 DAYS PRIOR TO SURGERY. Tylenol is fine, generally.( You will need specific instructions from primary care if on blood thinners).    -Read pre-operative pamphlets/ booklets related to your procedure, call or contact the clinic with questions.    - Pre-op History and Physical to be done by primary care physician within 30 days of surgery date. This form is in the packet.    - Restrictions on food and fluid the day of surgery as per packet. Generally, nothing solid to eat 8 hours prior to the procedure. Okay to have clear liquids up to 2 hours before (this includes; water, apple juice, black coffee without any cream or sugar).    - Pre-op soap, neck down, directions are in your packet. Use the night before surgery and the day of surgery.     - You will need a  the day of surgery.    - You will need someone to remain with you for 24 hours following your procedure.       AFTER SURGERY:    - You will  follow up 3 weeks after surgery    - Call [phone] for scheduling or general questions     For urgent needs after hours call [phone]. You will speak with the hospital  and should ask to have the ENT resident on call paged.    - Please contact our clinic if you note any of the following:          -Foul smelling drainage from your surgical site          -Persistent pain after pain medication          -Fever>100 degrees x 24 hours or longer          -Significant dizziness, especially of new onset          -Any questions or concerns about your care    Lilibeth RODRIGUEZ RN        Patient Education     Adult Tonsillectomy  The tonsils are 2 small masses of tissue at the back of the throat. They are part of the body s immune system. This helps the body fight disease. In some people, the tonsils become infected or enlarged. This can cause severe sore throats, snoring, or other problems. Tonsillectomy is surgery to remove the tonsils. Tonsillectomy may be recommended if you have obstruction causing sleep apnea, or recurring, chronic, or severe infections.     Preparing for surgery  Prepare as you have been told. Tell your healthcare provider about all medicine you take. This includes over-the-counter drugs. It also includes herbs and other supplements. You may need to stop taking some or all of them before surgery as directed by your healthcare provider. Also, follow any directions you re given for not eating or drinking before surgery.  The day of surgery  The surgery takes about 60 minutes. You will likely go home on the same day.    Caring for Yourself after Tonsillectomy / Adenoidectomy     What to expect after surgery:     A low fever (below 101 F or 38.3 C, taken under the tongue).     A sore throat that lasts 7 to 10 days, or as long as 14 days.     Ear, jaw or neck pain. Pain may peak about a week after surgery.     Yellow or white-gray tissue where the tonsils were removed.     A white film on the tongue.  This will go away within 10 to 14 days.     Bad breath for many days as the throat heals. Gentle tooth brushing is allowed. Do not have gargle.    A change in the voice. This will go away in about three weeks.     Snoring: This will usually improve over time.     Stuffy nose: This is normal.   Care after surgery:     Consume a mechanical soft diet for the first week after surgery. Progress to thicker foods as tolerated.  o Macaroni, eggs, mashed potatoes, applesauce, cooked cereal, yogurt, etc.    Avoid rough or crunchy foods for at least 7 days.     Consume plenty of fluids- at least 24 to 64 ounces per day. Cool or lukewarm liquids may feel better at first. Sports drinks are a good choice. Avoid citrus juice as this may burn.     Popsicles, smoothies, and ICEES are good options to sooth the throat.   o NO STRAWS    Chewing gum may help increase saliva and ease pain.   Things to Avoid:     Gargling     Avoid rough or crunchy foods for at least 7 days.     Straws    Heavy or strenuous activity for at least 7 days.   Activity:     You should avoid heavy or strenuous activity for one week.     Refrain from work for at least 2 weeks following your surgery. You may not return if you are still taking prescribed narcotic pain medicine.  Pain:     Pain may start to get better and then get worse again, often peaking on days 3 to 7 after surgery. This is common.     It will hurt to swallow at first. The more you attempt to swallow, the less it will hurt.     You may take prescribed pain medicine as needed. We will tell you how much to take and how often. Expect to take pain medications for at least 7-10 days     After two days, you may replace some or all of the prescribed medicine with liquid Tylenol.     Talk to your doctor before giving ibuprofen (Motrin, Advil) or other potential blood thinning medicines within 10 days following surgery. Some medicines will increase the risk of bleeding.     A humidifier may help ease a  sore throat. You might also try an ice pack on the throat for 20 minutes. (Place a cloth between the skin and the ice pack.)     Follow up:     3 weeks after surgery    When to call us:     Bleeding: if you have any bleeding, call your clinic right away.   o Some bleeding after surgery is normal. Expect blood tinged mucous.   o The risk for bleeding increases approximately 10 days after surgery when the surgery site eschar- gray patches where tonsils were removed ( like a wet scab) begins to fall off.   o If it is after business hours, go to the Emergency Room. Bleeding may occur up to 2 weeks after surgery. Most people will spit out the blood. Some will swallow the blood and then vomit.     Fever over 101 F (38.3 C), taken under the tongue, if the fever lasts more than 48 hours.     Nausea, vomiting or constipation    Breathing problems (more severe than a stuffy nose) go to the ER     Important Phone Numbers:     During office hours: [phone]     Emergency/ After hours: [phone] (ask to page the ENT resident who is on-call)   [facility]/ [facility] Emergency room, or closest emergency room for bleeding, airway concerns.     What to bring to ER- yourself, family member, insurance cards.    Please call/contact with further questions/concerns.

## 2020-04-10 ENCOUNTER — TELEPHONE (OUTPATIENT)
Dept: TRANSPLANT | Facility: CLINIC | Age: 65
End: 2020-04-10

## 2020-04-10 NOTE — TELEPHONE ENCOUNTER
Patient Call: General  Route to LPN    Reason for call: Pt is due for her labs  Wonders if she should do them or OK to wait     Call back needed? Yes    Return Call Needed  Same as documented in contacts section  When to return call?: Greater than one day: Route standard priority

## 2020-04-10 NOTE — TELEPHONE ENCOUNTER
Called back Zulma and discussed other labs look okay but EBV still elevated.  I will call her again next week to see if I can schedule her to get labs drawn at [place].  If she has not heard from RNCC by Thursday she will call back.  States she has an appointment to get shots in her eye that she cannot rechedule.  Discussed calling clinic to check if not she can wear a mask.  States she will do that.

## 2020-04-13 ENCOUNTER — DOCUMENTATION ONLY (OUTPATIENT)
Dept: TRANSPLANT | Facility: CLINIC | Age: 65
End: 2020-04-13

## 2020-04-17 DIAGNOSIS — Z94.0 KIDNEY REPLACED BY TRANSPLANT: Primary | ICD-10-CM

## 2020-04-17 DIAGNOSIS — Z94.0 KIDNEY TRANSPLANTED: ICD-10-CM

## 2020-04-17 DIAGNOSIS — Z79.899 HIGH RISK MEDICATIONS (NOT ANTICOAGULANTS) LONG-TERM USE: ICD-10-CM

## 2020-04-20 RX ORDER — MYCOPHENOLATE MOFETIL 250 MG/1
500 CAPSULE ORAL 2 TIMES DAILY
Qty: 360 CAPSULE | Refills: 3 | Status: ON HOLD | OUTPATIENT
Start: 2020-04-20 | End: 2020-07-30

## 2020-04-21 ENCOUNTER — TELEPHONE (OUTPATIENT)
Dept: TRANSPLANT | Facility: CLINIC | Age: 65
End: 2020-04-21

## 2020-04-21 NOTE — TELEPHONE ENCOUNTER
April 21, 2020 4:08 PM -  AIVERSE1[facility]

## 2020-04-21 NOTE — TELEPHONE ENCOUNTER
Called back Zulma Lovell who reports she has not heard from scheduling.  RNCC will send  Another message back to scheduling pool.  Appreciates call back.

## 2020-04-21 NOTE — TELEPHONE ENCOUNTER
Patient Call: Voicemail  Date/Time: 4/21/20 @ 12:55 PM  Reason for call: Patient called regarding plan for lab appt

## 2020-04-27 DIAGNOSIS — Z48.298 AFTERCARE FOLLOWING ORGAN TRANSPLANT: ICD-10-CM

## 2020-04-27 DIAGNOSIS — B27.00 EBV (EPSTEIN-BARR VIRUS) VIREMIA: ICD-10-CM

## 2020-04-27 DIAGNOSIS — Z94.0 IMMUNOSUPPRESSIVE MANAGEMENT ENCOUNTER FOLLOWING KIDNEY TRANSPLANT: ICD-10-CM

## 2020-04-27 DIAGNOSIS — Z79.899 IMMUNOSUPPRESSIVE MANAGEMENT ENCOUNTER FOLLOWING KIDNEY TRANSPLANT: ICD-10-CM

## 2020-04-27 DIAGNOSIS — Z94.0 KIDNEY TRANSPLANTED: ICD-10-CM

## 2020-04-27 DIAGNOSIS — D84.9 IMMUNOSUPPRESSED STATUS (H): ICD-10-CM

## 2020-04-27 LAB
ANION GAP SERPL CALCULATED.3IONS-SCNC: 6 MMOL/L (ref 3–14)
BUN SERPL-MCNC: 30 MG/DL (ref 7–30)
CALCIUM SERPL-MCNC: 9.9 MG/DL (ref 8.5–10.1)
CHLORIDE SERPL-SCNC: 103 MMOL/L (ref 94–109)
CO2 SERPL-SCNC: 27 MMOL/L (ref 20–32)
CREAT SERPL-MCNC: 0.86 MG/DL (ref 0.52–1.04)
CREAT UR-MCNC: 43 MG/DL
CYCLOSPORINE BLD LC/MS/MS-MCNC: 72 UG/L (ref 50–400)
ERYTHROCYTE [DISTWIDTH] IN BLOOD BY AUTOMATED COUNT: 12.1 % (ref 10–15)
GFR SERPL CREATININE-BSD FRML MDRD: 72 ML/MIN/{1.73_M2}
GLUCOSE SERPL-MCNC: 106 MG/DL (ref 70–99)
HCT VFR BLD AUTO: 39.2 % (ref 35–47)
HGB BLD-MCNC: 12 G/DL (ref 11.7–15.7)
MCH RBC QN AUTO: 28.2 PG (ref 26.5–33)
MCHC RBC AUTO-ENTMCNC: 30.6 G/DL (ref 31.5–36.5)
MCV RBC AUTO: 92 FL (ref 78–100)
PLATELET # BLD AUTO: 177 10E9/L (ref 150–450)
POTASSIUM SERPL-SCNC: 4.4 MMOL/L (ref 3.4–5.3)
PROT UR-MCNC: 0.1 G/L
PROT/CREAT 24H UR: 0.24 G/G CR (ref 0–0.2)
RBC # BLD AUTO: 4.25 10E12/L (ref 3.8–5.2)
SODIUM SERPL-SCNC: 136 MMOL/L (ref 133–144)
TME LAST DOSE: NORMAL H
WBC # BLD AUTO: 3.7 10E9/L (ref 4–11)

## 2020-04-28 LAB
EBV DNA # SPEC NAA+PROBE: ABNORMAL {COPIES}/ML
EBV DNA SPEC NAA+PROBE-LOG#: 4.8 {LOG_COPIES}/ML

## 2020-06-10 ENCOUNTER — MEDICAL CORRESPONDENCE (OUTPATIENT)
Dept: HEALTH INFORMATION MANAGEMENT | Facility: CLINIC | Age: 65
End: 2020-06-10

## 2020-06-10 ENCOUNTER — TRANSCRIBE ORDERS (OUTPATIENT)
Dept: OTHER | Age: 65
End: 2020-06-10

## 2020-06-10 DIAGNOSIS — H91.93 DECREASED HEARING OF BOTH EARS: Primary | ICD-10-CM

## 2020-06-24 ENCOUNTER — PATIENT OUTREACH (OUTPATIENT)
Dept: OTOLARYNGOLOGY | Facility: CLINIC | Age: 65
End: 2020-06-24

## 2020-06-24 NOTE — PROGRESS NOTES
Patient calling because she would like to move forward with scheduling surgery with Dr. Haines. Writer informed patient that she will be contacted by our surgery scheduler to schedule surgery. Patient would like to do surgery by the first week in august to ensure she is recovered prior to an event she has at the end of August. Writer informed patient of guidelines for COVID-19 testing. She is aware that she will be contacted prior to surgery to schedule this for 48-72 hours prior to scheduled surgery. Patient expressed understanding. Patient informed that she has been referred to PAC for pre-op which will be scheduled at the time of scheduling surgery. Patient is appreciative of the call and denies any further questions or concerns at this time.     Lilibeth Acosta RN

## 2020-06-24 NOTE — PROGRESS NOTES
Left message regarding scheduling surgery with Dr. Haines. Call back number provided, [phone].       Giovana Ngo   Perioperative Coordinator  Department of Otolaryngology    Office: [phone]

## 2020-06-25 PROBLEM — J35.1 LARGE TONSILS: Status: ACTIVE | Noted: 2020-06-25

## 2020-06-25 PROBLEM — B27.00 EBV (EPSTEIN-BARR VIRUS) VIREMIA: Status: ACTIVE | Noted: 2017-08-07

## 2020-06-26 ENCOUNTER — TELEPHONE (OUTPATIENT)
Dept: OTOLARYNGOLOGY | Facility: CLINIC | Age: 65
End: 2020-06-26

## 2020-06-26 DIAGNOSIS — Z11.59 ENCOUNTER FOR SCREENING FOR OTHER VIRAL DISEASES: Primary | ICD-10-CM

## 2020-06-26 NOTE — TELEPHONE ENCOUNTER
Patient called back to schedule surgery with Dr. Haines.   Date of Surgery: 07/29/2020  Location: [facility] OR  PAC or PCP:  PAC Appt. Patient had questions about intubation process. Per STEPHEN Gamble patient will be intubated nasally. Patient had further questions on head/neck manipulation from intubation. Patient to discuss with anethesia.  Post op: 3 weeks post op.     Imaging: N/A  Surgery packet given: Mailed out 6/26/2020  Surgery teaching completed: STEPHEN Mcelroy    Sent to Prior Authorization Team: 06/24/2020    Additional comments:   Discussed COVID testing pre-operatively. Patient will be getting phone call. Discussed precautions that we are taking. Visitor restrictions have been lifted for some. Patient to inquire on pre-op phone call.       Giovana Ngo   Perioperative Coordinator   Department of Otolaryngology  P: [phone]

## 2020-06-26 NOTE — TELEPHONE ENCOUNTER
FUTURE VISIT INFORMATION      SURGERY INFORMATION:    Date: 20    Location: [facility] or    Surgeon:  Tammy Haines MD     Anesthesia Type:  general    Procedure: TONSILLECTOMY AND POSSIBLE ADENOIDECTOMY    Consult: ov 3/5    RECORDS REQUESTED FROM:       Primary Care Provider: Nellie Jimenez MD    Pertinent Medical History: hypertension    Most recent EKG+ Tracin2007    Most recent ECHO: 2007

## 2020-07-02 ENCOUNTER — RECORDS - HEALTHEAST (OUTPATIENT)
Dept: LAB | Facility: CLINIC | Age: 65
End: 2020-07-02

## 2020-07-02 LAB
ALBUMIN SERPL-MCNC: 4.2 G/DL (ref 3.5–5)
ALP SERPL-CCNC: 61 U/L (ref 45–120)
ALT SERPL W P-5'-P-CCNC: 18 U/L (ref 0–45)
ANION GAP SERPL CALCULATED.3IONS-SCNC: 12 MMOL/L (ref 5–18)
AST SERPL W P-5'-P-CCNC: 26 U/L (ref 0–40)
BILIRUB SERPL-MCNC: 1.1 MG/DL (ref 0–1)
BUN SERPL-MCNC: 26 MG/DL (ref 8–22)
CALCIUM SERPL-MCNC: 10 MG/DL (ref 8.5–10.5)
CHLORIDE BLD-SCNC: 101 MMOL/L (ref 98–107)
CHOLEST SERPL-MCNC: 180 MG/DL
CO2 SERPL-SCNC: 24 MMOL/L (ref 22–31)
CREAT SERPL-MCNC: 0.91 MG/DL (ref 0.6–1.1)
FASTING STATUS PATIENT QL REPORTED: NORMAL
GFR SERPL CREATININE-BSD FRML MDRD: >60 ML/MIN/1.73M2
GLUCOSE BLD-MCNC: 94 MG/DL (ref 70–125)
HDLC SERPL-MCNC: 60 MG/DL
LDLC SERPL CALC-MCNC: 100 MG/DL
POTASSIUM BLD-SCNC: 4.6 MMOL/L (ref 3.5–5)
PROT SERPL-MCNC: 8.2 G/DL (ref 6–8)
SODIUM SERPL-SCNC: 137 MMOL/L (ref 136–145)
TRIGL SERPL-MCNC: 98 MG/DL

## 2020-07-03 LAB
25(OH)D3 SERPL-MCNC: 41.4 NG/ML (ref 30–80)
HCV AB SERPL QL IA: NEGATIVE

## 2020-07-09 PROBLEM — M47.812 CERVICAL OSTEOARTHRITIS: Status: ACTIVE | Noted: 2020-07-09

## 2020-07-09 PROBLEM — E78.5 HYPERLIPIDEMIA: Status: ACTIVE | Noted: 2020-07-09

## 2020-07-09 PROBLEM — H91.90 HEARING LOSS: Status: ACTIVE | Noted: 2020-07-09

## 2020-07-09 PROBLEM — H40.9 GLAUCOMA: Status: ACTIVE | Noted: 2020-07-09

## 2020-07-10 ENCOUNTER — OFFICE VISIT (OUTPATIENT)
Dept: SURGERY | Facility: CLINIC | Age: 65
End: 2020-07-10
Payer: COMMERCIAL

## 2020-07-10 ENCOUNTER — PRE VISIT (OUTPATIENT)
Dept: SURGERY | Facility: CLINIC | Age: 65
End: 2020-07-10

## 2020-07-10 ENCOUNTER — ANESTHESIA EVENT (OUTPATIENT)
Dept: SURGERY | Facility: CLINIC | Age: 65
End: 2020-07-10

## 2020-07-10 VITALS
HEART RATE: 60 BPM | SYSTOLIC BLOOD PRESSURE: 148 MMHG | DIASTOLIC BLOOD PRESSURE: 87 MMHG | BODY MASS INDEX: 22.53 KG/M2 | TEMPERATURE: 99 F | OXYGEN SATURATION: 99 % | WEIGHT: 132 LBS | HEIGHT: 64 IN | RESPIRATION RATE: 15 BRPM

## 2020-07-10 DIAGNOSIS — B27.00 EBV (EPSTEIN-BARR VIRUS) VIREMIA: ICD-10-CM

## 2020-07-10 DIAGNOSIS — Z01.818 PRE-OP EVALUATION: Primary | ICD-10-CM

## 2020-07-10 ASSESSMENT — LIFESTYLE VARIABLES: TOBACCO_USE: 0

## 2020-07-10 ASSESSMENT — PAIN SCALES - GENERAL: PAINLEVEL: NO PAIN (0)

## 2020-07-10 ASSESSMENT — ENCOUNTER SYMPTOMS: SEIZURES: 1

## 2020-07-10 ASSESSMENT — MIFFLIN-ST. JEOR: SCORE: 1138.75

## 2020-07-10 NOTE — PATIENT INSTRUCTIONS
Preparing for Your Surgery      Name:  Zulma Lovell   MRN:  [id number]   :  1955   Today's Date:  7/10/2020     Arriving for surgery:  Surgery date:  20  Arrival time:  11:00 AM    Restrictions due to COVID 19:  Patients are allowed one visitor in the pre-op period  All visitors must wear a mask  No visitors under 18  No ill visitors   parking is not available     Please come to:       [facility]   [address] [place]  [address]    -    Please proceed to the Surgery Lounge on the 3rd floor. [phone]?     - ?If you are in need of directions, wheelchair or escort please stop at the Information Desk in the lobby.  Inform the information person that you are here for surgery; a wheelchair and escort will be provided to the Surgery Lounge .?     What can I eat or drink?  -  You may eat and drink normally for up to 8 hours before your surgery.   -  You may have clear liquids until 2 hours before surgery.   Examples of clear liquids:  Water  Clear broth  Juices (apple, white grape, white cranberry  and cider) without pulp  Noncarbonated, powder based beverages  (lemonade and Tom-Aid)  Sodas (Sprite, 7-Up, ginger ale and seltzer)  Coffee or tea (without milk or cream)  Gatorade    -  No Alcohol for at least 24 hours before surgery     Which medicines can I take?    Hold Aspirin for 7 days before surgery.   Hold Multivitamins for 7 days before surgery.  Hold Supplements for 7 days before surgery.  Hold Ibuprofen (Advil, Motrin) for 1 day before surgery--unless otherwise directed by surgeon.  Hold Naproxen (Aleve) for 4 days before surgery.  -  PLEASE TAKE these medications the day of surgery:  Tylenol if needed; take all scheduled medications normally taken in the morning.    How do I prepare myself?  - Please take 2 showers before surgery using Scrubcare or Hibiclens soap.    Use this soap only from the neck to your toes.     Leave the soap on your  skin for one minute--then rinse thoroughly.      You may use your own shampoo and conditioner; no other hair products.   - Please remove all jewelry and body piercings.  - No lotions, deodorants or fragrance.  - No makeup or fingernail polish.   - Bring your ID and insurance card.    ALL PATIENTS GOING HOME THE SAME DAY OF SURGERY ARE REQUIRED TO HAVE A RESPONSIBLE ADULT TO DRIVE AND BE IN ATTENDANCE WITH THEM FOR 24 HOURS FOLLOWING SURGERY     Questions or Concerns:    - For any questions regarding the day of surgery or your hospital stay, please contact the Pre Admission Nursing Office at [phone].       - If you have health changes between today and your surgery please call your surgeon.       For questions after surgery please call your surgeons office.

## 2020-07-10 NOTE — H&P
Pre-Operative H & P     CC:  Preoperative exam to assess for increased cardiopulmonary risk while undergoing surgery and anesthesia.    Date of Encounter: 7/10/2020  Primary Care Physician:  Nellie Jimenez  Associated diagnosis: EBV    HPI  Zulma Lovell is a 64 year old female who presents for pre-operative H & P in preparation for Tonsillectomy, possible adenoidectomy with Dr. Haines on 7/29/20 at [facility]. Patient is being evaluated for comorbid conditions of hypertension, anemia, ESRD s/p kidney transplant 2007    Ms. Lovell has a history of a kidney transplant in 2007 complicated by EBV. She is followed closely by her transplant team.  She was referred to Dr. Redman as she has been feeling fatigued and her EBV has jumped significantly. Viral load has persisted despite lowering immunosuppression.  Above procedure is planned.     History is obtained from the patient and chart review.      Past Medical History  Past Medical History:   Diagnosis Date     Anemia in chronic kidney disease(285.21)      Dyslipidemia      High risk medications (not anticoagulants) long-term use      Hypertension      Immunosuppressed status (H)      Kidney replaced by transplant      Shingles        Past Surgical History  Past Surgical History:   Procedure Laterality Date     APPENDECTOMY       CATARACT IOL, RT/LT Bilateral      kidney transplant         Hx of Blood transfusions/reactions: Patient has a history of transfusion, but denies reactions     Hx of abnormal bleeding or anti-platelet use: denies    Menstrual history: No LMP recorded (lmp unknown). Patient is postmenopausal.    Steroid use in the last year: denies    Personal or FH with difficulty with Anesthesia:  denies    Prior to Admission Medications  Current Outpatient Medications   Medication Sig Dispense Refill     ACETAMINOPHEN PO Take 1,000 mg by mouth as needed for pain       amLODIPine (NORVASC) 2.5 MG tablet  Take 1 tablet (2.5 mg) by mouth At Bedtime 90 tablet 3     brimonidine (ALPHAGAN) 0.2 % ophthalmic solution Place 1 drop Into the left eye every morning        cycloSPORINE modified (GENERIC EQUIVALENT) 25 MG capsule Take 3 capsules (75 mg) by mouth 2 times daily 540 capsule 3     latanoprost (XALATAN) 0.005 % ophthalmic solution Place 1 drop Into the left eye At Bedtime       loteprednol (LOTEMAX) 0.5 % ophthalmic suspension Place 1 drop Into the left eye At Bedtime        METOPROLOL TARTRATE PO Take 25 mg by mouth 2 times daily        Multiple Vitamins-Minerals (CENTRUM SILVER) per tablet Take 1 tablet by mouth every morning        mycophenolate (GENERIC EQUIVALENT) 250 MG capsule Take 2 capsules (500 mg) by mouth 2 times daily 360 capsule 3     Omega-3 Fatty Acids (FISH OIL) 1000 MG CPDR Take 2,000 mg by mouth 2 times daily       PRAVASTATIN SODIUM PO Take 20 mg by mouth At Bedtime        Ranibizumab (LUCENTIS IO) Eye injections given every 11 weeks       timolol maleate (TIMOPTIC) 0.5 % ophthalmic solution Place 1 drop into both eyes every morning        fluticasone (FLONASE) 50 MCG/ACT nasal spray Spray 1 spray into both nostrils daily         Allergies  Allergies   Allergen Reactions     Fenofibrate Other (See Comments)     Pancreatitis     Tricor Other (See Comments)     Pancreatitis (this is fenofibrate)     Simvastatin Muscle Pain (Myalgia) and Cramps       Social History  Social History     Socioeconomic History     Marital status:      Spouse name: Not on file     Number of children: Not on file     Years of education: Not on file     Highest education level: Not on file   Occupational History     Not on file   Social Needs     Financial resource strain: Not on file     Food insecurity     Worry: Not on file     Inability: Not on file     Transportation needs     Medical: Not on file     Non-medical: Not on file   Tobacco Use     Smoking status: Never Smoker     Smokeless tobacco: Never Used    Substance and Sexual Activity     Alcohol use: No     Alcohol/week: 0.0 standard drinks     Drug use: No     Sexual activity: Not on file   Lifestyle     Physical activity     Days per week: Not on file     Minutes per session: Not on file     Stress: Not on file   Relationships     Social connections     Talks on phone: Not on file     Gets together: Not on file     Attends Temple service: Not on file     Active member of club or organization: Not on file     Attends meetings of clubs or organizations: Not on file     Relationship status: Not on file     Intimate partner violence     Fear of current or ex partner: Not on file     Emotionally abused: Not on file     Physically abused: Not on file     Forced sexual activity: Not on file   Other Topics Concern     Not on file   Social History Narrative    Zulma lives in [place] with her . Two children that live nearby, 4 grandchildren. Used to work in medical transcription. Children have dogs. No other animal exposures. Foreign travel includes a [place] cruise, [place], [place], [place] (2003).        Family History  Family History   Problem Relation Age of Onset     Alzheimer Disease Mother      Alzheimer Disease Father      Anesthesia Reaction No family hx of      Deep Vein Thrombosis (DVT) No family hx of      ROS/MED HX    ENT/Pulmonary:     (+)allergic rhinitis, , . .   (-) tobacco use   Neurologic:  - neg neurologic ROS     Cardiovascular:     (+) hypertension----. : . . . :. . Previous cardiac testing      (-) taking anticoagulants/antiplatelets   METS/Exercise Tolerance:  >4   Hematologic:  - neg hematologic  ROS      (-) History of Transfusion   Musculoskeletal:  - neg musculoskeletal ROS       GI/Hepatic:  - neg GI/hepatic ROS       Renal/Genitourinary:     (+) chronic renal disease, Pt does not require dialysis, Pt has history of transplant, date: 2007,       Endo:  - neg endo ROS       Psychiatric:  - neg psychiatric ROS      "  Infectious Disease: Comment: Chronic EBV- above procedure planned         Malignancy:      - no malignancy   Other:             The complete review of systems is negative other than noted in the HPI or here.   Temp: 99  F (37.2  C) Temp src: Oral BP: (!) 148/87 Pulse: 60   Resp: 15 SpO2: 99 %         132 lbs 0 oz  5' 4\"   Body mass index is 22.66 kg/m .       Physical Exam  Constitutional: Awake, alert, cooperative, no apparent distress, and appears stated age.  Eyes: Pupils equal, round and reactive to light, extra ocular muscles intact, sclera clear, conjunctiva normal.  HENT: Normocephalic, oral pharynx with moist mucus membranes, good dentition. No goiter appreciated.   Respiratory: Clear to auscultation bilaterally, no crackles or wheezing.  Cardiovascular: Regular rate and rhythm, normal S1 and S2, and no murmur noted.  Carotids no bruits. No edema. Palpable pulses to radial arteries.   GI: Normal bowel sounds, soft, non-distended, non-tender  Lymph/Hematologic: No cervical lymphadenopathy and no supraclavicular lymphadenopathy.  Genitourinary:  deferred  Skin: Warm and dry.   Musculoskeletal: Full ROM of neck. There is no redness, warmth, or swelling of the exposed joints. Gross motor strength is normal.    Neurologic: Awake, alert, oriented to name, place and time. Cranial nerves II-XII are grossly intact. Gait is normal.   Neuropsychiatric: Calm, cooperative. Normal affect.     Labs: (personally reviewed)  Component      Latest Ref Rng & Units 4/27/2020   Sodium      133 - 144 mmol/L 136   Potassium      3.4 - 5.3 mmol/L 4.4   Chloride      94 - 109 mmol/L 103   Carbon Dioxide      20 - 32 mmol/L 27   Anion Gap      3 - 14 mmol/L 6   Glucose      70 - 99 mg/dL 106 (H)   Urea Nitrogen      7 - 30 mg/dL 30   Creatinine      0.52 - 1.04 mg/dL 0.86   GFR Estimate      >60 mL/min/1.73:m2 72   GFR Estimate If Black      >60 mL/min/1.73:m2 83   Calcium      8.5 - 10.1 mg/dL 9.9   WBC      4.0 - 11.0 10e9/L 3.7 " (L)   RBC Count      3.8 - 5.2 10e12/L 4.25   Hemoglobin      11.7 - 15.7 g/dL 12.0   Hematocrit      35.0 - 47.0 % 39.2   MCV      78 - 100 fl 92   MCH      26.5 - 33.0 pg 28.2   MCHC      31.5 - 36.5 g/dL 30.6 (L)   RDW      10.0 - 15.0 % 12.1   Platelet Count      150 - 450 10e9/L 177       EKG and stress test in 2007- normal    EKG 7/10/20  Sinus bartolo, otherwise normal    Outside records reviewed from: care everywhere    ASSESSMENT and PLAN  Zulma Lovell is a 64 year old female scheduled for Tonsillectomy, possible adenoidectomy on 7/29/20 by Dr. Haines in treatment of EBV.  PAC referral for risk assessment and optimization for anesthesia with comorbid conditions of hypertension, anemia, ESRD s/p kidney transplant 2007:    Pre-operative considerations:  1.  Cardiac:  Functional status- METS >4. hypertension  Using norvasc and metoprolol (continue DOS). Can continue pravastatin. Endorses intermittent palpitations.  She has a family history of a. Fib. EKG in clinic today shows sinus bradycardia, otherwise normal. low risk surgery with 0.4% (RCRI #) risk of major adverse cardiac event.   2.  Pulm:  Airway feasible.  EAMON risk: low. denies pulmonary symptoms. COVID test ordered per surgery.  3.  GI:  Risk of PONV score = 3.  If > 2, anti-emetic intervention recommended.  4.  : h/o ESRD s/p kidney transplant in 2007. Complicated by EBV- above procedure is planned. Can continue mycophenolate and    VTE risk: 0.5%    Patient is optimized and is acceptable candidate for the proposed procedure.  No further diagnostic evaluation is needed.     Camila Robledo PA-C  Preoperative Assessment Center  [facility]  Clinic and Surgery Center  Phone: [phone]  Fax: [phone]

## 2020-07-10 NOTE — ANESTHESIA PREPROCEDURE EVALUATION
"Anesthesia Pre-Procedure Evaluation    Patient: Zulma Lovell   MRN:     [id number] Gender:   female   Age:    64 year old :      1955        Preoperative Diagnosis: * No surgery found *        LABS:  CBC:   Lab Results   Component Value Date    WBC 3.7 (L) 2020    WBC 4.2 2020    HGB 12.0 2020    HGB 10.8 (L) 2020    HCT 39.2 2020    HCT 34.5 (L) 2020     2020     2020     BMP:   Lab Results   Component Value Date     2020     2020    POTASSIUM 4.4 2020    POTASSIUM 4.6 2020    CHLORIDE 103 2020    CHLORIDE 106 2020    CO2 27 2020    CO2 22 2020    BUN 30 2020    BUN 32 (H) 2020    CR 0.86 2020    CR 0.87 2020     (H) 2020    GLC 88 2020     COAGS:   Lab Results   Component Value Date    PTT 34 2007    INR 0.95 2009    FIBR 410 2007     POC:   Lab Results   Component Value Date     (H) 2007    HCGS Negative 2007     OTHER:   Lab Results   Component Value Date    A1C 4.2 (L) 2007    AICHA 9.9 2020    PHOS 4.2 2009    MAG 1.6 09/10/2007    ALBUMIN 4.7 2009    PROTTOTAL 9.1 (H) 2009    ALT 12 2009    AST 34 2009    ALKPHOS 81 2009    BILITOTAL 0.7 2009    LIPASE 186 2007    AMYLASE 58 2007    TSH 2.16 2010    CRP 44.8 (H) 2007        Preop Vitals    BP Readings from Last 3 Encounters:   20 139/74   10/21/19 133/75   18 137/80    Pulse Readings from Last 3 Encounters:   20 61   10/21/19 57   18 55      Resp Readings from Last 3 Encounters:   10/21/19 18   18 16   18 16    SpO2 Readings from Last 3 Encounters:   20 97%   10/21/19 99%   18 99%      Temp Readings from Last 1 Encounters:   20 98.7  F (37.1  C) (Oral)    Ht Readings from Last 1 Encounters:   20 1.626 m (5' 4\")    " "  Wt Readings from Last 1 Encounters:   03/05/20 60.3 kg (133 lb)    Estimated body mass index is 22.83 kg/m  as calculated from the following:    Height as of 3/5/20: 1.626 m (5' 4\").    Weight as of 3/5/20: 60.3 kg (133 lb).     LDA:        Past Medical History:   Diagnosis Date     Anemia in chronic kidney disease(285.21)      Dyslipidemia      High risk medications (not anticoagulants) long-term use      Hypertension      Immunosuppressed status (H)      Kidney replaced by transplant      Shingles       Past Surgical History:   Procedure Laterality Date     CATARACT IOL, RT/LT Bilateral      LAPAROSCOPY        Allergies   Allergen Reactions     Fenofibrate Other (See Comments)     Pancreatitis     Tricor Other (See Comments)     Pancreatitis (this is fenofibrate)     Simvastatin Muscle Pain (Myalgia) and Cramps        Anesthesia Evaluation     . Pt has had prior anesthetic.     No history of anesthetic complications          ROS/MED HX    ENT/Pulmonary:     (+)allergic rhinitis, , . .   (-) tobacco use   Neurologic:     (+)seizures last seizure: prior to transplant so 2006     Cardiovascular:     (+) hypertension----. : . . . :. . Previous cardiac testing date:results:date: results:ECG reviewed date:7/10/20 results:Sinus bradycardia, otherwise normal date: results:         (-) taking anticoagulants/antiplatelets   METS/Exercise Tolerance:  >4 METS   Hematologic:     (+) History of Transfusion no previous transfusion reaction -      Musculoskeletal: Comment: Cervical OA- C5-7        GI/Hepatic:  - neg GI/hepatic ROS       Renal/Genitourinary:     (+) chronic renal disease, Pt does not require dialysis, Pt has history of transplant, date: 2007,       Endo:  - neg endo ROS       Psychiatric:  - neg psychiatric ROS       Infectious Disease: Comment: Chronic EBV- above procedure planned         Malignancy:      - no malignancy   Other:                         PHYSICAL EXAM:   Mental Status/Neuro: A/A/O   Airway: " Facies: Feasible  Mallampati: III  Mouth/Opening: Full  TM distance: > 6 cm  Neck ROM: Full   Respiratory: Auscultation: CTAB     Resp. Rate: Normal     Resp. Effort: Normal      CV: Rhythm: Regular  Heart: Normal Sounds  Edema: None   Comments:      Dental: Normal Dentition                JZG FV AN PLAN NO PONV RULE       PAC Discussion and Assessment    ASA Classification: 3  Case is suitable for: [place]  Anesthetic techniques and relevant risks discussed: GA  Invasive monitoring and risk discussed:   Types:   Possibility and Risk of blood transfusion discussed:   NPO instructions given:   Additional anesthetic preparation and risks discussed:   Needs early admission to pre-op area:   Other:     PAC Resident/NP Anesthesia Assessment:  Zulma Lovell is a 64 year old female scheduled for Tonsillectomy, possible adenoidectomy on 7/29/20 by Dr. Haines in treatment of EBV.  PAC referral for risk assessment and optimization for anesthesia with comorbid conditions of hypertension, anemia, ESRD s/p kidney transplant 2007:    Pre-operative considerations:  1.  Cardiac:  Functional status- METS >4. hypertension  Using norvasc and metoprolol (continue DOS). Can continue pravastatin. Endorses intermittent palpitations.  She has a family history of a. Fib. EKG in clinic today shows sinus bradycardia, otherwise normal. low risk surgery with 0.4% (RCRI #) risk of major adverse cardiac event.   2.  Pulm:  Airway feasible.  EAMON risk: low. denies pulmonary symptoms. COVID test ordered per surgery.  3.  GI:  Risk of PONV score = 3.  If > 2, anti-emetic intervention recommended.  4.  : h/o ESRD s/p kidney transplant in 2007. Complicated by EBV- above procedure is planned. Can continue mycophenolate and    VTE risk: 0.5%    Patient is optimized and is acceptable candidate for the proposed procedure.  No further diagnostic evaluation is needed.       **For further details of assessment, testing, and physical exam please see H and P  completed on same date.      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 This tonsillectomy in hopes of clearing the infection.    Patient/case discussed with ALIREZA/resident; agree with above assessment. No need to see patient. Patient is appropriate for the planned procedure without further work-up or medical management.    Adia Caballero MD        Anesthesiologist:   Date:   Time:   Pass/Fail:   Disposition:     PAC Pharmacist Assessment:        Pharmacist:   Date:   Time:    Camila Robledo PA-C

## 2020-07-24 ENCOUNTER — AMBULATORY - HEALTHEAST (OUTPATIENT)
Dept: FAMILY MEDICINE | Facility: CLINIC | Age: 65
End: 2020-07-24

## 2020-07-24 DIAGNOSIS — Z11.59 ENCOUNTER FOR SCREENING FOR OTHER VIRAL DISEASES: ICD-10-CM

## 2020-07-26 ENCOUNTER — AMBULATORY - HEALTHEAST (OUTPATIENT)
Dept: FAMILY MEDICINE | Facility: CLINIC | Age: 65
End: 2020-07-26

## 2020-07-26 DIAGNOSIS — Z11.59 ENCOUNTER FOR SCREENING FOR OTHER VIRAL DISEASES: ICD-10-CM

## 2020-07-28 ENCOUNTER — ANESTHESIA EVENT (OUTPATIENT)
Dept: SURGERY | Facility: CLINIC | Age: 65
End: 2020-07-28
Payer: COMMERCIAL

## 2020-07-28 ENCOUNTER — COMMUNICATION - HEALTHEAST (OUTPATIENT)
Dept: SCHEDULING | Facility: CLINIC | Age: 65
End: 2020-07-28

## 2020-07-29 ENCOUNTER — HOSPITAL ENCOUNTER (OUTPATIENT)
Facility: CLINIC | Age: 65
Setting detail: OBSERVATION
Discharge: HOME | End: 2020-07-30
Attending: OTOLARYNGOLOGY | Admitting: OTOLARYNGOLOGY
Payer: COMMERCIAL

## 2020-07-29 ENCOUNTER — ANESTHESIA (OUTPATIENT)
Dept: SURGERY | Facility: CLINIC | Age: 65
End: 2020-07-29
Payer: COMMERCIAL

## 2020-07-29 DIAGNOSIS — B27.00 EBV (EPSTEIN-BARR VIRUS) VIREMIA: ICD-10-CM

## 2020-07-29 DIAGNOSIS — J35.1 LARGE TONSILS: ICD-10-CM

## 2020-07-29 PROBLEM — J98.8 AIRWAY OBSTRUCTION: Status: ACTIVE | Noted: 2020-07-29

## 2020-07-29 LAB — GLUCOSE BLDC GLUCOMTR-MCNC: 109 MG/DL (ref 70–99)

## 2020-07-29 PROCEDURE — 25000125 ZZHC RX 250: Performed by: STUDENT IN AN ORGANIZED HEALTH CARE EDUCATION/TRAINING PROGRAM

## 2020-07-29 PROCEDURE — 00000458 ZZHCL STATISTIC INTERP CGEN PF  88291: Performed by: OTOLARYNGOLOGY

## 2020-07-29 PROCEDURE — 88313 SPECIAL STAINS GROUP 2: CPT | Performed by: OTOLARYNGOLOGY

## 2020-07-29 PROCEDURE — 25000125 ZZHC RX 250: Performed by: NURSE ANESTHETIST, CERTIFIED REGISTERED

## 2020-07-29 PROCEDURE — 88184 FLOWCYTOMETRY/ TC 1 MARKER: CPT | Mod: XS | Performed by: OTOLARYNGOLOGY

## 2020-07-29 PROCEDURE — 71000015 ZZH RECOVERY PHASE 1 LEVEL 2 EA ADDTL HR: Performed by: OTOLARYNGOLOGY

## 2020-07-29 PROCEDURE — 27210794 ZZH OR GENERAL SUPPLY STERILE: Performed by: OTOLARYNGOLOGY

## 2020-07-29 PROCEDURE — 40001005 ZZHCL STATISTIC FLOW >15 ABY TC 88189: Performed by: OTOLARYNGOLOGY

## 2020-07-29 PROCEDURE — 25000125 ZZHC RX 250: Performed by: OTOLARYNGOLOGY

## 2020-07-29 PROCEDURE — 88341 IMHCHEM/IMCYTCHM EA ADD ANTB: CPT | Performed by: OTOLARYNGOLOGY

## 2020-07-29 PROCEDURE — 37000008 ZZH ANESTHESIA TECHNICAL FEE, 1ST 30 MIN: Performed by: OTOLARYNGOLOGY

## 2020-07-29 PROCEDURE — 88239 TISSUE CULTURE TUMOR: CPT | Performed by: OTOLARYNGOLOGY

## 2020-07-29 PROCEDURE — 25000128 H RX IP 250 OP 636: Performed by: NURSE ANESTHETIST, CERTIFIED REGISTERED

## 2020-07-29 PROCEDURE — 88304 TISSUE EXAM BY PATHOLOGIST: CPT | Performed by: OTOLARYNGOLOGY

## 2020-07-29 PROCEDURE — 96375 TX/PRO/DX INJ NEW DRUG ADDON: CPT | Mod: 59

## 2020-07-29 PROCEDURE — 88365 INSITU HYBRIDIZATION (FISH): CPT | Performed by: OTOLARYNGOLOGY

## 2020-07-29 PROCEDURE — 40000170 ZZH STATISTIC PRE-PROCEDURE ASSESSMENT II: Performed by: OTOLARYNGOLOGY

## 2020-07-29 PROCEDURE — 36000051 ZZH SURGERY LEVEL 2 1ST 30 MIN - UMMC: Performed by: OTOLARYNGOLOGY

## 2020-07-29 PROCEDURE — G0378 HOSPITAL OBSERVATION PER HR: HCPCS

## 2020-07-29 PROCEDURE — 00000146 ZZHCL STATISTIC GLUCOSE BY METER IP

## 2020-07-29 PROCEDURE — 25000132 ZZH RX MED GY IP 250 OP 250 PS 637: Performed by: STUDENT IN AN ORGANIZED HEALTH CARE EDUCATION/TRAINING PROGRAM

## 2020-07-29 PROCEDURE — 25000128 H RX IP 250 OP 636: Performed by: ANESTHESIOLOGY

## 2020-07-29 PROCEDURE — 88364 INSITU HYBRIDIZATION (FISH): CPT | Performed by: OTOLARYNGOLOGY

## 2020-07-29 PROCEDURE — 71000014 ZZH RECOVERY PHASE 1 LEVEL 2 FIRST HR: Performed by: OTOLARYNGOLOGY

## 2020-07-29 PROCEDURE — 88185 FLOWCYTOMETRY/TC ADD-ON: CPT | Performed by: OTOLARYNGOLOGY

## 2020-07-29 PROCEDURE — 36000053 ZZH SURGERY LEVEL 2 EA 15 ADDTL MIN - UMMC: Performed by: OTOLARYNGOLOGY

## 2020-07-29 PROCEDURE — 25800030 ZZH RX IP 258 OP 636: Performed by: ANESTHESIOLOGY

## 2020-07-29 PROCEDURE — 00000159 ZZHCL STATISTIC H-SEND OUTS PREP: Performed by: OTOLARYNGOLOGY

## 2020-07-29 PROCEDURE — 25000565 ZZH ISOFLURANE, EA 15 MIN: Performed by: OTOLARYNGOLOGY

## 2020-07-29 PROCEDURE — 96374 THER/PROPH/DIAG INJ IV PUSH: CPT

## 2020-07-29 PROCEDURE — 00000160 ZZHCL STATISTIC H-SPECIAL HANDLING: Performed by: OTOLARYNGOLOGY

## 2020-07-29 PROCEDURE — 37000009 ZZH ANESTHESIA TECHNICAL FEE, EACH ADDTL 15 MIN: Performed by: OTOLARYNGOLOGY

## 2020-07-29 PROCEDURE — 88342 IMHCHEM/IMCYTCHM 1ST ANTB: CPT | Performed by: OTOLARYNGOLOGY

## 2020-07-29 PROCEDURE — 25000128 H RX IP 250 OP 636: Performed by: STUDENT IN AN ORGANIZED HEALTH CARE EDUCATION/TRAINING PROGRAM

## 2020-07-29 PROCEDURE — 88264 CHROMOSOME ANALYSIS 20-25: CPT | Performed by: OTOLARYNGOLOGY

## 2020-07-29 PROCEDURE — 88280 CHROMOSOME KARYOTYPE STUDY: CPT | Performed by: OTOLARYNGOLOGY

## 2020-07-29 RX ORDER — METOCLOPRAMIDE HYDROCHLORIDE 5 MG/ML
10 INJECTION INTRAMUSCULAR; INTRAVENOUS EVERY 6 HOURS PRN
Status: DISCONTINUED | OUTPATIENT
Start: 2020-07-29 | End: 2020-07-30 | Stop reason: HOSPADM

## 2020-07-29 RX ORDER — CYCLOSPORINE 100 MG/ML
75 SOLUTION ORAL
Status: DISCONTINUED | OUTPATIENT
Start: 2020-07-29 | End: 2020-07-30 | Stop reason: HOSPADM

## 2020-07-29 RX ORDER — FLUTICASONE PROPIONATE 50 MCG
1 SPRAY, SUSPENSION (ML) NASAL DAILY PRN
Status: DISCONTINUED | OUTPATIENT
Start: 2020-07-29 | End: 2020-07-30 | Stop reason: HOSPADM

## 2020-07-29 RX ORDER — PROCHLORPERAZINE MALEATE 10 MG
10 TABLET ORAL EVERY 6 HOURS PRN
Status: DISCONTINUED | OUTPATIENT
Start: 2020-07-29 | End: 2020-07-30 | Stop reason: HOSPADM

## 2020-07-29 RX ORDER — NALOXONE HYDROCHLORIDE 0.4 MG/ML
.1-.4 INJECTION, SOLUTION INTRAMUSCULAR; INTRAVENOUS; SUBCUTANEOUS
Status: DISCONTINUED | OUTPATIENT
Start: 2020-07-29 | End: 2020-07-30 | Stop reason: HOSPADM

## 2020-07-29 RX ORDER — CHLORHEXIDINE GLUCONATE ORAL RINSE 1.2 MG/ML
15 SOLUTION DENTAL 4 TIMES DAILY
Status: DISCONTINUED | OUTPATIENT
Start: 2020-07-29 | End: 2020-07-30 | Stop reason: HOSPADM

## 2020-07-29 RX ORDER — SODIUM CHLORIDE, SODIUM LACTATE, POTASSIUM CHLORIDE, CALCIUM CHLORIDE 600; 310; 30; 20 MG/100ML; MG/100ML; MG/100ML; MG/100ML
INJECTION, SOLUTION INTRAVENOUS CONTINUOUS
Status: DISCONTINUED | OUTPATIENT
Start: 2020-07-29 | End: 2020-07-29 | Stop reason: HOSPADM

## 2020-07-29 RX ORDER — ONDANSETRON 4 MG/1
4 TABLET, ORALLY DISINTEGRATING ORAL EVERY 6 HOURS PRN
Status: DISCONTINUED | OUTPATIENT
Start: 2020-07-29 | End: 2020-07-30 | Stop reason: HOSPADM

## 2020-07-29 RX ORDER — LIDOCAINE HYDROCHLORIDE 20 MG/ML
INJECTION, SOLUTION INFILTRATION; PERINEURAL PRN
Status: DISCONTINUED | OUTPATIENT
Start: 2020-07-29 | End: 2020-07-29

## 2020-07-29 RX ORDER — DIPHENHYDRAMINE HYDROCHLORIDE AND LIDOCAINE HYDROCHLORIDE AND ALUMINUM HYDROXIDE AND MAGNESIUM HYDRO
10 KIT EVERY 6 HOURS PRN
Status: DISCONTINUED | OUTPATIENT
Start: 2020-07-29 | End: 2020-07-30 | Stop reason: HOSPADM

## 2020-07-29 RX ORDER — PROPOFOL 10 MG/ML
INJECTION, EMULSION INTRAVENOUS PRN
Status: DISCONTINUED | OUTPATIENT
Start: 2020-07-29 | End: 2020-07-29

## 2020-07-29 RX ORDER — LATANOPROST 50 UG/ML
1 SOLUTION/ DROPS OPHTHALMIC AT BEDTIME
Status: DISCONTINUED | OUTPATIENT
Start: 2020-07-29 | End: 2020-07-30 | Stop reason: HOSPADM

## 2020-07-29 RX ORDER — BRIMONIDINE TARTRATE 2 MG/ML
1 SOLUTION/ DROPS OPHTHALMIC EVERY MORNING
Status: DISCONTINUED | OUTPATIENT
Start: 2020-07-30 | End: 2020-07-30 | Stop reason: HOSPADM

## 2020-07-29 RX ORDER — OXYCODONE HCL 5 MG/5 ML
5-10 SOLUTION, ORAL ORAL EVERY 4 HOURS PRN
Status: DISCONTINUED | OUTPATIENT
Start: 2020-07-29 | End: 2020-07-30 | Stop reason: HOSPADM

## 2020-07-29 RX ORDER — PROCHLORPERAZINE 25 MG
25 SUPPOSITORY, RECTAL RECTAL EVERY 12 HOURS PRN
Status: DISCONTINUED | OUTPATIENT
Start: 2020-07-29 | End: 2020-07-30 | Stop reason: HOSPADM

## 2020-07-29 RX ORDER — BUPIVACAINE HYDROCHLORIDE AND EPINEPHRINE 2.5; 5 MG/ML; UG/ML
INJECTION, SOLUTION EPIDURAL; INFILTRATION; INTRACAUDAL; PERINEURAL PRN
Status: DISCONTINUED | OUTPATIENT
Start: 2020-07-29 | End: 2020-07-29 | Stop reason: HOSPADM

## 2020-07-29 RX ORDER — OXYCODONE HYDROCHLORIDE 5 MG/1
5 TABLET ORAL EVERY 4 HOURS PRN
Status: DISCONTINUED | OUTPATIENT
Start: 2020-07-29 | End: 2020-07-29

## 2020-07-29 RX ORDER — EPHEDRINE SULFATE 50 MG/ML
INJECTION, SOLUTION INTRAMUSCULAR; INTRAVENOUS; SUBCUTANEOUS PRN
Status: DISCONTINUED | OUTPATIENT
Start: 2020-07-29 | End: 2020-07-29

## 2020-07-29 RX ORDER — DEXAMETHASONE SODIUM PHOSPHATE 10 MG/ML
10 INJECTION INTRAMUSCULAR; INTRAVENOUS EVERY 8 HOURS
Status: COMPLETED | OUTPATIENT
Start: 2020-07-29 | End: 2020-07-30

## 2020-07-29 RX ORDER — AMLODIPINE BESYLATE 2.5 MG/1
2.5 TABLET ORAL AT BEDTIME
Status: DISCONTINUED | OUTPATIENT
Start: 2020-07-29 | End: 2020-07-30 | Stop reason: HOSPADM

## 2020-07-29 RX ORDER — FENTANYL CITRATE 50 UG/ML
25-50 INJECTION, SOLUTION INTRAMUSCULAR; INTRAVENOUS
Status: DISCONTINUED | OUTPATIENT
Start: 2020-07-29 | End: 2020-07-29 | Stop reason: HOSPADM

## 2020-07-29 RX ORDER — ONDANSETRON 2 MG/ML
4 INJECTION INTRAMUSCULAR; INTRAVENOUS EVERY 6 HOURS PRN
Status: DISCONTINUED | OUTPATIENT
Start: 2020-07-29 | End: 2020-07-30 | Stop reason: HOSPADM

## 2020-07-29 RX ORDER — METOCLOPRAMIDE 5 MG/1
10 TABLET ORAL EVERY 6 HOURS PRN
Status: DISCONTINUED | OUTPATIENT
Start: 2020-07-29 | End: 2020-07-30 | Stop reason: HOSPADM

## 2020-07-29 RX ORDER — SODIUM CHLORIDE, SODIUM LACTATE, POTASSIUM CHLORIDE, CALCIUM CHLORIDE 600; 310; 30; 20 MG/100ML; MG/100ML; MG/100ML; MG/100ML
INJECTION, SOLUTION INTRAVENOUS CONTINUOUS
Status: DISCONTINUED | OUTPATIENT
Start: 2020-07-29 | End: 2020-07-29

## 2020-07-29 RX ORDER — OXYCODONE HCL 5 MG/5 ML
5-10 SOLUTION, ORAL ORAL EVERY 6 HOURS PRN
Qty: 210 ML | Refills: 0 | Status: SHIPPED | OUTPATIENT
Start: 2020-07-29 | End: 2020-08-04

## 2020-07-29 RX ORDER — ESMOLOL HYDROCHLORIDE 10 MG/ML
INJECTION INTRAVENOUS PRN
Status: DISCONTINUED | OUTPATIENT
Start: 2020-07-29 | End: 2020-07-29

## 2020-07-29 RX ORDER — POLYETHYLENE GLYCOL 3350 17 G/17G
17 POWDER, FOR SOLUTION ORAL DAILY PRN
Status: DISCONTINUED | OUTPATIENT
Start: 2020-07-29 | End: 2020-07-30 | Stop reason: HOSPADM

## 2020-07-29 RX ORDER — DEXAMETHASONE SODIUM PHOSPHATE 4 MG/ML
INJECTION, SOLUTION INTRA-ARTICULAR; INTRALESIONAL; INTRAMUSCULAR; INTRAVENOUS; SOFT TISSUE PRN
Status: DISCONTINUED | OUTPATIENT
Start: 2020-07-29 | End: 2020-07-29

## 2020-07-29 RX ORDER — LIDOCAINE 40 MG/G
CREAM TOPICAL
Status: DISCONTINUED | OUTPATIENT
Start: 2020-07-29 | End: 2020-07-29 | Stop reason: HOSPADM

## 2020-07-29 RX ORDER — OXYCODONE HCL 5 MG/5 ML
5-10 SOLUTION, ORAL ORAL EVERY 6 HOURS PRN
Qty: 210 ML | Refills: 0 | Status: SHIPPED | OUTPATIENT
Start: 2020-08-05 | End: 2020-08-11

## 2020-07-29 RX ORDER — FENTANYL CITRATE 50 UG/ML
INJECTION, SOLUTION INTRAMUSCULAR; INTRAVENOUS PRN
Status: DISCONTINUED | OUTPATIENT
Start: 2020-07-29 | End: 2020-07-29

## 2020-07-29 RX ORDER — HYDROMORPHONE HYDROCHLORIDE 1 MG/ML
.3-.5 INJECTION, SOLUTION INTRAMUSCULAR; INTRAVENOUS; SUBCUTANEOUS EVERY 5 MIN PRN
Status: DISCONTINUED | OUTPATIENT
Start: 2020-07-29 | End: 2020-07-29 | Stop reason: HOSPADM

## 2020-07-29 RX ORDER — LOTEPREDNOL ETABONATE 5 MG/ML
1 SUSPENSION/ DROPS OPHTHALMIC AT BEDTIME
Status: DISCONTINUED | OUTPATIENT
Start: 2020-07-29 | End: 2020-07-30 | Stop reason: HOSPADM

## 2020-07-29 RX ORDER — SODIUM CHLORIDE, SODIUM LACTATE, POTASSIUM CHLORIDE, CALCIUM CHLORIDE 600; 310; 30; 20 MG/100ML; MG/100ML; MG/100ML; MG/100ML
INJECTION, SOLUTION INTRAVENOUS CONTINUOUS
Status: DISCONTINUED | OUTPATIENT
Start: 2020-07-29 | End: 2020-07-30

## 2020-07-29 RX ORDER — ACETAMINOPHEN 650 MG/1
650 SUPPOSITORY RECTAL EVERY 4 HOURS PRN
Status: DISCONTINUED | OUTPATIENT
Start: 2020-07-29 | End: 2020-07-30 | Stop reason: HOSPADM

## 2020-07-29 RX ORDER — MORPHINE SULFATE 2 MG/ML
1-2 INJECTION, SOLUTION INTRAMUSCULAR; INTRAVENOUS
Status: DISCONTINUED | OUTPATIENT
Start: 2020-07-29 | End: 2020-07-30

## 2020-07-29 RX ORDER — HYDRALAZINE HYDROCHLORIDE 20 MG/ML
10 INJECTION INTRAMUSCULAR; INTRAVENOUS EVERY 10 MIN PRN
Status: DISCONTINUED | OUTPATIENT
Start: 2020-07-29 | End: 2020-07-29 | Stop reason: HOSPADM

## 2020-07-29 RX ORDER — TIMOLOL MALEATE 5 MG/ML
1 SOLUTION/ DROPS OPHTHALMIC EVERY MORNING
Status: DISCONTINUED | OUTPATIENT
Start: 2020-07-30 | End: 2020-07-30 | Stop reason: HOSPADM

## 2020-07-29 RX ORDER — ACETAMINOPHEN 325 MG/1
975 TABLET ORAL ONCE
Status: DISCONTINUED | OUTPATIENT
Start: 2020-07-29 | End: 2020-07-29 | Stop reason: HOSPADM

## 2020-07-29 RX ORDER — ONDANSETRON 2 MG/ML
4 INJECTION INTRAMUSCULAR; INTRAVENOUS EVERY 30 MIN PRN
Status: DISCONTINUED | OUTPATIENT
Start: 2020-07-29 | End: 2020-07-29 | Stop reason: HOSPADM

## 2020-07-29 RX ORDER — ONDANSETRON 2 MG/ML
INJECTION INTRAMUSCULAR; INTRAVENOUS PRN
Status: DISCONTINUED | OUTPATIENT
Start: 2020-07-29 | End: 2020-07-29

## 2020-07-29 RX ORDER — ONDANSETRON 4 MG/1
4 TABLET, ORALLY DISINTEGRATING ORAL EVERY 30 MIN PRN
Status: DISCONTINUED | OUTPATIENT
Start: 2020-07-29 | End: 2020-07-29 | Stop reason: HOSPADM

## 2020-07-29 RX ORDER — MYCOPHENOLATE MOFETIL 200 MG/ML
500 POWDER, FOR SUSPENSION ORAL 2 TIMES DAILY
Status: DISCONTINUED | OUTPATIENT
Start: 2020-07-29 | End: 2020-07-30 | Stop reason: HOSPADM

## 2020-07-29 RX ADMIN — ONDANSETRON 4 MG: 2 INJECTION INTRAMUSCULAR; INTRAVENOUS at 16:36

## 2020-07-29 RX ADMIN — ESMOLOL HYDROCHLORIDE 30 MG: 10 INJECTION, SOLUTION INTRAVENOUS at 17:07

## 2020-07-29 RX ADMIN — ACETAMINOPHEN 650 MG: 325 SOLUTION ORAL at 21:42

## 2020-07-29 RX ADMIN — SODIUM CHLORIDE, POTASSIUM CHLORIDE, SODIUM LACTATE AND CALCIUM CHLORIDE: 600; 310; 30; 20 INJECTION, SOLUTION INTRAVENOUS at 16:06

## 2020-07-29 RX ADMIN — SUGAMMADEX 200 MG: 100 INJECTION, SOLUTION INTRAVENOUS at 16:59

## 2020-07-29 RX ADMIN — FENTANYL CITRATE 150 MCG: 50 INJECTION, SOLUTION INTRAMUSCULAR; INTRAVENOUS at 16:14

## 2020-07-29 RX ADMIN — CHLORHEXIDINE GLUCONATE 0.12% ORAL RINSE 15 ML: 1.2 LIQUID ORAL at 21:42

## 2020-07-29 RX ADMIN — FENTANYL CITRATE 50 MCG: 50 INJECTION, SOLUTION INTRAMUSCULAR; INTRAVENOUS at 16:30

## 2020-07-29 RX ADMIN — DEXAMETHASONE SODIUM PHOSPHATE 10 MG: 10 INJECTION INTRAMUSCULAR; INTRAVENOUS at 21:42

## 2020-07-29 RX ADMIN — PROPOFOL 120 MG: 10 INJECTION, EMULSION INTRAVENOUS at 16:14

## 2020-07-29 RX ADMIN — LIDOCAINE HYDROCHLORIDE 60 MG: 20 INJECTION, SOLUTION INFILTRATION; PERINEURAL at 16:14

## 2020-07-29 RX ADMIN — ROCURONIUM BROMIDE 70 MG: 10 INJECTION INTRAVENOUS at 16:15

## 2020-07-29 RX ADMIN — SUGAMMADEX 200 MG: 100 INJECTION, SOLUTION INTRAVENOUS at 16:55

## 2020-07-29 RX ADMIN — HYDRALAZINE HYDROCHLORIDE 10 MG: 20 INJECTION INTRAMUSCULAR; INTRAVENOUS at 18:00

## 2020-07-29 RX ADMIN — ONDANSETRON 4 MG: 2 INJECTION INTRAMUSCULAR; INTRAVENOUS at 20:39

## 2020-07-29 RX ADMIN — AMLODIPINE BESYLATE 2.5 MG: 2.5 TABLET ORAL at 21:42

## 2020-07-29 RX ADMIN — Medication 5 MG: at 16:14

## 2020-07-29 RX ADMIN — ESMOLOL HYDROCHLORIDE 20 MG: 10 INJECTION, SOLUTION INTRAVENOUS at 16:38

## 2020-07-29 RX ADMIN — PROCHLORPERAZINE EDISYLATE 10 MG: 5 INJECTION INTRAMUSCULAR; INTRAVENOUS at 22:26

## 2020-07-29 RX ADMIN — METOPROLOL TARTRATE 25 MG: 100 TABLET ORAL at 21:42

## 2020-07-29 RX ADMIN — DEXAMETHASONE SODIUM PHOSPHATE 10 MG: 4 INJECTION, SOLUTION INTRA-ARTICULAR; INTRALESIONAL; INTRAMUSCULAR; INTRAVENOUS; SOFT TISSUE at 16:27

## 2020-07-29 ASSESSMENT — ENCOUNTER SYMPTOMS: SEIZURES: 1

## 2020-07-29 ASSESSMENT — PAIN DESCRIPTION - DESCRIPTORS: DESCRIPTORS: SORE

## 2020-07-29 ASSESSMENT — MIFFLIN-ST. JEOR: SCORE: 1135

## 2020-07-29 ASSESSMENT — LIFESTYLE VARIABLES: TOBACCO_USE: 0

## 2020-07-29 NOTE — BRIEF OP NOTE
[facility], [place]    Brief Operative Note    Pre-operative diagnosis: EBV (Bandar-Barr virus) viremia [B27.00]  Large tonsils [J35.1]  Post-operative diagnosis: EBV (Bandar-Barr virus) viremia [B27.00]  Large tonsils [J35.1]    Procedure: Tonsillectomy and adenoidectomy  Surgeon: Surgeon(s) and Role:     * Tammy Haines MD - Primary        Solomon Guevara MD - Resident  Anesthesia: General   Estimated blood loss: ~10cc  Drains: None  Specimens: * No specimens in log *  Findings:  Tonsillar hypertrophy, 2+ bilaterally; adenoid hypertrophy, 2+  Complications: None.  Implants: * No implants in log *       Otolaryngology Plan: Admission to Observation-    - Neuro: oxycodone 5-10mg PO PRN; morphine 1 - 2mg IV PRN; magic mouthwash PRN    - HEENT: clear liquid diet tonight; peridex QID; decadron 10mg x2 doses; HOB at 30 degrees; PTA ophthalmic drops    - CV/Heme: PMH of HTN- PTA amlodipine and metoprolol ordered in liquid form    - Pulm: no issues    - FEN/GI: LR for mIVF; clear liquid diet on POD#0; miralax qd; zofran PRN; BMP on POD#1    - : PMH of kidney transplantation in 2007- avoid NSAIDs; PTA mycophenolate and cyclosporine ordered in liquid forms with assistance of pharmacy    - Endo: no issues    - ID: immunosuppressed status but no indication for antibiosis    - Ppx: SCDs only    - Dispo: likely tomorrow      Solomon Guevara MD  Otolaryngology- Head and Neck Surgery  Please contact ENT with questions by dialing [phone] and entering job code [phone] when prompted.

## 2020-07-29 NOTE — PROGRESS NOTES
[id number] Dr. Del Toro MDA paged with update that Patient consumed PO solid food at 0430 this morning and that  currently; Dr. Del Toro returned page and no new orders were placed at this time; will continue to monitor.

## 2020-07-29 NOTE — DISCHARGE SUMMARY
"Otolaryngology Discharge Summary  07/29/20    Date of Admission: 7/29/2020  Date of Discharge: 7/30/2020    Admitting Diagnosis: EBV (Bandar-Barr virus) viremia [B27.00]  Large tonsils [J35.1]  Dsicharge Diagnosis: Same    Service: Otolaryngology-Head and Neck Surgery   Attending: Dr. Haines    Procedures:  7/29- bilateral tonsillectomy and adenoidectomy     HPI: Zulma Lovell is a 64-year-old female with PMH of kidney transplant on cyclosporine and mycophenolate and EBV viremia. Her EBV viremia was medically managed until December 20, 2019 when her EBV serology increased dramatically. An ENT referral was made for tonsillectomy in case the EBV source were her tonsils.     Hospital Course: The patient tolerated the procedure well, was extubated in the OR, and transferred to the PACU in good condition. Given her renal history she was then admitted to the hospital for routine post-operative observation and fluid replacement. Her post-op course was unremarkable. She slowly advanced her diet, and was tolerating a full liquid diet at time of discharge. Additionally her pain and nausea were well-controlled and she was voiding on her own. She will discharge to home on a mechanical soft diet.     Exam: BP (!) 141/84   Pulse 66   Temp 98.6  F (37  C) (Oral)   Resp 15   Ht 1.626 m (5' 4\")   Wt 59.5 kg (131 lb 2.8 oz)   LMP  (LMP Unknown)   SpO2 99%   BMI 22.52 kg/m    General: laying in bed, no acute distress  Face: symmetrical, CN VII intact bilaterally (HB 1), no swelling, edema, or erythema. Sensation V1-V3 intact and equal bilaterally.   Eyes: EOMI    Oral cavity/oropharynx: Moist, tongue midline and symmetric. No dried blood in oral cavity. Bilateral tonsillectomy defect can be seen. Wound bed is dry-no active bleeding or drainage.   Neck: no LAD, trachea midline  Neuro: cranial nerves 2-12 grossly intact       Zulma Lovell   Home Medication Instructions YESENIA:[id number]    Printed on:07/29/20 1859   Medication " Information                      ACETAMINOPHEN PO  Take 1,000 mg by mouth as needed for pain             amLODIPine (NORVASC) 2.5 MG tablet  Take 1 tablet (2.5 mg) by mouth At Bedtime             brimonidine (ALPHAGAN) 0.2 % ophthalmic solution  Place 1 drop Into the left eye every morning              cycloSPORINE modified (GENERIC EQUIVALENT) 25 MG capsule  Take 3 capsules (75 mg) by mouth 2 times daily             fluticasone (FLONASE) 50 MCG/ACT nasal spray  Spray 1 spray into both nostrils daily             latanoprost (XALATAN) 0.005 % ophthalmic solution  Place 1 drop Into the left eye At Bedtime             loteprednol (LOTEMAX) 0.5 % ophthalmic suspension  Place 1 drop Into the left eye At Bedtime              METOPROLOL TARTRATE PO  Take 25 mg by mouth 2 times daily              Multiple Vitamins-Minerals (CENTRUM SILVER) per tablet  Take 1 tablet by mouth every morning              mycophenolate (GENERIC EQUIVALENT) 250 MG capsule  Take 2 capsules (500 mg) by mouth 2 times daily             Omega-3 Fatty Acids (FISH OIL) 1000 MG CPDR  Take 2,000 mg by mouth 2 times daily             oxyCODONE (ROXICODONE) 5 MG/5ML solution  Take 5-10 mLs (5-10 mg) by mouth every 6 hours as needed for pain             oxyCODONE (ROXICODONE) 5 MG/5ML solution  Take 5-10 mLs (5-10 mg) by mouth every 6 hours as needed for pain             PRAVASTATIN SODIUM PO  Take 20 mg by mouth At Bedtime              Ranibizumab (LUCENTIS IO)  Eye injections given every 11 weeks             timolol maleate (TIMOPTIC) 0.5 % ophthalmic solution  Place 1 drop into both eyes every morning                Follow-up: Dr. Haines on 8/20 at 10:05 AM    Disposition: Home. All patients questions and concerns were answered prior to discharge.     BOOGIE Velasquez    The documentation recorded by the above mentioned medical student acting as scribe accurately reflects the services I personally performed and the decisions made by me.    Claudine Justin MD  PGY-1  Otolaryngology-Head & Neck Surgery  Please contact ENT by dialing [phone] and entering job code [phone].

## 2020-07-29 NOTE — PROGRESS NOTES
Brief Otolaryngology Note  7/29/2020    Subjective and Interval Events:  Patient has had no acute events. She has been saturating well on nasal cannula. She is tolerating her secretions well and has had no bleeding per mouth.    Objective:  - Patient resting comfortably and not in acute distress  - No bleeding per mouth  - Breathing comfortably on nasal cannula without issue      Assessment & Plan:  Patient is recovering appropriately.    - Admission to Observation  - Can lock mIVF if patient taking in enough fluids  - Clear liquid diet overnight  - Continuous pulse ox monitoring      Solomon Guevara MD  Otolaryngology- Head and Neck Surgery  Please contact ENT with questions by dialing * [phone] and entering job code [phone] when prompted.

## 2020-07-29 NOTE — ANESTHESIA CARE TRANSFER NOTE
Patient: Zulma Lovell    Procedure(s):  BILATERAL TONSILLECTOMY AND ADENOIDECTOMY    Diagnosis: EBV (Bandar-Barr virus) viremia [B27.00]  Large tonsils [J35.1]  Diagnosis Additional Information: No value filed.    Anesthesia Type:   General     Note:  Airway :Nasal Cannula  Patient transferred to:PACU  Comments: VSS< report to RN Handoff Report: Identifed the Patient, Identified the Reponsible Provider, Reviewed the pertinent medical history, Discussed the surgical course, Reviewed Intra-OP anesthesia mangement and issues during anesthesia, Set expectations for post-procedure period and Allowed opportunity for questions and acknowledgement of understanding      Vitals: (Last set prior to Anesthesia Care Transfer)    CRNA VITALS  7/29/2020 1643 - 7/29/2020 1720      7/29/2020             Pulse:  72    Ht Rate:  72    SpO2:  100 %    Resp Rate (observed):  (!) 2                Electronically Signed By: NEGIN Florence CRNA  July 29, 2020  5:20 PM

## 2020-07-29 NOTE — ANESTHESIA PREPROCEDURE EVALUATION
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]  Large tonsils [J35.1]   Procedure(s):  TONSILLECTOMY AND POSSIBLE ADENOIDECTOMY     LABS:  CBC:   Lab Results   Component Value Date    WBC 3.7 (L) 2020    WBC 4.2 2020    HGB 12.0 2020    HGB 10.8 (L) 2020    HCT 39.2 2020    HCT 34.5 (L) 2020     2020     2020     BMP:   Lab Results   Component Value Date     2020     2020    POTASSIUM 4.4 2020    POTASSIUM 4.6 2020    CHLORIDE 103 2020    CHLORIDE 106 2020    CO2 27 2020    CO2 22 2020    BUN 30 2020    BUN 32 (H) 2020    CR 0.86 2020    CR 0.87 2020     (H) 2020    GLC 88 2020     COAGS:   Lab Results   Component Value Date    PTT 34 2007    INR 0.95 2009    FIBR 410 2007     POC:   Lab Results   Component Value Date     (H) 2020    HCGS Negative 2007     OTHER:   Lab Results   Component Value Date    A1C 4.2 (L) 2007    AICHA 9.9 2020    PHOS 4.2 2009    MAG 1.6 09/10/2007    ALBUMIN 4.7 2009    PROTTOTAL 9.1 (H) 2009    ALT 12 2009    AST 34 2009    ALKPHOS 81 2009    BILITOTAL 0.7 2009    LIPASE 186 2007    AMYLASE 58 2007    TSH 2.16 2010    CRP 44.8 (H) 2007        Preop Vitals    BP Readings from Last 3 Encounters:   20 (!) 172/80   07/10/20 (!) 148/87   20 139/74    Pulse Readings from Last 3 Encounters:   20 53   07/10/20 60   20 61      Resp Readings from Last 3 Encounters:   20 16   07/10/20 15   10/21/19 18    SpO2 Readings from Last 3 Encounters:   20 100%   07/10/20 99%   20 97%      Temp Readings from Last 1 Encounters:  "  07/29/20 37.1  C (98.8  F) (Oral)    Ht Readings from Last 1 Encounters:   07/29/20 1.626 m (5' 4\")      Wt Readings from Last 1 Encounters:   07/29/20 59.5 kg (131 lb 2.8 oz)    Estimated body mass index is 22.52 kg/m  as calculated from the following:    Height as of this encounter: 1.626 m (5' 4\").    Weight as of this encounter: 59.5 kg (131 lb 2.8 oz).     LDA:  Peripheral IV 07/29/20 Right Lower forearm (Active)   Number of days: 0        Past Medical History:   Diagnosis Date     Anemia in chronic kidney disease(285.21)      Dyslipidemia      High risk medications (not anticoagulants) long-term use      Hypertension      Immunosuppressed status (H)      Kidney replaced by transplant      Shingles       Past Surgical History:   Procedure Laterality Date     APPENDECTOMY       CATARACT IOL, RT/LT Bilateral      kidney transplant        Allergies   Allergen Reactions     Fenofibrate Other (See Comments)     Pancreatitis     Tricor Other (See Comments)     Pancreatitis (this is fenofibrate)     Simvastatin Muscle Pain (Myalgia) and Cramps        Anesthesia Evaluation     . Pt has had prior anesthetic.     No history of anesthetic complications          ROS/MED HX    ENT/Pulmonary:     (+)allergic rhinitis, , . .   (-) tobacco use   Neurologic:     (+)seizures last seizure: prior to transplant so 2006 - dialysis related     Cardiovascular:     (+) hypertension----. : . . . :. . Previous cardiac testing date:results:date: results:ECG reviewed date:7/10/20 results:Sinus bradycardia, otherwise normal date: results:         (-) taking anticoagulants/antiplatelets   METS/Exercise Tolerance:  >4 METS   Hematologic:     (+) History of Transfusion no previous transfusion reaction -      Musculoskeletal: Comment: Cervical OA- C5-7        GI/Hepatic:  - neg GI/hepatic ROS       Renal/Genitourinary:     (+) chronic renal disease, Pt does not require dialysis, Pt has history of transplant, date: 2007,       Endo:  - neg " endo ROS       Psychiatric:  - neg psychiatric ROS       Infectious Disease: Comment: Chronic EBV- above procedure planned         Malignancy:      - no malignancy   Other:                         PHYSICAL EXAM:   Mental Status/Neuro: Age Appropriate; A/A/O   Airway: Facies: Feasible  Mallampati: I  Mouth/Opening: Full  TM distance: > 6 cm  Neck ROM: Full   Respiratory: Auscultation: CTAB     Resp. Rate: Normal     Resp. Effort: Normal      CV: Rhythm: Regular  Rate: Age appropriate  Heart: Normal Sounds  Edema: None   Comments:      Dental: Normal Dentition                Assessment:   ASA SCORE: 3    H&P: History and physical reviewed and following examination; no interval change.   Smoking Status:  Non-Smoker/Unknown   NPO Status: NPO Appropriate     Plan:   Anes. Type:  General   Pre-Medication: None   Induction:  IV (Standard)   Airway: ETT; Oral   Access/Monitoring: PIV   Maintenance: Balanced     Postop Plan:   Postop Pain: Opioids  Postop Sedation/Airway: Not planned  Disposition: Outpatient     PONV Management:   Adult Risk Factors: Female, Non-Smoker, Postop Opioids   Prevention: Ondansetron, Dexamethasone     CONSENT: Direct conversation   Plan and risks discussed with: Patient   Blood Products: Consent Deferred (Minimal Blood Loss)                   Lesli Lopez MD

## 2020-07-29 NOTE — OP NOTE
Pre-operative diagnosis: 1. Tonsillar hypertrophy                                          2. Bandar-Barr Virus Viremia    Post-operative procedure:  Same    Procedure: bilateral tonsillectomy and adenoidectomy    Surgeons: Tammy Haines MD    Assistants: Shiraz Irving MD, resident    Anesthesia: general    EBL: 10ml    Specimens: right and left tonsils for fresh, lymphoma workup    Complications: none    Indications: 64 year old female with history of kidney transplant and increase EBV viral load.        Findings: 2+ tonsils, 2+ adenoid pad    Description: Patient was brought into the operating room and placed on the operating table.  A member of the department of anesthesia was present and intubated the patient without difficult.  A time-out was taken to identify the procedure and patient. The table was turned 90 degrees and a shoulder roll was placed.  A McIvor mouth retractor was placed and used to expose the oropharynx.  5 ml of 0.25% bupivicaine with epi was injected into the soft palate.  The left tonsil was grasped with an allis clamp and medialized.  An incision was made along the anterior tonsillar pillar and the tonsillar capsule was identified.  The tonsil was dissected away from the underlying musculature using bovie cautery.  The left tonsil was passed off the field.  The right tonsil was removed in a similar fashion.      We then turned our attention to adenoidectomy.  A McIvor retractor was placed and used to expose the oropharynx.  A catheter was used to suspend the soft palate.  The adenoids were visualize with a mirror.  Adenoid was removed using suction bovie cautery.       A catheter was used to suspend the soft palate.  No significant adenoid tissue was seen in the nasopharynx.    We inspected the tonsillar fossae for bleed and ensured good hemostasis.    Patient tolerated the procedure well and was transferred to the PACU in good condition.

## 2020-07-29 NOTE — ANESTHESIA POSTPROCEDURE EVALUATION
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     Anesthesia Type: General       Disposition: Outpatient   Postop Pain Control: Uneventful            Sign Out: Well controlled pain   PONV: No   Neuro/Psych: Uneventful            Sign Out: Acceptable/Baseline neuro status   Airway/Respiratory: Uneventful            Sign Out: Acceptable/Baseline resp. status   CV/Hemodynamics: Uneventful            Sign Out: Acceptable CV status   Other NRE: NONE   DID A NON-ROUTINE EVENT OCCUR? No    Event details/Postop Comments:  BP was in the 180's on extubation - treated with Hydralazine in PACU. BP appropriate after treatment. Pt denies: N/V, recall.         Last Anesthesia Record Vitals:  CRNA VITALS  2020 1643 - 2020 1743      2020             Pulse:  72    Ht Rate:  72    SpO2:  100 %    Resp Rate (observed):  (!) 2          Last PACU Vitals:  Vitals Value Taken Time   /68 2020  6:30 PM   Temp 36.9  C (98.4  F) 2020  5:45 PM   Pulse 66 2020  6:30 PM   Resp 15 2020  6:00 PM   SpO2 99 % 2020  6:31 PM   Temp src     NIBP     Pulse     SpO2     Resp     Temp     Ht Rate     Temp 2     Vitals shown include unvalidated device data.      Electronically Signed By: Sarah Wachter, MD, 2020, 6:32 PM

## 2020-07-30 ENCOUNTER — PATIENT OUTREACH (OUTPATIENT)
Dept: CARE COORDINATION | Facility: CLINIC | Age: 65
End: 2020-07-30

## 2020-07-30 VITALS
TEMPERATURE: 99.2 F | DIASTOLIC BLOOD PRESSURE: 65 MMHG | OXYGEN SATURATION: 98 % | RESPIRATION RATE: 18 BRPM | SYSTOLIC BLOOD PRESSURE: 127 MMHG | WEIGHT: 131.17 LBS | BODY MASS INDEX: 22.39 KG/M2 | HEART RATE: 74 BPM | HEIGHT: 64 IN

## 2020-07-30 LAB
ANION GAP SERPL CALCULATED.3IONS-SCNC: 7 MMOL/L (ref 3–14)
BUN SERPL-MCNC: 22 MG/DL (ref 7–30)
CALCIUM SERPL-MCNC: 9.7 MG/DL (ref 8.5–10.1)
CHLORIDE SERPL-SCNC: 104 MMOL/L (ref 94–109)
CO2 SERPL-SCNC: 25 MMOL/L (ref 20–32)
COPATH REPORT: NORMAL
COPATH REPORT: NORMAL
CREAT SERPL-MCNC: 0.81 MG/DL (ref 0.52–1.04)
GFR SERPL CREATININE-BSD FRML MDRD: 77 ML/MIN/{1.73_M2}
GLUCOSE SERPL-MCNC: 185 MG/DL (ref 70–99)
POTASSIUM SERPL-SCNC: 4.4 MMOL/L (ref 3.4–5.3)
SODIUM SERPL-SCNC: 136 MMOL/L (ref 133–144)

## 2020-07-30 PROCEDURE — 25000132 ZZH RX MED GY IP 250 OP 250 PS 637: Performed by: STUDENT IN AN ORGANIZED HEALTH CARE EDUCATION/TRAINING PROGRAM

## 2020-07-30 PROCEDURE — 25000125 ZZHC RX 250: Performed by: STUDENT IN AN ORGANIZED HEALTH CARE EDUCATION/TRAINING PROGRAM

## 2020-07-30 PROCEDURE — 25000131 ZZH RX MED GY IP 250 OP 636 PS 637: Performed by: STUDENT IN AN ORGANIZED HEALTH CARE EDUCATION/TRAINING PROGRAM

## 2020-07-30 PROCEDURE — G0378 HOSPITAL OBSERVATION PER HR: HCPCS

## 2020-07-30 PROCEDURE — 96376 TX/PRO/DX INJ SAME DRUG ADON: CPT

## 2020-07-30 PROCEDURE — 96375 TX/PRO/DX INJ NEW DRUG ADDON: CPT

## 2020-07-30 PROCEDURE — 25000128 H RX IP 250 OP 636: Performed by: STUDENT IN AN ORGANIZED HEALTH CARE EDUCATION/TRAINING PROGRAM

## 2020-07-30 PROCEDURE — 36415 COLL VENOUS BLD VENIPUNCTURE: CPT | Performed by: STUDENT IN AN ORGANIZED HEALTH CARE EDUCATION/TRAINING PROGRAM

## 2020-07-30 PROCEDURE — 80048 BASIC METABOLIC PNL TOTAL CA: CPT | Performed by: STUDENT IN AN ORGANIZED HEALTH CARE EDUCATION/TRAINING PROGRAM

## 2020-07-30 RX ORDER — CHLORHEXIDINE GLUCONATE ORAL RINSE 1.2 MG/ML
15 SOLUTION DENTAL 4 TIMES DAILY
Qty: 420 ML | Refills: 0 | Status: SHIPPED | OUTPATIENT
Start: 2020-07-30 | End: 2020-08-06

## 2020-07-30 RX ORDER — MYCOPHENOLATE MOFETIL 200 MG/ML
500 POWDER, FOR SUSPENSION ORAL 2 TIMES DAILY
Qty: 70 ML | Refills: 0 | Status: SHIPPED | OUTPATIENT
Start: 2020-07-30 | End: 2020-08-13 | Stop reason: ALTCHOICE

## 2020-07-30 RX ORDER — CYCLOSPORINE 100 MG/ML
75 SOLUTION ORAL 2 TIMES DAILY
Qty: 21 ML | Refills: 0 | Status: SHIPPED | OUTPATIENT
Start: 2020-07-30 | End: 2020-08-13 | Stop reason: ALTCHOICE

## 2020-07-30 RX ORDER — ONDANSETRON 4 MG/1
4 TABLET, ORALLY DISINTEGRATING ORAL EVERY 6 HOURS PRN
Qty: 6 TABLET | Refills: 1 | Status: SHIPPED | OUTPATIENT
Start: 2020-07-30 | End: 2020-08-13

## 2020-07-30 RX ORDER — DIPHENHYDRAMINE HYDROCHLORIDE AND LIDOCAINE HYDROCHLORIDE AND ALUMINUM HYDROXIDE AND MAGNESIUM HYDRO
10 KIT EVERY 6 HOURS PRN
Qty: 119 ML | Refills: 1 | Status: SHIPPED | OUTPATIENT
Start: 2020-07-30 | End: 2020-08-13

## 2020-07-30 RX ORDER — POLYETHYLENE GLYCOL 3350 17 G/17G
17 POWDER, FOR SOLUTION ORAL DAILY PRN
Qty: 12 PACKET | Refills: 1 | Status: SHIPPED | OUTPATIENT
Start: 2020-07-30 | End: 2020-08-09

## 2020-07-30 RX ADMIN — CYCLOSPORINE 75 MG: 100 SOLUTION ORAL at 01:12

## 2020-07-30 RX ADMIN — MYCOPHENOLATE MOFETIL 500 MG: 200 POWDER, FOR SUSPENSION ORAL at 01:12

## 2020-07-30 RX ADMIN — MYCOPHENOLATE MOFETIL 500 MG: 200 POWDER, FOR SUSPENSION ORAL at 12:35

## 2020-07-30 RX ADMIN — ACETAMINOPHEN 650 MG: 325 SOLUTION ORAL at 11:35

## 2020-07-30 RX ADMIN — DEXAMETHASONE SODIUM PHOSPHATE 10 MG: 10 INJECTION INTRAMUSCULAR; INTRAVENOUS at 05:25

## 2020-07-30 RX ADMIN — ONDANSETRON 4 MG: 2 INJECTION INTRAMUSCULAR; INTRAVENOUS at 05:42

## 2020-07-30 RX ADMIN — METOPROLOL TARTRATE 25 MG: 100 TABLET ORAL at 08:01

## 2020-07-30 RX ADMIN — CHLORHEXIDINE GLUCONATE 0.12% ORAL RINSE 15 ML: 1.2 LIQUID ORAL at 08:01

## 2020-07-30 RX ADMIN — CHLORHEXIDINE GLUCONATE 0.12% ORAL RINSE 15 ML: 1.2 LIQUID ORAL at 12:36

## 2020-07-30 RX ADMIN — ACETAMINOPHEN 650 MG: 325 SOLUTION ORAL at 05:25

## 2020-07-30 RX ADMIN — CYCLOSPORINE 75 MG: 100 SOLUTION ORAL at 13:36

## 2020-07-30 NOTE — PROVIDER NOTIFICATION
"Text paged provider: re\"Fyi- pt had ~40 cc's of dark red/brown emesis after taking Tylenol. Thanks! Katie [phone]\"  "

## 2020-07-30 NOTE — PLAN OF CARE
"Outpatient/Observation goals to be met before discharge home:      (1) tolerating PO intake -In progress, awaiting for pt to tolerate full liquid  (2) pain well-controlled with oral analgesics -met, tylenol x1 for mild soreness in throat  (3) saturating > 92% on room air -met, O2 sats upper 90's on RA       /65 (BP Location: Right arm)   Pulse 74   Temp 99.2  F (37.3  C) (Oral)   Resp 18   Ht 1.626 m (5' 4\")   Wt 59.5 kg (131 lb 2.8 oz)   LMP  (LMP Unknown)   SpO2 98%   BMI 22.52 kg/m      "

## 2020-07-30 NOTE — PLAN OF CARE
Outpatient/Observation goals to be met before discharge home:     (1) tolerating PO intake -met, tolerating clears  (2) pain well-controlled with oral analgesics -met, tylenol x1 for mild soreness in throat  (3) saturating > 92% on room air -met, O2 sats upper 90's on RA     -pt had episode of emesis x1 after taking meds, relieved with compazine, states she thinks it is due to taking meds on an empty stomach, was able to take sips of water and tea, voiding, no bleeding noted from incision, HOB at 30 degrees, resting comfortably

## 2020-07-30 NOTE — PROVIDER NOTIFICATION
"Text paged provider re:\"Patient is vomiting and cannot get Zofran yet. Can you put in an order for Compazine? Looks like it was discontinued. Thanks! Katie [phone]\"  "

## 2020-07-30 NOTE — PLAN OF CARE
Outpatient/Observation goals to be met before discharge home:     (1) tolerating PO intake -tolerating sips of water and tea, has had emesis x2 after taking tylenol, was able to take transplant meds without issue   (2) pain well-controlled with oral analgesics -met, reports mild soreness in her throat  (3) saturating > 92% on room air -met, O2 sats 97-99% on RA      -pt resting between cares, voiding, LR infusing at 75 ml/hr until pt able to drink more fluids (will turn off this morning), small episodes of emesis x2 relieved with antiemetics, no apparent bleeding in mouth, denies swallowing difficulty or frequency, will continue to monitor

## 2020-07-30 NOTE — PLAN OF CARE
Outpatient/Observation goals to be met before discharge home:      (1) tolerating PO intake -In progress, awaiting for pt to tolerate full liquid  (2) pain well-controlled with oral analgesics -met, tylenol x1 for mild soreness in throat  (3) saturating > 92% on room air -met, O2 sats upper 90's on RA    EMS/Patient

## 2020-07-30 NOTE — PROGRESS NOTES
A&O. Pain managed with tylenol. Voiding. Ambulating. Pt tolerated soft diet. Nausea improved. DC instructions given to pt and  verbalized understanding.  All belongings with pt, IV DC'd and documented. Pt discharged home,  providing ride.

## 2020-07-31 NOTE — PROGRESS NOTES
[facility]: Post-Discharge Note  SITUATION                                                      Admission:    Admission Date: 07/29/20   Reason for Admission: bilateral tonsillectomy and adenoidectomy  Discharge:   Discharge Date: 07/30/20  Discharge Diagnosis: bilateral tonsillectomy and adenoidectomy    BACKGROUND                                                       Zulma Lovell is a 64-year-old female with PMH of kidney transplant on cyclosporine and mycophenolate and EBV viremia. Her EBV viremia was medically managed until December 20, 2019 when her EBV serology increased dramatically. An ENT referral was made for tonsillectomy in case the EBV source were her tonsils.    ASSESSMENT      Discharge Assessment  Patient reports symptoms are: Improved  Does the patient have all of their medications?: No  Does patient know what their new medications are for?: Yes  Does patient have a follow-up appointment scheduled?: Yes  Does patient have any other questions or concerns?: No    Post-op  Did the patient have surgery or a procedure: Yes  Incision: healing  Drainage: No  Bleeding: none  Fever: No  Chills: No  Redness: No  Warmth: No  Swelling: Yes  Incision site pain: Yes  Eating & Drinking: unable to tolerate solid foods  PO Intake: full liquids;soft foods  Bowel Function: normal  Urinary Status: voiding without complaint/concerns        PLAN                                                      Outpatient Plan:  Follow-up: Dr. Haines on 8/20 at 10:05 AM    Future Appointments   Date Time Provider Department Center   8/20/2020 10:05 AM Tammy Haines MD [facility]   10/13/2020  1:30 PM 1,  [facility]           Sarah Swain CMA

## 2020-08-02 DIAGNOSIS — Z94.0 KIDNEY TRANSPLANTED: Primary | ICD-10-CM

## 2020-08-03 RX ORDER — CYCLOSPORINE 25 MG/1
75 CAPSULE, LIQUID FILLED ORAL 2 TIMES DAILY
Qty: 540 CAPSULE | Refills: 0 | Status: SHIPPED | OUTPATIENT
Start: 2020-08-03 | End: 2020-11-16

## 2020-08-04 ENCOUNTER — DOCUMENTATION ONLY (OUTPATIENT)
Dept: OTHER | Facility: CLINIC | Age: 65
End: 2020-08-04

## 2020-08-04 ENCOUNTER — AMBULATORY - HEALTHEAST (OUTPATIENT)
Dept: OTHER | Facility: CLINIC | Age: 65
End: 2020-08-04

## 2020-08-07 LAB — COPATH REPORT: NORMAL

## 2020-08-10 NOTE — RESULT ENCOUNTER NOTE
I reviewed all the pathology results, surgical path and leukemia/lymphoma evaluation.  I called the patient and informed her of the results.  EBV was seen in the tonsils, but no suspicion for cancer, leukemia, or lymphoma.  Patient reports that her throat is feeling much better in the last couple of days.

## 2020-08-12 ENCOUNTER — OFFICE VISIT (OUTPATIENT)
Dept: AUDIOLOGY | Facility: CLINIC | Age: 65
End: 2020-08-12
Payer: COMMERCIAL

## 2020-08-12 DIAGNOSIS — H90.3 SENSORY HEARING LOSS, BILATERAL: Primary | ICD-10-CM

## 2020-08-12 LAB — COPATH REPORT: NORMAL

## 2020-08-12 NOTE — PROGRESS NOTES
AUDIOLOGY REPORT    SUBJECTIVE:  Zulma Lovell is a 64 year old female who was seen was seen in Audiology at the [facility] for audiologic evaluation, referred by Nellie Edwards.  Patient reports known bilateral hearing loss. Has tried hearing aids about 3 yrs ago at an outside clinic. Returned them as they were too hard to use with her job, which involved wearing a headset. She is now retired and interested in trying again. Denies pain, drainage,and dizziness. Bilateral tinnitus.     OBJECTIVE:  Abuse Screening:  Do you feel unsafe at home or work/school? No  Do you feel threatened by someone? No  Does anyone try to keep you from having contact with others, or doing things outside of your home? No  Physical signs of abuse present? No    Fall Risk Screen:  1. Have you fallen two or more times in the past year? No  2. Have you fallen and had an injury in the past year? No    Otoscopic exam indicates ears are clear of cerumen bilaterally     Pure Tone Thresholds assessed using conventional audiometry with good  reliability from 250-8000 Hz bilaterally using insert earphones and circumaural headphones     RIGHT:  mild sloping to severe sensorineural hearing loss    LEFT:    mild sloping tosevere sensorineural hearing loss (small minimal conductive component noted at 4 kHz only)    Tympanogram:    RIGHT: normal eardrum mobility    LEFT:   normal eardrum mobility    Reflexes (reported by stimulus ear):  RIGHT: Ipsilateral is present at normal levels  RIGHT: Contralateral is present at normal levels  LEFT:   Ipsilateral is present at normal levels  LEFT:   Contralateral is present at normal levels      Speech Reception Threshold:    RIGHT: 35 dB HL    LEFT:   30 dB HL  Word Recognition Score:     RIGHT: 84% at 75 dB HL using NU-6 recorded word list.    LEFT:   96% at 75 dB HL using NU-6 recorded word list.      ASSESSMENT:   Mild sloping to severe primarily  sensorineural hearing loss bilaterally. Today s results were discussed with the patient in detail.     PLAN:  Patient was counseled regarding hearing loss and impact on communication. Patient is a good candidate for amplification at this time.  It is recommended that the patient return for a hearing aid consult.  Please call this clinic with questions regarding these results or recommendations.        Shaunna Crowley., [facility]  Licensed Audiologist  [place] #[id number]

## 2020-08-20 ENCOUNTER — OFFICE VISIT (OUTPATIENT)
Dept: OTOLARYNGOLOGY | Facility: CLINIC | Age: 65
End: 2020-08-20
Payer: COMMERCIAL

## 2020-08-20 VITALS — BODY MASS INDEX: 22.14 KG/M2 | TEMPERATURE: 99.1 F | HEART RATE: 58 BPM | OXYGEN SATURATION: 95 % | WEIGHT: 129 LBS

## 2020-08-20 DIAGNOSIS — Z90.89 S/P TONSILLECTOMY: ICD-10-CM

## 2020-08-20 DIAGNOSIS — B27.00 EBV (EPSTEIN-BARR VIRUS) VIREMIA: Primary | ICD-10-CM

## 2020-08-20 ASSESSMENT — PAIN SCALES - GENERAL: PAINLEVEL: NO PAIN (0)

## 2020-08-20 NOTE — LETTER
8/20/2020       RE: Zulma Lovell  [address]  [facility] [place] [address]     Dear Colleague,    Thank you for referring your patient, Zulma Lovell, to the [facility] at [facility]. Please see a copy of my visit note below.    CC: Status post tonsillectomy and adenoidectomy on July 29, 2020    HPI: Patient returns to clinic for her first postoperative visit.  She reports that she had pretty significant throat pain for 12 days after surgery.  And then after that she turned the corner and has not had any significant pain.    PE:  GEN:nad  O/C: Tonsillar fossa's are well-healed.  No sign of granulation tissue or scabs.    I previously reviewed the patient's pathology results with her.No sign of lymphoma or leukemia on the cytology work-up.  Surgical pathology did show some EBV positive cells    A/P:  Patient has healed well after surgery.  Pathology did not show any evidence of lymphoma or cancers.  We will see if removal of the tonsils help with her EBV titers overall.  At this point she is free to resume all normal activity and to eat a regular diet.  She may follow-up with us as needed.    Again, thank you for allowing me to participate in the care of your patient.      Sincerely,    Tammy Haines MD

## 2020-08-20 NOTE — PROGRESS NOTES
CC: Status post tonsillectomy and adenoidectomy on July 29, 2020    HPI: Patient returns to clinic for her first postoperative visit.  She reports that she had pretty significant throat pain for 12 days after surgery.  And then after that she turned the corner and has not had any significant pain.    PE:  GEN:nad  O/C: Tonsillar fossa's are well-healed.  No sign of granulation tissue or scabs.    I previously reviewed the patient's pathology results with her.No sign of lymphoma or leukemia on the cytology work-up.  Surgical pathology did show some EBV positive cells    A/P:  Patient has healed well after surgery.  Pathology did not show any evidence of lymphoma or cancers.  We will see if removal of the tonsils help with her EBV titers overall.  At this point she is free to resume all normal activity and to eat a regular diet.  She may follow-up with us as needed.

## 2020-08-20 NOTE — NURSING NOTE
Chief Complaint   Patient presents with     Post-op Visit     DOS 7/29/20         Pulse 58, temperature 99.1  F (37.3  C), temperature source Temporal, weight 129 lb (58.5 kg), SpO2 95 %, not currently breastfeeding.    Janki Ngo, EMT

## 2020-09-03 ASSESSMENT — ENCOUNTER SYMPTOMS
EYE WATERING: 0
POLYPHAGIA: 0
NECK MASS: 0
FEVER: 0
EYE IRRITATION: 0
EYE PAIN: 0
TASTE DISTURBANCE: 0
WEIGHT LOSS: 0
POLYDIPSIA: 0
HALLUCINATIONS: 0
HOARSE VOICE: 1
SMELL DISTURBANCE: 0
DOUBLE VISION: 0
EYE REDNESS: 1
TROUBLE SWALLOWING: 1
DECREASED APPETITE: 0
SINUS CONGESTION: 1
INCREASED ENERGY: 1
WEIGHT GAIN: 0
NIGHT SWEATS: 0
ALTERED TEMPERATURE REGULATION: 0
CHILLS: 0
SINUS PAIN: 0
SORE THROAT: 1
FATIGUE: 1

## 2020-09-04 ENCOUNTER — OFFICE VISIT (OUTPATIENT)
Dept: INFECTIOUS DISEASES | Facility: CLINIC | Age: 65
End: 2020-09-04
Attending: INTERNAL MEDICINE
Payer: COMMERCIAL

## 2020-09-04 VITALS
HEART RATE: 60 BPM | BODY MASS INDEX: 22.76 KG/M2 | WEIGHT: 132.6 LBS | OXYGEN SATURATION: 98 % | DIASTOLIC BLOOD PRESSURE: 66 MMHG | SYSTOLIC BLOOD PRESSURE: 126 MMHG

## 2020-09-04 DIAGNOSIS — Z94.0 KIDNEY REPLACED BY TRANSPLANT: ICD-10-CM

## 2020-09-04 DIAGNOSIS — B27.00 EBV (EPSTEIN-BARR VIRUS) VIREMIA: ICD-10-CM

## 2020-09-04 RX ORDER — METOPROLOL TARTRATE 25 MG/1
25 TABLET, FILM COATED ORAL 2 TIMES DAILY
COMMUNITY
Start: 2020-08-13 | End: 2022-07-13

## 2020-09-04 RX ORDER — MYCOPHENOLATE MOFETIL 250 MG/1
500 CAPSULE ORAL 2 TIMES DAILY
Qty: 720 CAPSULE | Refills: 3 | COMMUNITY
Start: 2020-08-13 | End: 2020-12-18

## 2020-09-04 RX ORDER — TIMOLOL MALEATE 5 MG/ML
1 SOLUTION/ DROPS OPHTHALMIC EVERY MORNING
COMMUNITY
Start: 2020-02-08 | End: 2023-06-12

## 2020-09-04 NOTE — PROGRESS NOTES
[facility]  Transplant Infectious Disease Clinic Note     Patient:  Zulma Lovell, Date of birth 1955, Medical record number [id number]  Date of Visit:  09/04/2020         Assessment and Recommendations:   Recommendations:  - With her transplant blooddraw on 9/9/2020, will check an EBV level. Orders in place.   - I will follow her EBV levels that are being drawn quarterly. If no rise after about a year, then EBV level could then be checked annually.   - Due for seasonal influenza vaccination.   - Return to ID clinic is not specifically scheduled. She has hearing loss and it helps her tremendously to have in person appointments, so if she needs to be scheduled in, we will do so in person.     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 Zulma was EBV R-/D+ for her kidney transplantation. Her tonsils were large on exam, they were removed 7/29/2020. EBV+ cells seen on pathology of tonsils. She is snoring much less since removal of the tonsils. First EBV viral load post-tonsillectomy will be drawn 9/9/2020. I will follow her EBV levels that are being drawn quarterly. If no rise after about a year, then EBV level could then be checked annually.   - Recurrent URI and nuisance infections, could be an anatomic obstruction from large tonsils contributing to recurrence.  - Hx shingles.   - QTc interval: 436 msec on 8/19/2007 EKG  - Pneumocystis prophylaxis: none needed, as CD4 count > 200.  - Viral serostatus: CMV D-/R+, EBV D+/R-, VZV+, hep B immune.  - Immunization status: due for influenza  - Gamma globulin status: unknown  - Isolation status: Good hand hygiene.    Abril Perez MD. Pager  [phone]         Interval History:   Since Zulma was last seen by ID on 2/7/2020, she had her tonsils out 7/29/2020. The pain did not let up until 13 days after the operation. On the second day home, her  said that her breathing is completely different, no snoring, not labored. She knew that she had not been sleeping good, but to know that her breathing is better and to know that her sleeping is better is of some help. Zulma wonders if the fatigue that she has now is related to cervical arthritis.     Transplants:  8/19/2007 (Kidney); Postoperative day:  4765.  Coordinator Stephanie Schmitd    Review of Systems:  CONSTITUTIONAL:  No fevers or chills. No night sweats. Weight is not changing.  EYES: negative for icterus or acute vision changes. Her vision is affected by glaucoma, treated with drops and injection. She has broken blood vessel in the left eye since 8/30/2020.   ENT:  Gradual worsening of known hearing loss since 2013, + tinnitus. No sore throat. There is a little tickle in her throat every now and then.  RESPIRATORY:  negative for cough other than an occ throat clearing tickle, no sputum production. Perhaps a little dyspnea with mild exertion  CARDIOVASCULAR:  negative for chest pain. Every now and then her heart can flip-flop  GASTROINTESTINAL:  negative for nausea, vomiting, diarrhea. Once in a while constipation  GENITOURINARY:  negative for dysuria or hematuria.  HEME:  No easy bruising or bleeding  INTEGUMENT:  negative for rash, but some pruritus from dry skin   NEURO:  Negative for headache or tremor, other than an occ headache from arthritis.    Past Medical History:   Diagnosis Date     Anemia in chronic kidney disease(285.21)      Dyslipidemia      High risk medications (not anticoagulants) long-term use      Hypertension      Immunosuppressed status (H)      Kidney replaced by transplant      Shingles        Past Surgical History:   Procedure Laterality Date     APPENDECTOMY        CATARACT IOL, RT/LT Bilateral      kidney transplant       TONSILLECTOMY, ADENOIDECTOMY, COMBINED Bilateral 7/29/2020    Procedure: BILATERAL TONSILLECTOMY AND ADENOIDECTOMY;  Surgeon: Tammy Haines MD;  Location:  OR       Family History   Problem Relation Age of Onset     Alzheimer Disease Mother      Alzheimer Disease Father      Anesthesia Reaction No family hx of      Deep Vein Thrombosis (DVT) No family hx of        Social History     Social History Narrative    Zulma lives in [place] with her . Two children that live nearby, 4 grandchildren. Used to work in medical transcription. Children have dogs. No other animal exposures. Foreign travel includes a [employer] cruise, [place], [place], [place] (2003).      Social History     Tobacco Use     Smoking status: Never Smoker     Smokeless tobacco: Never Used   Substance Use Topics     Alcohol use: No     Alcohol/week: 0.0 standard drinks     Drug use: No       Immunization History   Administered Date(s) Administered     FLU 6-35 months 12/02/2004     Flu, Unspecified 11/08/2010     HepA-Adult 07/14/2008     HepB-Adult 02/05/2004, 05/03/2004, 07/14/2008     Influenza (H1N1) 11/06/2009     Influenza (IIV3) PF 11/24/2006, 10/11/2011, 10/17/2012, 10/14/2013, 10/20/2014, 10/26/2015, 10/10/2016     Influenza Vaccine IM > 6 months Valent IIV4 11/03/2017     Influenza Vaccine, 6+MO IM (QUADRIVALENT W/PRESERVATIVES) 11/16/2018, 10/22/2019     Pneumo Conj 13-V (2010&after) 11/03/2017     Pneumococcal 23 valent 02/03/2013, 02/07/2020     Poliovirus, inactivated (IPV) 07/14/2008     TDAP Vaccine (Boostrix) 11/16/2018     Tdap (Adult) Unspecified Formulation 07/02/1997, 07/14/2008     Typhoid Oral 07/14/2008       Patient Active Problem List   Diagnosis     Kidney replaced by transplant     Immunosuppression (H)     Anemia in chronic renal disease     Dyslipidemia     Aftercare following organ transplant     EBV (Bandar-Barr virus) viremia      Vitamin D deficiency     HTN, kidney transplant related     Immunosuppressed status (H)     Large tonsils     Cervical osteoarthritis     Glaucoma     Hearing loss     Hyperlipidemia     Airway obstruction       Outpatient Medications Marked as Taking for the 9/4/20 encounter (Office Visit) with Abril Perez MD   Medication Sig     amLODIPine (NORVASC) 2.5 MG tablet Take 1 tablet (2.5 mg) by mouth At Bedtime     brimonidine (ALPHAGAN) 0.2 % ophthalmic solution Place 1 drop Into the left eye every morning      cycloSPORINE modified (GENERIC EQUIVALENT) 25 MG capsule Take 3 capsules (75 mg) by mouth 2 times daily     latanoprost (XALATAN) 0.005 % ophthalmic solution Place 1 drop Into the left eye At Bedtime     loteprednol (LOTEMAX) 0.5 % ophthalmic suspension Place 1 drop Into the left eye At Bedtime      metoprolol tartrate (LOPRESSOR) 25 MG tablet Take 1 tablet (25 mg) by mouth 2 times daily     Multiple Vitamins-Minerals (CENTRUM SILVER) per tablet Take 1 tablet by mouth every morning      mycophenolate (GENERIC EQUIVALENT) 250 MG capsule Take 2 capsules (500 mg) by mouth 2 times daily     Omega-3 Fatty Acids (FISH OIL) 1000 MG CPDR Take 2,000 mg by mouth 2 times daily     PRAVASTATIN SODIUM PO Take 20 mg by mouth At Bedtime      Ranibizumab (LUCENTIS IO) Eye injections given every 11 weeks     timolol maleate (TIMOPTIC) 0.5 % ophthalmic solution Place 1 drop Into the left eye every morning       Allergies   Allergen Reactions     Fenofibrate Other (See Comments)     Pancreatitis     Tricor Other (See Comments)     Pancreatitis (this is fenofibrate)     Simvastatin Muscle Pain (Myalgia) and Cramps            Physical Exam:   Vitals were reviewed.  All vitals stable  /66   Pulse 60   Wt 60.1 kg (132 lb 9.6 oz)   LMP  (LMP Unknown)   SpO2 98%   BMI 22.76 kg/m    Wt Readings from Last 4 Encounters:   08/20/20 58.5 kg (129 lb)   07/29/20 59.5 kg (131 lb 2.8 oz)   07/10/20 59.9 kg (132 lb)    03/05/20 60.3 kg (133 lb)       Exam:  GENERAL: well-developed, well-nourished woman, alert, oriented, in no acute distress.  HEAD: Head is normocephalic, atraumatic   EYES: Eyes have anicteric sclerae.    ENT: Oropharynx is dry without exudates or ulcers. Tonsil surgery area appears normal to me.   NECK: Supple.  LUNGS: Clear to auscultation.  CARDIOVASCULAR: Regular rate and rhythm with no murmur  ABDOMEN: Normal bowel sounds, soft. Did not check for tenderness over RLQ transplanted kidney.  SKIN: No acute rashes.   NEUROLOGIC: Grossly nonfocal.         Laboratory Data:     Absolute CD4   Date Value Ref Range Status   11/03/2017 469 441 - 2,156 cells/uL Final       Metabolic Studies    Recent Labs   Lab Test 07/30/20  0605 04/27/20  0855 01/28/20  1031  09/27/17  1123    136 135   < >  --    POTASSIUM 4.4 4.4 4.6   < >  --    CHLORIDE 104 103 106   < >  --    CO2 25 27 22   < >  --    ANIONGAP 7 6 7   < >  --    BUN 22 30 32*   < >  --    CR 0.81 0.86 0.87   < >  --    78462  --   --   --   --  1.18*   GFRESTIMATED 77 72 71   < >  --    * 106* 88   < >  --    AICHA 9.7 9.9 9.4   < >  --     < > = values in this interval not displayed.       Hepatic Studies    Recent Labs   Lab Test 01/28/20  1031          Hematology Studies     Recent Labs   Lab Test 04/27/20  0855 01/28/20  1031 12/20/19  1112 09/30/19  1034  11/03/17  1035 09/27/17  1123   WBC 3.7* 4.2 5.5 5.5   < > 4.0  --    18933  --   --   --   --   --   --  4.6   ANEU  --   --   --   --   --  1.5*  --    ALYM  --   --   --   --   --  1.8  --    NILSA  --   --   --   --   --  0.6  --    AEOS  --   --   --   --   --  0.1  --    HGB 12.0 10.8* 11.5* 11.2*   < > 11.5*  --    18432  --   --   --   --   --   --  11.8*   HCT 39.2 34.5* 37.1 36.6   < > 35.9  --     178 238 190   < > 183  --    64211  --   --   --   --   --   --  199    < > = values in this interval not displayed.       Medication levels    Recent Labs   Lab Test  04/27/20  0850  12/20/19  1112   CYCLSP 72   < > 56   MPACID  --   --  1.22   MPAG  --   --  27.8*    < > = values in this interval not displayed.       Microbiology:  Last Culture results with specimen source  Culture Micro   Date Value Ref Range Status   12/21/2009 No yeast isolated  Final   12/21/2009 10 to 50,000 colonies/mL Mixed gram positive filemon  Final     Comment:     Multiple species present, probable perineal contamination.  Susceptibility testing not routinely done   09/04/2009 Duplicate request  Final     Comment:     Canceled, Test credited   09/04/2009 No yeast isolated  Final   09/04/2009   Final    <10,000 colonies/mL Lactose fermenting gram negative rods Susceptibility testing     Comment:      not routinely done  <10,000 colonies/mL Non lactose fermenting gram negative rods Susceptibility   testing not routinely done  10 to 50,000 colonies/mL Gram positive cocci in chains Susceptibility testing   not   routinely done  Multiple species present, probable perineal contamination.   10/01/2007 <10,000 colonies/mL Gram positive cocci  Final     Comment:     <10,000 colonies/mL Staphylococcus species  Susceptibility testing not routinely done   10/01/2007 No growth  Final   10/01/2007 No growth  Final   09/12/2007 No growth  Final   09/12/2007 No anaerobes isolated  Final   08/27/2007 No growth  Final   08/26/2007 No growth  Final   08/26/2007 No growth  Final   08/26/2007 No growth  Final   08/26/2007 No growth  Final   08/22/2007 No growth  Final   08/19/2007   Final    10 to 50,000 colonies/mL Staphylococcus species Susceptibility testing not     Comment:      routinely done  <10,000 colonies/mL Gram positive cocci Susceptibility testing not routinely   done    Specimen Description   Date Value Ref Range Status   12/21/2009 Midstream Urine  Final   12/21/2009 Midstream Urine  Final   12/21/2009 Midstream Urine  Final   09/04/2009 Unspecified Urine  Final   09/04/2009 Midstream Urine  Final    09/04/2009 Midstream Urine  Final   09/04/2009 Midstream Urine  Final   10/01/2007 Midstream Urine  Final   10/01/2007 Blood Left Arm  Final   10/01/2007 Blood Right Arm  Final   09/12/2007 Fluid LYMPHOCELE  Final   09/12/2007 Fluid LYMPHOCELE  Final   09/12/2007 Fluid LYMPHOCELE  Final   08/27/2007 Unspecified Urine  Final   08/26/2007 Blood  Final   08/26/2007 Blood Right Arm  Final   08/26/2007 Blood  Final   08/26/2007 Blood  Final   08/22/2007 Catheterized Urine  Final   08/19/2007 Midstream Urine  Final          Virology:  Respiratory virus testing    Recent Labs   Lab Test 07/26/20  1002   [id number] Not Detected   [id number] Nasopharyngeal       Log IU/mL of CMVQNT   Date Value Ref Range Status   08/01/2016 Not Calculated <2.1 [Log_IU]/mL Final       EBV DNA Copies/mL   Date Value Ref Range Status   04/27/2020 70,253 (A) EBVNEG^EBV DNA Not Detected [Copies]/mL Final   01/28/2020 130,706 (A) EBVNEG^EBV DNA Not Detected [Copies]/mL Final   12/20/2019 810,485 (A) EBVNEG^EBV DNA Not Detected [Copies]/mL Final   09/30/2019 35,733 (A) EBVNEG^EBV DNA Not Detected [Copies]/mL Final   06/14/2019 23,015 (A) EBVNEG^EBV DNA Not Detected [Copies]/mL Final   06/11/2019 16,277 (A) EBVNEG^EBV DNA Not Detected [Copies]/mL Final   05/24/2019 53,168 (A) EBVNEG^EBV DNA Not Detected [Copies]/mL Final   02/27/2019 10,323 (A) EBVNEG^EBV DNA Not Detected [Copies]/mL Final   01/02/2019 21,287 (A) EBVNEG^EBV DNA Not Detected [Copies]/mL Final   11/28/2018 8,619 (A) EBVNEG^EBV DNA Not Detected [Copies]/mL Final   10/26/2018 10,028 (A) EBVNEG^EBV DNA Not Detected [Copies]/mL Final   09/26/2018 8,114 (A) EBVNEG^EBV DNA Not Detected [Copies]/mL Final   07/23/2018 EBV DNA Not Detected EBVNEG^EBV DNA Not Detected [Copies]/mL Final   04/24/2018 EBV DNA Not Detected EBVNEG^EBV DNA Not Detected [Copies]/mL Final   03/28/2018 EBV DNA Not Detected EBVNEG^EBV DNA Not Detected [Copies]/mL Final   02/08/2018 1,256 (A) EBVNEG^EBV DNA Not Detected  [Copies]/mL Final   01/23/2018 178,701 (A) EBVNEG^EBV DNA Not Detected [Copies]/mL Final   12/21/2017 184,473 (A) EBVNEG^EBV DNA Not Detected [Copies]/mL Final   11/22/2017 143,449 (A) EBVNEG^EBV DNA Not Detected [Copies]/mL Final   11/03/2017 134,236 (A) EBVNEG^EBV DNA Not Detected [Copies]/mL Final   08/07/2017 154,014 (A) EBVNEG [Copies]/mL Final   08/01/2016 138,022 (A) EBVNEG [Copies]/mL Final       Hepatitis C Antibody   Date Value Ref Range Status   08/19/2007 Negative NEG Final   05/09/2007 Negative NEG Final       CMV IgG Antibody   Date Value Ref Range Status   08/19/2007 >160.0 EU/mL Final     Comment:     Positive for anti-CMV IgG       EBV IgG Antibody Interpretation   Date Value Ref Range Status   08/19/2007 Negative, suggests no immunologic exposure.  Final   05/09/2007 Negative, suggests no immunologic exposure.  Final       Pathology:  Specimen #: [id number] Collected: 7/29/2020  FINAL DIAGNOSIS:   A) Tonsil, left, tonsillectomy: Lymphoepithelial tissue with follicular hyperplasia with rare to occasional EBV positive cells.  B) Tonsil, right, tonsillectomy: Lymphoepithelial tissue with follicular hyperplasia with rare to occasional EBV positive cells.  COMMENT:   Concurrent flow cytometry was performed ([id number] and 8040) on the bilateral samples and in each study, the B cells were polytypic, and there was no aberrant immunophenotype on T cells.   Per clinic note dated 7/10/20 - this patient has had increased EBV viremia, despite reduction in immunosuppression.  Per op note, the tonsils were enlarged, but there was no mention of discrete lesions of significant asymmetry.    Based on gross description of the surgical pathology samples, the right tonsil sample was slightly heavier, but they are overall similar in size as measured and reported in the gross.   Based on H&E stains, all sections show normal architecture for tonsils without architectural distortion. Though the follicular hyperplasia is  "associated with EBV positive cells (and some would consider the possibility of a non-destructive post-transplant lymphoproliferative disorder), overall the team did not favor PTLD, as the tissue does not appear to \"form mass lesions\" as is typical of PTLD. A Congo red stain was performed on block A4 and is negative for amyloid; an on-slide control stains appropriately.       Imaging:   EXAMINATION: CT CHEST/ABDOMEN/PELVIS W CONTRAST, 1/16/2020 1:55 PM  HISTORY: ? Lymphoma; Kidney transplanted.  FINDINGS:  Chest: Heterogeneous, mildly enlarged thyroid parenchyma without discrete  nodule. The aortic branching pattern, heart size, pericardium, and  esophagus are normal. The ascending aorta and main pulmonary artery  are not dilated. No large central pulmonary embolism. No thoracic adenopathy.  The central tracheobronchial tree is patent. No pneumothorax or  pleural effusion. No airspace consolidation. Solid 3 mm nodule in the  right upper lobe (series 6, image 106).  Abdomen and pelvis:   The liver, gallbladder, spleen, and pancreas appear normal. Mild  diffuse benign-appearing thickening of the adrenal glands. Atrophy of  the native kidneys with numerous subcentimeter hypodensities which are  too small to characterize. A punctate calcification in the mid left  kidney may represent a vascular calcification or nonobstructing tiny  stone. Right lower quadrant renal transplant is unremarkable.  Unremarkable CT appearance of the uterus and ovaries.  No intra-abdominal free air or free fluid. No abnormally dilated loops  of bowel. The appendix is not visualized. Mild colonic diverticulosis.  Periampullary duodenal diverticulum measuring up to 2.8 cm. Normal  caliber abdominal aorta. The major abdominal vasculature is patent.  Retroaortic left renal vein. No lymphadenopathy in the abdomen or  pelvis. Minimal mesenteric edema.    Impression    IMPRESSION:   1. No lymphadenopathy in the chest, abdomen, or pelvis.  2. Single 3 " mm nodule in the right upper lobe. Consider follow-up CT  in one year if the patient is considered high risk for lung cancer,  correlation with previous outside imaging to determine chronicity.  3. Atrophy of the native kidneys with unremarkable appearance of the  right lower quadrant renal transplant.          Answers for HPI/ROS submitted by the patient on 9/3/2020   General Symptoms: Yes  Skin Symptoms: No  HENT Symptoms: Yes  EYE SYMPTOMS: Yes  HEART SYMPTOMS: No  LUNG SYMPTOMS: No  INTESTINAL SYMPTOMS: No  URINARY SYMPTOMS: No  GYNECOLOGIC SYMPTOMS: No  BREAST SYMPTOMS: No  SKELETAL SYMPTOMS: No  BLOOD SYMPTOMS: No  NERVOUS SYSTEM SYMPTOMS: No  MENTAL HEALTH SYMPTOMS: No  Fever: No  Loss of appetite: No  Weight loss: No  Weight gain: No  Fatigue: Yes  Night sweats: No  Chills: No  Increased stress: Yes  Excessive hunger: No  Excessive thirst: No  Feeling hot or cold when others believe the temperature is normal: No  Loss of height: No  Post-operative complications: No  Surgical site pain: Yes  Hallucinations: No  Change in or Loss of Energy: Yes  Hyperactivity: No  Confusion: No  Ear pain: Yes  Ear discharge: No  Hearing loss: Yes  Tinnitus: Yes  Nosebleeds: No  Congestion: Yes  Sinus pain: No  Trouble swallowing: Yes   Voice hoarseness: Yes  Mouth sores: Yes  Sore throat: Yes  Tooth pain: No  Gum tenderness: No  Bleeding gums: No  Change in taste: No  Change in sense of smell: No  Dry mouth: No  Hearing aid used: No  Neck lump: No  Eye pain: No  Vision loss: No  Dry eyes: No  Watery eyes: No  Eye bulging: No  Double vision: No  Flashing of lights: No  Spots: No  Floaters: Yes  Redness: Yes  Crossed eyes: No  Tunnel Vision: No  Yellowing of eyes: No  Eye irritation: No

## 2020-09-04 NOTE — LETTER
9/4/2020       RE: Zulma Lovell  [address]  [facility] [place] [address]     Dear Colleague,    Thank you for referring your patient, Zulma Lovell, to the [facility] AND INFECTIOUS DISEASES at [facility]. Please see a copy of my visit note below.    [facility]  Transplant Infectious Disease Clinic Note     Patient:  Zulma Lovell, Date of birth 1955, Medical record number [id number]  Date of Visit:  09/04/2020         Assessment and Recommendations:   Recommendations:  - With her transplant blooddraw on 9/9/2020, will check an EBV level. Orders in place.   - I will follow her EBV levels that are being drawn quarterly. If no rise after about a year, then EBV level could then be checked annually.   - Due for seasonal influenza vaccination.   - Return to ID clinic is not specifically scheduled. She has hearing loss and it helps her tremendously to have in person appointments, so if she needs to be scheduled in, we will do so in person.     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 Zulma was EBV R-/D+ for her kidney transplantation. Her tonsils were large on exam, they were removed 7/29/2020. EBV+ cells seen on pathology of tonsils. She is snoring much less since removal of the tonsils. First EBV viral load post-tonsillectomy will be drawn 9/9/2020. I will follow her EBV levels that are being drawn quarterly. If no rise after about a year, then EBV level could then be checked annually.   - Recurrent URI and nuisance infections, could be an anatomic obstruction from large tonsils contributing to recurrence.  - Hx shingles.   -  QTc interval: 436 msec on 8/19/2007 EKG  - Pneumocystis prophylaxis: none needed, as CD4 count > 200.  - Viral serostatus: CMV D-/R+, EBV D+/R-, VZV+, hep B immune.  - Immunization status: due for influenza  - Gamma globulin status: unknown  - Isolation status: Good hand hygiene.    Abril Perez MD. Pager [phone]         Interval History:   Since Zulma was last seen by ID on 2/7/2020, she had her tonsils out 7/29/2020. The pain did not let up until 13 days after the operation. On the second day home, her  said that her breathing is completely different, no snoring, not labored. She knew that she had not been sleeping good, but to know that her breathing is better and to know that her sleeping is better is of some help. Zulma wonders if the fatigue that she has now is related to cervical arthritis.     Transplants:  8/19/2007 (Kidney); Postoperative day:  4765.  Coordinator Stephanie Schmidt    Review of Systems:  CONSTITUTIONAL:  No fevers or chills. No night sweats. Weight is not changing.  EYES: negative for icterus or acute vision changes. Her vision is affected by glaucoma, treated with drops and injection. She has broken blood vessel in the left eye since 8/30/2020.   ENT:  Gradual worsening of known hearing loss since 2013, + tinnitus. No sore throat. There is a little tickle in her throat every now and then.  RESPIRATORY:  negative for cough other than an occ throat clearing tickle, no sputum production. Perhaps a little dyspnea with mild exertion  CARDIOVASCULAR:  negative for chest pain. Every now and then her heart can flip-flop  GASTROINTESTINAL:  negative for nausea, vomiting, diarrhea. Once in a while constipation  GENITOURINARY:  negative for dysuria or hematuria.  HEME:  No easy bruising or bleeding  INTEGUMENT:  negative for rash, but some pruritus from dry skin   NEURO:  Negative for headache or tremor, other than an occ headache from arthritis.    Past Medical History:   Diagnosis  Date     Anemia in chronic kidney disease(285.21)      Dyslipidemia      High risk medications (not anticoagulants) long-term use      Hypertension      Immunosuppressed status (H)      Kidney replaced by transplant      Shingles        Past Surgical History:   Procedure Laterality Date     APPENDECTOMY       CATARACT IOL, RT/LT Bilateral      kidney transplant       TONSILLECTOMY, ADENOIDECTOMY, COMBINED Bilateral 7/29/2020    Procedure: BILATERAL TONSILLECTOMY AND ADENOIDECTOMY;  Surgeon: Tammy Haines MD;  Location: [facility] OR       Family History   Problem Relation Age of Onset     Alzheimer Disease Mother      Alzheimer Disease Father      Anesthesia Reaction No family hx of      Deep Vein Thrombosis (DVT) No family hx of        Social History     Social History Narrative    Zulma lives in [place] with her . Two children that live nearby, 4 grandchildren. Used to work in medical transcription. Children have dogs. No other animal exposures. Foreign travel includes a [place] cruise, [place], [place], [place] (2003).      Social History     Tobacco Use     Smoking status: Never Smoker     Smokeless tobacco: Never Used   Substance Use Topics     Alcohol use: No     Alcohol/week: 0.0 standard drinks     Drug use: No       Immunization History   Administered Date(s) Administered     FLU 6-35 months 12/02/2004     Flu, Unspecified 11/08/2010     HepA-Adult 07/14/2008     HepB-Adult 02/05/2004, 05/03/2004, 07/14/2008     Influenza (H1N1) 11/06/2009     Influenza (IIV3) PF 11/24/2006, 10/11/2011, 10/17/2012, 10/14/2013, 10/20/2014, 10/26/2015, 10/10/2016     Influenza Vaccine IM > 6 months Valent IIV4 11/03/2017     Influenza Vaccine, 6+MO IM (QUADRIVALENT W/PRESERVATIVES) 11/16/2018, 10/22/2019     Pneumo Conj 13-V (2010&after) 11/03/2017     Pneumococcal 23 valent 02/03/2013, 02/07/2020     Poliovirus, inactivated (IPV) 07/14/2008     TDAP Vaccine (Boostrix) 11/16/2018     Tdap (Adult)  Unspecified Formulation 07/02/1997, 07/14/2008     Typhoid Oral 07/14/2008       Patient Active Problem List   Diagnosis     Kidney replaced by transplant     Immunosuppression (H)     Anemia in chronic renal disease     Dyslipidemia     Aftercare following organ transplant     EBV (Bandar-Barr virus) viremia     Vitamin D deficiency     HTN, kidney transplant related     Immunosuppressed status (H)     Large tonsils     Cervical osteoarthritis     Glaucoma     Hearing loss     Hyperlipidemia     Airway obstruction       Outpatient Medications Marked as Taking for the 9/4/20 encounter (Office Visit) with Abril Perez MD   Medication Sig     amLODIPine (NORVASC) 2.5 MG tablet Take 1 tablet (2.5 mg) by mouth At Bedtime     brimonidine (ALPHAGAN) 0.2 % ophthalmic solution Place 1 drop Into the left eye every morning      cycloSPORINE modified (GENERIC EQUIVALENT) 25 MG capsule Take 3 capsules (75 mg) by mouth 2 times daily     latanoprost (XALATAN) 0.005 % ophthalmic solution Place 1 drop Into the left eye At Bedtime     loteprednol (LOTEMAX) 0.5 % ophthalmic suspension Place 1 drop Into the left eye At Bedtime      metoprolol tartrate (LOPRESSOR) 25 MG tablet Take 1 tablet (25 mg) by mouth 2 times daily     Multiple Vitamins-Minerals (CENTRUM SILVER) per tablet Take 1 tablet by mouth every morning      mycophenolate (GENERIC EQUIVALENT) 250 MG capsule Take 2 capsules (500 mg) by mouth 2 times daily     Omega-3 Fatty Acids (FISH OIL) 1000 MG CPDR Take 2,000 mg by mouth 2 times daily     PRAVASTATIN SODIUM PO Take 20 mg by mouth At Bedtime      Ranibizumab (LUCENTIS IO) Eye injections given every 11 weeks     timolol maleate (TIMOPTIC) 0.5 % ophthalmic solution Place 1 drop Into the left eye every morning       Allergies   Allergen Reactions     Fenofibrate Other (See Comments)     Pancreatitis     Tricor Other (See Comments)     Pancreatitis (this is fenofibrate)     Simvastatin Muscle Pain (Myalgia) and  Cramps            Physical Exam:   Vitals were reviewed.  All vitals stable  /66   Pulse 60   Wt 60.1 kg (132 lb 9.6 oz)   LMP  (LMP Unknown)   SpO2 98%   BMI 22.76 kg/m    Wt Readings from Last 4 Encounters:   08/20/20 58.5 kg (129 lb)   07/29/20 59.5 kg (131 lb 2.8 oz)   07/10/20 59.9 kg (132 lb)   03/05/20 60.3 kg (133 lb)       Exam:  GENERAL: well-developed, well-nourished woman, alert, oriented, in no acute distress.  HEAD: Head is normocephalic, atraumatic   EYES: Eyes have anicteric sclerae.    ENT: Oropharynx is dry without exudates or ulcers. Tonsil surgery area appears normal to me.   NECK: Supple.  LUNGS: Clear to auscultation.  CARDIOVASCULAR: Regular rate and rhythm with no murmur  ABDOMEN: Normal bowel sounds, soft. Did not check for tenderness over RLQ transplanted kidney.  SKIN: No acute rashes.   NEUROLOGIC: Grossly nonfocal.         Laboratory Data:     Absolute CD4   Date Value Ref Range Status   11/03/2017 469 441 - 2,156 cells/uL Final       Metabolic Studies    Recent Labs   Lab Test 07/30/20  0605 04/27/20  0855 01/28/20  1031  09/27/17  1123    136 135   < >  --    POTASSIUM 4.4 4.4 4.6   < >  --    CHLORIDE 104 103 106   < >  --    CO2 25 27 22   < >  --    ANIONGAP 7 6 7   < >  --    BUN 22 30 32*   < >  --    CR 0.81 0.86 0.87   < >  --    88728  --   --   --   --  1.18*   GFRESTIMATED 77 72 71   < >  --    * 106* 88   < >  --    AICHA 9.7 9.9 9.4   < >  --     < > = values in this interval not displayed.       Hepatic Studies    Recent Labs   Lab Test 01/28/20  1031          Hematology Studies     Recent Labs   Lab Test 04/27/20  0855 01/28/20  1031 12/20/19  1112 09/30/19  1034  11/03/17  1035 09/27/17  1123   WBC 3.7* 4.2 5.5 5.5   < > 4.0  --    52119  --   --   --   --   --   --  4.6   ANEU  --   --   --   --   --  1.5*  --    ALYM  --   --   --   --   --  1.8  --    NILSA  --   --   --   --   --  0.6  --    AEOS  --   --   --   --   --  0.1  --    HGB  12.0 10.8* 11.5* 11.2*   < > 11.5*  --    43080  --   --   --   --   --   --  11.8*   HCT 39.2 34.5* 37.1 36.6   < > 35.9  --     178 238 190   < > 183  --    78673  --   --   --   --   --   --  199    < > = values in this interval not displayed.       Medication levels    Recent Labs   Lab Test 04/27/20  0850  12/20/19  1112   CYCLSP 72   < > 56   MPACID  --   --  1.22   MPAG  --   --  27.8*    < > = values in this interval not displayed.       Microbiology:  Last Culture results with specimen source  Culture Micro   Date Value Ref Range Status   12/21/2009 No yeast isolated  Final   12/21/2009 10 to 50,000 colonies/mL Mixed gram positive filemon  Final     Comment:     Multiple species present, probable perineal contamination.  Susceptibility testing not routinely done   09/04/2009 Duplicate request  Final     Comment:     Canceled, Test credited   09/04/2009 No yeast isolated  Final   09/04/2009   Final    <10,000 colonies/mL Lactose fermenting gram negative rods Susceptibility testing     Comment:      not routinely done  <10,000 colonies/mL Non lactose fermenting gram negative rods Susceptibility   testing not routinely done  10 to 50,000 colonies/mL Gram positive cocci in chains Susceptibility testing   not   routinely done  Multiple species present, probable perineal contamination.   10/01/2007 <10,000 colonies/mL Gram positive cocci  Final     Comment:     <10,000 colonies/mL Staphylococcus species  Susceptibility testing not routinely done   10/01/2007 No growth  Final   10/01/2007 No growth  Final   09/12/2007 No growth  Final   09/12/2007 No anaerobes isolated  Final   08/27/2007 No growth  Final   08/26/2007 No growth  Final   08/26/2007 No growth  Final   08/26/2007 No growth  Final   08/26/2007 No growth  Final   08/22/2007 No growth  Final   08/19/2007   Final    10 to 50,000 colonies/mL Staphylococcus species Susceptibility testing not     Comment:      routinely done  <10,000 colonies/mL Gram  positive cocci Susceptibility testing not routinely   done    Specimen Description   Date Value Ref Range Status   12/21/2009 Midstream Urine  Final   12/21/2009 Midstream Urine  Final   12/21/2009 Midstream Urine  Final   09/04/2009 Unspecified Urine  Final   09/04/2009 Midstream Urine  Final   09/04/2009 Midstream Urine  Final   09/04/2009 Midstream Urine  Final   10/01/2007 Midstream Urine  Final   10/01/2007 Blood Left Arm  Final   10/01/2007 Blood Right Arm  Final   09/12/2007 Fluid LYMPHOCELE  Final   09/12/2007 Fluid LYMPHOCELE  Final   09/12/2007 Fluid LYMPHOCELE  Final   08/27/2007 Unspecified Urine  Final   08/26/2007 Blood  Final   08/26/2007 Blood Right Arm  Final   08/26/2007 Blood  Final   08/26/2007 Blood  Final   08/22/2007 Catheterized Urine  Final   08/19/2007 Midstream Urine  Final          Virology:  Respiratory virus testing    Recent Labs   Lab Test 07/26/20  1002   [id number] Not Detected   [id number] Nasopharyngeal       Log IU/mL of CMVQNT   Date Value Ref Range Status   08/01/2016 Not Calculated <2.1 [Log_IU]/mL Final       EBV DNA Copies/mL   Date Value Ref Range Status   04/27/2020 70,253 (A) EBVNEG^EBV DNA Not Detected [Copies]/mL Final   01/28/2020 130,706 (A) EBVNEG^EBV DNA Not Detected [Copies]/mL Final   12/20/2019 810,485 (A) EBVNEG^EBV DNA Not Detected [Copies]/mL Final   09/30/2019 35,733 (A) EBVNEG^EBV DNA Not Detected [Copies]/mL Final   06/14/2019 23,015 (A) EBVNEG^EBV DNA Not Detected [Copies]/mL Final   06/11/2019 16,277 (A) EBVNEG^EBV DNA Not Detected [Copies]/mL Final   05/24/2019 53,168 (A) EBVNEG^EBV DNA Not Detected [Copies]/mL Final   02/27/2019 10,323 (A) EBVNEG^EBV DNA Not Detected [Copies]/mL Final   01/02/2019 21,287 (A) EBVNEG^EBV DNA Not Detected [Copies]/mL Final   11/28/2018 8,619 (A) EBVNEG^EBV DNA Not Detected [Copies]/mL Final   10/26/2018 10,028 (A) EBVNEG^EBV DNA Not Detected [Copies]/mL Final   09/26/2018 8,114 (A) EBVNEG^EBV DNA Not Detected [Copies]/mL  Final   07/23/2018 EBV DNA Not Detected EBVNEG^EBV DNA Not Detected [Copies]/mL Final   04/24/2018 EBV DNA Not Detected EBVNEG^EBV DNA Not Detected [Copies]/mL Final   03/28/2018 EBV DNA Not Detected EBVNEG^EBV DNA Not Detected [Copies]/mL Final   02/08/2018 1,256 (A) EBVNEG^EBV DNA Not Detected [Copies]/mL Final   01/23/2018 178,701 (A) EBVNEG^EBV DNA Not Detected [Copies]/mL Final   12/21/2017 184,473 (A) EBVNEG^EBV DNA Not Detected [Copies]/mL Final   11/22/2017 143,449 (A) EBVNEG^EBV DNA Not Detected [Copies]/mL Final   11/03/2017 134,236 (A) EBVNEG^EBV DNA Not Detected [Copies]/mL Final   08/07/2017 154,014 (A) EBVNEG [Copies]/mL Final   08/01/2016 138,022 (A) EBVNEG [Copies]/mL Final       Hepatitis C Antibody   Date Value Ref Range Status   08/19/2007 Negative NEG Final   05/09/2007 Negative NEG Final       CMV IgG Antibody   Date Value Ref Range Status   08/19/2007 >160.0 EU/mL Final     Comment:     Positive for anti-CMV IgG       EBV IgG Antibody Interpretation   Date Value Ref Range Status   08/19/2007 Negative, suggests no immunologic exposure.  Final   05/09/2007 Negative, suggests no immunologic exposure.  Final       Pathology:  Specimen #: [id number] Collected: 7/29/2020  FINAL DIAGNOSIS:   A) Tonsil, left, tonsillectomy: Lymphoepithelial tissue with follicular hyperplasia with rare to occasional EBV positive cells.  B) Tonsil, right, tonsillectomy: Lymphoepithelial tissue with follicular hyperplasia with rare to occasional EBV positive cells.  COMMENT:   Concurrent flow cytometry was performed ([id number] and 4865) on the bilateral samples and in each study, the B cells were polytypic, and there was no aberrant immunophenotype on T cells.   Per clinic note dated 7/10/20 - this patient has had increased EBV viremia, despite reduction in immunosuppression.  Per op note, the tonsils were enlarged, but there was no mention of discrete lesions of significant asymmetry.    Based on gross description of the  "surgical pathology samples, the right tonsil sample was slightly heavier, but they are overall similar in size as measured and reported in the gross.   Based on H&E stains, all sections show normal architecture for tonsils without architectural distortion. Though the follicular hyperplasia is associated with EBV positive cells (and some would consider the possibility of a non-destructive post-transplant lymphoproliferative disorder), overall the team did not favor PTLD, as the tissue does not appear to \"form mass lesions\" as is typical of PTLD. A Congo red stain was performed on block A4 and is negative for amyloid; an on-slide control stains appropriately.       Imaging:   EXAMINATION: CT CHEST/ABDOMEN/PELVIS W CONTRAST, 1/16/2020 1:55 PM  HISTORY: ? Lymphoma; Kidney transplanted.  FINDINGS:  Chest: Heterogeneous, mildly enlarged thyroid parenchyma without discrete  nodule. The aortic branching pattern, heart size, pericardium, and  esophagus are normal. The ascending aorta and main pulmonary artery  are not dilated. No large central pulmonary embolism. No thoracic adenopathy.  The central tracheobronchial tree is patent. No pneumothorax or  pleural effusion. No airspace consolidation. Solid 3 mm nodule in the  right upper lobe (series 6, image 106).  Abdomen and pelvis:   The liver, gallbladder, spleen, and pancreas appear normal. Mild  diffuse benign-appearing thickening of the adrenal glands. Atrophy of  the native kidneys with numerous subcentimeter hypodensities which are  too small to characterize. A punctate calcification in the mid left  kidney may represent a vascular calcification or nonobstructing tiny  stone. Right lower quadrant renal transplant is unremarkable.  Unremarkable CT appearance of the uterus and ovaries.  No intra-abdominal free air or free fluid. No abnormally dilated loops  of bowel. The appendix is not visualized. Mild colonic diverticulosis.  Periampullary duodenal diverticulum " measuring up to 2.8 cm. Normal  caliber abdominal aorta. The major abdominal vasculature is patent.  Retroaortic left renal vein. No lymphadenopathy in the abdomen or  pelvis. Minimal mesenteric edema.    Impression    IMPRESSION:   1. No lymphadenopathy in the chest, abdomen, or pelvis.  2. Single 3 mm nodule in the right upper lobe. Consider follow-up CT  in one year if the patient is considered high risk for lung cancer,  correlation with previous outside imaging to determine chronicity.  3. Atrophy of the native kidneys with unremarkable appearance of the  right lower quadrant renal transplant.          Answers for HPI/ROS submitted by the patient on 9/3/2020   General Symptoms: Yes  Skin Symptoms: No  HENT Symptoms: Yes  EYE SYMPTOMS: Yes  HEART SYMPTOMS: No  LUNG SYMPTOMS: No  INTESTINAL SYMPTOMS: No  URINARY SYMPTOMS: No  GYNECOLOGIC SYMPTOMS: No  BREAST SYMPTOMS: No  SKELETAL SYMPTOMS: No  BLOOD SYMPTOMS: No  NERVOUS SYSTEM SYMPTOMS: No  MENTAL HEALTH SYMPTOMS: No  Fever: No  Loss of appetite: No  Weight loss: No  Weight gain: No  Fatigue: Yes  Night sweats: No  Chills: No  Increased stress: Yes  Excessive hunger: No  Excessive thirst: No  Feeling hot or cold when others believe the temperature is normal: No  Loss of height: No  Post-operative complications: No  Surgical site pain: Yes  Hallucinations: No  Change in or Loss of Energy: Yes  Hyperactivity: No  Confusion: No  Ear pain: Yes  Ear discharge: No  Hearing loss: Yes  Tinnitus: Yes  Nosebleeds: No  Congestion: Yes  Sinus pain: No  Trouble swallowing: Yes   Voice hoarseness: Yes  Mouth sores: Yes  Sore throat: Yes  Tooth pain: No  Gum tenderness: No  Bleeding gums: No  Change in taste: No  Change in sense of smell: No  Dry mouth: No  Hearing aid used: No  Neck lump: No  Eye pain: No  Vision loss: No  Dry eyes: No  Watery eyes: No  Eye bulging: No  Double vision: No  Flashing of lights: No  Spots: No  Floaters: Yes  Redness: Yes  Crossed eyes:  No  Tunnel Vision: No  Yellowing of eyes: No  Eye irritation: No

## 2020-09-09 DIAGNOSIS — Z48.298 AFTERCARE FOLLOWING ORGAN TRANSPLANT: ICD-10-CM

## 2020-09-09 DIAGNOSIS — Z94.0 IMMUNOSUPPRESSIVE MANAGEMENT ENCOUNTER FOLLOWING KIDNEY TRANSPLANT: ICD-10-CM

## 2020-09-09 DIAGNOSIS — D84.9 IMMUNOSUPPRESSED STATUS (H): ICD-10-CM

## 2020-09-09 DIAGNOSIS — B27.00 EBV (EPSTEIN-BARR VIRUS) VIREMIA: ICD-10-CM

## 2020-09-09 DIAGNOSIS — Z94.0 KIDNEY TRANSPLANTED: ICD-10-CM

## 2020-09-09 DIAGNOSIS — Z79.899 IMMUNOSUPPRESSIVE MANAGEMENT ENCOUNTER FOLLOWING KIDNEY TRANSPLANT: ICD-10-CM

## 2020-09-09 LAB
ANION GAP SERPL CALCULATED.3IONS-SCNC: 8 MMOL/L (ref 3–14)
BUN SERPL-MCNC: 28 MG/DL (ref 7–30)
CALCIUM SERPL-MCNC: 9.7 MG/DL (ref 8.5–10.1)
CHLORIDE SERPL-SCNC: 104 MMOL/L (ref 94–109)
CO2 SERPL-SCNC: 25 MMOL/L (ref 20–32)
CREAT SERPL-MCNC: 0.93 MG/DL (ref 0.52–1.04)
CYCLOSPORINE BLD LC/MS/MS-MCNC: 54 UG/L (ref 50–400)
ERYTHROCYTE [DISTWIDTH] IN BLOOD BY AUTOMATED COUNT: 12.4 % (ref 10–15)
GFR SERPL CREATININE-BSD FRML MDRD: 65 ML/MIN/{1.73_M2}
GLUCOSE SERPL-MCNC: 97 MG/DL (ref 70–99)
HCT VFR BLD AUTO: 34.6 % (ref 35–47)
HGB BLD-MCNC: 10.9 G/DL (ref 11.7–15.7)
MCH RBC QN AUTO: 29.1 PG (ref 26.5–33)
MCHC RBC AUTO-ENTMCNC: 31.5 G/DL (ref 31.5–36.5)
MCV RBC AUTO: 92 FL (ref 78–100)
PLATELET # BLD AUTO: 189 10E9/L (ref 150–450)
POTASSIUM SERPL-SCNC: 4.8 MMOL/L (ref 3.4–5.3)
RBC # BLD AUTO: 3.75 10E12/L (ref 3.8–5.2)
SODIUM SERPL-SCNC: 137 MMOL/L (ref 133–144)
TME LAST DOSE: NORMAL H
WBC # BLD AUTO: 4.2 10E9/L (ref 4–11)

## 2020-09-09 PROCEDURE — 36415 COLL VENOUS BLD VENIPUNCTURE: CPT | Performed by: INTERNAL MEDICINE

## 2020-09-09 PROCEDURE — 87799 DETECT AGENT NOS DNA QUANT: CPT | Performed by: INTERNAL MEDICINE

## 2020-09-09 PROCEDURE — 80158 DRUG ASSAY CYCLOSPORINE: CPT | Performed by: INTERNAL MEDICINE

## 2020-09-09 PROCEDURE — 85027 COMPLETE CBC AUTOMATED: CPT | Performed by: INTERNAL MEDICINE

## 2020-09-09 PROCEDURE — 80048 BASIC METABOLIC PNL TOTAL CA: CPT | Performed by: INTERNAL MEDICINE

## 2020-09-10 LAB
EBV DNA # SPEC NAA+PROBE: ABNORMAL {COPIES}/ML
EBV DNA SPEC NAA+PROBE-LOG#: 4.9 {LOG_COPIES}/ML

## 2020-09-11 ENCOUNTER — OFFICE VISIT (OUTPATIENT)
Dept: AUDIOLOGY | Facility: CLINIC | Age: 65
End: 2020-09-11
Payer: COMMERCIAL

## 2020-09-11 DIAGNOSIS — H90.3 SENSORY HEARING LOSS, BILATERAL: Primary | ICD-10-CM

## 2020-09-11 NOTE — PROGRESS NOTES
AUDIOLOGY REPORT    SUBJECTIVE: Zulma Lovell is a 64 year old female was seen in the Audiology Clinic at  [facility] on 9/11/20 to discuss concerns with hearing and functional communication difficulties.  Zulma has been seen previously on 08/12/2020, and results revealed a mild sloping to severe sensorineural hearing loss bilaterally.  Zulma notes difficulty with communication in a variety of listening situations. She tried hearing aids about 6 years ago but felt the background noise was to overwhelming. She is interested in rechargeable hearing aids.    OBJECTIVE:  Patient is a hearing aid candidate. Patient would like to move forward with a hearing aid evaluation today. Therefore, the patient was presented with different options for amplification to help aid in communication. Discussed styles, levels of technology and monaural vs. binaural fitting.     The hearing aid(s) mutually chosen were:  Binaural: Resound One level 5  COLOR: 11  BATTERY SIZE: rechargeable  EARMOLD/TIPS: tulip medium  CANAL/ LENGTH: 2    ASSESSMENT:   Reviewed purchase information and warranty information with patient. The 45 day trial period was explained to patient. The patient was given a copy of the [place] [employer] consumer brochure on purchasing hearing instruments. Patient risk factors have been provided to the patient in writing prior to the sale of the hearing aid per FDA regulation. The risk factors are also available in the User Instructional Booklet to be presented on the day of the hearing aid fitting. Hearing aid(s) ordered. Hearing aid evaluation completed.    PLAN: Zulma is scheduled to return in 2-3 weeks for a hearing aid fitting and programming. Purchase agreement will be completed on that date. Please contact this clinic with any questions or concerns.        Sherine Crowley, [facility]  Licensed Audiologist  [place] #[id number]

## 2020-10-01 DIAGNOSIS — I15.1 HTN, KIDNEY TRANSPLANT RELATED: ICD-10-CM

## 2020-10-01 DIAGNOSIS — Z94.0 HTN, KIDNEY TRANSPLANT RELATED: ICD-10-CM

## 2020-10-02 ENCOUNTER — OFFICE VISIT (OUTPATIENT)
Dept: AUDIOLOGY | Facility: CLINIC | Age: 65
End: 2020-10-02

## 2020-10-02 DIAGNOSIS — H90.3 SENSORY HEARING LOSS, BILATERAL: Primary | ICD-10-CM

## 2020-10-02 PROCEDURE — V5011 HEARING AID FITTING/CHECKING: HCPCS | Performed by: AUDIOLOGIST

## 2020-10-02 PROCEDURE — V5261 HEARING AID, DIGIT, BIN, BTE: HCPCS | Performed by: AUDIOLOGIST

## 2020-10-02 PROCEDURE — V5160 DISPENSING FEE BINAURAL: HCPCS | Performed by: AUDIOLOGIST

## 2020-10-02 PROCEDURE — V5020 CONFORMITY EVALUATION: HCPCS | Performed by: AUDIOLOGIST

## 2020-10-02 RX ORDER — AMLODIPINE BESYLATE 2.5 MG/1
2.5 TABLET ORAL AT BEDTIME
Qty: 90 TABLET | Refills: 3 | Status: SHIPPED | OUTPATIENT
Start: 2020-10-02 | End: 2021-09-07

## 2020-10-02 NOTE — PROGRESS NOTES
AUDIOLOGY REPORT    SUBJECTIVE: Zulma Lovell is a 64 year old female who was seen in the Audiology Clinic at the [facility] for a fitting of Resound One 5 TANISHA. Zulma has been seen previously on 08/12/2020, and results revealed a mild sloping to severe sensorineural hearing loss bilaterally.     OBJECTIVE: The hearing aid conformity evaluation was completed.The hearing aids were placed and they provided a good fit. Real-ear-probe-microphone measurements were completed on the [employer] [place] system and were a good match to NAL-NL1 target with soft sounds audible, moderate sounds comfortable, and loud sounds below discomfort. UCLs are verified through maximum power output measures and demonstrate appropriate limiting of loud inputs. Zulma was oriented to proper hearing aid use, care, cleaning (no water, dry brush), batteries (size rechargeable, insertion/removal, toxicity, low-battery signal), aid insertion/removal, user booklet, warranty information, storage cases, and other hearing aid details. The patient confirmed understanding of hearing aid use and care, and showed proper insertion of hearing aid and batteries while in the office today.Zulma reported good volume and sound quality today. Bluetooth connectivity will be completed at the next appointment.   Hearing aids were programmed as follows:  Program 1: Auto only    EAR(S) FIT: Bilateral  HEARING AID MODEL NAME: Resound One 5 TANISHA Rechargeable   HEARING AID STYLE: -in-the-ear behind-the-ear  EARMOLDS/TIP: Tulip w/ size 1 LP receivers left and right  SERIAL NUMBERS: Right: [id number] Left: [id number]  WARRANTY END DATE: 10/14/2023    ASSESSMENT: Resound One 5 TANISHA Rechargeable hearing aid(s) were fit today. Verification measures were performed. Zulma signed the Hearing Aid Purchase Agreement and was given a copy, as well as details on her hearing aids. Patient was counseled that exact out of pocket amounts cannot be determined  for hearing aid claims being sent to insurance. Any insurance coverage information presented to the patient is an estimate only, and is not a guarantee of payment. Patient has been advised to check with their own insurance.    PLAN:Zulma will return for follow-up in 2-3 weeks for a hearing aid review appointment. Please call this clinic with questions regarding today s appointment.    JOSÉ MIGUEL Walters.   Audiology Doctoral Extern, License #[id number]    I was present with the patient for the entire Audiology appointment including all procedures/testing performed by the AuD student, and agree with the student s assessment and plan as documented.      Shaunna Crowley., [facility]  Licensed Audiologist  [place] #[id number]

## 2020-10-13 ENCOUNTER — VIRTUAL VISIT (OUTPATIENT)
Dept: NEPHROLOGY | Facility: CLINIC | Age: 65
End: 2020-10-13
Attending: INTERNAL MEDICINE
Payer: COMMERCIAL

## 2020-10-13 VITALS — SYSTOLIC BLOOD PRESSURE: 118 MMHG | WEIGHT: 130 LBS | BODY MASS INDEX: 22.31 KG/M2 | DIASTOLIC BLOOD PRESSURE: 85 MMHG

## 2020-10-13 DIAGNOSIS — B27.00 EBV (EPSTEIN-BARR VIRUS) VIREMIA: ICD-10-CM

## 2020-10-13 DIAGNOSIS — Z94.0 HTN, KIDNEY TRANSPLANT RELATED: ICD-10-CM

## 2020-10-13 DIAGNOSIS — I15.1 HTN, KIDNEY TRANSPLANT RELATED: ICD-10-CM

## 2020-10-13 DIAGNOSIS — D84.9 IMMUNOSUPPRESSION (H): ICD-10-CM

## 2020-10-13 DIAGNOSIS — Z94.0 KIDNEY REPLACED BY TRANSPLANT: Primary | ICD-10-CM

## 2020-10-13 PROCEDURE — 83883 ASSAY NEPHELOMETRY NOT SPEC: CPT | Mod: GT | Performed by: INTERNAL MEDICINE

## 2020-10-13 PROCEDURE — 99214 OFFICE O/P EST MOD 30 MIN: CPT | Mod: 95

## 2020-10-13 ASSESSMENT — PAIN SCALES - GENERAL: PAINLEVEL: NO PAIN (0)

## 2020-10-13 NOTE — PATIENT INSTRUCTIONS
No change to your medication.  Few blood tests added to your usual lab tests. ( for neuropathy )

## 2020-10-13 NOTE — PROGRESS NOTES
"Zulma Lovell is a 64 year old female who is being evaluated via a billable video visit.      The patient has been notified of following:     \"This video visit will be conducted via a call between you and your physician/provider. We have found that certain health care needs can be provided without the need for an in-person physical exam.  This service lets us provide the care you need with a video conversation.  If a prescription is necessary we can send it directly to your pharmacy.  If lab work is needed we can place an order for that and you can then stop by our lab to have the test done at a later time.    Video visits are billed at different rates depending on your insurance coverage.  Please reach out to your insurance provider with any questions.    If during the course of the call the physician/provider feels a video visit is not appropriate, you will not be charged for this service.\"    Patient has given verbal consent for Video visit? Yes  How would you like to obtain your AVS? Mail a copy  If you are dropped from the video visit, the video invite should be resent to: Send to e-mail at: [email or web address]  Will anyone else be joining your video visit? No      Video-Visit Details    Type of service:  Video Visit    Video Start Time:1:29pm  Video End Time:1:45pm    Originating Location (pt. Location): Home    Distant Location (provider location):  [facility]OLOG[facility] [place]     Platform used for Video Visit: Vega Rollins MD        "

## 2020-10-13 NOTE — LETTER
"10/13/2020       RE: Zulma Lovell  [address]  [facility] [place] [place] [id number]     Dear Colleague,    Thank you for referring your patient, Zulma Lovell, to the [facility] NEPHROLOG[facility]IN[facility] [place] at [facility]. Please see a copy of my visit note below.    Zulma Lovell is a 64 year old female who is being evaluated via a billable video visit.      The patient has been notified of following:     \"This video visit will be conducted via a call between you and your physician/provider. We have found that certain health care needs can be provided without the need for an in-person physical exam.  This service lets us provide the care you need with a video conversation.  If a prescription is necessary we can send it directly to your pharmacy.  If lab work is needed we can place an order for that and you can then stop by our lab to have the test done at a later time.    Video visits are billed at different rates depending on your insurance coverage.  Please reach out to your insurance provider with any questions.    If during the course of the call the physician/provider feels a video visit is not appropriate, you will not be charged for this service.\"    Patient has given verbal consent for Video visit? Yes  How would you like to obtain your AVS? Mail a copy  If you are dropped from the video visit, the video invite should be resent to: Send to e-mail at: [email or web address]  Will anyone else be joining your video visit? No      Video-Visit Details    Type of service:  Video Visit    Video Start Time:1:29pm  Video End Time:1:45pm    Originating Location (pt. Location): Home    Distant Location (provider location):  [facility] [place]     Platform used for Video Visit: Vega Rollins MD          TRANSPLANT NEPHROLOGY TELEMEDICINE VISIT     CHRONIC TRANSPLANT NEPHROLOGY VISIT    Assessment & Plan   # DDKT: Stable   - Baseline " Cr ~ 0.9 - 1.1   - Proteinuria: Normal (<0.2 grams)   - Date DSA Last Checked: Aug/2013      Latest DSA: Not checked recently due to time from transplant   - BK Viremia: No   - Kidney Tx Biopsy: No      # Immunosuppression: Cyclosporine 50-75, Mycophenolate 500mg bid   - Changes: No    # Infection Prophylaxis:   - PJP: None    # Blood Pressure    : controlled on Norvasc 5 mg daily. BP at home 118/85.      # Anemia/Erythrocytosis Hemoglobin stable at 10.9    # Mineral Bone Disorder PTH not checked since 2007. Ca is normal. Vitamin D was normal in 2017.                    - Will check PTH and Vit D with next labs    # Electrolytes Potassium, sodium, WNL    # EBV viremia: persistent, stable viral log around 4.9. She received rituximab x2 in the past and had tonsillectomy/adenoidectomy 7/29/20 after discussion with Dr. Perez   -Will continue to monitor    #Neuropathy:   -Obtain SPEP, UPEP, B12.    -If these are negative, consider ABIs    # Skin Cancer Risk:    - Discussed sun protection and recommend regular follow up with Dermatology.    # Medical Compliance: Yes    # Transplant History:  Etiology of Kidney Failure:   Tx: DDKT  Transplant: 8/19/2007 (Kidney)  Donor Type: Donation after Brain Death Donor Class: Standard Criteria Donor  Significant changes in immunosuppression: None  Significant transplant-related complications: EBV Viremia    Transplant Office Phone Number: [phone]    Assessment and plan was discussed with the patient and she voiced her understanding and agreement.    Return visit: Return in about 1 year (around 10/13/2021) for Follow up.    Jose Juan Pierre MD    Chief Complaint   Ms. Lovell is a 65 year old here for transplant follow up.    History of Present Illness   She has been doing fairly well. She had a tonsillectomy recently. She denies any weight changes, fever or chills. She does have hot flashes and sweating and this has been going on since menopause. She is tolerating her meds with  no reported issues. She denies adenopathy.     She states that every once in a while, every few weeks, she develops a sharp shooting pain in her thigh that lasts for an hour. She denies other claudication symptoms    Recent Hospitalizations:  [x] No [] Yes    New Medical Issues: [x] No [] Yes    Decreased energy: [x] No [] Yes    Chest pain or SOB with exertion:  [x] No [] Yes    Appetite change or weight change: [x] No [] Yes    Nausea, vomiting or diarrhea:  [] No [] Yes    Fever, sweats or chills: [x] No [] Yes    Leg swelling: [x] No [] Yes      Home BP: 118/85    Review of Systems   A comprehensive review of systems was obtained and negative, except as noted in the HPI or PMH.    Problem List   Patient Active Problem List   Diagnosis     Kidney replaced by transplant     Immunosuppression (H)     Anemia in chronic renal disease     Dyslipidemia     Aftercare following organ transplant     EBV (Bandar-Barr virus) viremia     Vitamin D deficiency     HTN, kidney transplant related     Immunosuppressed status (H)     Large tonsils     Cervical osteoarthritis     Glaucoma     Hearing loss     Hyperlipidemia     Airway obstruction       Social History   Social History     Tobacco Use     Smoking status: Never Smoker     Smokeless tobacco: Never Used   Substance Use Topics     Alcohol use: No     Alcohol/week: 0.0 standard drinks     Drug use: No       Allergies   Allergies   Allergen Reactions     Fenofibrate Other (See Comments)     Pancreatitis     Tricor Other (See Comments)     Pancreatitis (this is fenofibrate)     Simvastatin Muscle Pain (Myalgia) and Cramps       Medications   Current Outpatient Medications   Medication Sig     amLODIPine (NORVASC) 2.5 MG tablet Take 1 tablet (2.5 mg) by mouth at bedtime     brimonidine (ALPHAGAN) 0.2 % ophthalmic solution Place 1 drop Into the left eye every morning      cycloSPORINE modified (GENERIC EQUIVALENT) 25 MG capsule Take 3 capsules (75 mg) by mouth 2 times daily      latanoprost (XALATAN) 0.005 % ophthalmic solution Place 1 drop Into the left eye At Bedtime     loteprednol (LOTEMAX) 0.5 % ophthalmic suspension Place 1 drop Into the left eye At Bedtime      metoprolol tartrate (LOPRESSOR) 25 MG tablet Take 1 tablet (25 mg) by mouth 2 times daily     Multiple Vitamins-Minerals (CENTRUM SILVER) per tablet Take 1 tablet by mouth every morning      mycophenolate (GENERIC EQUIVALENT) 250 MG capsule Take 2 capsules (500 mg) by mouth 2 times daily     Omega-3 Fatty Acids (FISH OIL) 1000 MG CPDR Take 2,000 mg by mouth 2 times daily     PRAVASTATIN SODIUM PO Take 20 mg by mouth At Bedtime      Ranibizumab (LUCENTIS IO) Eye injections given every 11 weeks     timolol maleate (TIMOPTIC) 0.5 % ophthalmic solution Place 1 drop Into the left eye every morning     No current facility-administered medications for this visit.      There are no discontinued medications.    Physical Exam   Vital Signs: /85   Wt 59 kg (130 lb)   LMP  (LMP Unknown)   BMI 22.31 kg/m      GENERAL APPEARANCE: alert and no distress  HENT: no obvious abnormalities on appearance  RESP: breathing appears unremarkable with normal rate, no audible wheezing or cough and no apparent shortness of breath with conversation  MS: extremities normal - no gross deformities noted  SKIN: no apparent rash and normal skin tone  NEURO: speech is clear with no obvious neurological deficits  PSYCH: mentation appears normal and affect normal        Data     Renal Latest Ref Rng & Units 9/9/2020 7/30/2020 4/27/2020   Na 133 - 144 mmol/L 137 136 136   Na (external) 136 - 145 mmol/L - - -   K 3.4 - 5.3 mmol/L 4.8 4.4 4.4   K (external) 3.5 - 5.0 mmol/L - - -   Cl 94 - 109 mmol/L 104 104 103   Cl (external) 98 - 107 mmol/L - - -   CO2 20 - 32 mmol/L 25 25 27   CO2 (external) 22 - 31 mmol/L - - -   BUN 7 - 30 mg/dL 28 22 30   BUN (external) 8 - 22 mg/dL - - -   Cr 0.52 - 1.04 mg/dL 0.93 0.81 0.86   Cr (external) 0.60 - 1.10 mg/dL - -  -   Glucose 70 - 99 mg/dL 97 185(H) 106(H)   Glucose (external) 70 - 125 mg/dL - - -   Ca  8.5 - 10.1 mg/dL 9.7 9.7 9.9   Ca (external) 8.5 - 10.5 mg/dL - - -   Mg 1.6 - 2.3 mg/dL - - -     Bone Health Latest Ref Rng & Units 8/7/2017 12/8/2015 12/21/2009   Phos 2.5 - 4.5 mg/dL - - 4.2   Phos (external) 2.5 - 4.5 mg/dL - 3.1 -   PTHi 12 - 72 pg/mL - - -   Vit D Def 20 - 75 ug/L 50 - -     Heme Latest Ref Rng & Units 9/9/2020 4/27/2020 1/28/2020   WBC 4.0 - 11.0 10e9/L 4.2 3.7(L) 4.2   WBC (external) 4.0 - 11.0 thou/ul - - -   Hgb 11.7 - 15.7 g/dL 10.9(L) 12.0 10.8(L)   Hgb (external) 12.0 - 16.0 g/dL - - -   Plt 150 - 450 10e9/L 189 177 178   Plt (external) 140 - 440 thou/ul - - -   ABSOLUTE NEUTROPHIL 1.6 - 8.3 10e9/L - - -   ABSOLUTE NEUTROPHILS (EXTERNAL) 2.0 - 7.7 thou/uL - - -   ABSOLUTE LYMPHOCYTES 0.8 - 5.3 10e9/L - - -   ABSOLUTE LYMPHOCYTES (EXTERNAL) 0.8 - 4.4 thou/uL - - -   ABSOLUTE MONOCYTES 0.0 - 1.3 10e9/L - - -   ABSOLUTE MONOCYTES (EXTERNAL) 0.0 - 0.9 thou/uL - - -   ABSOLUTE EOSINOPHILS 0.0 - 0.7 10e9/L - - -   ABSOLUTE EOSINOPHILS (EXTERNAL) 0.0 - 0.4 thou/uL - - -   ABSOLUTE BASOPHILS 0.0 - 0.2 10e9/L - - -   ABSOLUTE BASOPHILS (EXTERNAL) 0.0 - 0.2 thou/uL - - -   ABS IMMATURE GRANULOCYTES 0 - 0.4 10e9/L - - -   ABSOLUTE NUCLEATED RBC - - - -     Liver Latest Ref Rng & Units 12/8/2015 7/20/2015 5/27/2015   AP 40 - 150 U/L - - -   AP (external) U/L - 55 -   TBili 0.2 - 1.3 mg/dL - - -   TBili (external) mg/dL - 1.0 -   DBili (external) mg/dL - 0.2 -   ALT 0 - 50 U/L - - -   ALT (external) U/L - 24 -   AST 0 - 45 U/L - - -   AST (external) U/L - 31 -   Tot Protein 6.8 - 8.8 g/dL - - -   Tot Protein (external) g/dL - 8.0 -   Albumin 3.9 - 5.1 g/dL - - -   Albumin (external) 3.5 - 5.0 g/dL 3.7 3.9 3.8     Pancreas Latest Ref Rng & Units 8/26/2007 5/9/2007   A1C 4.3 - 6.0 % - 4.2(L)   Amylase 30 - 110 U/L 58 -   Lipase 20 - 250 U/L 186 -     Iron studies Latest Ref Rng & Units 8/28/2007   Iron 35 - 180  ug/dL 23(L)   Ferritin 10 - 300 ng/mL 903(H)     [facility] Txp Virology Latest Ref Rng & Units 9/9/2020 4/27/2020 1/28/2020   CMV IgG EU/mL - - -   CVM DNA Quant - - - -   CMV Quant <100 Copies/mL - - -   CMV QT Log <2.0 Log copies/mL - - -   CMV QUANT IU/ML CMVND [IU]/mL - - -   LOG IU/ML OF CMVQNT <2.1 [Log:IU]/mL - - -   BK Spec - - - -   BK Res <1000 copies/mL - - -   BK Log <3.0 Log copies/mL - - -   EBV IgG - - - -   EBV VCA IGM ANTIBODY (EXTERNAL) Negative - - -   EBV DNA COPIES/ML EBVNEG:EBV DNA Not Detected [Copies]/mL 77,536(A) 70,253(A) 130,706(A)   EBV DNA LOG OF COPIES <2.7 [Log:copies]/mL 4.9(H) 4.8(H) 5.1(H)   Hep B Core NEG - - -   Hep B Surf 0.0 - 4.9 mIU/mL - - -   HIV 1&2 NEG - - -            Recent Labs   Lab Test 07/23/18  1020 06/14/19  1033 12/20/19  1112   DOSMPA Not Provided 2,230 12/20/2019 @ 2330   MPACID [id number] 0.59* 1.22   MPAG 51.3 31.1 27.8*       Jose Juan Pierre MD   Transplant Nephrology Fellow.  10/13/2020 at 2:00 PM    Physician Attestation   I, Wang Rollins MD, personally examined and evaluated this patient.  I discussed the patient with the resident/fellow and care team, and agree with the assessment and plan of care as documented in the note on 10/14/20 .      I personally reviewed vital signs, medications, labs and imaging.    Wang Rollins MD  Date of Service (when I saw the patient): 10/13/20        Again, thank you for allowing me to participate in the care of your patient.      Sincerely,    Kidney/Pancreas Recipient

## 2020-10-14 NOTE — PROGRESS NOTES
TRANSPLANT NEPHROLOGY TELEMEDICINE VISIT     CHRONIC TRANSPLANT NEPHROLOGY VISIT    Assessment & Plan   # DDKT: Stable   - Baseline Cr ~ 0.9 - 1.1   - Proteinuria: Normal (<0.2 grams)   - Date DSA Last Checked: Aug/2013      Latest DSA: Not checked recently due to time from transplant   - BK Viremia: No   - Kidney Tx Biopsy: No      # Immunosuppression: Cyclosporine 50-75, Mycophenolate 500mg bid   - Changes: No    # Infection Prophylaxis:   - PJP: None    # Blood Pressure    : controlled on Norvasc 5 mg daily. BP at home 118/85.      # Anemia/Erythrocytosis Hemoglobin stable at 10.9    # Mineral Bone Disorder PTH not checked since 2007. Ca is normal. Vitamin D was normal in 2017.                    - Will check PTH and Vit D with next labs    # Electrolytes Potassium, sodium, WNL    # EBV viremia: persistent, stable viral log around 4.9. She received rituximab x2 in the past and had tonsillectomy/adenoidectomy 7/29/20 after discussion with Dr. Perez   -Will continue to monitor    #Neuropathy:   -Obtain SPEP, UPEP, B12.    -If these are negative, consider ABIs    # Skin Cancer Risk:    - Discussed sun protection and recommend regular follow up with Dermatology.    # Medical Compliance: Yes    # Transplant History:  Etiology of Kidney Failure:   Tx: DDKT  Transplant: 8/19/2007 (Kidney)  Donor Type: Donation after Brain Death Donor Class: Standard Criteria Donor  Significant changes in immunosuppression: None  Significant transplant-related complications: EBV Viremia    Transplant Office Phone Number: [phone]    Assessment and plan was discussed with the patient and she voiced her understanding and agreement.    Return visit: Return in about 1 year (around 10/13/2021) for Follow up.    Jose Juan Pierre MD    Chief Complaint   Ms. Lovell is a 65 year old here for transplant follow up.    History of Present Illness   She has been doing fairly well. She had a tonsillectomy recently. She denies any weight changes,  fever or chills. She does have hot flashes and sweating and this has been going on since menopause. She is tolerating her meds with no reported issues. She denies adenopathy.     She states that every once in a while, every few weeks, she develops a sharp shooting pain in her thigh that lasts for an hour. She denies other claudication symptoms    Recent Hospitalizations:  [x] No [] Yes    New Medical Issues: [x] No [] Yes    Decreased energy: [x] No [] Yes    Chest pain or SOB with exertion:  [x] No [] Yes    Appetite change or weight change: [x] No [] Yes    Nausea, vomiting or diarrhea:  [] No [] Yes    Fever, sweats or chills: [x] No [] Yes    Leg swelling: [x] No [] Yes      Home BP: 118/85    Review of Systems   A comprehensive review of systems was obtained and negative, except as noted in the HPI or PMH.    Problem List   Patient Active Problem List   Diagnosis     Kidney replaced by transplant     Immunosuppression (H)     Anemia in chronic renal disease     Dyslipidemia     Aftercare following organ transplant     EBV (Bandar-Barr virus) viremia     Vitamin D deficiency     HTN, kidney transplant related     Immunosuppressed status (H)     Large tonsils     Cervical osteoarthritis     Glaucoma     Hearing loss     Hyperlipidemia     Airway obstruction       Social History   Social History     Tobacco Use     Smoking status: Never Smoker     Smokeless tobacco: Never Used   Substance Use Topics     Alcohol use: No     Alcohol/week: 0.0 standard drinks     Drug use: No       Allergies   Allergies   Allergen Reactions     Fenofibrate Other (See Comments)     Pancreatitis     Tricor Other (See Comments)     Pancreatitis (this is fenofibrate)     Simvastatin Muscle Pain (Myalgia) and Cramps       Medications   Current Outpatient Medications   Medication Sig     amLODIPine (NORVASC) 2.5 MG tablet Take 1 tablet (2.5 mg) by mouth at bedtime     brimonidine (ALPHAGAN) 0.2 % ophthalmic solution Place 1 drop Into the  left eye every morning      cycloSPORINE modified (GENERIC EQUIVALENT) 25 MG capsule Take 3 capsules (75 mg) by mouth 2 times daily     latanoprost (XALATAN) 0.005 % ophthalmic solution Place 1 drop Into the left eye At Bedtime     loteprednol (LOTEMAX) 0.5 % ophthalmic suspension Place 1 drop Into the left eye At Bedtime      metoprolol tartrate (LOPRESSOR) 25 MG tablet Take 1 tablet (25 mg) by mouth 2 times daily     Multiple Vitamins-Minerals (CENTRUM SILVER) per tablet Take 1 tablet by mouth every morning      mycophenolate (GENERIC EQUIVALENT) 250 MG capsule Take 2 capsules (500 mg) by mouth 2 times daily     Omega-3 Fatty Acids (FISH OIL) 1000 MG CPDR Take 2,000 mg by mouth 2 times daily     PRAVASTATIN SODIUM PO Take 20 mg by mouth At Bedtime      Ranibizumab (LUCENTIS IO) Eye injections given every 11 weeks     timolol maleate (TIMOPTIC) 0.5 % ophthalmic solution Place 1 drop Into the left eye every morning     No current facility-administered medications for this visit.      There are no discontinued medications.    Physical Exam   Vital Signs: /85   Wt 59 kg (130 lb)   LMP  (LMP Unknown)   BMI 22.31 kg/m      GENERAL APPEARANCE: alert and no distress  HENT: no obvious abnormalities on appearance  RESP: breathing appears unremarkable with normal rate, no audible wheezing or cough and no apparent shortness of breath with conversation  MS: extremities normal - no gross deformities noted  SKIN: no apparent rash and normal skin tone  NEURO: speech is clear with no obvious neurological deficits  PSYCH: mentation appears normal and affect normal        Data     Renal Latest Ref Rng & Units 9/9/2020 7/30/2020 4/27/2020   Na 133 - 144 mmol/L 137 136 136   Na (external) 136 - 145 mmol/L - - -   K 3.4 - 5.3 mmol/L 4.8 4.4 4.4   K (external) 3.5 - 5.0 mmol/L - - -   Cl 94 - 109 mmol/L 104 104 103   Cl (external) 98 - 107 mmol/L - - -   CO2 20 - 32 mmol/L 25 25 27   CO2 (external) 22 - 31 mmol/L - - -   BUN 7  - 30 mg/dL 28 22 30   BUN (external) 8 - 22 mg/dL - - -   Cr 0.52 - 1.04 mg/dL 0.93 0.81 0.86   Cr (external) 0.60 - 1.10 mg/dL - - -   Glucose 70 - 99 mg/dL 97 185(H) 106(H)   Glucose (external) 70 - 125 mg/dL - - -   Ca  8.5 - 10.1 mg/dL 9.7 9.7 9.9   Ca (external) 8.5 - 10.5 mg/dL - - -   Mg 1.6 - 2.3 mg/dL - - -     Bone Health Latest Ref Rng & Units 8/7/2017 12/8/2015 12/21/2009   Phos 2.5 - 4.5 mg/dL - - 4.2   Phos (external) 2.5 - 4.5 mg/dL - 3.1 -   PTHi 12 - 72 pg/mL - - -   Vit D Def 20 - 75 ug/L 50 - -     Heme Latest Ref Rng & Units 9/9/2020 4/27/2020 1/28/2020   WBC 4.0 - 11.0 10e9/L 4.2 3.7(L) 4.2   WBC (external) 4.0 - 11.0 thou/ul - - -   Hgb 11.7 - 15.7 g/dL 10.9(L) 12.0 10.8(L)   Hgb (external) 12.0 - 16.0 g/dL - - -   Plt 150 - 450 10e9/L 189 177 178   Plt (external) 140 - 440 thou/ul - - -   ABSOLUTE NEUTROPHIL 1.6 - 8.3 10e9/L - - -   ABSOLUTE NEUTROPHILS (EXTERNAL) 2.0 - 7.7 thou/uL - - -   ABSOLUTE LYMPHOCYTES 0.8 - 5.3 10e9/L - - -   ABSOLUTE LYMPHOCYTES (EXTERNAL) 0.8 - 4.4 thou/uL - - -   ABSOLUTE MONOCYTES 0.0 - 1.3 10e9/L - - -   ABSOLUTE MONOCYTES (EXTERNAL) 0.0 - 0.9 thou/uL - - -   ABSOLUTE EOSINOPHILS 0.0 - 0.7 10e9/L - - -   ABSOLUTE EOSINOPHILS (EXTERNAL) 0.0 - 0.4 thou/uL - - -   ABSOLUTE BASOPHILS 0.0 - 0.2 10e9/L - - -   ABSOLUTE BASOPHILS (EXTERNAL) 0.0 - 0.2 thou/uL - - -   ABS IMMATURE GRANULOCYTES 0 - 0.4 10e9/L - - -   ABSOLUTE NUCLEATED RBC - - - -     Liver Latest Ref Rng & Units 12/8/2015 7/20/2015 5/27/2015   AP 40 - 150 U/L - - -   AP (external) U/L - 55 -   TBili 0.2 - 1.3 mg/dL - - -   TBili (external) mg/dL - 1.0 -   DBili (external) mg/dL - 0.2 -   ALT 0 - 50 U/L - - -   ALT (external) U/L - 24 -   AST 0 - 45 U/L - - -   AST (external) U/L - 31 -   Tot Protein 6.8 - 8.8 g/dL - - -   Tot Protein (external) g/dL - 8.0 -   Albumin 3.9 - 5.1 g/dL - - -   Albumin (external) 3.5 - 5.0 g/dL 3.7 3.9 3.8     Pancreas Latest Ref Rng & Units 8/26/2007 5/9/2007   A1C 4.3 - 6.0  % - 4.2(L)   Amylase 30 - 110 U/L 58 -   Lipase 20 - 250 U/L 186 -     Iron studies Latest Ref Rng & Units 8/28/2007   Iron 35 - 180 ug/dL 23(L)   Ferritin 10 - 300 ng/mL 903(H)     [facility] Txp Virology Latest Ref Rng & Units 9/9/2020 4/27/2020 1/28/2020   CMV IgG EU/mL - - -   CVM DNA Quant - - - -   CMV Quant <100 Copies/mL - - -   CMV QT Log <2.0 Log copies/mL - - -   CMV QUANT IU/ML CMVND [IU]/mL - - -   LOG IU/ML OF CMVQNT <2.1 [Log:IU]/mL - - -   BK Spec - - - -   BK Res <1000 copies/mL - - -   BK Log <3.0 Log copies/mL - - -   EBV IgG - - - -   EBV VCA IGM ANTIBODY (EXTERNAL) Negative - - -   EBV DNA COPIES/ML EBVNEG:EBV DNA Not Detected [Copies]/mL 77,536(A) 70,253(A) 130,706(A)   EBV DNA LOG OF COPIES <2.7 [Log:copies]/mL 4.9(H) 4.8(H) 5.1(H)   Hep B Core NEG - - -   Hep B Surf 0.0 - 4.9 mIU/mL - - -   HIV 1&2 NEG - - -            Recent Labs   Lab Test 07/23/18  1020 06/14/19  1033 12/20/19  1112   DOSMPA Not Provided 2,230 12/20/2019 @ 2330   MPACID 1.62 0.59* 1.22   MPAG 51.3 31.1 27.8*       Jose Juan Pierre MD   Transplant Nephrology Fellow.  10/13/2020 at 2:00 PM    Physician Attestation   I, Wang Rollins MD, personally examined and evaluated this patient.  I discussed the patient with the resident/fellow and care team, and agree with the assessment and plan of care as documented in the note on 10/14/20 .      I personally reviewed vital signs, medications, labs and imaging.    Wang Rollins MD  Date of Service (when I saw the patient): 10/13/20

## 2020-10-23 ENCOUNTER — OFFICE VISIT (OUTPATIENT)
Dept: AUDIOLOGY | Facility: CLINIC | Age: 65
End: 2020-10-23
Payer: COMMERCIAL

## 2020-10-23 DIAGNOSIS — H90.3 SENSORINEURAL HEARING LOSS, BILATERAL: Primary | ICD-10-CM

## 2020-10-23 PROCEDURE — 99207 PR ASSESSMENT FOR HEARING AID: CPT | Performed by: AUDIOLOGIST

## 2020-10-23 NOTE — PROGRESS NOTES
AUDIOLOGY REPORT    SUBJECTIVE: Zulma Lovlel is a 65 year old female who was seen in the Audiology Clinic at [facility] on 10/23/2020 for a follow-up check regarding the fitting of new hearing aids. Previous results have revealed mild sloping to severe sensorineural hearing loss bilaterally.  The patient has been seen previously in this clinic and was fit with bilateral ReSound ONE 5 - R hearing aids on 10/2/2020.  Zulma reports she is very pleased with the hearing aids overall. She does note that loud sounds, such as the toilet flushing, are still uncomfortable at times.    OBJECTIVE: The International Outcome Inventory-Hearing Aids (IOI-HA) was administered today.The patient s responses to the 7 questions can be compared to normative data relative to how others are performing with their hearing aids, as well as focusing audiologic care and counseling.This patient s Quality of Life score (Question 7) was 5, which is above normative average.     Based on patient report, the following changes were made: Gain for loud inputs was decreased by 3 dB. The hearing aids were paired to the patient's iPhone and use was reviewed. She did not wish to download the [employer] shanell at this time.      Reviewed 45 day trial period, care, cleaning (no water, dry brush), batteries (rechargeable, low-battery signal), aid insertion/removal, volume adjustment (if applicable), user booklet, warranty information, storage cases, and other hearing aid details.     An electroacoustic analysis was performed at User Settings for future comparison.    ASSESSMENT: A follow-up appointment for hearing aid fitting was completed today. IOI-HA administered. Changes made as outlined above. No charge visit, as the hearing aids are in warranty.    PLAN: Zulma will return for follow-up as needed, or at least every 6-9 months for cleaning and assessment of the hearing aids. Please call this clinic with any  questions regarding today s appointment.      Sherine Thrasher, [facility]  Licensed Audiologist  [place] #[id number]

## 2020-11-16 DIAGNOSIS — Z94.0 KIDNEY TRANSPLANTED: Primary | ICD-10-CM

## 2020-11-16 RX ORDER — CYCLOSPORINE 25 MG/1
75 CAPSULE, LIQUID FILLED ORAL 2 TIMES DAILY
Qty: 540 CAPSULE | Refills: 3 | Status: SHIPPED | OUTPATIENT
Start: 2020-11-16 | End: 2021-10-11

## 2020-12-06 ENCOUNTER — HEALTH MAINTENANCE LETTER (OUTPATIENT)
Age: 65
End: 2020-12-06

## 2020-12-15 ENCOUNTER — TELEPHONE (OUTPATIENT)
Dept: TRANSPLANT | Facility: CLINIC | Age: 65
End: 2020-12-15

## 2020-12-15 NOTE — TELEPHONE ENCOUNTER
Patient Call: Voicemail  Date/Time: [id number] 04:26 pm  Reason for call: Patient is calling in about vaccine and when it will become available for transplant patients. Please call her at [phone].

## 2020-12-15 NOTE — TELEPHONE ENCOUNTER
"Called back Zulma Lovell and discussed it is safe to get COVID-19 vaccine from [employer] and [employer].  States she will \"hang tight\" and wait for Transplant Center to give out information.  She will call back if she has more questions.  "

## 2020-12-16 DIAGNOSIS — B27.00 EBV (EPSTEIN-BARR VIRUS) VIREMIA: ICD-10-CM

## 2020-12-16 DIAGNOSIS — Z48.298 AFTERCARE FOLLOWING ORGAN TRANSPLANT: ICD-10-CM

## 2020-12-16 DIAGNOSIS — D84.9 IMMUNOSUPPRESSED STATUS (H): ICD-10-CM

## 2020-12-16 DIAGNOSIS — Z94.0 KIDNEY REPLACED BY TRANSPLANT: ICD-10-CM

## 2020-12-16 DIAGNOSIS — Z94.0 KIDNEY TRANSPLANTED: ICD-10-CM

## 2020-12-16 DIAGNOSIS — Z79.899 IMMUNOSUPPRESSIVE MANAGEMENT ENCOUNTER FOLLOWING KIDNEY TRANSPLANT: ICD-10-CM

## 2020-12-16 DIAGNOSIS — Z94.0 IMMUNOSUPPRESSIVE MANAGEMENT ENCOUNTER FOLLOWING KIDNEY TRANSPLANT: ICD-10-CM

## 2020-12-16 LAB
ERYTHROCYTE [DISTWIDTH] IN BLOOD BY AUTOMATED COUNT: 11.8 % (ref 10–15)
HCT VFR BLD AUTO: 39 % (ref 35–47)
HGB BLD-MCNC: 12 G/DL (ref 11.7–15.7)
MCH RBC QN AUTO: 28.8 PG (ref 26.5–33)
MCHC RBC AUTO-ENTMCNC: 30.8 G/DL (ref 31.5–36.5)
MCV RBC AUTO: 94 FL (ref 78–100)
PLATELET # BLD AUTO: 182 10E9/L (ref 150–450)
RBC # BLD AUTO: 4.16 10E12/L (ref 3.8–5.2)
VIT B12 SERPL-MCNC: 531 PG/ML (ref 193–986)
WBC # BLD AUTO: 4.6 10E9/L (ref 4–11)

## 2020-12-16 PROCEDURE — 80048 BASIC METABOLIC PNL TOTAL CA: CPT | Performed by: INTERNAL MEDICINE

## 2020-12-16 PROCEDURE — 80158 DRUG ASSAY CYCLOSPORINE: CPT | Performed by: INTERNAL MEDICINE

## 2020-12-16 PROCEDURE — 82607 VITAMIN B-12: CPT | Performed by: INTERNAL MEDICINE

## 2020-12-16 PROCEDURE — 85027 COMPLETE CBC AUTOMATED: CPT | Performed by: INTERNAL MEDICINE

## 2020-12-16 PROCEDURE — 83883 ASSAY NEPHELOMETRY NOT SPEC: CPT | Mod: 59 | Performed by: INTERNAL MEDICINE

## 2020-12-16 PROCEDURE — 87799 DETECT AGENT NOS DNA QUANT: CPT | Performed by: INTERNAL MEDICINE

## 2020-12-16 PROCEDURE — 99N1036 PR STATISTIC TOTAL PROTEIN: Performed by: INTERNAL MEDICINE

## 2020-12-16 PROCEDURE — 84165 PROTEIN E-PHORESIS SERUM: CPT | Performed by: PATHOLOGY

## 2020-12-16 PROCEDURE — 83883 ASSAY NEPHELOMETRY NOT SPEC: CPT | Performed by: INTERNAL MEDICINE

## 2020-12-16 PROCEDURE — 36415 COLL VENOUS BLD VENIPUNCTURE: CPT | Performed by: INTERNAL MEDICINE

## 2020-12-17 DIAGNOSIS — Z94.0 KIDNEY TRANSPLANTED: ICD-10-CM

## 2020-12-17 DIAGNOSIS — B27.00 EBV (EPSTEIN-BARR VIRUS) VIREMIA: ICD-10-CM

## 2020-12-17 DIAGNOSIS — Z79.899 IMMUNOSUPPRESSIVE MANAGEMENT ENCOUNTER FOLLOWING KIDNEY TRANSPLANT: ICD-10-CM

## 2020-12-17 DIAGNOSIS — D84.9 IMMUNOSUPPRESSED STATUS (H): ICD-10-CM

## 2020-12-17 DIAGNOSIS — Z48.298 AFTERCARE FOLLOWING ORGAN TRANSPLANT: ICD-10-CM

## 2020-12-17 DIAGNOSIS — Z94.0 IMMUNOSUPPRESSIVE MANAGEMENT ENCOUNTER FOLLOWING KIDNEY TRANSPLANT: ICD-10-CM

## 2020-12-17 LAB
ALBUMIN SERPL ELPH-MCNC: 4.5 G/DL (ref 3.7–5.1)
ALPHA1 GLOB SERPL ELPH-MCNC: 0.3 G/DL (ref 0.2–0.4)
ALPHA2 GLOB SERPL ELPH-MCNC: 0.8 G/DL (ref 0.5–0.9)
ANION GAP SERPL CALCULATED.3IONS-SCNC: 2 MMOL/L (ref 3–14)
B-GLOBULIN SERPL ELPH-MCNC: 0.9 G/DL (ref 0.6–1)
BUN SERPL-MCNC: 29 MG/DL (ref 7–30)
CALCIUM SERPL-MCNC: 9.5 MG/DL (ref 8.5–10.1)
CHLORIDE SERPL-SCNC: 105 MMOL/L (ref 94–109)
CO2 SERPL-SCNC: 30 MMOL/L (ref 20–32)
CREAT SERPL-MCNC: 0.83 MG/DL (ref 0.52–1.04)
CYCLOSPORINE BLD LC/MS/MS-MCNC: 52 UG/L (ref 50–400)
EBV DNA # SPEC NAA+PROBE: ABNORMAL {COPIES}/ML
EBV DNA SPEC NAA+PROBE-LOG#: 5.1 {LOG_COPIES}/ML
GAMMA GLOB SERPL ELPH-MCNC: 1.4 G/DL (ref 0.7–1.6)
GFR SERPL CREATININE-BSD FRML MDRD: 74 ML/MIN/{1.73_M2}
GLUCOSE SERPL-MCNC: 113 MG/DL (ref 70–99)
KAPPA LC UR-MCNC: 3.76 MG/DL (ref 0.33–1.94)
KAPPA LC/LAMBDA SER: 2.58 {RATIO} (ref 0.26–1.65)
LAMBDA LC SERPL-MCNC: 1.46 MG/DL (ref 0.57–2.63)
M PROTEIN SERPL ELPH-MCNC: 0 G/DL
POTASSIUM SERPL-SCNC: 4 MMOL/L (ref 3.4–5.3)
PROT PATTERN SERPL ELPH-IMP: NORMAL
PROT UR-MCNC: 0.11 G/L
PROT/CREAT 24H UR: 0.19 G/G CR (ref 0–0.2)
SODIUM SERPL-SCNC: 137 MMOL/L (ref 133–144)
TME LAST DOSE: NORMAL H

## 2020-12-17 PROCEDURE — 84156 ASSAY OF PROTEIN URINE: CPT | Performed by: INTERNAL MEDICINE

## 2020-12-18 ENCOUNTER — TELEPHONE (OUTPATIENT)
Dept: TRANSPLANT | Facility: CLINIC | Age: 65
End: 2020-12-18

## 2020-12-18 DIAGNOSIS — Z94.0 KIDNEY REPLACED BY TRANSPLANT: ICD-10-CM

## 2020-12-18 RX ORDER — MYCOPHENOLATE MOFETIL 250 MG/1
250 CAPSULE ORAL 2 TIMES DAILY
Qty: 180 CAPSULE | Refills: 3 | Status: SHIPPED | OUTPATIENT
Start: 2020-12-18 | End: 2022-01-16

## 2020-12-18 NOTE — TELEPHONE ENCOUNTER
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      Called and discussed MMF dose change.  Verbalized understanding and agreement to plan.  Updated prescription.

## 2021-01-19 ENCOUNTER — RECORDS - HEALTHEAST (OUTPATIENT)
Dept: LAB | Facility: CLINIC | Age: 66
End: 2021-01-19

## 2021-01-19 LAB
ALBUMIN SERPL-MCNC: 4 G/DL (ref 3.5–5)
ALP SERPL-CCNC: 53 U/L (ref 45–120)
ALT SERPL W P-5'-P-CCNC: 20 U/L (ref 0–45)
ANION GAP SERPL CALCULATED.3IONS-SCNC: 10 MMOL/L (ref 5–18)
AST SERPL W P-5'-P-CCNC: 24 U/L (ref 0–40)
BILIRUB SERPL-MCNC: 1.1 MG/DL (ref 0–1)
BUN SERPL-MCNC: 28 MG/DL (ref 8–22)
CALCIUM SERPL-MCNC: 10 MG/DL (ref 8.5–10.5)
CHLORIDE BLD-SCNC: 104 MMOL/L (ref 98–107)
CO2 SERPL-SCNC: 27 MMOL/L (ref 22–31)
CREAT SERPL-MCNC: 0.96 MG/DL (ref 0.6–1.1)
GFR SERPL CREATININE-BSD FRML MDRD: 59 ML/MIN/1.73M2
GLUCOSE BLD-MCNC: 99 MG/DL (ref 70–125)
POTASSIUM BLD-SCNC: 4.4 MMOL/L (ref 3.5–5)
PROT SERPL-MCNC: 7.7 G/DL (ref 6–8)
SODIUM SERPL-SCNC: 141 MMOL/L (ref 136–145)

## 2021-03-30 ENCOUNTER — DOCUMENTATION ONLY (OUTPATIENT)
Dept: NEPHROLOGY | Facility: CLINIC | Age: 66
End: 2021-03-30

## 2021-03-30 DIAGNOSIS — Z79.899 ENCOUNTER FOR LONG-TERM CURRENT USE OF MEDICATION: ICD-10-CM

## 2021-03-30 DIAGNOSIS — Z94.0 KIDNEY REPLACED BY TRANSPLANT: ICD-10-CM

## 2021-03-30 DIAGNOSIS — Z48.298 AFTERCARE FOLLOWING ORGAN TRANSPLANT: ICD-10-CM

## 2021-03-30 DIAGNOSIS — Z48.298 AFTERCARE FOLLOWING ORGAN TRANSPLANT: Primary | ICD-10-CM

## 2021-03-30 LAB
ERYTHROCYTE [DISTWIDTH] IN BLOOD BY AUTOMATED COUNT: 12.5 % (ref 10–15)
HCT VFR BLD AUTO: 36.2 % (ref 35–47)
HGB BLD-MCNC: 11.1 G/DL (ref 11.7–15.7)
MCH RBC QN AUTO: 28.8 PG (ref 26.5–33)
MCHC RBC AUTO-ENTMCNC: 30.7 G/DL (ref 31.5–36.5)
MCV RBC AUTO: 94 FL (ref 78–100)
PLATELET # BLD AUTO: 212 10E9/L (ref 150–450)
RBC # BLD AUTO: 3.86 10E12/L (ref 3.8–5.2)
WBC # BLD AUTO: 5.1 10E9/L (ref 4–11)

## 2021-03-30 PROCEDURE — 80158 DRUG ASSAY CYCLOSPORINE: CPT | Performed by: INTERNAL MEDICINE

## 2021-03-30 PROCEDURE — 36415 COLL VENOUS BLD VENIPUNCTURE: CPT | Performed by: INTERNAL MEDICINE

## 2021-03-30 PROCEDURE — 80048 BASIC METABOLIC PNL TOTAL CA: CPT | Performed by: INTERNAL MEDICINE

## 2021-03-30 PROCEDURE — 85027 COMPLETE CBC AUTOMATED: CPT | Performed by: INTERNAL MEDICINE

## 2021-03-30 NOTE — PROGRESS NOTES
"Patient came in for lab only appt today for \"per Spong\", no orders in chart. Blood has already been drawn.  Please place FUTURE orders in Epic if appropriate, and let us know when done.    Thanks,   [facility]    "

## 2021-03-31 LAB
ANION GAP SERPL CALCULATED.3IONS-SCNC: 6 MMOL/L (ref 3–14)
BUN SERPL-MCNC: 29 MG/DL (ref 7–30)
CALCIUM SERPL-MCNC: 9.6 MG/DL (ref 8.5–10.1)
CHLORIDE SERPL-SCNC: 105 MMOL/L (ref 94–109)
CO2 SERPL-SCNC: 26 MMOL/L (ref 20–32)
CREAT SERPL-MCNC: 0.84 MG/DL (ref 0.52–1.04)
CYCLOSPORINE BLD LC/MS/MS-MCNC: 56 UG/L (ref 50–400)
GFR SERPL CREATININE-BSD FRML MDRD: 73 ML/MIN/{1.73_M2}
GLUCOSE SERPL-MCNC: 92 MG/DL (ref 70–99)
POTASSIUM SERPL-SCNC: 4.8 MMOL/L (ref 3.4–5.3)
SODIUM SERPL-SCNC: 137 MMOL/L (ref 133–144)
TME LAST DOSE: NORMAL H

## 2021-04-09 ENCOUNTER — TELEPHONE (OUTPATIENT)
Dept: TRANSPLANT | Facility: CLINIC | Age: 66
End: 2021-04-09

## 2021-04-09 DIAGNOSIS — Z94.0 KIDNEY TRANSPLANTED: ICD-10-CM

## 2021-04-09 DIAGNOSIS — D84.9 IMMUNOSUPPRESSED STATUS (H): ICD-10-CM

## 2021-04-09 DIAGNOSIS — Z48.298 AFTERCARE FOLLOWING ORGAN TRANSPLANT: ICD-10-CM

## 2021-04-09 DIAGNOSIS — B27.00 EBV (EPSTEIN-BARR VIRUS) VIREMIA: Primary | ICD-10-CM

## 2021-04-09 NOTE — TELEPHONE ENCOUNTER
Patient Call: Transplant Lab/Orders  Route to LPN  Post Transplant Days: [id number]  When patient is less than 60 days post-transplant, route high priority    Reason for Call: updated orders in Epic for [facility] patient stated no EBV was taken and will go next week for repeat lab.  Callback needed? If needed

## 2021-04-29 DIAGNOSIS — D84.9 IMMUNOSUPPRESSED STATUS (H): ICD-10-CM

## 2021-04-29 DIAGNOSIS — Z94.0 KIDNEY TRANSPLANTED: ICD-10-CM

## 2021-04-29 DIAGNOSIS — Z48.298 AFTERCARE FOLLOWING ORGAN TRANSPLANT: ICD-10-CM

## 2021-04-29 DIAGNOSIS — B27.00 EBV (EPSTEIN-BARR VIRUS) VIREMIA: ICD-10-CM

## 2021-04-29 PROCEDURE — 87799 DETECT AGENT NOS DNA QUANT: CPT | Performed by: INTERNAL MEDICINE

## 2021-04-30 ENCOUNTER — DOCUMENTATION ONLY (OUTPATIENT)
Dept: TRANSPLANT | Facility: CLINIC | Age: 66
End: 2021-04-30

## 2021-04-30 LAB
EBV DNA # SPEC NAA+PROBE: ABNORMAL {COPIES}/ML
EBV DNA SPEC NAA+PROBE-LOG#: 5.1 {LOG_COPIES}/ML

## 2021-04-30 NOTE — PROGRESS NOTES
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

## 2021-05-03 ENCOUNTER — DOCUMENTATION ONLY (OUTPATIENT)
Dept: TRANSPLANT | Facility: CLINIC | Age: 66
End: 2021-05-03

## 2021-05-03 NOTE — PROGRESS NOTES
Ref. 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

## 2021-05-26 ENCOUNTER — OFFICE VISIT (OUTPATIENT)
Dept: AUDIOLOGY | Facility: CLINIC | Age: 66
End: 2021-05-26
Payer: COMMERCIAL

## 2021-05-26 DIAGNOSIS — H90.3 SENSORINEURAL HEARING LOSS, BILATERAL: Primary | ICD-10-CM

## 2021-05-26 PROCEDURE — 99207 PR ASSESSMENT FOR HEARING AID: CPT | Performed by: AUDIOLOGIST

## 2021-05-26 NOTE — PROGRESS NOTES
AUDIOLOGY REPORT    SUBJECTIVE:Zulma Lovell is a 65 year old female who was seen in the Audiology Clinic at the [facility] on 5/26/2021  for a follow-up check regarding her hearing aids. Previous results have revealed mild sloping to severe sensorineural hearing loss bilaterally.  The patient has been seen previously in this clinic and was fit with bilateral ReSound ONE 5 - R hearing aids on 10/2/2020.  Zulma reports that about one week ago the left one sounded week and stopped working.    OBJECTIVE:   Hearing aids are examined and both have significant wax in the domes. These are changed and hearing aids are cleaned. Biological listening check revealed both have good sound quality. Electroacoustic analysis indicates good function. Reviewed cleaning with patient.    No charge visit today (in warranty hearing aid check).     ASSESSMENT: A follow-up appointment for hearing aid fitting was completed today.  Changes to hearing aid was completed as outlined above.     PLAN:Zulma will return for follow-up as needed, or at least every 9-12 months for cleaning and assessment of hearing aid.  . Please call this clinic with any questions regarding today s appointment.        Sherine Crowley, [facility]  Licensed Audiologist  [place] #[id number]

## 2021-05-27 ENCOUNTER — RECORDS - HEALTHEAST (OUTPATIENT)
Dept: ADMINISTRATIVE | Facility: CLINIC | Age: 66
End: 2021-05-27

## 2021-05-28 ENCOUNTER — RECORDS - HEALTHEAST (OUTPATIENT)
Dept: ADMINISTRATIVE | Facility: CLINIC | Age: 66
End: 2021-05-28

## 2021-05-29 ENCOUNTER — RECORDS - HEALTHEAST (OUTPATIENT)
Dept: ADMINISTRATIVE | Facility: CLINIC | Age: 66
End: 2021-05-29

## 2021-05-30 ENCOUNTER — RECORDS - HEALTHEAST (OUTPATIENT)
Dept: ADMINISTRATIVE | Facility: CLINIC | Age: 66
End: 2021-05-30

## 2021-06-30 DIAGNOSIS — Z94.0 KIDNEY REPLACED BY TRANSPLANT: ICD-10-CM

## 2021-06-30 DIAGNOSIS — Z94.0 KIDNEY TRANSPLANTED: ICD-10-CM

## 2021-06-30 DIAGNOSIS — Z79.899 ENCOUNTER FOR LONG-TERM CURRENT USE OF MEDICATION: ICD-10-CM

## 2021-06-30 DIAGNOSIS — Z48.298 AFTERCARE FOLLOWING ORGAN TRANSPLANT: ICD-10-CM

## 2021-06-30 DIAGNOSIS — B27.00 EBV (EPSTEIN-BARR VIRUS) VIREMIA: ICD-10-CM

## 2021-06-30 DIAGNOSIS — D84.9 IMMUNOSUPPRESSED STATUS (H): ICD-10-CM

## 2021-06-30 LAB
ANION GAP SERPL CALCULATED.3IONS-SCNC: 4 MMOL/L (ref 3–14)
BUN SERPL-MCNC: 29 MG/DL (ref 7–30)
CALCIUM SERPL-MCNC: 9.7 MG/DL (ref 8.5–10.1)
CHLORIDE SERPL-SCNC: 102 MMOL/L (ref 94–109)
CO2 SERPL-SCNC: 28 MMOL/L (ref 20–32)
CREAT SERPL-MCNC: 0.99 MG/DL (ref 0.52–1.04)
CYCLOSPORINE BLD LC/MS/MS-MCNC: 59 UG/L (ref 50–400)
ERYTHROCYTE [DISTWIDTH] IN BLOOD BY AUTOMATED COUNT: 12.7 % (ref 10–15)
GFR SERPL CREATININE-BSD FRML MDRD: 60 ML/MIN/{1.73_M2}
GLUCOSE SERPL-MCNC: 96 MG/DL (ref 70–99)
HCT VFR BLD AUTO: 37.8 % (ref 35–47)
HGB BLD-MCNC: 12 G/DL (ref 11.7–15.7)
MCH RBC QN AUTO: 29.6 PG (ref 26.5–33)
MCHC RBC AUTO-ENTMCNC: 31.7 G/DL (ref 31.5–36.5)
MCV RBC AUTO: 93 FL (ref 78–100)
PLATELET # BLD AUTO: 177 10E9/L (ref 150–450)
POTASSIUM SERPL-SCNC: 4.4 MMOL/L (ref 3.4–5.3)
PROT UR-MCNC: 0.12 G/L
PROT/CREAT 24H UR: 0.18 G/G CR (ref 0–0.2)
RBC # BLD AUTO: 4.06 10E12/L (ref 3.8–5.2)
SODIUM SERPL-SCNC: 134 MMOL/L (ref 133–144)
TME LAST DOSE: NORMAL H
WBC # BLD AUTO: 4.4 10E9/L (ref 4–11)

## 2021-06-30 PROCEDURE — 85027 COMPLETE CBC AUTOMATED: CPT | Performed by: INTERNAL MEDICINE

## 2021-06-30 PROCEDURE — 80158 DRUG ASSAY CYCLOSPORINE: CPT | Performed by: INTERNAL MEDICINE

## 2021-06-30 PROCEDURE — 36415 COLL VENOUS BLD VENIPUNCTURE: CPT | Performed by: INTERNAL MEDICINE

## 2021-06-30 PROCEDURE — 84156 ASSAY OF PROTEIN URINE: CPT | Performed by: INTERNAL MEDICINE

## 2021-06-30 PROCEDURE — 80048 BASIC METABOLIC PNL TOTAL CA: CPT | Performed by: INTERNAL MEDICINE

## 2021-06-30 PROCEDURE — 87799 DETECT AGENT NOS DNA QUANT: CPT | Performed by: INTERNAL MEDICINE

## 2021-07-01 LAB
EBV DNA # SPEC NAA+PROBE: ABNORMAL {COPIES}/ML
EBV DNA SPEC NAA+PROBE-LOG#: 5.4 {LOG_COPIES}/ML

## 2021-07-03 NOTE — ADDENDUM NOTE
Addendum Note by Pam Canela at 7/13/2017 11:21 AM     Author: Pam Canela Service: -- Author Type:     Filed: 7/13/2017 11:21 AM Encounter Date: 7/7/2017 Status: Signed    : Pam Canela ()    Addended by: PAM CANELA on: 7/13/2017 11:21 AM        Modules accepted: Orders

## 2021-07-06 ENCOUNTER — TELEPHONE (OUTPATIENT)
Dept: TRANSPLANT | Facility: CLINIC | Age: 66
End: 2021-07-06

## 2021-07-06 DIAGNOSIS — Z94.0 KIDNEY TRANSPLANTED: Primary | ICD-10-CM

## 2021-07-06 NOTE — TELEPHONE ENCOUNTER
Patient responded, she is not having EBV symptoms.   She will repeat in 1 month.  Lab orders placed.

## 2021-07-06 NOTE — TELEPHONE ENCOUNTER
PLAN:  [employer] message sent to assess for any EBV symptoms.   Lab order placed to repeat in 1 month       Rod Lopez MD Ututalum, Teresa, RN   Cc: Abril Perez MD             Slight increase in EBV viremia and would see if patient has any symptoms.  Would recheck EBV PCR in a month and if symptomatic, would consider rituximab dosing.  Patient to follow up with Transplant ID.

## 2021-07-13 ENCOUNTER — RECORDS - HEALTHEAST (OUTPATIENT)
Dept: ADMINISTRATIVE | Facility: CLINIC | Age: 66
End: 2021-07-13

## 2021-07-14 ENCOUNTER — LAB REQUISITION (OUTPATIENT)
Dept: LAB | Facility: CLINIC | Age: 66
End: 2021-07-14

## 2021-07-14 DIAGNOSIS — I10 ESSENTIAL (PRIMARY) HYPERTENSION: ICD-10-CM

## 2021-07-14 DIAGNOSIS — E78.2 MIXED HYPERLIPIDEMIA: ICD-10-CM

## 2021-07-14 LAB
ALBUMIN SERPL-MCNC: 3.8 G/DL (ref 3.5–5)
ALP SERPL-CCNC: 60 U/L (ref 45–120)
ALT SERPL W P-5'-P-CCNC: 20 U/L (ref 0–45)
ANION GAP SERPL CALCULATED.3IONS-SCNC: 13 MMOL/L (ref 5–18)
AST SERPL W P-5'-P-CCNC: 25 U/L (ref 0–40)
BILIRUB SERPL-MCNC: 1 MG/DL (ref 0–1)
BUN SERPL-MCNC: 24 MG/DL (ref 8–22)
CALCIUM SERPL-MCNC: 9.9 MG/DL (ref 8.5–10.5)
CHLORIDE BLD-SCNC: 103 MMOL/L (ref 98–107)
CHOLEST SERPL-MCNC: 175 MG/DL
CO2 SERPL-SCNC: 23 MMOL/L (ref 22–31)
CREAT SERPL-MCNC: 0.89 MG/DL (ref 0.6–1.1)
GFR SERPL CREATININE-BSD FRML MDRD: 69 ML/MIN/1.73M2
GLUCOSE BLD-MCNC: 109 MG/DL (ref 70–125)
HDLC SERPL-MCNC: 58 MG/DL
LDLC SERPL CALC-MCNC: 99 MG/DL
POTASSIUM BLD-SCNC: 4.5 MMOL/L (ref 3.5–5)
PROT SERPL-MCNC: 7.6 G/DL (ref 6–8)
SODIUM SERPL-SCNC: 139 MMOL/L (ref 136–145)
TRIGL SERPL-MCNC: 91 MG/DL

## 2021-07-14 PROCEDURE — 80053 COMPREHEN METABOLIC PANEL: CPT | Performed by: FAMILY MEDICINE

## 2021-07-14 PROCEDURE — 80061 LIPID PANEL: CPT | Performed by: FAMILY MEDICINE

## 2021-07-21 ENCOUNTER — RECORDS - HEALTHEAST (OUTPATIENT)
Dept: ADMINISTRATIVE | Facility: CLINIC | Age: 66
End: 2021-07-21

## 2021-07-22 ENCOUNTER — RECORDS - HEALTHEAST (OUTPATIENT)
Dept: SCHEDULING | Facility: CLINIC | Age: 66
End: 2021-07-22

## 2021-07-22 DIAGNOSIS — Z12.31 OTHER SCREENING MAMMOGRAM: ICD-10-CM

## 2021-08-05 ENCOUNTER — LAB (OUTPATIENT)
Dept: LAB | Facility: CLINIC | Age: 66
End: 2021-08-05
Payer: COMMERCIAL

## 2021-08-05 DIAGNOSIS — Z94.0 KIDNEY TRANSPLANTED: ICD-10-CM

## 2021-08-05 PROCEDURE — 87799 DETECT AGENT NOS DNA QUANT: CPT

## 2021-08-05 PROCEDURE — 36415 COLL VENOUS BLD VENIPUNCTURE: CPT

## 2021-08-06 LAB
EBV DNA COPIES/ML, INSTRUMENT: ABNORMAL COPIES/ML
EBV DNA SPEC NAA+PROBE-LOG#: 5 {LOG_COPIES}/ML

## 2021-08-09 ENCOUNTER — TELEPHONE (OUTPATIENT)
Dept: TRANSPLANT | Facility: CLINIC | Age: 66
End: 2021-08-09

## 2021-08-09 NOTE — TELEPHONE ENCOUNTER
Called back Zulma Lovell who reports she has an opportunity to watch her grandchildren.  Discussed Transplant recommendations remain the same mask, social distancing handwashing etc.  States she will think about it. Also asked about a booster shot. RNCC discussed no updates about the Booster shot at this time.

## 2021-08-09 NOTE — TELEPHONE ENCOUNTER
Patient Called is wanting to help baby sit her granddaughter twice a week  Zulma is worried about catching covid         Call back needed? Yes    Return Call Needed  Same as documented in contacts section  When to return call?: Greater than one day: Route standard priority

## 2021-09-07 DIAGNOSIS — Z94.0 HTN, KIDNEY TRANSPLANT RELATED: ICD-10-CM

## 2021-09-07 DIAGNOSIS — I15.1 HTN, KIDNEY TRANSPLANT RELATED: ICD-10-CM

## 2021-09-07 RX ORDER — AMLODIPINE BESYLATE 2.5 MG/1
2.5 TABLET ORAL AT BEDTIME
Qty: 90 TABLET | Refills: 3 | Status: SHIPPED | OUTPATIENT
Start: 2021-09-07 | End: 2022-10-04

## 2021-09-18 ENCOUNTER — LAB (OUTPATIENT)
Dept: LAB | Facility: CLINIC | Age: 66
End: 2021-09-18
Payer: COMMERCIAL

## 2021-09-18 DIAGNOSIS — Z94.0 KIDNEY REPLACED BY TRANSPLANT: ICD-10-CM

## 2021-09-18 DIAGNOSIS — D84.9 IMMUNOSUPPRESSED STATUS (H): ICD-10-CM

## 2021-09-18 DIAGNOSIS — Z79.899 ENCOUNTER FOR LONG-TERM CURRENT USE OF MEDICATION: ICD-10-CM

## 2021-09-18 DIAGNOSIS — Z48.298 AFTERCARE FOLLOWING ORGAN TRANSPLANT: ICD-10-CM

## 2021-09-18 DIAGNOSIS — Z94.0 KIDNEY TRANSPLANTED: ICD-10-CM

## 2021-09-18 DIAGNOSIS — B27.00 EBV (EPSTEIN-BARR VIRUS) VIREMIA: ICD-10-CM

## 2021-09-18 LAB
ANION GAP SERPL CALCULATED.3IONS-SCNC: 3 MMOL/L (ref 3–14)
BUN SERPL-MCNC: 27 MG/DL (ref 7–30)
CALCIUM SERPL-MCNC: 9.2 MG/DL (ref 8.5–10.1)
CHLORIDE BLD-SCNC: 104 MMOL/L (ref 94–109)
CO2 SERPL-SCNC: 29 MMOL/L (ref 20–32)
CREAT SERPL-MCNC: 0.95 MG/DL (ref 0.52–1.04)
ERYTHROCYTE [DISTWIDTH] IN BLOOD BY AUTOMATED COUNT: 12.5 % (ref 10–15)
GFR SERPL CREATININE-BSD FRML MDRD: 63 ML/MIN/1.73M2
GLUCOSE BLD-MCNC: 112 MG/DL (ref 70–99)
HCT VFR BLD AUTO: 37.8 % (ref 35–47)
HGB BLD-MCNC: 11.9 G/DL (ref 11.7–15.7)
MCH RBC QN AUTO: 29.6 PG (ref 26.5–33)
MCHC RBC AUTO-ENTMCNC: 31.5 G/DL (ref 31.5–36.5)
MCV RBC AUTO: 94 FL (ref 78–100)
PLATELET # BLD AUTO: 200 10E3/UL (ref 150–450)
POTASSIUM BLD-SCNC: 4.3 MMOL/L (ref 3.4–5.3)
RBC # BLD AUTO: 4.02 10E6/UL (ref 3.8–5.2)
SODIUM SERPL-SCNC: 136 MMOL/L (ref 133–144)
WBC # BLD AUTO: 3.8 10E3/UL (ref 4–11)

## 2021-09-18 PROCEDURE — 36415 COLL VENOUS BLD VENIPUNCTURE: CPT

## 2021-09-18 PROCEDURE — 80048 BASIC METABOLIC PNL TOTAL CA: CPT

## 2021-09-18 PROCEDURE — 85027 COMPLETE CBC AUTOMATED: CPT

## 2021-09-18 PROCEDURE — 80158 DRUG ASSAY CYCLOSPORINE: CPT

## 2021-09-18 PROCEDURE — 87799 DETECT AGENT NOS DNA QUANT: CPT

## 2021-09-19 LAB
CYCLOSPORINE BLD LC/MS/MS-MCNC: 78 UG/L (ref 50–400)
TME LAST DOSE: NORMAL H
TME LAST DOSE: NORMAL H

## 2021-09-20 LAB
EBV DNA COPIES/ML, INSTRUMENT: ABNORMAL COPIES/ML
EBV DNA SPEC NAA+PROBE-LOG#: 4.9 {LOG_COPIES}/ML

## 2021-09-25 ENCOUNTER — HEALTH MAINTENANCE LETTER (OUTPATIENT)
Age: 66
End: 2021-09-25

## 2021-10-11 DIAGNOSIS — Z94.0 KIDNEY TRANSPLANTED: Primary | ICD-10-CM

## 2021-10-11 RX ORDER — CYCLOSPORINE 25 MG/1
75 CAPSULE, LIQUID FILLED ORAL 2 TIMES DAILY
Qty: 540 CAPSULE | Refills: 3 | Status: SHIPPED | OUTPATIENT
Start: 2021-10-11 | End: 2022-11-02

## 2021-10-12 ENCOUNTER — VIRTUAL VISIT (OUTPATIENT)
Dept: NEPHROLOGY | Facility: CLINIC | Age: 66
End: 2021-10-12
Attending: INTERNAL MEDICINE
Payer: MEDICARE

## 2021-10-12 DIAGNOSIS — D84.9 IMMUNOSUPPRESSION (H): ICD-10-CM

## 2021-10-12 DIAGNOSIS — I15.1 HTN, KIDNEY TRANSPLANT RELATED: ICD-10-CM

## 2021-10-12 DIAGNOSIS — Z94.0 HTN, KIDNEY TRANSPLANT RELATED: ICD-10-CM

## 2021-10-12 DIAGNOSIS — B27.00 EBV (EPSTEIN-BARR VIRUS) VIREMIA: ICD-10-CM

## 2021-10-12 DIAGNOSIS — Z48.298 AFTERCARE FOLLOWING ORGAN TRANSPLANT: Primary | ICD-10-CM

## 2021-10-12 PROCEDURE — 99214 OFFICE O/P EST MOD 30 MIN: CPT | Mod: 95 | Performed by: INTERNAL MEDICINE

## 2021-10-12 NOTE — PROGRESS NOTES
rossana is a 65 year old who is being evaluated via a billable video visit.      How would you like to obtain your AVS? Mail a copy  If the video visit is dropped, the invitation should be resent by: Text to cell phone: [phone]  Will anyone else be joining your video visit? No      Video Start Time: 1:03  Video-Visit Details    Type of service:  Video Visit    Video End Time:1:28    Originating Location (pt. Location): Home    Distant Location (provider location):  [facility] [place]     Platform used for Video Visit: Vega RAMIREZ CMA        TRANSPLANT NEPHROLOGY TELEMEDICINE VISIT     CHRONIC TRANSPLANT NEPHROLOGY VISIT    Assessment & Plan   # DDKT: Stable   - Baseline Cr ~ 0.9 - 1.1   - Proteinuria: Normal (<0.2 grams)   - Date DSA Last Checked: Aug/2013      Latest DSA: Not checked recently due to time from transplant   - BK Viremia: No   - Kidney Tx Biopsy: No      # Immunosuppression: Cyclosporine 50-75, Mycophenolate 250mg bid   - Changes: No    - Continue with intensive monitoring of immunosuppression for efficacy and toxicity.    # Infection Prophylaxis:   - PJP: None    # Blood Pressure    : borderline variable BP readings, advised to monitor home bP  Readings, this am 124/82, currently on amlodipine 2.5 mg at bedtime & metoprolol 25 mg po bid    # Anemia/Erythrocytosis Hemoglobin stable     # Mineral Bone Disorder PTH not checked since 2007. Ca is normal. Vitamin D was normal in 2017.                    - Will check PTH and Vit D with next labs    # EBV viremia: persistent, stable viral log around 4.9. She received rituximab x2 in the past and had tonsillectomy/adenoidectomy 7/29/20 after discussion with Dr. Perez. CT c/a/p no masses or lymph nodes in 2020     # Skin Cancer Risk: reminded to schedule dermatology visit    - Discussed sun protection and recommend regular follow up with Dermatology.    # Cancer screening: overdue for mammogram    # Medical  Compliance: Yes    # Transplant History:  Etiology of Kidney Failure:   Tx: DDKT  Transplant: 8/19/2007 (Kidney)  Donor Type: Donation after Brain Death Donor Class: Standard Criteria Donor  Significant changes in immunosuppression: None  Significant transplant-related complications: EBV Viremia    Transplant Office Phone Number: [phone]    Assessment and plan was discussed with the patient and she voiced her understanding and agreement.    Return visit: 1 yr    Jenny Maguire MD    Chief Complaint   Ms. Lovell is a 65 year old here for transplant follow up.    History of Present Illness      She reports low energy after a recent bout of bronchitis. Nasal congestion and cough, low grade temp for 2.5 weeks slightly better over the past couple days. She has received covid vaccine and booster. She was tested for covid which was negative. She had some wheezes and was given albuterol with some relief. She has not yet received flu shot.   Last febrile episode was 1-1.5 weeks ago.~100 F  No nausea, vomiting, diarrhea. No headaches  Appetite is good, drinking a lot of fluids  Denies any night sweats, weight loss, lumps  Labs with downtrending EBV viral load -spontaneous & stable renal function      IS: CSA 75/75 /250    Home BP: not checking regularly-but fair control when checked in clinic 1 episode of 150/90-she wonders if course of prednisone (medrol pack) could have caused rise in BP    Review of Systems   A comprehensive review of systems was obtained and negative, except as noted in the HPI or PMH.    Problem List   Patient Active Problem List   Diagnosis     Kidney replaced by transplant     Immunosuppression (H)     Anemia in chronic renal disease     Dyslipidemia     Aftercare following organ transplant     EBV (Bandar-Barr virus) viremia     Vitamin D deficiency     HTN, kidney transplant related     Immunosuppressed status (H)     Large tonsils     Cervical osteoarthritis     Glaucoma     Hearing  loss     Hyperlipidemia     Airway obstruction       Social History   Social History     Tobacco Use     Smoking status: Never Smoker     Smokeless tobacco: Never Used   Substance Use Topics     Alcohol use: No     Alcohol/week: 0.0 standard drinks     Drug use: No       Allergies   Allergies   Allergen Reactions     Fenofibrate Other (See Comments)     Pancreatitis     Tricor Other (See Comments)     Pancreatitis (this is fenofibrate)     Simvastatin Muscle Pain (Myalgia) and Cramps       Medications   Current Outpatient Medications   Medication Sig     amLODIPine (NORVASC) 2.5 MG tablet Take 1 tablet (2.5 mg) by mouth at bedtime     brimonidine (ALPHAGAN) 0.2 % ophthalmic solution Place 1 drop Into the left eye every morning      cycloSPORINE modified (GENERIC EQUIVALENT) 25 MG capsule Take 3 capsules (75 mg) by mouth 2 times daily     latanoprost (XALATAN) 0.005 % ophthalmic solution Place 1 drop Into the left eye At Bedtime     loteprednol (LOTEMAX) 0.5 % ophthalmic suspension Place 1 drop Into the left eye At Bedtime      metoprolol tartrate (LOPRESSOR) 25 MG tablet Take 1 tablet (25 mg) by mouth 2 times daily     Multiple Vitamins-Minerals (CENTRUM SILVER) per tablet Take 1 tablet by mouth every morning      mycophenolate (GENERIC EQUIVALENT) 250 MG capsule Take 1 capsule (250 mg) by mouth 2 times daily     Omega-3 Fatty Acids (FISH OIL) 1000 MG CPDR Take 2,000 mg by mouth 2 times daily     PRAVASTATIN SODIUM PO Take 20 mg by mouth At Bedtime      Ranibizumab (LUCENTIS IO) Eye injections given every 11 weeks     timolol maleate (TIMOPTIC) 0.5 % ophthalmic solution Place 1 drop Into the left eye every morning     No current facility-administered medications for this visit.     There are no discontinued medications.    Physical Exam   Vital Signs: LMP  (LMP Unknown)     GENERAL APPEARANCE: alert and no distress  HENT: no obvious abnormalities on appearance  RESP: breathing appears unremarkable with normal rate,  no audible wheezing or cough and no apparent shortness of breath with conversation  MS: extremities normal - no gross deformities noted  SKIN: no apparent rash and normal skin tone  NEURO: speech is clear with no obvious neurological deficits  PSYCH: mentation appears normal and affect normal        Data     Renal Latest Ref Rng & Units 9/18/2021 7/14/2021 6/30/2021   Na 133 - 144 mmol/L 136 139 134   Na (external) 136 - 145 mmol/L - - -   K 3.4 - 5.3 mmol/L 4.3 4.5 4.4   K (external) 3.5 - 5.0 mmol/L - - -   Cl 94 - 109 mmol/L 104 103 102   Cl (external) 98 - 107 mmol/L - - -   CO2 20 - 32 mmol/L 29 23 28   CO2 (external) 22 - 31 mmol/L - - -   BUN 7 - 30 mg/dL 27 24(H) 29   BUN (external) 8 - 22 mg/dL - - -   Cr 0.52 - 1.04 mg/dL 0.95 0.89 0.99   Cr (external) 0.60 - 1.10 mg/dL - - -   Glucose 70 - 99 mg/dL 112(H) 109 96   Glucose (external) 70 - 125 mg/dL - - -   Ca  8.5 - 10.1 mg/dL 9.2 9.9 9.7   Ca (external) 8.5 - 10.5 mg/dL - - -   Mg 1.6 - 2.3 mg/dL - - -     Bone Health Latest Ref Rng & Units 8/7/2017 12/8/2015 12/21/2009   Phos 2.5 - 4.5 mg/dL - - 4.2   Phos (external) 2.5 - 4.5 mg/dL - 3.1 -   PTHi 12 - 72 pg/mL - - -   Vit D Def 20 - 75 ug/L 50 - -     Heme Latest Ref Rng & Units 9/18/2021 6/30/2021 3/30/2021   WBC 4.0 - 11.0 10e3/uL 3.8(L) 4.4 5.1   WBC (external) 4.0 - 11.0 thou/ul - - -   Hgb 11.7 - 15.7 g/dL 11.9 12.0 11.1(L)   Hgb (external) 12.0 - 16.0 g/dL - - -   Plt 150 - 450 10e3/uL 200 177 212   Plt (external) 140 - 440 thou/ul - - -   ABSOLUTE NEUTROPHIL 1.6 - 8.3 10e9/L - - -   ABSOLUTE NEUTROPHILS (EXTERNAL) 2.0 - 7.7 thou/uL - - -   ABSOLUTE LYMPHOCYTES 0.8 - 5.3 10e9/L - - -   ABSOLUTE LYMPHOCYTES (EXTERNAL) 0.8 - 4.4 thou/uL - - -   ABSOLUTE MONOCYTES 0.0 - 1.3 10e9/L - - -   ABSOLUTE MONOCYTES (EXTERNAL) 0.0 - 0.9 thou/uL - - -   ABSOLUTE EOSINOPHILS 0.0 - 0.7 10e9/L - - -   ABSOLUTE EOSINOPHILS (EXTERNAL) 0.0 - 0.4 thou/uL - - -   ABSOLUTE BASOPHILS 0.0 - 0.2 10e9/L - - -   ABSOLUTE  BASOPHILS (EXTERNAL) 0.0 - 0.2 thou/uL - - -   ABS IMMATURE GRANULOCYTES 0 - 0.4 10e9/L - - -   ABSOLUTE NUCLEATED RBC - - - -     Liver Latest Ref Rng & Units 7/14/2021 1/19/2021 7/2/2020   AP 45 - 120 U/L 60 53 61   AP (external) U/L - - -   TBili 0.0 - 1.0 mg/dL 1.0 1.1(H) 1.1(H)   TBili (external) mg/dL - - -   DBili (external) mg/dL - - -   ALT 0 - 45 U/L 20 20 18   ALT (external) U/L - - -   AST 0 - 40 U/L 25 24 26   AST (external) U/L - - -   Tot Protein 6.0 - 8.0 g/dL 7.6 7.7 8.2(H)   Tot Protein (external) g/dL - - -   Albumin 3.5 - 5.0 g/dL 3.8 4.0 4.2   Albumin (external) 3.5 - 5.0 g/dL - - -     Pancreas Latest Ref Rng & Units 8/26/2007 5/9/2007   A1C 4.3 - 6.0 % - 4.2(L)   Amylase 30 - 110 U/L 58 -   Lipase 20 - 250 U/L 186 -     Iron studies Latest Ref Rng & Units 8/28/2007   Iron 35 - 180 ug/dL 23(L)   Ferritin 10 - 300 ng/mL 903(H)     [facility] Txp Virology Latest Ref Rng & Units 9/18/2021 8/5/2021 6/30/2021   CMV IgG EU/mL - - -   CVM DNA Quant - - - -   CMV Quant <100 Copies/mL - - -   CMV QT Log <2.0 Log copies/mL - - -   CMV QUANT IU/ML CMVND [IU]/mL - - -   LOG IU/ML OF CMVQNT <2.1 [Log:IU]/mL - - -   BK Spec - - - -   BK Res <1000 copies/mL - - -   BK Log <3.0 Log copies/mL - - -   EBV IgG - - - -   EBV VCA IGM ANTIBODY (EXTERNAL) Negative - - -   EBV DNA COPIES/ML EBVNEG:EBV DNA Not Detected [Copies]/mL - - 238,228(A)   EBV DNA LOG OF COPIES - 4.9 5.0 5.4(H)   Hep B Core NEG - - -   Hep B Surf 0.0 - 4.9 mIU/mL - - -   HIV 1&2 NEG - - -            Recent Labs   Lab Test 07/23/18  1020 06/14/19  1033 12/20/19  1112   DOSMPA Not Provided 2,230 12/20/2019 @ 0826   MPACID 1.62 0.59* 1.22   MPAG 51.3 31.1 27.8*     Jenny Maguire MD on 10/12/2021 at 1:10 PM

## 2021-10-12 NOTE — LETTER
10/12/2021       RE: Zulma Lovell  [address]  [facility] [place] [address]     Dear Colleague,    Thank you for referring your patient, Zulma Lovell, to the [facility]ROLOG[facility] [place] at [facility]. Please see a copy of my visit note below.    zulma is a 65 year old who is being evaluated via a billable video visit.      How would you like to obtain your AVS? Mail a copy  If the video visit is dropped, the invitation should be resent by: Text to cell phone: [phone]  Will anyone else be joining your video visit? No      Video Start Time: 1:03  Video-Visit Details    Type of service:  Video Visit    Video End Time:1:28    Originating Location (pt. Location): Home    Distant Location (provider location):  [facility] [place]     Platform used for Video Visit: Vega RAMIREZ CMA        TRANSPLANT NEPHROLOGY TELEMEDICINE VISIT     CHRONIC TRANSPLANT NEPHROLOGY VISIT    Assessment & Plan   # DDKT: Stable   - Baseline Cr ~ 0.9 - 1.1   - Proteinuria: Normal (<0.2 grams)   - Date DSA Last Checked: Aug/2013      Latest DSA: Not checked recently due to time from transplant   - BK Viremia: No   - Kidney Tx Biopsy: No      # Immunosuppression: Cyclosporine 50-75, Mycophenolate 250mg bid   - Changes: No    - Continue with intensive monitoring of immunosuppression for efficacy and toxicity.    # Infection Prophylaxis:   - PJP: None    # Blood Pressure    : borderline variable BP readings, advised to monitor home bP  Readings, this am 124/82, currently on amlodipine 2.5 mg at bedtime & metoprolol 25 mg po bid    # Anemia/Erythrocytosis Hemoglobin stable     # Mineral Bone Disorder PTH not checked since 2007. Ca is normal. Vitamin D was normal in 2017.                    - Will check PTH and Vit D with next labs    # EBV viremia: persistent, stable viral log around 4.9. She received rituximab x2 in the past and  had tonsillectomy/adenoidectomy 7/29/20 after discussion with Dr. Perez. CT c/a/p no masses or lymph nodes in 2020     # Skin Cancer Risk: reminded to schedule dermatology visit    - Discussed sun protection and recommend regular follow up with Dermatology.    # Cancer screening: overdue for mammogram    # Medical Compliance: Yes    # Transplant History:  Etiology of Kidney Failure:   Tx: DDKT  Transplant: 8/19/2007 (Kidney)  Donor Type: Donation after Brain Death Donor Class: Standard Criteria Donor  Significant changes in immunosuppression: None  Significant transplant-related complications: EBV Viremia    Transplant Office Phone Number: [phone]    Assessment and plan was discussed with the patient and she voiced her understanding and agreement.    Return visit: 1 yr    Jenny Maguire MD    Chief Complaint   Ms. Lovell is a 65 year old here for transplant follow up.    History of Present Illness      She reports low energy after a recent bout of bronchitis. Nasal congestion and cough, low grade temp for 2.5 weeks slightly better over the past couple days. She has received covid vaccine and booster. She was tested for covid which was negative. She had some wheezes and was given albuterol with some relief. She has not yet received flu shot.   Last febrile episode was 1-1.5 weeks ago.~100 F  No nausea, vomiting, diarrhea. No headaches  Appetite is good, drinking a lot of fluids  Denies any night sweats, weight loss, lumps  Labs with downtrending EBV viral load -spontaneous & stable renal function      IS: CSA 75/75 /250    Home BP: not checking regularly-but fair control when checked in clinic 1 episode of 150/90-she wonders if course of prednisone (medrol pack) could have caused rise in BP    Review of Systems   A comprehensive review of systems was obtained and negative, except as noted in the HPI or PMH.    Problem List   Patient Active Problem List   Diagnosis     Kidney replaced by transplant      Immunosuppression (H)     Anemia in chronic renal disease     Dyslipidemia     Aftercare following organ transplant     EBV (Bandar-Barr virus) viremia     Vitamin D deficiency     HTN, kidney transplant related     Immunosuppressed status (H)     Large tonsils     Cervical osteoarthritis     Glaucoma     Hearing loss     Hyperlipidemia     Airway obstruction       Social History   Social History     Tobacco Use     Smoking status: Never Smoker     Smokeless tobacco: Never Used   Substance Use Topics     Alcohol use: No     Alcohol/week: 0.0 standard drinks     Drug use: No       Allergies   Allergies   Allergen Reactions     Fenofibrate Other (See Comments)     Pancreatitis     Tricor Other (See Comments)     Pancreatitis (this is fenofibrate)     Simvastatin Muscle Pain (Myalgia) and Cramps       Medications   Current Outpatient Medications   Medication Sig     amLODIPine (NORVASC) 2.5 MG tablet Take 1 tablet (2.5 mg) by mouth at bedtime     brimonidine (ALPHAGAN) 0.2 % ophthalmic solution Place 1 drop Into the left eye every morning      cycloSPORINE modified (GENERIC EQUIVALENT) 25 MG capsule Take 3 capsules (75 mg) by mouth 2 times daily     latanoprost (XALATAN) 0.005 % ophthalmic solution Place 1 drop Into the left eye At Bedtime     loteprednol (LOTEMAX) 0.5 % ophthalmic suspension Place 1 drop Into the left eye At Bedtime      metoprolol tartrate (LOPRESSOR) 25 MG tablet Take 1 tablet (25 mg) by mouth 2 times daily     Multiple Vitamins-Minerals (CENTRUM SILVER) per tablet Take 1 tablet by mouth every morning      mycophenolate (GENERIC EQUIVALENT) 250 MG capsule Take 1 capsule (250 mg) by mouth 2 times daily     Omega-3 Fatty Acids (FISH OIL) 1000 MG CPDR Take 2,000 mg by mouth 2 times daily     PRAVASTATIN SODIUM PO Take 20 mg by mouth At Bedtime      Ranibizumab (LUCENTIS IO) Eye injections given every 11 weeks     timolol maleate (TIMOPTIC) 0.5 % ophthalmic solution Place 1 drop Into the left eye every  morning     No current facility-administered medications for this visit.     There are no discontinued medications.    Physical Exam   Vital Signs: LMP  (LMP Unknown)     GENERAL APPEARANCE: alert and no distress  HENT: no obvious abnormalities on appearance  RESP: breathing appears unremarkable with normal rate, no audible wheezing or cough and no apparent shortness of breath with conversation  MS: extremities normal - no gross deformities noted  SKIN: no apparent rash and normal skin tone  NEURO: speech is clear with no obvious neurological deficits  PSYCH: mentation appears normal and affect normal        Data     Renal Latest Ref Rng & Units 9/18/2021 7/14/2021 6/30/2021   Na 133 - 144 mmol/L 136 139 134   Na (external) 136 - 145 mmol/L - - -   K 3.4 - 5.3 mmol/L 4.3 4.5 4.4   K (external) 3.5 - 5.0 mmol/L - - -   Cl 94 - 109 mmol/L 104 103 102   Cl (external) 98 - 107 mmol/L - - -   CO2 20 - 32 mmol/L 29 23 28   CO2 (external) 22 - 31 mmol/L - - -   BUN 7 - 30 mg/dL 27 24(H) 29   BUN (external) 8 - 22 mg/dL - - -   Cr 0.52 - 1.04 mg/dL 0.95 0.89 0.99   Cr (external) 0.60 - 1.10 mg/dL - - -   Glucose 70 - 99 mg/dL 112(H) 109 96   Glucose (external) 70 - 125 mg/dL - - -   Ca  8.5 - 10.1 mg/dL 9.2 9.9 9.7   Ca (external) 8.5 - 10.5 mg/dL - - -   Mg 1.6 - 2.3 mg/dL - - -     Bone Health Latest Ref Rng & Units 8/7/2017 12/8/2015 12/21/2009   Phos 2.5 - 4.5 mg/dL - - 4.2   Phos (external) 2.5 - 4.5 mg/dL - 3.1 -   PTHi 12 - 72 pg/mL - - -   Vit D Def 20 - 75 ug/L 50 - -     Heme Latest Ref Rng & Units 9/18/2021 6/30/2021 3/30/2021   WBC 4.0 - 11.0 10e3/uL 3.8(L) 4.4 5.1   WBC (external) 4.0 - 11.0 thou/ul - - -   Hgb 11.7 - 15.7 g/dL 11.9 12.0 11.1(L)   Hgb (external) 12.0 - 16.0 g/dL - - -   Plt 150 - 450 10e3/uL 200 177 212   Plt (external) 140 - 440 thou/ul - - -   ABSOLUTE NEUTROPHIL 1.6 - 8.3 10e9/L - - -   ABSOLUTE NEUTROPHILS (EXTERNAL) 2.0 - 7.7 thou/uL - - -   ABSOLUTE LYMPHOCYTES 0.8 - 5.3 10e9/L - - -    ABSOLUTE LYMPHOCYTES (EXTERNAL) 0.8 - 4.4 thou/uL - - -   ABSOLUTE MONOCYTES 0.0 - 1.3 10e9/L - - -   ABSOLUTE MONOCYTES (EXTERNAL) 0.0 - 0.9 thou/uL - - -   ABSOLUTE EOSINOPHILS 0.0 - 0.7 10e9/L - - -   ABSOLUTE EOSINOPHILS (EXTERNAL) 0.0 - 0.4 thou/uL - - -   ABSOLUTE BASOPHILS 0.0 - 0.2 10e9/L - - -   ABSOLUTE BASOPHILS (EXTERNAL) 0.0 - 0.2 thou/uL - - -   ABS IMMATURE GRANULOCYTES 0 - 0.4 10e9/L - - -   ABSOLUTE NUCLEATED RBC - - - -     Liver Latest Ref Rng & Units 7/14/2021 1/19/2021 7/2/2020   AP 45 - 120 U/L 60 53 61   AP (external) U/L - - -   TBili 0.0 - 1.0 mg/dL 1.0 1.1(H) 1.1(H)   TBili (external) mg/dL - - -   DBili (external) mg/dL - - -   ALT 0 - 45 U/L 20 20 18   ALT (external) U/L - - -   AST 0 - 40 U/L 25 24 26   AST (external) U/L - - -   Tot Protein 6.0 - 8.0 g/dL 7.6 7.7 8.2(H)   Tot Protein (external) g/dL - - -   Albumin 3.5 - 5.0 g/dL 3.8 4.0 4.2   Albumin (external) 3.5 - 5.0 g/dL - - -     Pancreas Latest Ref Rng & Units 8/26/2007 5/9/2007   A1C 4.3 - 6.0 % - 4.2(L)   Amylase 30 - 110 U/L 58 -   Lipase 20 - 250 U/L 186 -     Iron studies Latest Ref Rng & Units 8/28/2007   Iron 35 - 180 ug/dL 23(L)   Ferritin 10 - 300 ng/mL 903(H)     [facility] Txp Virology Latest Ref Rng & Units 9/18/2021 8/5/2021 6/30/2021   CMV IgG EU/mL - - -   CVM DNA Quant - - - -   CMV Quant <100 Copies/mL - - -   CMV QT Log <2.0 Log copies/mL - - -   CMV QUANT IU/ML CMVND [IU]/mL - - -   LOG IU/ML OF CMVQNT <2.1 [Log:IU]/mL - - -   BK Spec - - - -   BK Res <1000 copies/mL - - -   BK Log <3.0 Log copies/mL - - -   EBV IgG - - - -   EBV VCA IGM ANTIBODY (EXTERNAL) Negative - - -   EBV DNA COPIES/ML EBVNEG:EBV DNA Not Detected [Copies]/mL - - 238,228(A)   EBV DNA LOG OF COPIES - 4.9 5.0 5.4(H)   Hep B Core NEG - - -   Hep B Surf 0.0 - 4.9 mIU/mL - - -   HIV 1&2 NEG - - -            Recent Labs   Lab Test 07/23/18  1020 06/14/19  1033 12/20/19  1112   DOSMPA Not Provided 2,230 12/20/2019 @ 7746   MPACID 1.62 0.59* 1.22    [facility].3 31.1 27.8*     Jenny Maguire MD on 10/12/2021 at 1:10 PM          Again, thank you for allowing me to participate in the care of your patient.      Sincerely,    Jenny Maguire MD

## 2021-10-13 ENCOUNTER — DOCUMENTATION ONLY (OUTPATIENT)
Dept: TRANSPLANT | Facility: CLINIC | Age: 66
End: 2021-10-13

## 2021-11-03 ENCOUNTER — OFFICE VISIT (OUTPATIENT)
Dept: AUDIOLOGY | Facility: CLINIC | Age: 66
End: 2021-11-03
Payer: MEDICARE

## 2021-11-03 DIAGNOSIS — H90.3 SENSORINEURAL HEARING LOSS, BILATERAL: Primary | ICD-10-CM

## 2021-11-03 PROCEDURE — 99207 PR ASSESSMENT FOR HEARING AID: CPT | Performed by: AUDIOLOGIST

## 2021-11-03 NOTE — PROGRESS NOTES
AUDIOLOGY REPORT    SUBJECTIVE:Luba Lovell is a 66 year old female who was seen in the Audiology Clinic at the [facility] on 11/3/2021  for a follow-up check regarding the fitting of new hearing aids. Previous results have revealed mild sloping to severe sensorineural hearing loss bilaterally.  The patient has been seen previously in this clinic and was fit with Resound One 5 TANISHA on 10/02/2020.  Luba reports satisfaction with the devices. She comes today for her annual check  A third-year Audiology Doctoral student was present for today's appointment.    OBJECTIVE:   The following was completed today:  ANSI measures were run and hearing aids are functioning well.  Domes were changed and extra were given to the patient.  Hearing aids were cleaned and checked.      No charge visit today (in warranty hearing aid check).     ASSESSMENT: A follow-up appointment for hearing aid fitting was completed today. Changes to hearing aid was completed as outlined above.     PLAN:Luba will return for follow-up as needed, or at least every 9-12 months for cleaning and assessment of hearing aid.  . Please call this clinic with any questions regarding today s appointment.    I was present with the patient for the entire Audiology appointment including all procedures/testing performed by the AuD student, and agree with the student s assessment and plan as documented.      Sherine Crowley, [facility]  Licensed Audiologist  [place] #[id number]

## 2021-11-20 ENCOUNTER — HEALTH MAINTENANCE LETTER (OUTPATIENT)
Age: 66
End: 2021-11-20

## 2021-12-06 ENCOUNTER — LAB (OUTPATIENT)
Dept: LAB | Facility: CLINIC | Age: 66
End: 2021-12-06
Payer: MEDICARE

## 2021-12-06 DIAGNOSIS — Z94.0 KIDNEY REPLACED BY TRANSPLANT: ICD-10-CM

## 2021-12-06 DIAGNOSIS — B27.00 EBV (EPSTEIN-BARR VIRUS) VIREMIA: ICD-10-CM

## 2021-12-06 DIAGNOSIS — Z79.899 ENCOUNTER FOR LONG-TERM CURRENT USE OF MEDICATION: ICD-10-CM

## 2021-12-06 DIAGNOSIS — Z48.298 AFTERCARE FOLLOWING ORGAN TRANSPLANT: ICD-10-CM

## 2021-12-06 DIAGNOSIS — D84.9 IMMUNOSUPPRESSED STATUS (H): ICD-10-CM

## 2021-12-06 DIAGNOSIS — Z94.0 KIDNEY TRANSPLANTED: ICD-10-CM

## 2021-12-06 LAB
ANION GAP SERPL CALCULATED.3IONS-SCNC: 6 MMOL/L (ref 3–14)
BUN SERPL-MCNC: 21 MG/DL (ref 7–30)
CALCIUM SERPL-MCNC: 9.5 MG/DL (ref 8.5–10.1)
CHLORIDE BLD-SCNC: 104 MMOL/L (ref 94–109)
CO2 SERPL-SCNC: 27 MMOL/L (ref 20–32)
CREAT SERPL-MCNC: 0.9 MG/DL (ref 0.52–1.04)
CYCLOSPORINE BLD LC/MS/MS-MCNC: 51 UG/L (ref 50–400)
ERYTHROCYTE [DISTWIDTH] IN BLOOD BY AUTOMATED COUNT: 12.3 % (ref 10–15)
GFR SERPL CREATININE-BSD FRML MDRD: 67 ML/MIN/1.73M2
GLUCOSE BLD-MCNC: 103 MG/DL (ref 70–99)
HCT VFR BLD AUTO: 38.4 % (ref 35–47)
HGB BLD-MCNC: 12.2 G/DL (ref 11.7–15.7)
MCH RBC QN AUTO: 30 PG (ref 26.5–33)
MCHC RBC AUTO-ENTMCNC: 31.8 G/DL (ref 31.5–36.5)
MCV RBC AUTO: 95 FL (ref 78–100)
PLATELET # BLD AUTO: 194 10E3/UL (ref 150–450)
POTASSIUM BLD-SCNC: 4.7 MMOL/L (ref 3.4–5.3)
RBC # BLD AUTO: 4.06 10E6/UL (ref 3.8–5.2)
SODIUM SERPL-SCNC: 137 MMOL/L (ref 133–144)
TME LAST DOSE: NORMAL H
TME LAST DOSE: NORMAL H
WBC # BLD AUTO: 4.7 10E3/UL (ref 4–11)

## 2021-12-06 PROCEDURE — 80048 BASIC METABOLIC PNL TOTAL CA: CPT

## 2021-12-06 PROCEDURE — 36415 COLL VENOUS BLD VENIPUNCTURE: CPT

## 2021-12-06 PROCEDURE — 87799 DETECT AGENT NOS DNA QUANT: CPT

## 2021-12-06 PROCEDURE — 85027 COMPLETE CBC AUTOMATED: CPT

## 2021-12-06 PROCEDURE — 80158 DRUG ASSAY CYCLOSPORINE: CPT

## 2021-12-07 LAB
EBV DNA COPIES/ML, INSTRUMENT: ABNORMAL COPIES/ML
EBV DNA SPEC NAA+PROBE-LOG#: 5.4 {LOG_COPIES}/ML

## 2021-12-09 ENCOUNTER — TELEPHONE (OUTPATIENT)
Dept: TRANSPLANT | Facility: CLINIC | Age: 66
End: 2021-12-09
Payer: MEDICARE

## 2021-12-09 DIAGNOSIS — Z94.0 KIDNEY TRANSPLANTED: ICD-10-CM

## 2021-12-09 DIAGNOSIS — B27.00 EBV (EPSTEIN-BARR VIRUS) VIREMIA: Primary | ICD-10-CM

## 2021-12-09 DIAGNOSIS — D84.9 IMMUNOSUPPRESSED STATUS (H): ICD-10-CM

## 2021-12-09 NOTE — TELEPHONE ENCOUNTER
Rod Lopez MD Ututalum, Teresa, RN  Cc: Jenny Maguire MD  Stable, moderate high EBV viremia over the last year or so and has previously received rituximab.  Would continue to follow and recommend return visit with Transplant ID.     Was seen last 10/12/21.  Confirming with Dr. Maguire when return visit should be scheduled. - Staff message sent.    Jenny Maguire MD Ututalum, Teresa, RN  1 month virtual/ in person   Repeat EBV in 2 weeks and check if any symptoms   F/up Transplant ID as well       PLAN:  Call and discuss Dr. Maguire's recommendations.    OUTCOME:  Unable to leave a message.  Sent MyChart message.  Orders placed for SOT follow up and ID follow-up

## 2021-12-28 ENCOUNTER — APPOINTMENT (OUTPATIENT)
Dept: RADIOLOGY | Facility: CLINIC | Age: 66
End: 2021-12-28
Attending: EMERGENCY MEDICINE
Payer: MEDICARE

## 2021-12-28 ENCOUNTER — APPOINTMENT (OUTPATIENT)
Dept: CT IMAGING | Facility: CLINIC | Age: 66
End: 2021-12-28
Attending: EMERGENCY MEDICINE
Payer: MEDICARE

## 2021-12-28 ENCOUNTER — HOSPITAL ENCOUNTER (EMERGENCY)
Facility: CLINIC | Age: 66
Discharge: HOME | End: 2021-12-28
Attending: EMERGENCY MEDICINE | Admitting: EMERGENCY MEDICINE
Payer: MEDICARE

## 2021-12-28 ENCOUNTER — LAB (OUTPATIENT)
Dept: LAB | Facility: CLINIC | Age: 66
End: 2021-12-28
Payer: MEDICARE

## 2021-12-28 VITALS
TEMPERATURE: 98 F | BODY MASS INDEX: 23.69 KG/M2 | HEART RATE: 72 BPM | DIASTOLIC BLOOD PRESSURE: 104 MMHG | SYSTOLIC BLOOD PRESSURE: 156 MMHG | RESPIRATION RATE: 16 BRPM | OXYGEN SATURATION: 98 % | WEIGHT: 138 LBS

## 2021-12-28 DIAGNOSIS — B27.00 EBV (EPSTEIN-BARR VIRUS) VIREMIA: ICD-10-CM
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
## 2021-12-28 DIAGNOSIS — Z94.0 KIDNEY TRANSPLANTED: ICD-10-CM

## 2021-12-28 DIAGNOSIS — S00.93XA CONTUSION OF HEAD, UNSPECIFIED PART OF HEAD, INITIAL ENCOUNTER: ICD-10-CM

## 2021-12-28 DIAGNOSIS — D84.9 IMMUNOSUPPRESSED STATUS (H): ICD-10-CM

## 2021-12-28 PROCEDURE — 87799 DETECT AGENT NOS DNA QUANT: CPT

## 2021-12-28 PROCEDURE — 70486 CT MAXILLOFACIAL W/O DYE: CPT

## 2021-12-28 PROCEDURE — 73562 X-RAY EXAM OF KNEE 3: CPT | Mod: LT

## 2021-12-28 PROCEDURE — 99285 EMERGENCY DEPT VISIT HI MDM: CPT | Mod: 25

## 2021-12-28 PROCEDURE — 70450 CT HEAD/BRAIN W/O DYE: CPT

## 2021-12-28 PROCEDURE — 72125 CT NECK SPINE W/O DYE: CPT

## 2021-12-28 PROCEDURE — 73130 X-RAY EXAM OF HAND: CPT | Mod: RT

## 2021-12-28 PROCEDURE — 36415 COLL VENOUS BLD VENIPUNCTURE: CPT

## 2021-12-28 ASSESSMENT — ENCOUNTER SYMPTOMS
FEVER: 0
DIFFICULTY URINATING: 0
EYE REDNESS: 0
NECK STIFFNESS: 0
CONFUSION: 0
COLOR CHANGE: 0
SHORTNESS OF BREATH: 0
ARTHRALGIAS: 1
WOUND: 1
BACK PAIN: 0
HEADACHES: 0
ABDOMINAL PAIN: 0

## 2021-12-28 NOTE — ED PROVIDER NOTES
EMERGENCY DEPARTMENT ENCOUNTER     NAME: Luba Lovell   AGE: 66 year old female   YOB: 1955   MRN: [id number]   EVALUATION DATE & TIME: 12/28/2021 11:18 AM   PCP: Nellie Jimenez     Chief Complaint   Patient presents with     Fall   :    FINAL IMPRESSION     1. Contusion of head, unspecified part of head, initial encounter    2. Fall, initial encounter         ED COURSE & MEDICAL DECISION MAKING      Pertinent Labs & Imaging studies reviewed. (See chart for details)   66 year old female  presents to the Emergency Department for evaluation of a fall on the ice. Initial Vitals Reviewed. Initial exam notable for generally well-appearing patient who does have a frontal hematoma, periorbital ecchymosis, GCS of 15 with some tenderness over the right wrist and left knee without deformity.  Ice was applied and we did x-rays of her wrist and knee to rule out fracture or dislocation, CT of the head, cervical spine, and facial bones to rule out intracranial hemorrhage, facial bone fracture, cervical spine fracture.  Fortunately, all imaging was negative and this points towards contusions as the source or of her injuries.  Patient is very relieved and is comfortable with discharge with rice instructions at this time.        11:19 AM PA student met with patient to obtain history and perform initial exam.  11:28 AM Discussed the patient with the PA student.  11:38 AM I met with the patient, obtained history, performed an initial exam, and discussed options and plan for diagnostics and treatment here in the ED. PPE worn: N95, eye protection, gloves.  12:18 PM Rechecked patient and updated on results. Discussed plan for discharge - patient agreeable.    At the conclusion of the encounter I discussed the results of all of the tests and the disposition. The questions were answered. The patient or family acknowledged understanding and was agreeable with the care plan.         MEDICATIONS GIVEN IN THE  EMERGENCY:   Medications - No data to display   NEW PRESCRIPTIONS STARTED AT TODAY'S ER VISIT   New Prescriptions    No medications on file     ================================================================   HISTORY OF PRESENT ILLNESS     Patient information was obtained from: Patient   Use of Intrepreter: N/A  Luba Lovell is a 66 year old female with history of HLD, HTN, who presents for evaluation of a fall. Patient reports she fell on the ice this morning after taking a few steps after getting out of the car. States her left leg went underneath her when she fell, and she notes left knee pain with a bruise. She attempted to brace the fall with her right arm, and now notes right hand/thumb pain. States she also hit her forehead, noting an abrasion to her forehead with associated pain and pain in the bridge of her nose. Denies any loss of consciousness and states she was able to get up and walk around on her own after the fall.    Patient mentions that her nose was bleeding after the fall, but that has resolved. Denies blood thinners. Denies any new back pain, chest pain, shortness of breath, new vision changes, confusion, or any other concerns at this time.    ================================================================    REVIEW OF SYSTEMS     Review of Systems   Constitutional: Negative for fever.   HENT: Negative for congestion.         Positive for abrasion to forehead with associated pain, pain to bridge of nose.   Eyes: Negative for redness and visual disturbance.   Respiratory: Negative for shortness of breath.    Cardiovascular: Negative for chest pain.   Gastrointestinal: Negative for abdominal pain.   Genitourinary: Negative for difficulty urinating.   Musculoskeletal: Positive for arthralgias (left knee, right hand/thumb). Negative for back pain and neck stiffness.   Skin: Positive for wound (abrasion to forehead, brusing to left knee). Negative for color change.   Neurological: Negative for  headaches.        Negative for loss of consciousness.   Psychiatric/Behavioral: Negative for confusion.   All other systems reviewed and are negative.      PAST HISTORY     PAST MEDICAL HISTORY:   Past Medical History:   Diagnosis Date     Anemia in chronic kidney disease(285.21)      Dyslipidemia      High risk medications (not anticoagulants) long-term use      Hypertension      Immunosuppressed status (H)      Kidney replaced by transplant      Shingles       PAST SURGICAL HISTORY:   Past Surgical History:   Procedure Laterality Date     APPENDECTOMY       CATARACT IOL, RT/LT Bilateral      IR MISCELLANEOUS PROCEDURE  1/15/2004     IR MISCELLANEOUS PROCEDURE  3/29/2007     kidney transplant       TONSILLECTOMY, ADENOIDECTOMY, COMBINED Bilateral 7/29/2020    Procedure: BILATERAL TONSILLECTOMY AND ADENOIDECTOMY;  Surgeon: Tammy Haines MD;  Location:  OR      CURRENT MEDICATIONS:   amLODIPine (NORVASC) 2.5 MG tablet  brimonidine (ALPHAGAN) 0.2 % ophthalmic solution  cycloSPORINE modified (GENERIC EQUIVALENT) 25 MG capsule  latanoprost (XALATAN) 0.005 % ophthalmic solution  loteprednol (LOTEMAX) 0.5 % ophthalmic suspension  metoprolol tartrate (LOPRESSOR) 25 MG tablet  Multiple Vitamins-Minerals (CENTRUM SILVER) per tablet  mycophenolate (GENERIC EQUIVALENT) 250 MG capsule  Omega-3 Fatty Acids (FISH OIL) 1000 MG CPDR  PRAVASTATIN SODIUM PO  Ranibizumab (LUCENTIS IO)  timolol maleate (TIMOPTIC) 0.5 % ophthalmic solution      ALLERGIES:   Allergies   Allergen Reactions     Fenofibrate Other (See Comments)     Pancreatitis     Tricor Other (See Comments)     Pancreatitis (this is fenofibrate)     Simvastatin Muscle Pain (Myalgia) and Cramps      FAMILY HISTORY:   Family History   Problem Relation Age of Onset     Alzheimer Disease Mother      Alzheimer Disease Father      Anesthesia Reaction No family hx of      Deep Vein Thrombosis (DVT) No family hx of      Cancer Maternal Grandfather         Thinks it  was Stomach Cancer      SOCIAL HISTORY:   Social History     Socioeconomic History     Marital status:      Spouse name: Not on file     Number of children: Not on file     Years of education: Not on file     Highest education level: Not on file   Occupational History     Not on file   Tobacco Use     Smoking status: Never Smoker     Smokeless tobacco: Never Used   Substance and Sexual Activity     Alcohol use: No     Alcohol/week: 0.0 standard drinks     Drug use: No     Sexual activity: Not on file   Other Topics Concern     Not on file   Social History Narrative    Zulma lives in [place] with her . Two children that live nearby, 4 grandchildren. Used to work in medical transcription. Children have dogs. No other animal exposures. Foreign travel includes a [place] cruise, [place], [place], [place] (2003).      Social Determinants of Health     Financial Resource Strain: Not on file   Food Insecurity: Not on file   Transportation Needs: Not on file   Physical Activity: Not on file   Stress: Not on file   Social Connections: Not on file   Intimate Partner Violence: Not on file   Housing Stability: Not on file        VITALS  Patient Vitals for the past 24 hrs:   BP Temp Pulse Resp SpO2 Weight   12/28/21 1116 (!) 156/104 98  F (36.7  C) 72 16 98 % 62.6 kg (138 lb)     ================================================================    PHYSICAL EXAM     VITAL SIGNS: BP (!) 156/104   Pulse 72   Temp 98  F (36.7  C)   Resp 16   Wt 62.6 kg (138 lb)   LMP  (LMP Unknown)   SpO2 98%   BMI 23.69 kg/m     Constitutional:  Awake, no acute distress   HENT:  Oropharynx without exudate or erythema, membranes moist, frontal scalp hematoma, periorbital ecchymosis  Lymph:  No adenopathy  Eyes: EOM intact, PERRL, no injection  Neck: Supple  Respiratory:  Clear to auscultation bilaterally, no wheezes or crackles   Cardiovascular:  Regular rate and rhythm, single S1 and S2   GI:  Soft, nontender,  nondistended, no rebound or guarding   Musculoskeletal: Tender to palpation over left knee, right wrist.  No deformity, extremities neurovascularly intact moves all extremities, no lower extremity edema, no deformities    Skin:  Warm, dry  Neurologic:  Alert and oriented x3, no focal deficits noted, GCS 15    ================================================================  LAB     All pertinent labs reviewed and interpreted.   Labs Ordered and Resulted from Time of ED Arrival to Time of ED Departure - No data to display     ===============================================================  RADIOLOGY     Reviewed all pertinent imaging. Please see official radiology report.   XR Knee Left 3 Views   Final Result   IMPRESSION: Normal joint spaces and alignment. No fracture or joint effusion. Small spur arising from the tibial tubercle.      XR Hand Right G/E 3 Views   Final Result   IMPRESSION: Normal joint spaces and alignment. No fracture.      CT Facial Bones without Contrast   Final Result   IMPRESSION:    1.  No evidence of acute maxillofacial fracture by CT imaging.   2.  Right frontal scalp soft tissue swelling and hematoma.   3.  Mucosal disease within the ethmoid and left maxillary sinus with an air-fluid level which could represent acute sinusitis in the appropriate clinical setting.         Cervical spine CT w/o contrast   Final Result   IMPRESSION:   1.  No evidence of acute fracture or subluxation of the cervical spine by CT imaging.   2.  Degenerative cervical spondylosis as described above.      Head CT w/o contrast   Final Result   IMPRESSION:     1.  No evidence of acute intracranial hemorrhage or mass effect.   2.  Mild nonspecific white matter changes.   3.  Mild brain parenchymal volume loss.   4.  Small air-fluid level in the maxillary sinus which could represent acute sinusitis in the appropriate setting.        ================================================================  PROCEDURES         I,  Wang Nunez, am serving as a scribe to document services personally performed by Dr. Card based on my observation and the provider's statements to me. I, Angela Card MD attest that Wang Nunez is acting in a scribe capacity, has observed my performance of the services and has documented them in accordance with my direction.     Angela Card M.D.   Emergency Medicine   [facility] EMERGENCY ROOM  [address] [id number]  [phone]  Dept: [phone]      Angela Card MD  12/28/21 1236

## 2021-12-28 NOTE — DISCHARGE INSTRUCTIONS
Fortunately the CT scans of your head, neck, and face are negative for any broken bones, blood in the brain.  The x-ray of your hand and your knee do not show broken bones and it is most likely that everything is suffering from bruises which are called contusions at this time.  Elevate, use ice, use Tylenol or ibuprofen and note that the bruising is likely to worsen before it lightens up over the next few days.

## 2021-12-28 NOTE — ED NOTES
Patient discharge home with AVS. All questions answered. Vitals stable on RA.  present at bedside for discharge.

## 2021-12-28 NOTE — ED TRIAGE NOTES
Patient is here with a fall on the ice. She does complain of a forehead abrasion, right thumb and left knee pain.

## 2021-12-29 LAB
EBV DNA COPIES/ML, INSTRUMENT: ABNORMAL COPIES/ML
EBV DNA SPEC NAA+PROBE-LOG#: 5.1 {LOG_COPIES}/ML

## 2022-01-12 NOTE — PROGRESS NOTES
CHRONIC TRANSPLANT NEPHROLOGY VISIT    Recommendation   No changes to immunosuppression at this time     Labs every 3 months including EBV PCR     Recommended she measure her pulse and BP multiple times a week and if HR <50 to hold metoprolol. If persistent issue, ought to address with PCP.    Covid 4th shot     RTC in 6 months       Assessment & Plan   # DDKT: Stable   - Baseline Cr ~ 0.9 - 1.1   - Proteinuria: Normal (<0.2 grams)   - Date DSA Last Checked: Aug/2013      Latest DSA: Not checked recently due to time from transplant   - BK Viremia: No   - Kidney Tx Biopsy: No    # Immunosuppression: Cyclosporine 50-75, Mycophenolate 250mg bid   - Changes: No    - Continue with intensive monitoring of immunosuppression for efficacy and toxicity.    # Infection Prophylaxis:   - PJP: None    # Blood Pressure:   Blood pressure this am 125/80, pulse 46. Heart rate jumps around.      #Labile HR with intermittent bradycardia   Advised to measure pulse, BP more frequently   Hold metoprolol if HR <50   If persistent problem, should discuss with PCP.     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 Ca is normal. Vitamin D was normal in 2017.                    - Will check PTH and Vit D with next labs  Never smoker, not on PPI.   DEXA more than 3 years ago.  Would discuss with PCP re: updating.        #Hyperlipidemia   -Lipids excellent, high HDL    # EBV viremia: persistent, stable viral log around 4.9-5.1. She received rituximab x2 in the past and had tonsillectomy/adenoidectomy 7/29/20 after discussion with Dr. Perez. CT c/a/p no masses or lymph nodes in 2020  EBV improved from 12/6 to 12/28.   Put on a couple pounds since Covid started but not losing.   No fevers, chills, night sweats.   She is seeing Dr. Perez in a few weeks.     EBV PCR q3 months       # Skin Cancer Risk: reminded to schedule dermatology visit    - Discussed sun protection and recommend regular follow up with  "Dermatology.   - Scheduled appointment next week    -No skin cancers     # Cancer screening:   Mammogram -scheduled   Colonoscopy - overdue. As soon as COVID \"settles\" down,     ## COVID-19 Virus Review: Discussed COVID-19 virus and the potential medical risks.  Reviewed preventative health recommendations, including wearing a mask where appropriate.  Recommended COVID vaccination should be up to date with either an initial vaccination or booster shot when appropriate.  Asked the patient to inform the transplant center if they are exposed or diagnosed with this virus.    # COVID Vaccination Up To Date: Yes; has received 3 Pfizer shots. Recommend 4th shot 6 months after prior.     # Medical Compliance: Yes    # Transplant History:  Etiology of Kidney Failure:   Tx: DDKT  Transplant: 8/19/2007 (Kidney)  Donor Type: Donation after Brain Death Donor Class: Standard Criteria Donor  Significant changes in immunosuppression: None  Significant transplant-related complications: EBV Viremia    Transplant Office Phone Number: [phone]    Assessment and plan was discussed with the patient and she voiced her understanding and agreement.    Return visit: Return in about 6 months (around 7/13/2022).    Yonis Drake MD  Transplant Nephrology   Pager: [phone]    Chief Complaint   Ms. Lovell is a 66 year old here for transplant follow up.    History of Present Illness    Last seen by Dr. Maguire 10/2021. She had a fall 12/28. Bruise on knee getting better, hand is getting better. Slight concussion. Referred to dizzy/balance center.     She feels slightly off balance.     Appetite is good, drinking a lot of fluids  Denies any fevers, chills, night sweats, unintended weight loss.  Labs with downtrending EBV viral load -spontaneous & stable renal function    Questions/concerns today:   -Heart rate    IS: CSA 75/75 /250    Home BP: not checking regularly-but fair control    Review of Systems   A comprehensive review " of systems was obtained and negative, except as noted in the HPI or PMH.    Problem List   Patient Active Problem List   Diagnosis     Kidney replaced by transplant     Immunosuppression (H)     Anemia in chronic renal disease     Dyslipidemia     Aftercare following organ transplant     EBV (Bandar-Barr virus) viremia     Vitamin D deficiency     HTN, kidney transplant related     Immunosuppressed status (H)     Large tonsils     Cervical osteoarthritis     Glaucoma     Hearing loss     Hyperlipidemia     Airway obstruction       Social History   Social History     Tobacco Use     Smoking status: Never Smoker     Smokeless tobacco: Never Used   Substance Use Topics     Alcohol use: No     Alcohol/week: 0.0 standard drinks     Drug use: No       Allergies   Allergies   Allergen Reactions     Fenofibrate Other (See Comments)     Pancreatitis     Tricor Other (See Comments)     Pancreatitis (this is fenofibrate)     Simvastatin Muscle Pain (Myalgia) and Cramps       Medications   Current Outpatient Medications   Medication Sig     amLODIPine (NORVASC) 2.5 MG tablet Take 1 tablet (2.5 mg) by mouth at bedtime     brimonidine (ALPHAGAN) 0.2 % ophthalmic solution Place 1 drop Into the left eye every morning      cycloSPORINE modified (GENERIC EQUIVALENT) 25 MG capsule Take 3 capsules (75 mg) by mouth 2 times daily     latanoprost (XALATAN) 0.005 % ophthalmic solution Place 1 drop Into the left eye At Bedtime     loteprednol (LOTEMAX) 0.5 % ophthalmic suspension Place 1 drop Into the left eye At Bedtime      metoprolol tartrate (LOPRESSOR) 25 MG tablet Take 1 tablet (25 mg) by mouth 2 times daily     Multiple Vitamins-Minerals (CENTRUM SILVER) per tablet Take 1 tablet by mouth every morning      mycophenolate (GENERIC EQUIVALENT) 250 MG capsule Take 1 capsule (250 mg) by mouth 2 times daily     Omega-3 Fatty Acids (FISH OIL) 1000 MG CPDR Take 2,000 mg by mouth 2 times daily     PRAVASTATIN SODIUM PO Take 20 mg by mouth At  Bedtime      Ranibizumab (LUCENTIS IO) Eye injections given every 11 weeks     timolol maleate (TIMOPTIC) 0.5 % ophthalmic solution Place 1 drop Into the left eye every morning     No current facility-administered medications for this visit.     There are no discontinued medications.    Physical Exam   Vital Signs: LMP  (LMP Unknown)     GENERAL APPEARANCE: alert and no distress  HENT: no obvious abnormalities on appearance  RESP: breathing appears unremarkable with normal rate, no audible wheezing or cough and no apparent shortness of breath with conversation  MS: extremities normal - no gross deformities noted  SKIN: no apparent rash and normal skin tone  NEURO: speech is clear with no obvious neurological deficits  PSYCH: mentation appears normal and affect normal        Data     Renal Latest Ref Rng & Units 12/6/2021 9/18/2021 7/14/2021   Na 133 - 144 mmol/L 137 136 139   Na (external) 136 - 145 mmol/L - - -   K 3.4 - 5.3 mmol/L 4.7 4.3 4.5   K (external) 3.5 - 5.0 mmol/L - - -   Cl 94 - 109 mmol/L 104 104 103   Cl (external) 98 - 107 mmol/L - - -   CO2 20 - 32 mmol/L 27 29 23   CO2 (external) 22 - 31 mmol/L - - -   BUN 7 - 30 mg/dL 21 27 24(H)   BUN (external) 8 - 22 mg/dL - - -   Cr 0.52 - 1.04 mg/dL 0.90 0.95 0.89   Cr (external) 0.60 - 1.10 mg/dL - - -   Glucose 70 - 99 mg/dL 103(H) 112(H) 109   Glucose (external) 70 - 125 mg/dL - - -   Ca  8.5 - 10.1 mg/dL 9.5 9.2 9.9   Ca (external) 8.5 - 10.5 mg/dL - - -   Mg 1.6 - 2.3 mg/dL - - -     Bone Health Latest Ref Rng & Units 8/7/2017 12/8/2015 12/21/2009   Phos 2.5 - 4.5 mg/dL - - 4.2   Phos (external) 2.5 - 4.5 mg/dL - 3.1 -   PTHi 12 - 72 pg/mL - - -   Vit D Def 20 - 75 ug/L 50 - -     Heme Latest Ref Rng & Units 12/6/2021 9/18/2021 6/30/2021   WBC 4.0 - 11.0 10e3/uL 4.7 3.8(L) 4.4   WBC (external) 4.0 - 11.0 thou/ul - - -   Hgb 11.7 - 15.7 g/dL 12.2 11.9 12.0   Hgb (external) 12.0 - 16.0 g/dL - - -   Plt 150 - 450 10e3/uL 194 200 177   Plt (external) 140 -  440 thou/ul - - -   ABSOLUTE NEUTROPHIL 1.6 - 8.3 10e9/L - - -   ABSOLUTE NEUTROPHILS (EXTERNAL) 2.0 - 7.7 thou/uL - - -   ABSOLUTE LYMPHOCYTES 0.8 - 5.3 10e9/L - - -   ABSOLUTE LYMPHOCYTES (EXTERNAL) 0.8 - 4.4 thou/uL - - -   ABSOLUTE MONOCYTES 0.0 - 1.3 10e9/L - - -   ABSOLUTE MONOCYTES (EXTERNAL) 0.0 - 0.9 thou/uL - - -   ABSOLUTE EOSINOPHILS 0.0 - 0.7 10e9/L - - -   ABSOLUTE EOSINOPHILS (EXTERNAL) 0.0 - 0.4 thou/uL - - -   ABSOLUTE BASOPHILS 0.0 - 0.2 10e9/L - - -   ABSOLUTE BASOPHILS (EXTERNAL) 0.0 - 0.2 thou/uL - - -   ABS IMMATURE GRANULOCYTES 0 - 0.4 10e9/L - - -   ABSOLUTE NUCLEATED RBC - - - -     Liver Latest Ref Rng & Units 7/14/2021 1/19/2021 7/2/2020   AP 45 - 120 U/L 60 53 61   AP (external) U/L - - -   TBili 0.0 - 1.0 mg/dL 1.0 1.1(H) 1.1(H)   TBili (external) mg/dL - - -   DBili (external) mg/dL - - -   ALT 0 - 45 U/L 20 20 18   ALT (external) U/L - - -   AST 0 - 40 U/L 25 24 26   AST (external) U/L - - -   Tot Protein 6.0 - 8.0 g/dL 7.6 7.7 8.2(H)   Tot Protein (external) g/dL - - -   Albumin 3.5 - 5.0 g/dL 3.8 4.0 4.2   Albumin (external) 3.5 - 5.0 g/dL - - -     Pancreas Latest Ref Rng & Units 8/26/2007 5/9/2007   A1C 4.3 - 6.0 % - 4.2(L)   Amylase 30 - 110 U/L 58 -   Lipase 20 - 250 U/L 186 -     Iron studies Latest Ref Rng & Units 8/28/2007   Iron 35 - 180 ug/dL 23(L)   Ferritin 10 - 300 ng/mL 903(H)     [facility] Txp Virology Latest Ref Rng & Units 12/28/2021 12/6/2021 9/18/2021   CMV IgG EU/mL - - -   CVM DNA Quant - - - -   CMV Quant <100 Copies/mL - - -   CMV QT Log <2.0 Log copies/mL - - -   CMV QUANT IU/ML CMVND [IU]/mL - - -   LOG IU/ML OF CMVQNT <2.1 [Log:IU]/mL - - -   BK Spec - - - -   BK Res <1000 copies/mL - - -   BK Log <3.0 Log copies/mL - - -   EBV IgG - - - -   EBV VCA IGM ANTIBODY (EXTERNAL) Negative - - -   EBV DNA COPIES/ML EBVNEG:EBV DNA Not Detected [Copies]/mL - - -   EBV DNA LOG OF COPIES - 5.1 5.4 4.9   Hep B Core NEG - - -   Hep B Surf 0.0 - 4.9 mIU/mL - - -   HIV 1&2 NEG - -  -            Recent Labs   Lab Test 07/23/18  1020 06/14/19  1033 12/20/19  1112   DOSMPA Not Provided 2,230 12/20/2019 @ 7207   MPACID 1.62 0.59* 1.22   MPAG 51.3 31.1 27.8*

## 2022-01-13 ENCOUNTER — VIRTUAL VISIT (OUTPATIENT)
Dept: NEPHROLOGY | Facility: CLINIC | Age: 67
End: 2022-01-13
Attending: INTERNAL MEDICINE
Payer: MEDICARE

## 2022-01-13 DIAGNOSIS — B27.00 EBV (EPSTEIN-BARR VIRUS) VIREMIA: ICD-10-CM

## 2022-01-13 DIAGNOSIS — Z94.0 KIDNEY TRANSPLANTED: Primary | ICD-10-CM

## 2022-01-13 DIAGNOSIS — D84.9 IMMUNOSUPPRESSED STATUS (H): ICD-10-CM

## 2022-01-13 DIAGNOSIS — R00.9 ABNORMAL HEART RATE: ICD-10-CM

## 2022-01-13 PROCEDURE — G0463 HOSPITAL OUTPT CLINIC VISIT: HCPCS | Mod: PN,RTG

## 2022-01-13 PROCEDURE — 99214 OFFICE O/P EST MOD 30 MIN: CPT | Mod: 95

## 2022-01-13 ASSESSMENT — PAIN SCALES - GENERAL: PAINLEVEL: NO PAIN (0)

## 2022-01-13 NOTE — LETTER
1/13/2022     RE: Luba Lovell  [address]  [facility] [place] [id number]     Dear Colleague,    Thank you for referring your patient, Luba Lovell, to the [facility] [place] at [facility]. Please see a copy of my visit note below.    CHRONIC TRANSPLANT NEPHROLOGY VISIT    Recommendation   No changes to immunosuppression at this time     Labs every 3 months including EBV PCR     Recommended she measure her pulse and BP multiple times a week and if HR <50 to hold metoprolol. If persistent issue, ought to address with PCP.    Covid 4th shot     RTC in 6 months       Assessment & Plan   # DDKT: Stable   - Baseline Cr ~ 0.9 - 1.1   - Proteinuria: Normal (<0.2 grams)   - Date DSA Last Checked: Aug/2013      Latest DSA: Not checked recently due to time from transplant   - BK Viremia: No   - Kidney Tx Biopsy: No    # Immunosuppression: Cyclosporine 50-75, Mycophenolate 250mg bid   - Changes: No    - Continue with intensive monitoring of immunosuppression for efficacy and toxicity.    # Infection Prophylaxis:   - PJP: None    # Blood Pressure:   Blood pressure this am 125/80, pulse 46. Heart rate jumps around.      #Labile HR with intermittent bradycardia   Advised to measure pulse, BP more frequently   Hold metoprolol if HR <50   If persistent problem, should discuss with PCP.     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 Ca is normal. Vitamin D was normal in 2017.                    - Will check PTH and Vit D with next labs  Never smoker, not on PPI.   DEXA more than 3 years ago.  Would discuss with PCP re: updating.        #Hyperlipidemia   -Lipids excellent, high HDL    # EBV viremia: persistent, stable viral log around 4.9-5.1. She received rituximab x2 in the past and had tonsillectomy/adenoidectomy 7/29/20 after discussion with Dr. Perez. CT c/a/p no masses or lymph nodes in 2020  EBV  "improved from 12/6 to 12/28.   Put on a couple pounds since Covid started but not losing.   No fevers, chills, night sweats.   She is seeing Dr. Perez in a few weeks.     EBV PCR q3 months       # Skin Cancer Risk: reminded to schedule dermatology visit    - Discussed sun protection and recommend regular follow up with Dermatology.   - Scheduled appointment next week    -No skin cancers     # Cancer screening:   Mammogram -scheduled   Colonoscopy - overdue. As soon as COVID \"settles\" down,     ## COVID-19 Virus Review: Discussed COVID-19 virus and the potential medical risks.  Reviewed preventative health recommendations, including wearing a mask where appropriate.  Recommended COVID vaccination should be up to date with either an initial vaccination or booster shot when appropriate.  Asked the patient to inform the transplant center if they are exposed or diagnosed with this virus.    # COVID Vaccination Up To Date: Yes; has received 3 Pfizer shots. Recommend 4th shot 6 months after prior.     # Medical Compliance: Yes    # Transplant History:  Etiology of Kidney Failure:   Tx: DDKT  Transplant: 8/19/2007 (Kidney)  Donor Type: Donation after Brain Death Donor Class: Standard Criteria Donor  Significant changes in immunosuppression: None  Significant transplant-related complications: EBV Viremia    Transplant Office Phone Number: [phone]    Assessment and plan was discussed with the patient and she voiced her understanding and agreement.    Return visit: Return in about 6 months (around 7/13/2022).    Yonis Drake MD  Transplant Nephrology   Pager: [phone]    Chief Complaint   Ms. Lovell is a 66 year old here for transplant follow up.    History of Present Illness    Last seen by Dr. Maguire 10/2021. She had a fall 12/28. Bruise on knee getting better, hand is getting better. Slight concussion. Referred to dizzy/balance center.     She feels slightly off balance.     Appetite is good, drinking a lot " of fluids  Denies any fevers, chills, night sweats, unintended weight loss.  Labs with downtrending EBV viral load -spontaneous & stable renal function    Questions/concerns today:   -Heart rate    IS: CSA 75/75 /250    Home BP: not checking regularly-but fair control    Review of Systems   A comprehensive review of systems was obtained and negative, except as noted in the HPI or PMH.    Problem List   Patient Active Problem List   Diagnosis     Kidney replaced by transplant     Immunosuppression (H)     Anemia in chronic renal disease     Dyslipidemia     Aftercare following organ transplant     EBV (Bandar-Barr virus) viremia     Vitamin D deficiency     HTN, kidney transplant related     Immunosuppressed status (H)     Large tonsils     Cervical osteoarthritis     Glaucoma     Hearing loss     Hyperlipidemia     Airway obstruction       Social History   Social History     Tobacco Use     Smoking status: Never Smoker     Smokeless tobacco: Never Used   Substance Use Topics     Alcohol use: No     Alcohol/week: 0.0 standard drinks     Drug use: No       Allergies   Allergies   Allergen Reactions     Fenofibrate Other (See Comments)     Pancreatitis     Tricor Other (See Comments)     Pancreatitis (this is fenofibrate)     Simvastatin Muscle Pain (Myalgia) and Cramps       Medications   Current Outpatient Medications   Medication Sig     amLODIPine (NORVASC) 2.5 MG tablet Take 1 tablet (2.5 mg) by mouth at bedtime     brimonidine (ALPHAGAN) 0.2 % ophthalmic solution Place 1 drop Into the left eye every morning      cycloSPORINE modified (GENERIC EQUIVALENT) 25 MG capsule Take 3 capsules (75 mg) by mouth 2 times daily     latanoprost (XALATAN) 0.005 % ophthalmic solution Place 1 drop Into the left eye At Bedtime     loteprednol (LOTEMAX) 0.5 % ophthalmic suspension Place 1 drop Into the left eye At Bedtime      metoprolol tartrate (LOPRESSOR) 25 MG tablet Take 1 tablet (25 mg) by mouth 2 times daily      Multiple Vitamins-Minerals (CENTRUM SILVER) per tablet Take 1 tablet by mouth every morning      mycophenolate (GENERIC EQUIVALENT) 250 MG capsule Take 1 capsule (250 mg) by mouth 2 times daily     Omega-3 Fatty Acids (FISH OIL) 1000 MG CPDR Take 2,000 mg by mouth 2 times daily     PRAVASTATIN SODIUM PO Take 20 mg by mouth At Bedtime      Ranibizumab (LUCENTIS IO) Eye injections given every 11 weeks     timolol maleate (TIMOPTIC) 0.5 % ophthalmic solution Place 1 drop Into the left eye every morning     No current facility-administered medications for this visit.     There are no discontinued medications.    Physical Exam   Vital Signs: LMP  (LMP Unknown)     GENERAL APPEARANCE: alert and no distress  HENT: no obvious abnormalities on appearance  RESP: breathing appears unremarkable with normal rate, no audible wheezing or cough and no apparent shortness of breath with conversation  MS: extremities normal - no gross deformities noted  SKIN: no apparent rash and normal skin tone  NEURO: speech is clear with no obvious neurological deficits  PSYCH: mentation appears normal and affect normal        Data     Renal Latest Ref Rng & Units 12/6/2021 9/18/2021 7/14/2021   Na 133 - 144 mmol/L 137 136 139   Na (external) 136 - 145 mmol/L - - -   K 3.4 - 5.3 mmol/L 4.7 4.3 4.5   K (external) 3.5 - 5.0 mmol/L - - -   Cl 94 - 109 mmol/L 104 104 103   Cl (external) 98 - 107 mmol/L - - -   CO2 20 - 32 mmol/L 27 29 23   CO2 (external) 22 - 31 mmol/L - - -   BUN 7 - 30 mg/dL 21 27 24(H)   BUN (external) 8 - 22 mg/dL - - -   Cr 0.52 - 1.04 mg/dL 0.90 0.95 0.89   Cr (external) 0.60 - 1.10 mg/dL - - -   Glucose 70 - 99 mg/dL 103(H) 112(H) 109   Glucose (external) 70 - 125 mg/dL - - -   Ca  8.5 - 10.1 mg/dL 9.5 9.2 9.9   Ca (external) 8.5 - 10.5 mg/dL - - -   Mg 1.6 - 2.3 mg/dL - - -     Bone Health Latest Ref Rng & Units 8/7/2017/2017 12/8/2015 12/21/2009   Phos 2.5 - 4.5 mg/dL - - 4.2   Phos (external) 2.5 - 4.5 mg/dL - 3.1 -   PTHi 12 -  72 pg/mL - - -   Vit D Def 20 - 75 ug/L 50 - -     Heme Latest Ref Rng & Units 12/6/2021 9/18/2021 6/30/2021   WBC 4.0 - 11.0 10e3/uL 4.7 3.8(L) 4.4   WBC (external) 4.0 - 11.0 thou/ul - - -   Hgb 11.7 - 15.7 g/dL 12.2 11.9 12.0   Hgb (external) 12.0 - 16.0 g/dL - - -   Plt 150 - 450 10e3/uL 194 200 177   Plt (external) 140 - 440 thou/ul - - -   ABSOLUTE NEUTROPHIL 1.6 - 8.3 10e9/L - - -   ABSOLUTE NEUTROPHILS (EXTERNAL) 2.0 - 7.7 thou/uL - - -   ABSOLUTE LYMPHOCYTES 0.8 - 5.3 10e9/L - - -   ABSOLUTE LYMPHOCYTES (EXTERNAL) 0.8 - 4.4 thou/uL - - -   ABSOLUTE MONOCYTES 0.0 - 1.3 10e9/L - - -   ABSOLUTE MONOCYTES (EXTERNAL) 0.0 - 0.9 thou/uL - - -   ABSOLUTE EOSINOPHILS 0.0 - 0.7 10e9/L - - -   ABSOLUTE EOSINOPHILS (EXTERNAL) 0.0 - 0.4 thou/uL - - -   ABSOLUTE BASOPHILS 0.0 - 0.2 10e9/L - - -   ABSOLUTE BASOPHILS (EXTERNAL) 0.0 - 0.2 thou/uL - - -   ABS IMMATURE GRANULOCYTES 0 - 0.4 10e9/L - - -   ABSOLUTE NUCLEATED RBC - - - -     Liver Latest Ref Rng & Units 7/14/2021 1/19/2021 7/2/2020   AP 45 - 120 U/L 60 53 61   AP (external) U/L - - -   TBili 0.0 - 1.0 mg/dL 1.0 1.1(H) 1.1(H)   TBili (external) mg/dL - - -   DBili (external) mg/dL - - -   ALT 0 - 45 U/L 20 20 18   ALT (external) U/L - - -   AST 0 - 40 U/L 25 24 26   AST (external) U/L - - -   Tot Protein 6.0 - 8.0 g/dL 7.6 7.7 8.2(H)   Tot Protein (external) g/dL - - -   Albumin 3.5 - 5.0 g/dL 3.8 4.0 4.2   Albumin (external) 3.5 - 5.0 g/dL - - -     Pancreas Latest Ref Rng & Units 8/26/2007 5/9/2007   A1C 4.3 - 6.0 % - 4.2(L)   Amylase 30 - 110 U/L 58 -   Lipase 20 - 250 U/L 186 -     Iron studies Latest Ref Rng & Units 8/28/2007   Iron 35 - 180 ug/dL 23(L)   Ferritin 10 - 300 ng/mL 903(H)     [facility] Txp Virology Latest Ref Rng & Units 12/28/2021 12/6/2021 9/18/2021   CMV IgG EU/mL - - -   CVM DNA Quant - - - -   CMV Quant <100 Copies/mL - - -   CMV QT Log <2.0 Log copies/mL - - -   CMV QUANT IU/ML CMVND [IU]/mL - - -   LOG IU/ML OF CMVQNT <2.1 [Log:IU]/mL - - -    BK Spec - - - -   BK Res <1000 copies/mL - - -   BK Log <3.0 Log copies/mL - - -   EBV IgG - - - -   EBV VCA IGM ANTIBODY (EXTERNAL) Negative - - -   EBV DNA COPIES/ML EBVNEG:EBV DNA Not Detected [Copies]/mL - - -   EBV DNA LOG OF COPIES - 5.1 5.4 4.9   Hep B Core NEG - - -   Hep B Surf 0.0 - 4.9 mIU/mL - - -   HIV 1&2 NEG - - -            Recent Labs   Lab Test 07/23/18  1020 06/14/19  1033 12/20/19  1112   DOSMPA Not Provided 2,230 12/20/2019 @ 2330   MPACID [id number] 0.59* 1.22   MPAG 51.3 31.1 27.8*     Zulma is a 66 year old who is being evaluated via a billable video visit.      How would you like to obtain your AVS? MyChart  If the video visit is dropped, the invitation should be resent by: Text to cell phone: [phone]  Will anyone else be joining your video visit? No    Video Start Time: 1504  Video-Visit Details  Type of service:  Video Visit  Video End Time:1536  Originating Location (pt. Location): Home  Distant Location (provider location):  [facility] [place]   Platform used for Video Visit: Vega    Again, thank you for allowing me to participate in the care of your patient.      Sincerely,     [facility]

## 2022-01-13 NOTE — PROGRESS NOTES
Zulma is a 66 year old who is being evaluated via a billable video visit.      How would you like to obtain your AVS? [facility]  If the video visit is dropped, the invitation should be resent by: Text to cell phone: [phone]  Will anyone else be joining your video visit? No      Video Start Time: 1504  Video-Visit Details    Type of service:  Video Visit    Video End Time:1536    Originating Location (pt. Location): Home    Distant Location (provider location):  [facility] [place]     Platform used for Video Visit: [employer]

## 2022-01-15 ENCOUNTER — HEALTH MAINTENANCE LETTER (OUTPATIENT)
Age: 67
End: 2022-01-15

## 2022-01-16 DIAGNOSIS — Z94.0 KIDNEY REPLACED BY TRANSPLANT: Primary | ICD-10-CM

## 2022-01-16 RX ORDER — MYCOPHENOLATE MOFETIL 250 MG/1
250 CAPSULE ORAL 2 TIMES DAILY
Qty: 180 CAPSULE | Refills: 3 | Status: SHIPPED | OUTPATIENT
Start: 2022-01-16 | End: 2023-02-02

## 2022-01-24 ENCOUNTER — TELEPHONE (OUTPATIENT)
Dept: TRANSPLANT | Facility: CLINIC | Age: 67
End: 2022-01-24
Payer: MEDICARE

## 2022-01-24 NOTE — TELEPHONE ENCOUNTER
Patient Call: Voicemail  Date/Time: 1/24/22 @ 2:24pm  Reason for call: Pharmacy received microphenolate script but there is no insurance for her and they have no way to contact patient and would like assistance.

## 2022-01-28 ENCOUNTER — VIRTUAL VISIT (OUTPATIENT)
Dept: INFECTIOUS DISEASES | Facility: CLINIC | Age: 67
End: 2022-01-28
Attending: INTERNAL MEDICINE
Payer: MEDICARE

## 2022-01-28 DIAGNOSIS — B27.00 EBV (EPSTEIN-BARR VIRUS) VIREMIA: Primary | ICD-10-CM

## 2022-01-28 DIAGNOSIS — Z94.0 KIDNEY TRANSPLANTED: ICD-10-CM

## 2022-01-28 DIAGNOSIS — Z71.89 COUNSELED ABOUT COVID-19 VIRUS INFECTION: ICD-10-CM

## 2022-01-28 DIAGNOSIS — D84.9 IMMUNOSUPPRESSED STATUS (H): ICD-10-CM

## 2022-01-28 PROCEDURE — G0463 HOSPITAL OUTPT CLINIC VISIT: HCPCS | Mod: PN,RTG | Performed by: INTERNAL MEDICINE

## 2022-01-28 PROCEDURE — 99215 OFFICE O/P EST HI 40 MIN: CPT | Mod: 95 | Performed by: INTERNAL MEDICINE

## 2022-01-28 RX ORDER — PREDNISOLONE ACETATE 10 MG/ML
1-2 SUSPENSION/ DROPS OPHTHALMIC AT BEDTIME
COMMUNITY
Start: 2021-10-26 | End: 2023-06-12

## 2022-01-28 NOTE — LETTER
1/28/2022       RE: Luba Lovell  [address]  [facility] [place] [address]     Dear Colleague,    Thank you for referring your patient, Luba Lovell, to the [facility] [place] at [facility]. Please see a copy of my visit note below.    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 Will also check peripheral smear, looking for atypical lymphocytes, as well as EBV level.   - I will follow her EBV levels that are being drawn periodically.  - She will continue with periodic self-exams to check for adenopathy.   - Return to ID clinic is not specifically scheduled. She has hearing loss and it helps her tremendously to have in person appointments, so if she needs to be scheduled in, we will do so in person or via video, but not by phone.     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 Zulma was EBV R-/D+ for her kidney transplantation. Her tonsils were large on exam, they were removed 7/29/2020. EBV+ cells seen on pathology of tonsils. She does not snore anymore since removal of her tonsils. EBV viral loads post-tonsillectomy have been elevated in a generally modestly  elevated range, but without major symptoms. When imaging has been performed, there is no adenopathy. On self exams, there is no adenopathy. Would avoid rituxan given need to keep all vaccine-produced antibody levels solid, so will just monitor monthly or so for now, allowing her to range between 100K-200K as long as there are no new symptoms and no new adenopathy.   - Counseled about covid infection. Potential Covid exposure, as her  has covid. Will check natural and vaccine antibodies with next scheduled blood draw, as a baseline should she come down with covid in the future and we need to make decisions about treatment in the setting of limited monoclonal antibodies.   - Chronic left maxillary sinusitis. Documented on 12/28/2021 CT imaging, and fits with stuffy symptoms.   - Recurrent URI and nuisance infections, could be an anatomic obstruction from large tonsils contributing to recurrence.  - Hx shingles.   - QTc interval: 436 msec on 8/19/2007 EKG  - Pneumocystis prophylaxis: none needed, as CD4 count > 200.  - Viral serostatus: CMV D-/R+, EBV D+/R-, VZV+, hep B immune.  - Immunization status: up to date with influenza and covid  - Gamma globulin status: unknown, will check in 3/2022 with blood draw  - Isolation status: Good hand hygiene.    Abril Perez MD. Pager [phone]         Interval History:   Since Zulma was last seen by ID on 9/4/2020, she is worried about her , since he has covid. He thinks that he picked it up at work, delivering material at construction sites. He has been very careful, although he does shopping for them. She had a fall on a patch if ice about a month ago. Kidney-wise, she feels fine. She has no symptoms referable to where she had her tonsils out 7/29/2020. Zulma surmises that some of her fatigue that she has is related to cervical arthritis, and she has a chiropracter. She finds no swollen lymph nodes. She sleeps lousy at night, it is rare to sleep all night.  Sleep total varies between 4 and 8 hours. She has to get up to urinate now and then. There are many days when she barely notices it. It is hard for her to nap, that is just not her style.     Transplants:  8/19/2007 (Kidney); Postoperative day:  5276.  Coordinator Stephanie Schmidt    Review of Systems:  CONSTITUTIONAL:  No fevers or chills. No night sweats. Weight is not changing.  EYES: negative for icterus or acute vision changes. Her vision is affected by glaucoma.  ENT:  Gradual worsening of known hearing loss since 2013, + a little bit of tinnitus. She has hearing aids. Once in a while she has scratchy throat, disappears if she drinks something.   RESPIRATORY:  Minor cough (throat clearing tickle), no sputum production. Perhaps a little dyspnea with mild exertion (heavy laundry up 3 flights of stairs).   CARDIOVASCULAR:  negative for chest pain. Every now and then her heart can flip-flop, coffee can rev that up.   GASTROINTESTINAL:  negative for nausea, vomiting, diarrhea. Once in a while constipation, and then she will up the fiber  GENITOURINARY:  negative for dysuria or hematuria.  HEME:  No easy bruising or bleeding  INTEGUMENT:  negative for rash, but some pruritus from dry skin   NEURO:  Negative for headache or tremor, other than an occ headache from arthritis, treated with tylenol.    Past Medical History:   Diagnosis Date     Anemia in chronic kidney disease(285.21)      Dyslipidemia      High risk medications (not anticoagulants) long-term use      Hypertension      Immunosuppressed status (H)      Kidney replaced by transplant      Shingles        Past Surgical History:   Procedure Laterality Date     APPENDECTOMY       CATARACT IOL, RT/LT Bilateral      IR MISCELLANEOUS PROCEDURE  1/15/2004     IR MISCELLANEOUS PROCEDURE  3/29/2007     kidney transplant       TONSILLECTOMY, ADENOIDECTOMY, COMBINED Bilateral 7/29/2020    Procedure: BILATERAL TONSILLECTOMY AND ADENOIDECTOMY;  Surgeon: Tammy Haines  Silvestre Monroy MD;  Location: [facility] OR       Family History   Problem Relation Age of Onset     Alzheimer Disease Mother      Alzheimer Disease Father      Anesthesia Reaction No family hx of      Deep Vein Thrombosis (DVT) No family hx of      Cancer Maternal Grandfather         Thinks it was Stomach Cancer       Social History     Social History Narrative    Zulma lives in [place] with her . Two children that live nearby, 4 grandchildren. Used to work in medical transcription. Children have dogs. No other animal exposures. Foreign travel includes a [place] cruise, [employer], [place], [place] (2003).      Social History     Tobacco Use     Smoking status: Never Smoker     Smokeless tobacco: Never Used   Substance Use Topics     Alcohol use: No     Alcohol/week: 0.0 standard drinks     Drug use: No       Immunization History   Administered Date(s) Administered     COVID-19,PF,Pfizer (12+ Yrs) 03/13/2021, 03/31/2021, 08/20/2021, 01/27/2022     FLU 6-35 months 12/02/2004     Flu, Unspecified 11/08/2010     HepA-Adult 07/14/2008     HepB-Adult 02/05/2004, 05/03/2004, 07/14/2008     Influenza (H1N1) 11/06/2009     Influenza (IIV3) PF 11/24/2006, 10/11/2011, 10/17/2012, 10/14/2013, 10/20/2014, 10/26/2015, 10/10/2016     Influenza Vaccine IM > 6 months Valent IIV4 (Alfuria,Fluzone) 11/03/2017, 10/26/2020     Influenza Vaccine, 6+MO IM (QUADRIVALENT W/PRESERVATIVES) 11/16/2018, 10/22/2019, 10/01/2021     Pneumo Conj 13-V (2010&after) 11/03/2017     Pneumococcal 23 valent 02/03/2013, 02/07/2020     Poliovirus, inactivated (IPV) 07/14/2008     TDAP Vaccine (Boostrix) 11/16/2018     Tdap (Adult) Unspecified Formulation 07/02/1997, 07/14/2008     Typhoid Oral 07/14/2008     Zoster vaccine recombinant adjuvanted (SHINGRIX) 10/26/2020, 12/28/2020       Patient Active Problem List   Diagnosis     Kidney replaced by transplant     Immunosuppression (H)     Anemia in chronic renal disease     Dyslipidemia      Aftercare following organ transplant     EBV (Bandar-Barr virus) viremia     Vitamin D deficiency     HTN, kidney transplant related     Immunosuppressed status (H)     Large tonsils     Cervical osteoarthritis     Glaucoma     Hearing loss     Hyperlipidemia     Airway obstruction       Outpatient Medications Marked as Taking for the 1/28/22 encounter (Virtual Visit) with Abril Perez MD   Medication Sig     amLODIPine (NORVASC) 2.5 MG tablet Take 1 tablet (2.5 mg) by mouth at bedtime     brimonidine (ALPHAGAN) 0.2 % ophthalmic solution Place 1 drop Into the left eye every morning      cycloSPORINE modified (GENERIC EQUIVALENT) 25 MG capsule Take 3 capsules (75 mg) by mouth 2 times daily     latanoprost (XALATAN) 0.005 % ophthalmic solution Place 1 drop Into the left eye At Bedtime     metoprolol tartrate (LOPRESSOR) 25 MG tablet Take 1 tablet (25 mg) by mouth 2 times daily     Multiple Vitamins-Minerals (CENTRUM SILVER) per tablet Take 1 tablet by mouth every morning      mycophenolate (GENERIC EQUIVALENT) 250 MG capsule Take 1 capsule (250 mg) by mouth 2 times daily     Omega-3 Fatty Acids (FISH OIL) 1000 MG CPDR Take 2,000 mg by mouth 2 times daily     PRAVASTATIN SODIUM PO Take 20 mg by mouth At Bedtime      prednisoLONE acetate (PRED FORTE) 1 % ophthalmic suspension Place 1-2 drops Into the left eye At Bedtime     ranibizumab (LUCENTIS) 0.5 MG/0.05ML SOSY Every 7 weeks     timolol maleate (TIMOPTIC) 0.5 % ophthalmic solution Place 1 drop Into the left eye every morning       Allergies   Allergen Reactions     Fenofibrate Other (See Comments)     Pancreatitis     Tricor Other (See Comments)     Pancreatitis (this is fenofibrate)     Simvastatin Muscle Pain (Myalgia) and Cramps            Physical Exam:   Vitals not checked, as this was a video visit.   Wt Readings from Last 4 Encounters:   12/28/21 62.6 kg (138 lb)   10/13/20 59 kg (130 lb)   09/04/20 60.1 kg (132 lb 9.6 oz)   08/20/20 58.5 kg (129  lb)       Exam, via video:  GENERAL: well-developed, well-nourished woman, alert, oriented, in no acute distress.  HEAD: Head is normocephalic, atraumatic   EYES: Eyes have anicteric sclerae.    NECK: Supple.         Laboratory Data:     Absolute CD4   Date Value Ref Range Status   11/03/2017 469 441 - 2,156 cells/uL Final       Metabolic Studies    Recent Labs   Lab Test 12/06/21  1032 09/18/21  1014 07/14/21  1140 11/22/17  1023 09/27/17  1123    136 139   < >  --    POTASSIUM 4.7 4.3 4.5   < >  --    CHLORIDE 104 104 103   < >  --    CO2 27 29 23   < >  --    ANIONGAP 6 3 13   < >  --    BUN 21 27 24*   < >  --    CR 0.90 0.95 0.89   < >  --    10696  --   --   --   --  1.18*   GFRESTIMATED 67 63 69   < >  --    * 112* 109   < >  --    AICHA 9.5 9.2 9.9   < >  --     < > = values in this interval not displayed.       Hepatic Studies    Recent Labs   Lab Test 07/14/21  1140 01/19/21  1049 07/02/20  1137 01/28/20  1031   BILITOTAL 1.0 1.1* 1.1*  --    ALKPHOS 60 53 61  --    PROTTOTAL 7.6 7.7 8.2*  --    ALBUMIN 3.8 4.0 4.2  --    AST 25 24 26  --    ALT 20 20 18  --    LDH  --   --   --  180       Hematology Studies     Recent Labs   Lab Test 12/06/21  1032 09/18/21  1014 06/30/21  1039 03/30/21  1634 11/22/17  1023 11/03/17  1035 09/27/17  1123   WBC 4.7 3.8* 4.4 5.1   < > 4.0  --    58261  --   --   --   --   --   --  4.6   ANEU  --   --   --   --   --  1.5*  --    ALYM  --   --   --   --   --  1.8  --    NILSA  --   --   --   --   --  0.6  --    AEOS  --   --   --   --   --  0.1  --    HGB 12.2 11.9 12.0 11.1*   < > 11.5*  --    36531  --   --   --   --   --   --  11.8*   HCT 38.4 37.8 37.8 36.2   < > 35.9  --     200 177 212   < > 183  --    70446  --   --   --   --   --   --  199    < > = values in this interval not displayed.       Lipid testing  Recent Labs   Lab Test 07/14/21  1140 07/02/20  1137 11/25/19  0906   CHOL 175 180 176   HDL 58 60 55   LDL 99 100 103   TRIG 91 98 92        Medication levels    Recent Labs   Lab Test 12/06/21  1032 01/28/20  1032 12/20/19  1112   CYCLSP 51   < > 56   MPACID  --   --  1.22   MPAG  --   --  27.8*    < > = values in this interval not displayed.       Microbiology:  Last Culture results with specimen source  Culture Micro   Date Value Ref Range Status   12/21/2009 No yeast isolated  Final   12/21/2009 10 to 50,000 colonies/mL Mixed gram positive filemon  Final     Comment:     Multiple species present, probable perineal contamination.  Susceptibility testing not routinely done   09/04/2009 Duplicate request  Final     Comment:     Canceled, Test credited   09/04/2009 No yeast isolated  Final   09/04/2009   Final    <10,000 colonies/mL Lactose fermenting gram negative rods Susceptibility testing     Comment:      not routinely done  <10,000 colonies/mL Non lactose fermenting gram negative rods Susceptibility   testing not routinely done  10 to 50,000 colonies/mL Gram positive cocci in chains Susceptibility testing   not   routinely done  Multiple species present, probable perineal contamination.   10/01/2007 <10,000 colonies/mL Gram positive cocci  Final     Comment:     <10,000 colonies/mL Staphylococcus species  Susceptibility testing not routinely done   10/01/2007 No growth  Final   10/01/2007 No growth  Final   09/12/2007 No growth  Final   09/12/2007 No anaerobes isolated  Final           Virology:  Log IU/mL of CMVQNT   Date Value Ref Range Status   08/01/2016 Not Calculated <2.1 [Log_IU]/mL Final       EBV DNA Copies/mL   Date Value Ref Range Status   12/28/2021 121,569 (H) <=0 copies/mL Final   12/06/2021 226,287 (H) <=0 copies/mL Final   09/18/2021 81,057 (H) <=0 copies/mL Final   08/05/2021 102,738 (H) <=0 copies/mL Final   06/30/2021 238,228 (A) EBVNEG^EBV DNA Not Detected [Copies]/mL Final   04/29/2021 133,422 (A) EBVNEG^EBV DNA Not Detected [Copies]/mL Final   12/16/2020 120,382 (A) EBVNEG^EBV DNA Not Detected [Copies]/mL Final    09/09/2020 77,536 (A) EBVNEG^EBV DNA Not Detected [Copies]/mL Final   04/27/2020 70,253 (A) EBVNEG^EBV DNA Not Detected [Copies]/mL Final   01/28/2020 130,706 (A) EBVNEG^EBV DNA Not Detected [Copies]/mL Final     EBV PCR Quant (External)   Date Value Ref Range Status   08/04/2017 Detected None detected Final   07/07/2017 Positive (A) Negative Final       Hepatitis C Antibody   Date Value Ref Range Status   07/02/2020 Negative Negative Final   08/19/2007 Negative NEG Final   05/09/2007 Negative NEG Final       CMV IgG Antibody   Date Value Ref Range Status   08/19/2007 >160.0 EU/mL Final     Comment:     Positive for anti-CMV IgG       EBV IgG Antibody Interpretation   Date Value Ref Range Status   08/19/2007 Negative, suggests no immunologic exposure.  Final   05/09/2007 Negative, suggests no immunologic exposure.  Final       Pathology:  Specimen #: [id number] Collected: 7/29/2020  FINAL DIAGNOSIS:   A) Tonsil, left, tonsillectomy: Lymphoepithelial tissue with follicular hyperplasia with rare to occasional EBV positive cells.  B) Tonsil, right, tonsillectomy: Lymphoepithelial tissue with follicular hyperplasia with rare to occasional EBV positive cells.  COMMENT:   Concurrent flow cytometry was performed ([id number] and 6324) on the bilateral samples and in each study, the B cells were polytypic, and there was no aberrant immunophenotype on T cells.   Per clinic note dated 7/10/20 - this patient has had increased EBV viremia, despite reduction in immunosuppression.  Per op note, the tonsils were enlarged, but there was no mention of discrete lesions of significant asymmetry.    Based on gross description of the surgical pathology samples, the right tonsil sample was slightly heavier, but they are overall similar in size as measured and reported in the gross.   Based on H&E stains, all sections show normal architecture for tonsils without architectural distortion. Though the follicular hyperplasia is associated with  "EBV positive cells (and some would consider the possibility of a non-destructive post-transplant lymphoproliferative disorder), overall the team did not favor PTLD, as the tissue does not appear to \"form mass lesions\" as is typical of PTLD. A Congo red stain was performed on block A4 and is negative for amyloid; an on-slide control stains appropriately.       Imaging:   EXAMINATION: CT CHEST/ABDOMEN/PELVIS W CONTRAST, 1/16/2020 1:55 PM  HISTORY: ? Lymphoma; Kidney transplanted.  FINDINGS:  Chest: Heterogeneous, mildly enlarged thyroid parenchyma without discrete  nodule. The aortic branching pattern, heart size, pericardium, and  esophagus are normal. The ascending aorta and main pulmonary artery  are not dilated. No large central pulmonary embolism. No thoracic adenopathy.  The central tracheobronchial tree is patent. No pneumothorax or  pleural effusion. No airspace consolidation. Solid 3 mm nodule in the  right upper lobe (series 6, image 106).  Abdomen and pelvis:   The liver, gallbladder, spleen, and pancreas appear normal. Mild  diffuse benign-appearing thickening of the adrenal glands. Atrophy of  the native kidneys with numerous subcentimeter hypodensities which are  too small to characterize. A punctate calcification in the mid left  kidney may represent a vascular calcification or nonobstructing tiny  stone. Right lower quadrant renal transplant is unremarkable.  Unremarkable CT appearance of the uterus and ovaries.  No intra-abdominal free air or free fluid. No abnormally dilated loops  of bowel. The appendix is not visualized. Mild colonic diverticulosis.  Periampullary duodenal diverticulum measuring up to 2.8 cm. Normal  caliber abdominal aorta. The major abdominal vasculature is patent.  Retroaortic left renal vein. No lymphadenopathy in the abdomen or  pelvis. Minimal mesenteric edema.    Impression    IMPRESSION:   1. No lymphadenopathy in the chest, abdomen, or pelvis.  2. Single 3 mm nodule in the " right upper lobe. Consider follow-up CT  in one year if the patient is considered high risk for lung cancer,  correlation with previous outside imaging to determine chronicity.  3. Atrophy of the native kidneys with unremarkable appearance of the  right lower quadrant renal transplant.          Zulma is a 66 year old who is being evaluated via a billable video visit.    How would you like to obtain your AVS? MyChart  If the video visit is dropped, the invitation should be resent by: Send to e-mail at: [email or web address]  Will anyone else be joining your video visit? No  Video-Visit Details  Type of service:  Video Visit  Video Start Time: 9:24 AM  Video End Time:10:00 AM  Originating Location (pt. Location): Home  Distant Location (provider location):  [facility] INFECTI[facility] DISEASE CLIN[facility] [place]   Platform used for Video Visit: [email or web address].[email or web address]       Time spent in this encounter, on the date of service:  55 minutes    Again, thank you for allowing me to participate in the care of your patient.      Sincerely,    Abril Perez MD

## 2022-01-28 NOTE — PROGRESS NOTES
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 Will also check peripheral smear, looking for atypical lymphocytes, as well as EBV level.   - I will follow her EBV levels that are being drawn periodically.  - She will continue with periodic self-exams to check for adenopathy.   - Return to ID clinic is not specifically scheduled. She has hearing loss and it helps her tremendously to have in person appointments, so if she needs to be scheduled in, we will do so in person or via video, but not by phone.     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 Zulma was EBV R-/D+ for her kidney transplantation. Her tonsils were large on exam, they were removed 7/29/2020. EBV+ cells seen on pathology of tonsils. She does not snore anymore since removal of her tonsils. EBV viral loads post-tonsillectomy have been elevated in a generally modestly elevated range, but without major symptoms. When imaging has been performed, there is no adenopathy. On self exams, there is no adenopathy. Would avoid rituxan given need to keep all vaccine-produced antibody levels solid, so will just monitor monthly or so for now, allowing her to range between 100K-200K as long as there are no new  symptoms and no new adenopathy.   - Counseled about covid infection. Potential Covid exposure, as her  has covid. Will check natural and vaccine antibodies with next scheduled blood draw, as a baseline should she come down with covid in the future and we need to make decisions about treatment in the setting of limited monoclonal antibodies.   - Chronic left maxillary sinusitis. Documented on 12/28/2021 CT imaging, and fits with stuffy symptoms.   - Recurrent URI and nuisance infections, could be an anatomic obstruction from large tonsils contributing to recurrence.  - Hx shingles.   - QTc interval: 436 msec on 8/19/2007 EKG  - Pneumocystis prophylaxis: none needed, as CD4 count > 200.  - Viral serostatus: CMV D-/R+, EBV D+/R-, VZV+, hep B immune.  - Immunization status: up to date with influenza and covid  - Gamma globulin status: unknown, will check in 3/2022 with blood draw  - Isolation status: Good hand hygiene.    Abril Perez MD. Pager [phone]         Interval History:   Since Zulma was last seen by ID on 9/4/2020, she is worried about her , since he has covid. He thinks that he picked it up at work, delivering material at construction sites. He has been very careful, although he does shopping for them. She had a fall on a patch if ice about a month ago. Kidney-wise, she feels fine. She has no symptoms referable to where she had her tonsils out 7/29/2020. Zulma surmises that some of her fatigue that she has is related to cervical arthritis, and she has a chiropracter. She finds no swollen lymph nodes. She sleeps lousy at night, it is rare to sleep all night. Sleep total varies between 4 and 8 hours. She has to get up to urinate now and then. There are many days when she barely notices it. It is hard for her to nap, that is just not her style.     Transplants:  8/19/2007 (Kidney); Postoperative day:  5276.  Coordinator Stephanie Schmidt    Review of Systems:  CONSTITUTIONAL:  No fevers or  chills. No night sweats. Weight is not changing.  EYES: negative for icterus or acute vision changes. Her vision is affected by glaucoma.  ENT:  Gradual worsening of known hearing loss since 2013, + a little bit of tinnitus. She has hearing aids. Once in a while she has scratchy throat, disappears if she drinks something.   RESPIRATORY:  Minor cough (throat clearing tickle), no sputum production. Perhaps a little dyspnea with mild exertion (heavy laundry up 3 flights of stairs).   CARDIOVASCULAR:  negative for chest pain. Every now and then her heart can flip-flop, coffee can rev that up.   GASTROINTESTINAL:  negative for nausea, vomiting, diarrhea. Once in a while constipation, and then she will up the fiber  GENITOURINARY:  negative for dysuria or hematuria.  HEME:  No easy bruising or bleeding  INTEGUMENT:  negative for rash, but some pruritus from dry skin   NEURO:  Negative for headache or tremor, other than an occ headache from arthritis, treated with tylenol.    Past Medical History:   Diagnosis Date     Anemia in chronic kidney disease(285.21)      Dyslipidemia      High risk medications (not anticoagulants) long-term use      Hypertension      Immunosuppressed status (H)      Kidney replaced by transplant      Shingles        Past Surgical History:   Procedure Laterality Date     APPENDECTOMY       CATARACT IOL, RT/LT Bilateral      IR MISCELLANEOUS PROCEDURE  1/15/2004     IR MISCELLANEOUS PROCEDURE  3/29/2007     kidney transplant       TONSILLECTOMY, ADENOIDECTOMY, COMBINED Bilateral 7/29/2020    Procedure: BILATERAL TONSILLECTOMY AND ADENOIDECTOMY;  Surgeon: Tammy Haines MD;  Location: [facility]       Family History   Problem Relation Age of Onset     Alzheimer Disease Mother      Alzheimer Disease Father      Anesthesia Reaction No family hx of      Deep Vein Thrombosis (DVT) No family hx of      Cancer Maternal Grandfather         Thinks it was Stomach Cancer       Social History     Social  History Narrative    Zulma lives in [place] with her . Two children that live nearby, 4 grandchildren. Used to work in . Children have dogs. No other animal exposures. Foreign travel includes a [place] cruise, [place], [place], [place] (2003).      Social History     Tobacco Use     Smoking status: Never Smoker     Smokeless tobacco: Never Used   Substance Use Topics     Alcohol use: No     Alcohol/week: 0.0 standard drinks     Drug use: No       Immunization History   Administered Date(s) Administered     COVID-19,PF,Pfizer (12+ Yrs) 03/13/2021, 03/31/2021, 08/20/2021, 01/27/2022     FLU 6-35 months 12/02/2004     Flu, Unspecified 11/08/2010     HepA-Adult 07/14/2008     HepB-Adult 02/05/2004, 05/03/2004, 07/14/2008     Influenza (H1N1) 11/06/2009     Influenza (IIV3) PF 11/24/2006, 10/11/2011, 10/17/2012, 10/14/2013, 10/20/2014, 10/26/2015, 10/10/2016     Influenza Vaccine IM > 6 months Valent IIV4 (Alfuria,Fluzone) 11/03/2017, 10/26/2020     Influenza Vaccine, 6+MO IM (QUADRIVALENT W/PRESERVATIVES) 11/16/2018, 10/22/2019, 10/01/2021     Pneumo Conj 13-V (2010&after) 11/03/2017     Pneumococcal 23 valent 02/03/2013, 02/07/2020     Poliovirus, inactivated (IPV) 07/14/2008     TDAP Vaccine (Boostrix) 11/16/2018     Tdap (Adult) Unspecified Formulation 07/02/1997, 07/14/2008     Typhoid Oral 07/14/2008     Zoster vaccine recombinant adjuvanted (SHINGRIX) 10/26/2020, 12/28/2020       Patient Active Problem List   Diagnosis     Kidney replaced by transplant     Immunosuppression (H)     Anemia in chronic renal disease     Dyslipidemia     Aftercare following organ transplant     EBV (Bandra-Barr virus) viremia     Vitamin D deficiency     HTN, kidney transplant related     Immunosuppressed status (H)     Large tonsils     Cervical osteoarthritis     Glaucoma     Hearing loss     Hyperlipidemia     Airway obstruction       Outpatient Medications Marked as Taking for the  1/28/22 encounter (Virtual Visit) with Abril Perez MD   Medication Sig     amLODIPine (NORVASC) 2.5 MG tablet Take 1 tablet (2.5 mg) by mouth at bedtime     brimonidine (ALPHAGAN) 0.2 % ophthalmic solution Place 1 drop Into the left eye every morning      cycloSPORINE modified (GENERIC EQUIVALENT) 25 MG capsule Take 3 capsules (75 mg) by mouth 2 times daily     latanoprost (XALATAN) 0.005 % ophthalmic solution Place 1 drop Into the left eye At Bedtime     metoprolol tartrate (LOPRESSOR) 25 MG tablet Take 1 tablet (25 mg) by mouth 2 times daily     Multiple Vitamins-Minerals (CENTRUM SILVER) per tablet Take 1 tablet by mouth every morning      mycophenolate (GENERIC EQUIVALENT) 250 MG capsule Take 1 capsule (250 mg) by mouth 2 times daily     Omega-3 Fatty Acids (FISH OIL) 1000 MG CPDR Take 2,000 mg by mouth 2 times daily     PRAVASTATIN SODIUM PO Take 20 mg by mouth At Bedtime      prednisoLONE acetate (PRED FORTE) 1 % ophthalmic suspension Place 1-2 drops Into the left eye At Bedtime     ranibizumab (LUCENTIS) 0.5 MG/0.05ML SOSY Every 7 weeks     timolol maleate (TIMOPTIC) 0.5 % ophthalmic solution Place 1 drop Into the left eye every morning       Allergies   Allergen Reactions     Fenofibrate Other (See Comments)     Pancreatitis     Tricor Other (See Comments)     Pancreatitis (this is fenofibrate)     Simvastatin Muscle Pain (Myalgia) and Cramps            Physical Exam:   Vitals not checked, as this was a video visit.   Wt Readings from Last 4 Encounters:   12/28/21 62.6 kg (138 lb)   10/13/20 59 kg (130 lb)   09/04/20 60.1 kg (132 lb 9.6 oz)   08/20/20 58.5 kg (129 lb)       Exam, via video:  GENERAL: well-developed, well-nourished woman, alert, oriented, in no acute distress.  HEAD: Head is normocephalic, atraumatic   EYES: Eyes have anicteric sclerae.    NECK: Supple.         Laboratory Data:     Absolute CD4   Date Value Ref Range Status   11/03/2017 469 441 - 2,156 cells/uL Final        Metabolic Studies    Recent Labs   Lab Test 12/06/21  1032 09/18/21  1014 07/14/21  1140 11/22/17  1023 09/27/17  1123    136 139   < >  --    POTASSIUM 4.7 4.3 4.5   < >  --    CHLORIDE 104 104 103   < >  --    CO2 27 29 23   < >  --    ANIONGAP 6 3 13   < >  --    BUN 21 27 24*   < >  --    CR 0.90 0.95 0.89   < >  --    27435  --   --   --   --  1.18*   GFRESTIMATED 67 63 69   < >  --    * 112* 109   < >  --    AICHA 9.5 9.2 9.9   < >  --     < > = values in this interval not displayed.       Hepatic Studies    Recent Labs   Lab Test 07/14/21  1140 01/19/21  1049 07/02/20  1137 01/28/20  1031   BILITOTAL 1.0 1.1* 1.1*  --    ALKPHOS 60 53 61  --    PROTTOTAL 7.6 7.7 8.2*  --    ALBUMIN 3.8 4.0 4.2  --    AST 25 24 26  --    ALT 20 20 18  --    LDH  --   --   --  180       Hematology Studies     Recent Labs   Lab Test 12/06/21  1032 09/18/21  1014 06/30/21  1039 03/30/21  1634 11/22/17  1023 11/03/17  1035 09/27/17  1123   WBC 4.7 3.8* 4.4 5.1   < > 4.0  --    10677  --   --   --   --   --   --  4.6   ANEU  --   --   --   --   --  1.5*  --    ALYM  --   --   --   --   --  1.8  --    NILSA  --   --   --   --   --  0.6  --    AEOS  --   --   --   --   --  0.1  --    HGB 12.2 11.9 12.0 11.1*   < > 11.5*  --    74577  --   --   --   --   --   --  11.8*   HCT 38.4 37.8 37.8 36.2   < > 35.9  --     200 177 212   < > 183  --    59506  --   --   --   --   --   --  199    < > = values in this interval not displayed.       Lipid testing  Recent Labs   Lab Test 07/14/21  1140 07/02/20  1137 11/25/19  0906   CHOL 175 180 176   HDL 58 60 55   LDL 99 100 103   TRIG 91 98 92       Medication levels    Recent Labs   Lab Test 12/06/21  1032 01/28/20  1032 12/20/19  1112   CYCLSP 51   < > 56   MPACID  --   --  1.22   MPAG  --   --  27.8*    < > = values in this interval not displayed.       Microbiology:  Last Culture results with specimen source  Culture Micro   Date Value Ref Range Status   12/21/2009 No  yeast isolated  Final   12/21/2009 10 to 50,000 colonies/mL Mixed gram positive filemon  Final     Comment:     Multiple species present, probable perineal contamination.  Susceptibility testing not routinely done   09/04/2009 Duplicate request  Final     Comment:     Canceled, Test credited   09/04/2009 No yeast isolated  Final   09/04/2009   Final    <10,000 colonies/mL Lactose fermenting gram negative rods Susceptibility testing     Comment:      not routinely done  <10,000 colonies/mL Non lactose fermenting gram negative rods Susceptibility   testing not routinely done  10 to 50,000 colonies/mL Gram positive cocci in chains Susceptibility testing   not   routinely done  Multiple species present, probable perineal contamination.   10/01/2007 <10,000 colonies/mL Gram positive cocci  Final     Comment:     <10,000 colonies/mL Staphylococcus species  Susceptibility testing not routinely done   10/01/2007 No growth  Final   10/01/2007 No growth  Final   09/12/2007 No growth  Final   09/12/2007 No anaerobes isolated  Final           Virology:  Log IU/mL of CMVQNT   Date Value Ref Range Status   08/01/2016 Not Calculated <2.1 [Log_IU]/mL Final       EBV DNA Copies/mL   Date Value Ref Range Status   12/28/2021 121,569 (H) <=0 copies/mL Final   12/06/2021 226,287 (H) <=0 copies/mL Final   09/18/2021 81,057 (H) <=0 copies/mL Final   08/05/2021 102,738 (H) <=0 copies/mL Final   06/30/2021 238,228 (A) EBVNEG^EBV DNA Not Detected [Copies]/mL Final   04/29/2021 133,422 (A) EBVNEG^EBV DNA Not Detected [Copies]/mL Final   12/16/2020 120,382 (A) EBVNEG^EBV DNA Not Detected [Copies]/mL Final   09/09/2020 77,536 (A) EBVNEG^EBV DNA Not Detected [Copies]/mL Final   04/27/2020 70,253 (A) EBVNEG^EBV DNA Not Detected [Copies]/mL Final   01/28/2020 130,706 (A) EBVNEG^EBV DNA Not Detected [Copies]/mL Final     EBV PCR Quant (External)   Date Value Ref Range Status   08/04/2017 Detected None detected Final   07/07/2017 Positive (A) Negative  "Final       Hepatitis C Antibody   Date Value Ref Range Status   07/02/2020 Negative Negative Final   08/19/2007 Negative NEG Final   05/09/2007 Negative NEG Final       CMV IgG Antibody   Date Value Ref Range Status   08/19/2007 >160.0 EU/mL Final     Comment:     Positive for anti-CMV IgG       EBV IgG Antibody Interpretation   Date Value Ref Range Status   08/19/2007 Negative, suggests no immunologic exposure.  Final   05/09/2007 Negative, suggests no immunologic exposure.  Final       Pathology:  Specimen #: [id number] Collected: 7/29/2020  FINAL DIAGNOSIS:   A) Tonsil, left, tonsillectomy: Lymphoepithelial tissue with follicular hyperplasia with rare to occasional EBV positive cells.  B) Tonsil, right, tonsillectomy: Lymphoepithelial tissue with follicular hyperplasia with rare to occasional EBV positive cells.  COMMENT:   Concurrent flow cytometry was performed ([id number] and 5970) on the bilateral samples and in each study, the B cells were polytypic, and there was no aberrant immunophenotype on T cells.   Per clinic note dated 7/10/20 - this patient has had increased EBV viremia, despite reduction in immunosuppression.  Per op note, the tonsils were enlarged, but there was no mention of discrete lesions of significant asymmetry.    Based on gross description of the surgical pathology samples, the right tonsil sample was slightly heavier, but they are overall similar in size as measured and reported in the gross.   Based on H&E stains, all sections show normal architecture for tonsils without architectural distortion. Though the follicular hyperplasia is associated with EBV positive cells (and some would consider the possibility of a non-destructive post-transplant lymphoproliferative disorder), overall the team did not favor PTLD, as the tissue does not appear to \"form mass lesions\" as is typical of PTLD. A Congo red stain was performed on block A4 and is negative for amyloid; an on-slide control stains " appropriately.       Imaging:   EXAMINATION: CT CHEST/ABDOMEN/PELVIS W CONTRAST, 1/16/2020 1:55 PM  HISTORY: ? Lymphoma; Kidney transplanted.  FINDINGS:  Chest: Heterogeneous, mildly enlarged thyroid parenchyma without discrete  nodule. The aortic branching pattern, heart size, pericardium, and  esophagus are normal. The ascending aorta and main pulmonary artery  are not dilated. No large central pulmonary embolism. No thoracic adenopathy.  The central tracheobronchial tree is patent. No pneumothorax or  pleural effusion. No airspace consolidation. Solid 3 mm nodule in the  right upper lobe (series 6, image 106).  Abdomen and pelvis:   The liver, gallbladder, spleen, and pancreas appear normal. Mild  diffuse benign-appearing thickening of the adrenal glands. Atrophy of  the native kidneys with numerous subcentimeter hypodensities which are  too small to characterize. A punctate calcification in the mid left  kidney may represent a vascular calcification or nonobstructing tiny  stone. Right lower quadrant renal transplant is unremarkable.  Unremarkable CT appearance of the uterus and ovaries.  No intra-abdominal free air or free fluid. No abnormally dilated loops  of bowel. The appendix is not visualized. Mild colonic diverticulosis.  Periampullary duodenal diverticulum measuring up to 2.8 cm. Normal  caliber abdominal aorta. The major abdominal vasculature is patent.  Retroaortic left renal vein. No lymphadenopathy in the abdomen or  pelvis. Minimal mesenteric edema.    Impression    IMPRESSION:   1. No lymphadenopathy in the chest, abdomen, or pelvis.  2. Single 3 mm nodule in the right upper lobe. Consider follow-up CT  in one year if the patient is considered high risk for lung cancer,  correlation with previous outside imaging to determine chronicity.  3. Atrophy of the native kidneys with unremarkable appearance of the  right lower quadrant renal transplant.          Zulma is a 66 year old who is being  evaluated via a billable video visit.    How would you like to obtain your AVS? MyChart  If the video visit is dropped, the invitation should be resent by: Send to e-mail at: [email or web address]  Will anyone else be joining your video visit? No  Video-Visit Details  Type of service:  Video Visit  Video Start Time: 9:24 AM  Video End Time:10:00 AM  Originating Location (pt. Location): Home  Distant Location (provider location):  [facility] [place]   Platform used for Video Visit: [employer]       Time spent in this encounter, on the date of service:  55 minutes

## 2022-03-07 ENCOUNTER — LAB (OUTPATIENT)
Dept: LAB | Facility: CLINIC | Age: 67
End: 2022-03-07
Attending: INTERNAL MEDICINE
Payer: MEDICARE

## 2022-03-07 DIAGNOSIS — D84.9 IMMUNOSUPPRESSED STATUS (H): ICD-10-CM

## 2022-03-07 DIAGNOSIS — Z71.89 COUNSELED ABOUT COVID-19 VIRUS INFECTION: ICD-10-CM

## 2022-03-07 DIAGNOSIS — Z94.0 KIDNEY TRANSPLANTED: ICD-10-CM

## 2022-03-07 DIAGNOSIS — B27.00 EBV (EPSTEIN-BARR VIRUS) VIREMIA: ICD-10-CM

## 2022-03-07 LAB
BASOPHILS # BLD AUTO: 0 10E3/UL (ref 0–0.2)
BASOPHILS NFR BLD AUTO: 0 %
CYCLOSPORINE BLD LC/MS/MS-MCNC: 52 UG/L (ref 50–400)
EOSINOPHIL # BLD AUTO: 0.1 10E3/UL (ref 0–0.7)
EOSINOPHIL NFR BLD AUTO: 1 %
ERYTHROCYTE [DISTWIDTH] IN BLOOD BY AUTOMATED COUNT: 12.3 % (ref 10–15)
HCT VFR BLD AUTO: 36.7 % (ref 35–47)
HGB BLD-MCNC: 11.7 G/DL (ref 11.7–15.7)
LYMPHOCYTES # BLD AUTO: 2.1 10E3/UL (ref 0.8–5.3)
LYMPHOCYTES NFR BLD AUTO: 38 %
MCH RBC QN AUTO: 29.9 PG (ref 26.5–33)
MCHC RBC AUTO-ENTMCNC: 31.9 G/DL (ref 31.5–36.5)
MCV RBC AUTO: 94 FL (ref 78–100)
MONOCYTES # BLD AUTO: 0.7 10E3/UL (ref 0–1.3)
MONOCYTES NFR BLD AUTO: 12 %
NEUTROPHILS # BLD AUTO: 2.7 10E3/UL (ref 1.6–8.3)
NEUTROPHILS NFR BLD AUTO: 49 %
PLATELET # BLD AUTO: 185 10E3/UL (ref 150–450)
RBC # BLD AUTO: 3.91 10E6/UL (ref 3.8–5.2)
RETICS # AUTO: 0.05 10E6/UL (ref 0.03–0.1)
RETICS/RBC NFR AUTO: 1.2 % (ref 0.5–2)
TME LAST DOSE: NORMAL H
TME LAST DOSE: NORMAL H
WBC # BLD AUTO: 5.5 10E3/UL (ref 4–11)

## 2022-03-07 PROCEDURE — 86359 T CELLS TOTAL COUNT: CPT

## 2022-03-07 PROCEDURE — 86355 B CELLS TOTAL COUNT: CPT

## 2022-03-07 PROCEDURE — 82785 ASSAY OF IGE: CPT

## 2022-03-07 PROCEDURE — 85025 COMPLETE CBC W/AUTO DIFF WBC: CPT

## 2022-03-07 PROCEDURE — 86769 SARS-COV-2 COVID-19 ANTIBODY: CPT | Mod: 90

## 2022-03-07 PROCEDURE — 86357 NK CELLS TOTAL COUNT: CPT

## 2022-03-07 PROCEDURE — 80158 DRUG ASSAY CYCLOSPORINE: CPT

## 2022-03-07 PROCEDURE — 82784 ASSAY IGA/IGD/IGG/IGM EACH: CPT

## 2022-03-07 PROCEDURE — 87799 DETECT AGENT NOS DNA QUANT: CPT

## 2022-03-07 PROCEDURE — 99000 SPECIMEN HANDLING OFFICE-LAB: CPT

## 2022-03-07 PROCEDURE — 86360 T CELL ABSOLUTE COUNT/RATIO: CPT

## 2022-03-07 PROCEDURE — 80053 COMPREHEN METABOLIC PANEL: CPT

## 2022-03-07 PROCEDURE — 99207 BLOOD MORPHOLOGY PATHOLOGIST REVIEW: CPT | Performed by: PATHOLOGY

## 2022-03-07 PROCEDURE — 85045 AUTOMATED RETICULOCYTE COUNT: CPT

## 2022-03-07 PROCEDURE — 36415 COLL VENOUS BLD VENIPUNCTURE: CPT

## 2022-03-08 LAB
ALBUMIN SERPL-MCNC: 4 G/DL (ref 3.4–5)
ALP SERPL-CCNC: 55 U/L (ref 40–150)
ALT SERPL W P-5'-P-CCNC: 34 U/L (ref 0–50)
ANION GAP SERPL CALCULATED.3IONS-SCNC: 7 MMOL/L (ref 3–14)
AST SERPL W P-5'-P-CCNC: 26 U/L (ref 0–45)
BILIRUB SERPL-MCNC: 0.8 MG/DL (ref 0.2–1.3)
BUN SERPL-MCNC: 24 MG/DL (ref 7–30)
CALCIUM SERPL-MCNC: 9.7 MG/DL (ref 8.5–10.1)
CD19 CELLS # BLD: 238 CELLS/UL (ref 107–698)
CD19 CELLS NFR BLD: 10 % (ref 6–27)
CD3 CELLS # BLD: 1754 CELLS/UL (ref 603–2990)
CD3 CELLS NFR BLD: 76 % (ref 49–84)
CD3+CD4+ CELLS # BLD: 609 CELLS/UL (ref 441–2156)
CD3+CD4+ CELLS NFR BLD: 26 % (ref 28–63)
CD3+CD4+ CELLS/CD3+CD8+ CLL BLD: 0.57 % (ref 1.4–2.6)
CD3+CD8+ CELLS # BLD: 1065 CELLS/UL (ref 125–1312)
CD3+CD8+ CELLS NFR BLD: 46 % (ref 10–40)
CD3-CD16+CD56+ CELLS # BLD: 319 CELLS/UL (ref 95–640)
CD3-CD16+CD56+ CELLS NFR BLD: 14 % (ref 4–25)
CHLORIDE BLD-SCNC: 106 MMOL/L (ref 94–109)
CO2 SERPL-SCNC: 24 MMOL/L (ref 20–32)
CREAT SERPL-MCNC: 0.88 MG/DL (ref 0.52–1.04)
EBV DNA COPIES/ML, INSTRUMENT: ABNORMAL COPIES/ML
EBV DNA SPEC NAA+PROBE-LOG#: 5.3 {LOG_COPIES}/ML
GFR SERPL CREATININE-BSD FRML MDRD: 73 ML/MIN/1.73M2
GLUCOSE BLD-MCNC: 103 MG/DL (ref 70–99)
IGA SERPL-MCNC: 368 MG/DL (ref 84–499)
IGE SERPL-ACNC: 2 KU/L (ref 0–114)
IGG SERPL-MCNC: 1653 MG/DL (ref 610–1616)
IGM SERPL-MCNC: 47 MG/DL (ref 35–242)
PATH REPORT.COMMENTS IMP SPEC: NORMAL
PATH REPORT.FINAL DX SPEC: NORMAL
PATH REPORT.MICROSCOPIC SPEC OTHER STN: NORMAL
PATH REPORT.MICROSCOPIC SPEC OTHER STN: NORMAL
POTASSIUM BLD-SCNC: 4.4 MMOL/L (ref 3.4–5.3)
PROT SERPL-MCNC: 8.9 G/DL (ref 6.8–8.8)
SODIUM SERPL-SCNC: 137 MMOL/L (ref 133–144)
T CELL EXTENDED COMMENT: ABNORMAL

## 2022-03-09 LAB
SARS-COV-2 AB SERPL IA-ACNC: >250 U/ML
SARS-COV-2 AB SERPL QL IA: POSITIVE

## 2022-03-12 ENCOUNTER — HEALTH MAINTENANCE LETTER (OUTPATIENT)
Age: 67
End: 2022-03-12

## 2022-03-21 ENCOUNTER — TELEPHONE (OUTPATIENT)
Dept: NEPHROLOGY | Facility: CLINIC | Age: 67
End: 2022-03-21
Payer: MEDICARE

## 2022-03-21 DIAGNOSIS — Z79.899 ENCOUNTER FOR LONG-TERM CURRENT USE OF MEDICATION: ICD-10-CM

## 2022-03-21 DIAGNOSIS — Z48.298 AFTERCARE FOLLOWING ORGAN TRANSPLANT: Primary | ICD-10-CM

## 2022-03-21 DIAGNOSIS — Z94.0 KIDNEY REPLACED BY TRANSPLANT: ICD-10-CM

## 2022-03-21 NOTE — TELEPHONE ENCOUNTER
[facility] Call Center    Phone Message    May a detailed message be left on voicemail: yes     Reason for Call: Order(s): Other:   Reason for requested: Labs  Date needed: 7/1/22  Provider name: Dr. Drake    Patient will schedule at [facility] lab      Action Taken: Message routed to:  [facility] ([facility] Neph    Travel Screening: Not Applicable

## 2022-05-02 ENCOUNTER — MYC MEDICAL ADVICE (OUTPATIENT)
Dept: NEPHROLOGY | Facility: CLINIC | Age: 67
End: 2022-05-02
Payer: MEDICARE

## 2022-05-02 DIAGNOSIS — Z48.298 AFTERCARE FOLLOWING ORGAN TRANSPLANT: Primary | ICD-10-CM

## 2022-05-04 ENCOUNTER — LAB REQUISITION (OUTPATIENT)
Dept: LAB | Facility: CLINIC | Age: 67
End: 2022-05-04
Payer: MEDICARE

## 2022-05-04 DIAGNOSIS — I10 ESSENTIAL (PRIMARY) HYPERTENSION: ICD-10-CM

## 2022-05-04 DIAGNOSIS — E55.9 VITAMIN D DEFICIENCY, UNSPECIFIED: ICD-10-CM

## 2022-05-04 DIAGNOSIS — E78.2 MIXED HYPERLIPIDEMIA: ICD-10-CM

## 2022-05-04 LAB
ALBUMIN SERPL-MCNC: 4 G/DL (ref 3.5–5)
ALP SERPL-CCNC: 54 U/L (ref 45–120)
ALT SERPL W P-5'-P-CCNC: 21 U/L (ref 0–45)
ANION GAP SERPL CALCULATED.3IONS-SCNC: 12 MMOL/L (ref 5–18)
AST SERPL W P-5'-P-CCNC: 25 U/L (ref 0–40)
BILIRUB SERPL-MCNC: 0.9 MG/DL (ref 0–1)
BUN SERPL-MCNC: 30 MG/DL (ref 8–22)
CALCIUM SERPL-MCNC: 9.9 MG/DL (ref 8.5–10.5)
CHLORIDE BLD-SCNC: 105 MMOL/L (ref 98–107)
CHOLEST SERPL-MCNC: 170 MG/DL
CO2 SERPL-SCNC: 23 MMOL/L (ref 22–31)
CREAT SERPL-MCNC: 0.95 MG/DL (ref 0.6–1.1)
GFR SERPL CREATININE-BSD FRML MDRD: 66 ML/MIN/1.73M2
GLUCOSE BLD-MCNC: 101 MG/DL (ref 70–125)
HDLC SERPL-MCNC: 57 MG/DL
LDLC SERPL CALC-MCNC: 93 MG/DL
POTASSIUM BLD-SCNC: 4.6 MMOL/L (ref 3.5–5)
PROT SERPL-MCNC: 7.5 G/DL (ref 6–8)
SODIUM SERPL-SCNC: 140 MMOL/L (ref 136–145)
TRIGL SERPL-MCNC: 98 MG/DL

## 2022-05-04 PROCEDURE — 82306 VITAMIN D 25 HYDROXY: CPT | Mod: ORL | Performed by: FAMILY MEDICINE

## 2022-05-04 PROCEDURE — 80053 COMPREHEN METABOLIC PANEL: CPT | Mod: ORL | Performed by: FAMILY MEDICINE

## 2022-05-04 PROCEDURE — 80061 LIPID PANEL: CPT | Mod: ORL | Performed by: FAMILY MEDICINE

## 2022-05-05 LAB — DEPRECATED CALCIDIOL+CALCIFEROL SERPL-MC: 43 UG/L (ref 20–75)

## 2022-05-25 ENCOUNTER — VIRTUAL VISIT (OUTPATIENT)
Dept: FAMILY MEDICINE | Facility: CLINIC | Age: 67
End: 2022-05-25
Payer: MEDICARE

## 2022-05-25 DIAGNOSIS — Z71.3 DIETARY COUNSELING AND SURVEILLANCE: Primary | ICD-10-CM

## 2022-05-25 NOTE — PROGRESS NOTES
[facility] Nutrition Assessment    Zulma is a 66 year old female who presents for nutrition education related to kidney transplant. She had a kidney transplant 15 years ago and has some nutrition questions related to this.     Tea - specific kinds of tea that contraindicated? Zulma would like to have the occasional cup of tea in moderate portions. This seems reasonable.   Is tofu allowed for kidney patients - Yes  Updated list of do's and don'ts for kidney patients - Zulma current watches her sodium intake and avoids extra fat. We discussed this rationale as well as need for high fiber complex carbohydrates. After review there does not seem to be any new foods to avoid related to her transplant.   Limiting processed foods as much as possible - wheat bread, etc. Is it okay to have from time to time? Yes, it is reasonable to occasionally have a food that is more processed. Processed foods can contain more added sodium, fat or sugar so keep this in mind.   Cold cereals - are there any that are better? When looking for a cold cereal, aim for one that contains whole grains and minimum added sugar. One goal could be fiber 2-3 grams per serving and 10 grams or less added sugar      Recent weights:   Wt Readings from Last 6 Encounters:   12/28/21 62.6 kg (138 lb)   10/13/20 59 kg (130 lb)   09/04/20 60.1 kg (132 lb 9.6 oz)   08/20/20 58.5 kg (129 lb)   07/29/20 59.5 kg (131 lb 2.8 oz)   07/10/20 59.9 kg (132 lb)     Recent A1C values:   Hemoglobin A1C POCT   Date Value Ref Range Status   05/09/2007 4.2 (L) 4.3 - 6.0 % Final     Recent lipid values:   Cholesterol   Date Value Ref Range Status   05/04/2022 170 <=199 mg/dL Final   07/14/2021 175 <=199 mg/dL Final     Comment:     Blood specimen source: Peripheral BloodBlood speci   05/09/2007 213 (H) 0 - 200 mg/dL Final     Comment:     LDL Cholesterol is the primary guide to therapy: LDL-cholesterol goal in high   risk patients is <100 mg/dL and in very high risk patients  is <70 mg/dL.   The NCEP recommends further evaluation of: patients with cholesterol <200   mg/dL   if additionalrisk factors are present, cholesterol >240 mg/dL, triglycerides   >150 mg/dL, or HDL <40 mg/dL.     HDL Cholesterol   Date Value Ref Range Status   05/09/2007 43 (L) 50 - 110 mg/dL Final     Direct Measure HDL   Date Value Ref Range Status   05/04/2022 57 >=50 mg/dL Final     Comment:     HDL Cholesterol Reference Range:     0-2 years:   No reference ranges established for patients under 2 years old  at [facility] lipid analytes.    2-8 years:  Greater than 45 mg/dL     18 years and older:   Female: Greater than or equal to 50 mg/dL   Male:   Greater than or equal to 40 mg/dL   07/14/2021 58 >=50 mg/dL Final     Comment:     Blood specimen source: Peripheral BloodBlood speci  HDL Cholesterol Reference Range:     0-2 years:   No reference ranges established for patients under 2 years old  at [facility] lipid analytes.    2-8 years:  Greater than 45 mg/dL     18 years and older:   Female: Greater than or equal to 50 mg/dL   Male:   Greater than or equal to 40 mg/dL     LDL Cholesterol Calculated   Date Value Ref Range Status   05/04/2022 93 <=129 mg/dL Final   07/14/2021 99 <=129 mg/dL Final   05/09/2007 127 0 - 129 mg/dL Final     Triglycerides   Date Value Ref Range Status   05/04/2022 98 <=149 mg/dL Final   07/14/2021 91 <=149 mg/dL Final     Comment:     Blood specimen source: Peripheral BloodBlood speci   05/09/2007 213 (H) 0 - 150 mg/dL Final     Triglycerides (External)   Date Value Ref Range Status   07/20/2015 156 (H) mg/dL Final       Intervention(s):  Zulma is a 66 year old female who presents for nutrition education related to kidney transplant. We discussed her questions in depth. Zulma appears to consume a healthy, low sodium, low fat diet most of the time. She is conscious of avoiding too much added sugar and continues to keep food safety a top priority.  "  Specifics topics included:  1. Tea - specific kinds of tea that contraindicated? Zulma would like to have the occasional cup of tea in moderate portions. This seems reasonable.   2. Is tofu allowed for kidney patients - Yes  3. Updated list of do's and don'ts for kidney patients - Zulma current watches her sodium intake and avoids extra fat. We discussed this rationale as well as need for high fiber complex carbohydrates. After review there does not seem to be any new foods to avoid related to her transplant.   4. Limiting processed foods as much as possible - wheat bread, etc. Is it okay to have from time to time? Yes, it is reasonable to occasionally have a food that is more processed. Processed foods can contain more added sodium, fat or sugar so keep this in mind.   . Cold cereals - are there any that are better? When looking for a cold cereal, aim for one that contains whole grains and minimum added sugar. One goal could be fiber 2-3 grams per serving and 10 grams or less added sugar    Monitoring/Evaluation:  Follow up on My Chart or call for an appointment as needed    Patient referred by Nellie Jimenez MD   Total time spent with patient 16 minutes    Sadaf Castillo RD     Family Medicine Video Visit Note  Zulma is being evaluated via a billable video visit.             Video Visit Consent     The patient has been notified of following:     \"This video visit will be conducted via a call between you and your physician/provider. We have found that certain health care needs can be provided without the need for an in-person physical exam.  This service lets us provide the care you need with a video conversation.  If a prescription is necessary we can send it directly to your pharmacy.  If lab work is needed we can place an order for that and you can then stop by our lab to have the test done at a later time.    Video visits are billed at different rates depending on your insurance coverage.  Please " "reach out to your insurance provider with any questions.    If during the course of the call the physician/provider feels a video visit is not appropriate, you will not be charged for this service.\"    Patient has given verbal consent for Video visit? Yes    How would you like to obtain your AVS? Ibeth    Patient would like the video invitation sent by: Other e-mail: my chart    Will anyone else be joining your video visit? No       Video-Visit Details    Type of service:  Video Visit    Video Start Time: 2:27 PM  THIS is the time provider and patient connect.    Video End Time (time video stopped): 2:43 PM    Originating Location (pt. Location): Home    Distant Location (provider location):  [facility]     Mode of Communication:  Video Conference via Anisha Castillo RD                      "

## 2022-05-26 ENCOUNTER — HOSPITAL ENCOUNTER (OUTPATIENT)
Dept: MAMMOGRAPHY | Facility: CLINIC | Age: 67
Discharge: HOME | End: 2022-05-26
Attending: OBSTETRICS & GYNECOLOGY | Admitting: OBSTETRICS & GYNECOLOGY
Payer: MEDICARE

## 2022-05-26 DIAGNOSIS — Z12.31 VISIT FOR SCREENING MAMMOGRAM: ICD-10-CM

## 2022-05-26 PROCEDURE — 77067 SCR MAMMO BI INCL CAD: CPT

## 2022-05-31 ENCOUNTER — ANCILLARY PROCEDURE (OUTPATIENT)
Dept: BONE DENSITY | Facility: CLINIC | Age: 67
End: 2022-05-31
Attending: FAMILY MEDICINE
Payer: MEDICARE

## 2022-05-31 DIAGNOSIS — Z78.0 POST-MENOPAUSAL: ICD-10-CM

## 2022-05-31 PROCEDURE — 77080 DXA BONE DENSITY AXIAL: CPT | Mod: TC | Performed by: RADIOLOGY

## 2022-07-07 ENCOUNTER — LAB (OUTPATIENT)
Dept: LAB | Facility: CLINIC | Age: 67
End: 2022-07-07
Payer: MEDICARE

## 2022-07-07 DIAGNOSIS — Z48.298 AFTERCARE FOLLOWING ORGAN TRANSPLANT: ICD-10-CM

## 2022-07-07 DIAGNOSIS — Z94.0 KIDNEY REPLACED BY TRANSPLANT: ICD-10-CM

## 2022-07-07 DIAGNOSIS — Z79.899 ENCOUNTER FOR LONG-TERM CURRENT USE OF MEDICATION: ICD-10-CM

## 2022-07-07 LAB
CYCLOSPORINE BLD LC/MS/MS-MCNC: 54 UG/L (ref 50–400)
ERYTHROCYTE [DISTWIDTH] IN BLOOD BY AUTOMATED COUNT: 12.2 % (ref 10–15)
HCT VFR BLD AUTO: 37.2 % (ref 35–47)
HGB BLD-MCNC: 11.9 G/DL (ref 11.7–15.7)
MCH RBC QN AUTO: 29.6 PG (ref 26.5–33)
MCHC RBC AUTO-ENTMCNC: 32 G/DL (ref 31.5–36.5)
MCV RBC AUTO: 93 FL (ref 78–100)
PLATELET # BLD AUTO: 187 10E3/UL (ref 150–450)
RBC # BLD AUTO: 4.02 10E6/UL (ref 3.8–5.2)
TME LAST DOSE: NORMAL H
TME LAST DOSE: NORMAL H
WBC # BLD AUTO: 4.9 10E3/UL (ref 4–11)

## 2022-07-07 PROCEDURE — 80158 DRUG ASSAY CYCLOSPORINE: CPT

## 2022-07-07 PROCEDURE — 85027 COMPLETE CBC AUTOMATED: CPT

## 2022-07-07 PROCEDURE — 36415 COLL VENOUS BLD VENIPUNCTURE: CPT

## 2022-07-11 ENCOUNTER — MYC MEDICAL ADVICE (OUTPATIENT)
Dept: NEPHROLOGY | Facility: CLINIC | Age: 67
End: 2022-07-11

## 2022-07-11 ENCOUNTER — TELEPHONE (OUTPATIENT)
Dept: TRANSPLANT | Facility: CLINIC | Age: 67
End: 2022-07-11

## 2022-07-11 DIAGNOSIS — Z48.298 AFTERCARE FOLLOWING ORGAN TRANSPLANT: Primary | ICD-10-CM

## 2022-07-12 NOTE — PROGRESS NOTES
CHRONIC TRANSPLANT NEPHROLOGY VISIT    Recommendation   No changes to immunosuppression at this time     Labs every 3 months including EBV PCR     Recommended she measure her pulse and BP multiple times a week and if HR <50 to hold metoprolol. If persistent issue, ought to address with PCP.    Covid 4th shot     RTC in 6 months       Assessment & Plan   # DDKT: Stable   - Baseline Cr ~ 0.9 - 1.1   - Proteinuria: Normal (<0.2 grams)   - Date DSA Last Checked: Aug/2013      Latest DSA: Not checked recently due to time from transplant   - BK Viremia: No   - Kidney Tx Biopsy: No    # Immunosuppression: Cyclosporine 50-75, Mycophenolate 250mg bid   - Changes: No    - Continue with intensive monitoring of immunosuppression for efficacy and toxicity.    # Infection Prophylaxis:   - PJP: None    # Blood Pressure:   Blood pressure this am 125/80, pulse 46. Heart rate jumps around.      #Labile HR with intermittent bradycardia   Advised to measure pulse, BP more frequently   Hold metoprolol if HR <50   If persistent problem, should discuss with PCP.     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 Ca is normal. Vitamin D was normal in 2020.                    - Will check PTH and Vit D with next labs  Never smoker, not on PPI.   DEXA more than 3 years ago.  Would discuss with PCP re: updating.      # Hyperlipidemia   -Lipids excellent, high HDL    # EBV viremia: persistent, stable viral log around 4.9-5.1. She received rituximab x2 in the past and had tonsillectomy/adenoidectomy 7/29/20 after discussion with Dr. Perez. CT c/a/p no masses or lymph nodes in 2020  EBV improved to 196K from 121 copies    Weight ***  No fevers, chills, night sweats.   .     EBV PCR q3 months       # Skin Cancer Risk: reminded to schedule dermatology visit    - Discussed sun protection and recommend regular follow up with Dermatology.   - Scheduled appointment next week    -No skin cancers     # Cancer  "screening:   Mammogram -UTD   Colonoscopy - overdue. As soon as COVID \"settles\" down,     # COVID-19 Virus Review: Discussed COVID-19 virus and the potential medical risks.  Reviewed preventative health recommendations, including wearing a mask where appropriate.  Recommended COVID vaccination should be up to date with either an initial vaccination or booster shot when appropriate.  Asked the patient to inform the transplant center if they are exposed or diagnosed with this virus.    # COVID Vaccination Up To Date: Yes; has received 3 Pfizer shots. Recommend 4th shot 6 months after prior.     # Medical Compliance: Yes    # Transplant History:  Etiology of Kidney Failure:   Tx: DDKT  Transplant: 8/19/2007 (Kidney)  Donor Type: Donation after Brain Death Donor Class: Standard Criteria Donor  Significant changes in immunosuppression: None  Significant transplant-related complications: EBV Viremia    Transplant Office Phone Number: [phone]    Assessment and plan was discussed with the patient and she voiced her understanding and agreement.    Return visit: No follow-ups on file.    Yonis Drake MD  Transplant Nephrology   Pager: [phone]    Chief Complaint   Ms. Lovell is a 66 year old here for transplant follow up.    History of Present Illness    Last seen by Dr. Drake in 1/2022. She had a fall 12/28. Bruise on knee getting better, hand is getting better. Slight concussion.     Referred to dizzy/balance center.     She feels slightly off balance.     Appetite is good, drinking a lot of fluids  Denies any fevers, chills, night sweats, unintended weight loss.  Labs with downtrending EBV viral load -spontaneous & stable renal function    Questions/concerns today:   -Heart rate    IS: CSA 75/75 /250    Home BP: not checking regularly-but fair control    Review of Systems   A comprehensive review of systems was obtained and negative, except as noted in the HPI or PMH.    Problem List   Patient Active " Problem List   Diagnosis     Kidney replaced by transplant     Immunosuppression (H)     Anemia in chronic renal disease     Dyslipidemia     Aftercare following organ transplant     EBV (Bandar-Barr virus) viremia     Vitamin D deficiency     HTN, kidney transplant related     Immunosuppressed status (H)     Large tonsils     Cervical osteoarthritis     Glaucoma     Hearing loss     Hyperlipidemia     Airway obstruction       Social History   Social History     Tobacco Use     Smoking status: Never Smoker     Smokeless tobacco: Never Used   Substance Use Topics     Alcohol use: No     Alcohol/week: 0.0 standard drinks     Drug use: No       Allergies   Allergies   Allergen Reactions     Fenofibrate Other (See Comments)     Pancreatitis     Tricor Other (See Comments)     Pancreatitis (this is fenofibrate)     Simvastatin Muscle Pain (Myalgia) and Cramps       Medications   Current Outpatient Medications   Medication Sig     amLODIPine (NORVASC) 2.5 MG tablet Take 1 tablet (2.5 mg) by mouth at bedtime     brimonidine (ALPHAGAN) 0.2 % ophthalmic solution Place 1 drop Into the left eye every morning      cycloSPORINE modified (GENERIC EQUIVALENT) 25 MG capsule Take 3 capsules (75 mg) by mouth 2 times daily     latanoprost (XALATAN) 0.005 % ophthalmic solution Place 1 drop Into the left eye At Bedtime     metoprolol tartrate (LOPRESSOR) 25 MG tablet Take 1 tablet (25 mg) by mouth 2 times daily     Multiple Vitamins-Minerals (CENTRUM SILVER) per tablet Take 1 tablet by mouth every morning      mycophenolate (GENERIC EQUIVALENT) 250 MG capsule Take 1 capsule (250 mg) by mouth 2 times daily     Omega-3 Fatty Acids (FISH OIL) 1000 MG CPDR Take 2,000 mg by mouth 2 times daily     PRAVASTATIN SODIUM PO Take 20 mg by mouth At Bedtime      prednisoLONE acetate (PRED FORTE) 1 % ophthalmic suspension Place 1-2 drops Into the left eye At Bedtime     ranibizumab (LUCENTIS) 0.5 MG/0.05ML SOSY Every 7 weeks     timolol maleate  (TIMOPTIC) 0.5 % ophthalmic solution Place 1 drop Into the left eye every morning     No current facility-administered medications for this visit.     There are no discontinued medications.    Physical Exam   Vital Signs: LMP  (LMP Unknown)     GENERAL APPEARANCE: alert and no distress  HENT: no obvious abnormalities on appearance  RESP: breathing appears unremarkable with normal rate, no audible wheezing or cough and no apparent shortness of breath with conversation  MS: extremities normal - no gross deformities noted  SKIN: no apparent rash and normal skin tone  NEURO: speech is clear with no obvious neurological deficits  PSYCH: mentation appears normal and affect normal        Data     Renal Latest Ref Rng & Units 5/4/2022 3/7/2022 12/6/2021   Na 136 - 145 mmol/L 140 137 137   Na (external) 136 - 145 mmol/L - - -   K 3.5 - 5.0 mmol/L 4.6 4.4 4.7   K (external) 3.5 - 5.0 mmol/L - - -   Cl 98 - 107 mmol/L 105 106 104   CO2 22 - 31 mmol/L 23 24 27   CO2 (external) 22 - 31 mmol/L - - -   BUN 8 - 22 mg/dL 30(H) 24 21   BUN (external) 8 - 22 mg/dL - - -   Cr 0.60 - 1.10 mg/dL 0.95 0.88 0.90   Cr (external) 0.60 - 1.10 mg/dL - - -   Glucose 70 - 125 mg/dL 101 103(H) 103(H)   Glucose (external) 70 - 125 mg/dL - - -   Ca  8.5 - 10.5 mg/dL 9.9 9.7 9.5   Ca (external) 8.5 - 10.5 mg/dL - - -   Mg 1.6 - 2.3 mg/dL - - -     Bone Health Latest Ref Rng & Units 5/4/2022 8/7/2017 12/8/2015   Phos 2.5 - 4.5 mg/dL - - -   Phos (external) 2.5 - 4.5 mg/dL - - 3.1   PTHi 12 - 72 pg/mL - - -   Vit D Def 20 - 75 ug/L 43 50 -     Heme Latest Ref Rng & Units 7/7/2022 3/7/2022 12/6/2021   WBC 4.0 - 11.0 10e3/uL 4.9 5.5 4.7   WBC (external) 4.0 - 11.0 thou/ul - - -   Hgb 11.7 - 15.7 g/dL 11.9 11.7 12.2   Hgb (external) 12.0 - 16.0 g/dL - - -   Plt 150 - 450 10e3/uL 187 185 194   Plt (external) 140 - 440 thou/ul - - -   ABSOLUTE NEUTROPHIL 1.6 - 8.3 10e9/L - - -   ABSOLUTE NEUTROPHILS (EXTERNAL) 2.0 - 7.7 thou/uL - - -   ABSOLUTE LYMPHOCYTES  0.8 - 5.3 10e9/L - - -   ABSOLUTE LYMPHOCYTES (EXTERNAL) 0.8 - 4.4 thou/uL - - -   ABSOLUTE MONOCYTES 0.0 - 1.3 10e9/L - - -   ABSOLUTE MONOCYTES (EXTERNAL) 0.0 - 0.9 thou/uL - - -   ABSOLUTE EOSINOPHILS 0.0 - 0.7 10e9/L - - -   ABSOLUTE EOSINOPHILS (EXTERNAL) 0.0 - 0.4 thou/uL - - -   ABSOLUTE BASOPHILS 0.0 - 0.2 10e9/L - - -   ABSOLUTE BASOPHILS (EXTERNAL) 0.0 - 0.2 thou/uL - - -   ABS IMMATURE GRANULOCYTES 0 - 0.4 10e9/L - - -   ABSOLUTE NUCLEATED RBC - - - -     Liver Latest Ref Rng & Units 5/4/2022 3/7/2022 7/14/2021   AP 45 - 120 U/L 54 55 60   AP (external) U/L - - -   TBili 0.0 - 1.0 mg/dL 0.9 0.8 1.0   TBili (external) mg/dL - - -   DBili (external) mg/dL - - -   ALT 0 - 45 U/L 21 34 20   ALT (external) U/L - - -   AST 0 - 40 U/L 25 26 25   AST (external) U/L - - -   Tot Protein 6.0 - 8.0 g/dL 7.5 8.9(H) 7.6   Tot Protein (external) g/dL - - -   Albumin 3.5 - 5.0 g/dL 4.0 4.0 3.8   Albumin (external) 3.5 - 5.0 g/dL - - -     Pancreas Latest Ref Rng & Units 8/26/2007 5/9/2007   A1C 4.3 - 6.0 % - 4.2(L)   Amylase 30 - 110 U/L 58 -   Lipase 20 - 250 U/L 186 -     Iron studies Latest Ref Rng & Units 8/28/2007   Iron 35 - 180 ug/dL 23(L)   Ferritin 10 - 300 ng/mL 903(H)     [facility] Txp Virology Latest Ref Rng & Units 3/7/2022 12/28/2021 12/6/2021   CMV IgG EU/mL - - -   CVM DNA Quant - - - -   CMV Quant <100 Copies/mL - - -   CMV QT Log <2.0 Log copies/mL - - -   CMV QUANT IU/ML CMVND [IU]/mL - - -   LOG IU/ML OF CMVQNT <2.1 [Log:IU]/mL - - -   BK Spec - - - -   BK Res <1000 copies/mL - - -   BK Log <3.0 Log copies/mL - - -   EBV IgG - - - -   EBV VCA IGM ANTIBODY (EXTERNAL) Negative - - -   EBV DNA COPIES/ML EBVNEG:EBV DNA Not Detected [Copies]/mL - - -   EBV DNA LOG OF COPIES - 5.3 5.1 5.4   Hep B Core NEG - - -   Hep B Surf 0.0 - 4.9 mIU/mL - - -   HIV 1&2 NEG - - -            Recent Labs   Lab Test 07/23/18  1020 06/14/19  1033 12/20/19  1112   DOSMPA Not Provided 2230 12/20/2019 @ 8726   MPACID 1.62 0.59* 1.22    MPAG 51.3 31.1 27.8*

## 2022-07-13 ENCOUNTER — VIRTUAL VISIT (OUTPATIENT)
Dept: NEPHROLOGY | Facility: CLINIC | Age: 67
End: 2022-07-13
Attending: INTERNAL MEDICINE
Payer: MEDICARE

## 2022-07-13 ENCOUNTER — TRANSFERRED RECORDS (OUTPATIENT)
Dept: HEALTH INFORMATION MANAGEMENT | Facility: CLINIC | Age: 67
End: 2022-07-13

## 2022-07-13 ENCOUNTER — LAB (OUTPATIENT)
Dept: LAB | Facility: CLINIC | Age: 67
End: 2022-07-13
Payer: MEDICARE

## 2022-07-13 VITALS
WEIGHT: 136 LBS | SYSTOLIC BLOOD PRESSURE: 126 MMHG | DIASTOLIC BLOOD PRESSURE: 75 MMHG | BODY MASS INDEX: 23.22 KG/M2 | HEIGHT: 64 IN

## 2022-07-13 DIAGNOSIS — Z94.0 KIDNEY REPLACED BY TRANSPLANT: Primary | ICD-10-CM

## 2022-07-13 DIAGNOSIS — I15.1 HTN, KIDNEY TRANSPLANT RELATED: ICD-10-CM

## 2022-07-13 DIAGNOSIS — B27.00 EBV (EPSTEIN-BARR VIRUS) VIREMIA: ICD-10-CM

## 2022-07-13 DIAGNOSIS — Z94.0 KIDNEY TRANSPLANTED: ICD-10-CM

## 2022-07-13 DIAGNOSIS — D84.9 IMMUNOSUPPRESSION (H): ICD-10-CM

## 2022-07-13 DIAGNOSIS — Z48.298 AFTERCARE FOLLOWING ORGAN TRANSPLANT: ICD-10-CM

## 2022-07-13 DIAGNOSIS — Z94.0 HTN, KIDNEY TRANSPLANT RELATED: ICD-10-CM

## 2022-07-13 DIAGNOSIS — D84.9 IMMUNOSUPPRESSED STATUS (H): ICD-10-CM

## 2022-07-13 PROBLEM — J98.8 AIRWAY OBSTRUCTION: Status: RESOLVED | Noted: 2020-07-29 | Resolved: 2022-07-13

## 2022-07-13 PROBLEM — E78.5 HYPERLIPIDEMIA: Status: RESOLVED | Noted: 2020-07-09 | Resolved: 2022-07-13

## 2022-07-13 PROBLEM — J35.1 LARGE TONSILS: Status: RESOLVED | Noted: 2020-06-25 | Resolved: 2022-07-13

## 2022-07-13 PROCEDURE — 87799 DETECT AGENT NOS DNA QUANT: CPT

## 2022-07-13 PROCEDURE — 80048 BASIC METABOLIC PNL TOTAL CA: CPT

## 2022-07-13 PROCEDURE — 99214 OFFICE O/P EST MOD 30 MIN: CPT | Mod: 95 | Performed by: INTERNAL MEDICINE

## 2022-07-13 PROCEDURE — G0463 HOSPITAL OUTPT CLINIC VISIT: HCPCS | Mod: PN,RTG | Performed by: INTERNAL MEDICINE

## 2022-07-13 PROCEDURE — 36415 COLL VENOUS BLD VENIPUNCTURE: CPT

## 2022-07-13 ASSESSMENT — PAIN SCALES - GENERAL: PAINLEVEL: NO PAIN (0)

## 2022-07-13 NOTE — LETTER
7/13/2022      RE: Luba Loevll  [address]  [facility] [id number]       TRANSPLANT NEPHROLOGY CHRONIC POST TRANSPLANT VISIT    Assessment & Plan   # DDKT: Stable   - Baseline Creatinine:  ~ 0.9-1   - Proteinuria: Normal (<0.2 grams)   - Date DSA Last Checked: Aug/2013      Latest DSA: No   - BK Viremia: Not checked recently due to time from transplant   - Kidney Tx Biopsy: Sep 24, 2009; Result:  mesangial immune complex GN and foot process effacement       # Immunosuppression: Cyclosporine (goal 50-75) and Mycophenolate mofetil (dose 250 mg every 12 hours)   - Continue with intensive monitoring of immunosuppression for efficacy and toxicity.   - Changes: Not at this time    # Infection Prophylaxis:   - PJP: None. CD4>200 in 3/2022    # Hypertension: Controlled;  Goal BP: < 130/80   - Changes: Yes - due to fatigue and bradycardia stop metoprolol.. Continue amlodipine 2.5mg daily. Measure BP and if consistently >130/80 she should call and we would increase amlodipine to 5mg daily.     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 Perez. Neg CT C/A/P 2020   -As long as stays between 100-200k no changes    # Mineral Bone Disorder:   - Secondary renal hyperparathyroidism; PTH level: Normal (18-80 pg/ml)        On treatment: None  - Vitamin D; level: Normal        On supplement: No  - Calcium; level: Normal        On supplement: No  - Phosphorus; level: Not checked recently, but was normal last check        On supplement: No    # Electrolytes:   - Potassium; level: Normal        On supplement: No  - Magnesium; level: Not checked recently, but was normal last check        On supplement: No  - Bicarbonate; level: Normal        On supplement: No    # Skin Cancer Risk:    - Discussed sun  protection and recommend regular follow up with Dermatology.    # Medical Compliance: Yes    # COVID-19 Virus Review: Discussed COVID-19 virus and the potential medical risks.  Reviewed preventative health recommendations, including wearing a mask where appropriate.  Recommended COVID vaccination should be up to date with either an initial vaccination or booster shot when appropriate.  Asked the patient to inform the transplant center if they are exposed or diagnosed with this virus.    # COVID Vaccination Up To Date: No, due for next dose    # Transplant History:  Etiology of Kidney Failure: Chronic glomerulonephritis (GN)  Tx: DDKT  Transplant: 8/19/2007 (Kidney)  Significant changes in immunosuppression: None  Significant transplant-related complications: EBV Viremia    Transplant Office Phone Number: [phone]    Assessment and plan was discussed with the patient and she voiced her understanding and agreement.    Return visit: Return in about 1 year (around 7/13/2023).    Wang Rollins MD    Chief Complaint   Ms. Lovell is a 66 year old here for routine follow up, kidney transplant and immunosuppression management.    History of Present Illness   The patient overall feels well. She denies any recent hospitalizations. She denies nausea, vomiting, diarrhea, fever, chills, shortness of breath, chest pain, LE edema, unintentional weight loss, nights sweats, dysuria, hematuria.     She struggles with fatigue. She had a fall after [place] in 2021, developed a concussion and recently finished rehab for this. She states that in the afternoon she gets tired and typically needs to take a nap. Her fatigue waxes and wanes.       Home BP: 130 systolic    Problem List   Patient Active Problem List   Diagnosis     Kidney replaced by transplant     Immunosuppression (H)     Anemia in chronic renal disease     Dyslipidemia     Aftercare following organ transplant     EBV (Bandar-Barr virus) viremia     Vitamin D deficiency  "    HTN, kidney transplant related     Immunosuppressed status (H)     Large tonsils     Cervical osteoarthritis     Glaucoma     Hearing loss     Hyperlipidemia     Airway obstruction       Allergies   Allergies   Allergen Reactions     Fenofibrate Other (See Comments)     Pancreatitis     Tricor Other (See Comments)     Pancreatitis (this is fenofibrate)     Simvastatin Muscle Pain (Myalgia) and Cramps       Medications   Current Outpatient Medications   Medication Sig     amLODIPine (NORVASC) 2.5 MG tablet Take 1 tablet (2.5 mg) by mouth at bedtime     brimonidine (ALPHAGAN) 0.2 % ophthalmic solution Place 1 drop Into the left eye every morning      cycloSPORINE modified (GENERIC EQUIVALENT) 25 MG capsule Take 3 capsules (75 mg) by mouth 2 times daily     latanoprost (XALATAN) 0.005 % ophthalmic solution Place 1 drop Into the left eye At Bedtime     metoprolol tartrate (LOPRESSOR) 25 MG tablet Take 1 tablet (25 mg) by mouth 2 times daily     Multiple Vitamins-Minerals (CENTRUM SILVER) per tablet Take 1 tablet by mouth every morning      mycophenolate (GENERIC EQUIVALENT) 250 MG capsule Take 1 capsule (250 mg) by mouth 2 times daily     Omega-3 Fatty Acids (FISH OIL) 1000 MG CPDR Take 2,000 mg by mouth 2 times daily     PRAVASTATIN SODIUM PO Take 20 mg by mouth At Bedtime      prednisoLONE acetate (PRED FORTE) 1 % ophthalmic suspension Place 1-2 drops Into the left eye At Bedtime     ranibizumab (LUCENTIS) 0.5 MG/0.05ML SOSY Every 7 weeks     timolol maleate (TIMOPTIC) 0.5 % ophthalmic solution Place 1 drop Into the left eye every morning     No current facility-administered medications for this visit.     There are no discontinued medications.    Physical Exam   Vital Signs: /75   Ht 1.619 m (5' 3.75\")   Wt 61.7 kg (136 lb)   LMP  (LMP Unknown)   BMI 23.53 kg/m      GENERAL APPEARANCE: alert and no distress  HENT: no obvious abnormalities on appearance  RESP: breathing appears unremarkable with normal " rate, no audible wheezing or cough and no apparent shortness of breath with conversation  MS: extremities normal - no gross deformities noted  SKIN: no apparent rash and normal skin tone  NEURO: speech is clear with no obvious neurological deficits  PSYCH: mentation appears normal and affect normal      Data     Renal Latest Ref Rng & Units 5/4/2022 3/7/2022 12/6/2021   Na 136 - 145 mmol/L 140 137 137   Na (external) 136 - 145 mmol/L - - -   K 3.5 - 5.0 mmol/L 4.6 4.4 4.7   K (external) 3.5 - 5.0 mmol/L - - -   Cl 98 - 107 mmol/L 105 106 104   CO2 22 - 31 mmol/L 23 24 27   CO2 (external) 22 - 31 mmol/L - - -   BUN 8 - 22 mg/dL 30(H) 24 21   BUN (external) 8 - 22 mg/dL - - -   Cr 0.60 - 1.10 mg/dL 0.95 0.88 0.90   Cr (external) 0.60 - 1.10 mg/dL - - -   Glucose 70 - 125 mg/dL 101 103(H) 103(H)   Glucose (external) 70 - 125 mg/dL - - -   Ca  8.5 - 10.5 mg/dL 9.9 9.7 9.5   Ca (external) 8.5 - 10.5 mg/dL - - -   Mg 1.6 - 2.3 mg/dL - - -     Bone Health Latest Ref Rng & Units 5/4/2022 8/7/2017 12/8/2015   Phos 2.5 - 4.5 mg/dL - - -   Phos (external) 2.5 - 4.5 mg/dL - - 3.1   PTHi 12 - 72 pg/mL - - -   Vit D Def 20 - 75 ug/L 43 50 -     Heme Latest Ref Rng & Units 7/7/2022 3/7/2022 12/6/2021   WBC 4.0 - 11.0 10e3/uL 4.9 5.5 4.7   WBC (external) 4.0 - 11.0 thou/ul - - -   Hgb 11.7 - 15.7 g/dL 11.9 11.7 12.2   Hgb (external) 12.0 - 16.0 g/dL - - -   Plt 150 - 450 10e3/uL 187 185 194   Plt (external) 140 - 440 thou/ul - - -   ABSOLUTE NEUTROPHIL 1.6 - 8.3 10e9/L - - -   ABSOLUTE NEUTROPHILS (EXTERNAL) 2.0 - 7.7 thou/uL - - -   ABSOLUTE LYMPHOCYTES 0.8 - 5.3 10e9/L - - -   ABSOLUTE LYMPHOCYTES (EXTERNAL) 0.8 - 4.4 thou/uL - - -   ABSOLUTE MONOCYTES 0.0 - 1.3 10e9/L - - -   ABSOLUTE MONOCYTES (EXTERNAL) 0.0 - 0.9 thou/uL - - -   ABSOLUTE EOSINOPHILS 0.0 - 0.7 10e9/L - - -   ABSOLUTE EOSINOPHILS (EXTERNAL) 0.0 - 0.4 thou/uL - - -   ABSOLUTE BASOPHILS 0.0 - 0.2 10e9/L - - -   ABSOLUTE BASOPHILS (EXTERNAL) 0.0 - 0.2 thou/uL - - -    ABS IMMATURE GRANULOCYTES 0 - 0.4 10e9/L - - -   ABSOLUTE NUCLEATED RBC - - - -     Liver Latest Ref Rng & Units 5/4/2022 3/7/2022 7/14/2021   AP 45 - 120 U/L 54 55 60   AP (external) U/L - - -   TBili 0.0 - 1.0 mg/dL 0.9 0.8 1.0   TBili (external) mg/dL - - -   DBili (external) mg/dL - - -   ALT 0 - 45 U/L 21 34 20   ALT (external) U/L - - -   AST 0 - 40 U/L 25 26 25   AST (external) U/L - - -   Tot Protein 6.0 - 8.0 g/dL 7.5 8.9(H) 7.6   Tot Protein (external) g/dL - - -   Albumin 3.5 - 5.0 g/dL 4.0 4.0 3.8   Albumin (external) 3.5 - 5.0 g/dL - - -     Pancreas Latest Ref Rng & Units 8/26/2007 5/9/2007   A1C 4.3 - 6.0 % - 4.2(L)   Amylase 30 - 110 U/L 58 -   Lipase 20 - 250 U/L 186 -     Iron studies Latest Ref Rng & Units 8/28/2007   Iron 35 - 180 ug/dL 23(L)   Ferritin 10 - 300 ng/mL 903(H)     [facility] Txp Virology Latest Ref Rng & Units 3/7/2022 12/28/2021 12/6/2021   CMV IgG EU/mL - - -   CVM DNA Quant - - - -   CMV Quant <100 Copies/mL - - -   CMV QT Log <2.0 Log copies/mL - - -   CMV QUANT IU/ML CMVND [IU]/mL - - -   LOG IU/ML OF CMVQNT <2.1 [Log:IU]/mL - - -   BK Spec - - - -   BK Res <1000 copies/mL - - -   BK Log <3.0 Log copies/mL - - -   EBV IgG - - - -   EBV VCA IGM ANTIBODY (EXTERNAL) Negative - - -   EBV DNA COPIES/ML EBVNEG:EBV DNA Not Detected [Copies]/mL - - -   EBV DNA LOG OF COPIES - 5.3 5.1 5.4   Hep B Core NEG - - -   Hep B Surf 0.0 - 4.9 mIU/mL - - -   HIV 1&2 NEG - - -            Recent Labs   Lab Test 07/23/18  1020 06/14/19  1033 12/20/19  1112   DOSMPA Not Provided 2230 12/20/2019 @ 5420   MPACID [id number] 0.59* 1.22   MPAG 51.3 31.1 27.8*             Wang Rollins MD

## 2022-07-13 NOTE — PROGRESS NOTES
TRANSPLANT NEPHROLOGY CHRONIC POST TRANSPLANT VISIT    Assessment & Plan   # DDKT: Stable   - Baseline Creatinine:  ~ 0.9-1   - Proteinuria: Normal (<0.2 grams)   - Date DSA Last Checked: Aug/2013      Latest DSA: No   - BK Viremia: Not checked recently due to time from transplant   - Kidney Tx Biopsy: Sep 24, 2009; Result:  mesangial immune complex GN and foot process effacement       # Immunosuppression: Cyclosporine (goal 50-75) and Mycophenolate mofetil (dose 250 mg every 12 hours)   - Continue with intensive monitoring of immunosuppression for efficacy and toxicity.   - Changes: Not at this time    # Infection Prophylaxis:   - PJP: None. CD4>200 in 3/2022    # Hypertension: Controlled;  Goal BP: < 130/80   - Changes: Yes - due to fatigue and bradycardia stop metoprolol.. Continue amlodipine 2.5mg daily. Measure BP and if consistently >130/80 she should call and we would increase amlodipine to 5mg daily.     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 Perez. Neg CT C/A/P 2020   -As long as stays between 100-200k no changes    # Mineral Bone Disorder:   - Secondary renal hyperparathyroidism; PTH level: Normal (18-80 pg/ml)        On treatment: None  - Vitamin D; level: Normal        On supplement: No  - Calcium; level: Normal        On supplement: No  - Phosphorus; level: Not checked recently, but was normal last check        On supplement: No    # Electrolytes:   - Potassium; level: Normal        On supplement: No  - Magnesium; level: Not checked recently, but was normal last check        On supplement: No  - Bicarbonate; level: Normal        On supplement: No    # Skin Cancer Risk:    - Discussed sun protection and recommend regular follow up with Dermatology.    # Medical Compliance: Yes    #  COVID-19 Virus Review: Discussed COVID-19 virus and the potential medical risks.  Reviewed preventative health recommendations, including wearing a mask where appropriate.  Recommended COVID vaccination should be up to date with either an initial vaccination or booster shot when appropriate.  Asked the patient to inform the transplant center if they are exposed or diagnosed with this virus.    # COVID Vaccination Up To Date: No, due for next dose    # Transplant History:  Etiology of Kidney Failure: Chronic glomerulonephritis (GN)  Tx: DDKT  Transplant: 8/19/2007 (Kidney)  Significant changes in immunosuppression: None  Significant transplant-related complications: EBV Viremia    Transplant Office Phone Number: [phone]    Assessment and plan was discussed with the patient and she voiced her understanding and agreement.    Return visit: Return in about 1 year (around 7/13/2023).    Wang Rollins MD    Chief Complaint   Ms. Lovell is a 66 year old here for routine follow up, kidney transplant and immunosuppression management.    History of Present Illness   The patient overall feels well. She denies any recent hospitalizations. She denies nausea, vomiting, diarrhea, fever, chills, shortness of breath, chest pain, LE edema, unintentional weight loss, nights sweats, dysuria, hematuria.     She struggles with fatigue. She had a fall after [place] in 2021, developed a concussion and recently finished rehab for this. She states that in the afternoon she gets tired and typically needs to take a nap. Her fatigue waxes and wanes.       Home BP: 130 systolic    Problem List   Patient Active Problem List   Diagnosis     Kidney replaced by transplant     Immunosuppression (H)     Anemia in chronic renal disease     Dyslipidemia     Aftercare following organ transplant     EBV (Bandar-Barr virus) viremia     Vitamin D deficiency     HTN, kidney transplant related     Immunosuppressed status (H)     Large tonsils      "Cervical osteoarthritis     Glaucoma     Hearing loss     Hyperlipidemia     Airway obstruction       Allergies   Allergies   Allergen Reactions     Fenofibrate Other (See Comments)     Pancreatitis     Tricor Other (See Comments)     Pancreatitis (this is fenofibrate)     Simvastatin Muscle Pain (Myalgia) and Cramps       Medications   Current Outpatient Medications   Medication Sig     amLODIPine (NORVASC) 2.5 MG tablet Take 1 tablet (2.5 mg) by mouth at bedtime     brimonidine (ALPHAGAN) 0.2 % ophthalmic solution Place 1 drop Into the left eye every morning      cycloSPORINE modified (GENERIC EQUIVALENT) 25 MG capsule Take 3 capsules (75 mg) by mouth 2 times daily     latanoprost (XALATAN) 0.005 % ophthalmic solution Place 1 drop Into the left eye At Bedtime     metoprolol tartrate (LOPRESSOR) 25 MG tablet Take 1 tablet (25 mg) by mouth 2 times daily     Multiple Vitamins-Minerals (CENTRUM SILVER) per tablet Take 1 tablet by mouth every morning      mycophenolate (GENERIC EQUIVALENT) 250 MG capsule Take 1 capsule (250 mg) by mouth 2 times daily     Omega-3 Fatty Acids (FISH OIL) 1000 MG CPDR Take 2,000 mg by mouth 2 times daily     PRAVASTATIN SODIUM PO Take 20 mg by mouth At Bedtime      prednisoLONE acetate (PRED FORTE) 1 % ophthalmic suspension Place 1-2 drops Into the left eye At Bedtime     ranibizumab (LUCENTIS) 0.5 MG/0.05ML SOSY Every 7 weeks     timolol maleate (TIMOPTIC) 0.5 % ophthalmic solution Place 1 drop Into the left eye every morning     No current facility-administered medications for this visit.     There are no discontinued medications.    Physical Exam   Vital Signs: /75   Ht 1.619 m (5' 3.75\")   Wt 61.7 kg (136 lb)   LMP  (LMP Unknown)   BMI 23.53 kg/m      GENERAL APPEARANCE: alert and no distress  HENT: no obvious abnormalities on appearance  RESP: breathing appears unremarkable with normal rate, no audible wheezing or cough and no apparent shortness of breath with " conversation  MS: extremities normal - no gross deformities noted  SKIN: no apparent rash and normal skin tone  NEURO: speech is clear with no obvious neurological deficits  PSYCH: mentation appears normal and affect normal      Data     Renal Latest Ref Rng & Units 5/4/2022 3/7/2022 12/6/2021   Na 136 - 145 mmol/L 140 137 137   Na (external) 136 - 145 mmol/L - - -   K 3.5 - 5.0 mmol/L 4.6 4.4 4.7   K (external) 3.5 - 5.0 mmol/L - - -   Cl 98 - 107 mmol/L 105 106 104   CO2 22 - 31 mmol/L 23 24 27   CO2 (external) 22 - 31 mmol/L - - -   BUN 8 - 22 mg/dL 30(H) 24 21   BUN (external) 8 - 22 mg/dL - - -   Cr 0.60 - 1.10 mg/dL 0.95 0.88 0.90   Cr (external) 0.60 - 1.10 mg/dL - - -   Glucose 70 - 125 mg/dL 101 103(H) 103(H)   Glucose (external) 70 - 125 mg/dL - - -   Ca  8.5 - 10.5 mg/dL 9.9 9.7 9.5   Ca (external) 8.5 - 10.5 mg/dL - - -   Mg 1.6 - 2.3 mg/dL - - -     Bone Health Latest Ref Rng & Units 5/4/2022 8/7/2017 12/8/2015   Phos 2.5 - 4.5 mg/dL - - -   Phos (external) 2.5 - 4.5 mg/dL - - 3.1   PTHi 12 - 72 pg/mL - - -   Vit D Def 20 - 75 ug/L 43 50 -     Heme Latest Ref Rng & Units 7/7/2022 3/7/2022 12/6/2021   WBC 4.0 - 11.0 10e3/uL 4.9 5.5 4.7   WBC (external) 4.0 - 11.0 thou/ul - - -   Hgb 11.7 - 15.7 g/dL 11.9 11.7 12.2   Hgb (external) 12.0 - 16.0 g/dL - - -   Plt 150 - 450 10e3/uL 187 185 194   Plt (external) 140 - 440 thou/ul - - -   ABSOLUTE NEUTROPHIL 1.6 - 8.3 10e9/L - - -   ABSOLUTE NEUTROPHILS (EXTERNAL) 2.0 - 7.7 thou/uL - - -   ABSOLUTE LYMPHOCYTES 0.8 - 5.3 10e9/L - - -   ABSOLUTE LYMPHOCYTES (EXTERNAL) 0.8 - 4.4 thou/uL - - -   ABSOLUTE MONOCYTES 0.0 - 1.3 10e9/L - - -   ABSOLUTE MONOCYTES (EXTERNAL) 0.0 - 0.9 thou/uL - - -   ABSOLUTE EOSINOPHILS 0.0 - 0.7 10e9/L - - -   ABSOLUTE EOSINOPHILS (EXTERNAL) 0.0 - 0.4 thou/uL - - -   ABSOLUTE BASOPHILS 0.0 - 0.2 10e9/L - - -   ABSOLUTE BASOPHILS (EXTERNAL) 0.0 - 0.2 thou/uL - - -   ABS IMMATURE GRANULOCYTES 0 - 0.4 10e9/L - - -   ABSOLUTE NUCLEATED RBC -  - - -     Liver Latest Ref Rng & Units 5/4/2022 3/7/2022 7/14/2021   AP 45 - 120 U/L 54 55 60   AP (external) U/L - - -   TBili 0.0 - 1.0 mg/dL 0.9 0.8 1.0   TBili (external) mg/dL - - -   DBili (external) mg/dL - - -   ALT 0 - 45 U/L 21 34 20   ALT (external) U/L - - -   AST 0 - 40 U/L 25 26 25   AST (external) U/L - - -   Tot Protein 6.0 - 8.0 g/dL 7.5 8.9(H) 7.6   Tot Protein (external) g/dL - - -   Albumin 3.5 - 5.0 g/dL 4.0 4.0 3.8   Albumin (external) 3.5 - 5.0 g/dL - - -     Pancreas Latest Ref Rng & Units 8/26/2007 5/9/2007   A1C 4.3 - 6.0 % - 4.2(L)   Amylase 30 - 110 U/L 58 -   Lipase 20 - 250 U/L 186 -     Iron studies Latest Ref Rng & Units 8/28/2007   Iron 35 - 180 ug/dL 23(L)   Ferritin 10 - 300 ng/mL 903(H)     [facility] Txp Virology Latest Ref Rng & Units 3/7/2022 12/28/2021 12/6/2021   CMV IgG EU/mL - - -   CVM DNA Quant - - - -   CMV Quant <100 Copies/mL - - -   CMV QT Log <2.0 Log copies/mL - - -   CMV QUANT IU/ML CMVND [IU]/mL - - -   LOG IU/ML OF CMVQNT <2.1 [Log:IU]/mL - - -   BK Spec - - - -   BK Res <1000 copies/mL - - -   BK Log <3.0 Log copies/mL - - -   EBV IgG - - - -   EBV VCA IGM ANTIBODY (EXTERNAL) Negative - - -   EBV DNA COPIES/ML EBVNEG:EBV DNA Not Detected [Copies]/mL - - -   EBV DNA LOG OF COPIES - 5.3 5.1 5.4   Hep B Core NEG - - -   Hep B Surf 0.0 - 4.9 mIU/mL - - -   HIV 1&2 NEG - - -            Recent Labs   Lab Test 07/23/18  1020 06/14/19  1033 12/20/19  1112   DOSMPA Not Provided 2,230 12/20/2019 @ 1893   MPACID [id number] 0.59* 1.22   MPAG 51.3 31.1 27.8*

## 2022-07-13 NOTE — PROGRESS NOTES
Zulma is a 66 year old who is being evaluated via a billable video visit.      How would you like to obtain your AVS? MyChart  If the video visit is dropped, the invitation should be resent by: Send to e-mail at: [email or web address]  Will anyone else be joining your video visit? No      Video-Visit Details    Video Start Time: 9:52 AM    Type of service:  Video Visit    Video End Time:10:06 AM    Originating Location (pt. Location): Home    Distant Location (provider location):  [facility] [place]     Platform used for Video Visit: [email or web address]

## 2022-07-13 NOTE — LETTER
7/13/2022       RE: Luba Lovell  [address]  [facility] [place] [address]     Dear Colleague,    Thank you for referring your patient, Luba Lovell, to the [facility] [place] [facility]. Please see a copy of my visit note below.    TRANSPLANT NEPHROLOGY CHRONIC POST TRANSPLANT VISIT    Assessment & Plan   # DDKT: Stable   - Baseline Creatinine:  ~ 0.9-1   - Proteinuria: Normal (<0.2 grams)   - Date DSA Last Checked: Aug/2013      Latest DSA: No   - BK Viremia: Not checked recently due to time from transplant   - Kidney Tx Biopsy: Sep 24, 2009; Result:  mesangial immune complex GN and foot process effacement       # Immunosuppression: Cyclosporine (goal 50-75) and Mycophenolate mofetil (dose 250 mg every 12 hours)   - Continue with intensive monitoring of immunosuppression for efficacy and toxicity.   - Changes: Not at this time    # Infection Prophylaxis:   - PJP: None. CD4>200 in 3/2022    # Hypertension: Controlled;  Goal BP: < 130/80   - Changes: Yes - due to fatigue and bradycardia stop metoprolol.. Continue amlodipine 2.5mg daily. Measure BP and if consistently >130/80 she should call and we would increase amlodipine to 5mg daily.     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 Perez. Neg CT C/A/P 2020   -As long as stays between 100-200k no changes    # Mineral Bone Disorder:   - Secondary renal hyperparathyroidism; PTH level: Normal (18-80 pg/ml)        On treatment: None  - Vitamin D; level: Normal        On supplement: No  - Calcium; level: Normal        On supplement: No  - Phosphorus; level: Not checked recently, but was normal last check        On supplement: No    # Electrolytes:   - Potassium;  level: Normal        On supplement: No  - Magnesium; level: Not checked recently, but was normal last check        On supplement: No  - Bicarbonate; level: Normal        On supplement: No    # Skin Cancer Risk:    - Discussed sun protection and recommend regular follow up with Dermatology.    # Medical Compliance: Yes    # COVID-19 Virus Review: Discussed COVID-19 virus and the potential medical risks.  Reviewed preventative health recommendations, including wearing a mask where appropriate.  Recommended COVID vaccination should be up to date with either an initial vaccination or booster shot when appropriate.  Asked the patient to inform the transplant center if they are exposed or diagnosed with this virus.    # COVID Vaccination Up To Date: No, due for next dose    # Transplant History:  Etiology of Kidney Failure: Chronic glomerulonephritis (GN)  Tx: DDKT  Transplant: 8/19/2007 (Kidney)  Significant changes in immunosuppression: None  Significant transplant-related complications: EBV Viremia    Transplant Office Phone Number: [phone]    Assessment and plan was discussed with the patient and she voiced her understanding and agreement.    Return visit: Return in about 1 year (around 7/13/2023).    Wang Rollins MD    Chief Complaint   Ms. Lovell is a 66 year old here for routine follow up, kidney transplant and immunosuppression management.    History of Present Illness   The patient overall feels well. She denies any recent hospitalizations. She denies nausea, vomiting, diarrhea, fever, chills, shortness of breath, chest pain, LE edema, unintentional weight loss, nights sweats, dysuria, hematuria.     She struggles with fatigue. She had a fall after [place] in 2021, developed a concussion and recently finished rehab for this. She states that in the afternoon she gets tired and typically needs to take a nap. Her fatigue waxes and wanes.       Home BP: 130 systolic    Problem List   Patient Active Problem  List   Diagnosis     Kidney replaced by transplant     Immunosuppression (H)     Anemia in chronic renal disease     Dyslipidemia     Aftercare following organ transplant     EBV (Bandar-Barr virus) viremia     Vitamin D deficiency     HTN, kidney transplant related     Immunosuppressed status (H)     Large tonsils     Cervical osteoarthritis     Glaucoma     Hearing loss     Hyperlipidemia     Airway obstruction       Allergies   Allergies   Allergen Reactions     Fenofibrate Other (See Comments)     Pancreatitis     Tricor Other (See Comments)     Pancreatitis (this is fenofibrate)     Simvastatin Muscle Pain (Myalgia) and Cramps       Medications   Current Outpatient Medications   Medication Sig     amLODIPine (NORVASC) 2.5 MG tablet Take 1 tablet (2.5 mg) by mouth at bedtime     brimonidine (ALPHAGAN) 0.2 % ophthalmic solution Place 1 drop Into the left eye every morning      cycloSPORINE modified (GENERIC EQUIVALENT) 25 MG capsule Take 3 capsules (75 mg) by mouth 2 times daily     latanoprost (XALATAN) 0.005 % ophthalmic solution Place 1 drop Into the left eye At Bedtime     metoprolol tartrate (LOPRESSOR) 25 MG tablet Take 1 tablet (25 mg) by mouth 2 times daily     Multiple Vitamins-Minerals (CENTRUM SILVER) per tablet Take 1 tablet by mouth every morning      mycophenolate (GENERIC EQUIVALENT) 250 MG capsule Take 1 capsule (250 mg) by mouth 2 times daily     Omega-3 Fatty Acids (FISH OIL) 1000 MG CPDR Take 2,000 mg by mouth 2 times daily     PRAVASTATIN SODIUM PO Take 20 mg by mouth At Bedtime      prednisoLONE acetate (PRED FORTE) 1 % ophthalmic suspension Place 1-2 drops Into the left eye At Bedtime     ranibizumab (LUCENTIS) 0.5 MG/0.05ML SOSY Every 7 weeks     timolol maleate (TIMOPTIC) 0.5 % ophthalmic solution Place 1 drop Into the left eye every morning     No current facility-administered medications for this visit.     There are no discontinued medications.    Physical Exam   Vital Signs: /75  "  Ht 1.619 m (5' 3.75\")   Wt 61.7 kg (136 lb)   LMP  (LMP Unknown)   BMI 23.53 kg/m      GENERAL APPEARANCE: alert and no distress  HENT: no obvious abnormalities on appearance  RESP: breathing appears unremarkable with normal rate, no audible wheezing or cough and no apparent shortness of breath with conversation  MS: extremities normal - no gross deformities noted  SKIN: no apparent rash and normal skin tone  NEURO: speech is clear with no obvious neurological deficits  PSYCH: mentation appears normal and affect normal      Data     Renal Latest Ref Rng & Units 5/4/2022 3/7/2022 12/6/2021   Na 136 - 145 mmol/L 140 137 137   Na (external) 136 - 145 mmol/L - - -   K 3.5 - 5.0 mmol/L 4.6 4.4 4.7   K (external) 3.5 - 5.0 mmol/L - - -   Cl 98 - 107 mmol/L 105 106 104   CO2 22 - 31 mmol/L 23 24 27   CO2 (external) 22 - 31 mmol/L - - -   BUN 8 - 22 mg/dL 30(H) 24 21   BUN (external) 8 - 22 mg/dL - - -   Cr 0.60 - 1.10 mg/dL 0.95 0.88 0.90   Cr (external) 0.60 - 1.10 mg/dL - - -   Glucose 70 - 125 mg/dL 101 103(H) 103(H)   Glucose (external) 70 - 125 mg/dL - - -   Ca  8.5 - 10.5 mg/dL 9.9 9.7 9.5   Ca (external) 8.5 - 10.5 mg/dL - - -   Mg 1.6 - 2.3 mg/dL - - -     Bone Health Latest Ref Rng & Units 5/4/2022 8/7/2017 12/8/2015   Phos 2.5 - 4.5 mg/dL - - -   Phos (external) 2.5 - 4.5 mg/dL - - 3.1   PTHi 12 - 72 pg/mL - - -   Vit D Def 20 - 75 ug/L 43 50 -     Heme Latest Ref Rng & Units 7/7/2022 3/7/2022 12/6/2021   WBC 4.0 - 11.0 10e3/uL 4.9 5.5 4.7   WBC (external) 4.0 - 11.0 thou/ul - - -   Hgb 11.7 - 15.7 g/dL 11.9 11.7 12.2   Hgb (external) 12.0 - 16.0 g/dL - - -   Plt 150 - 450 10e3/uL 187 185 194   Plt (external) 140 - 440 thou/ul - - -   ABSOLUTE NEUTROPHIL 1.6 - 8.3 10e9/L - - -   ABSOLUTE NEUTROPHILS (EXTERNAL) 2.0 - 7.7 thou/uL - - -   ABSOLUTE LYMPHOCYTES 0.8 - 5.3 10e9/L - - -   ABSOLUTE LYMPHOCYTES (EXTERNAL) 0.8 - 4.4 thou/uL - - -   ABSOLUTE MONOCYTES 0.0 - 1.3 10e9/L - - -   ABSOLUTE MONOCYTES " (EXTERNAL) 0.0 - 0.9 thou/uL - - -   ABSOLUTE EOSINOPHILS 0.0 - 0.7 10e9/L - - -   ABSOLUTE EOSINOPHILS (EXTERNAL) 0.0 - 0.4 thou/uL - - -   ABSOLUTE BASOPHILS 0.0 - 0.2 10e9/L - - -   ABSOLUTE BASOPHILS (EXTERNAL) 0.0 - 0.2 thou/uL - - -   ABS IMMATURE GRANULOCYTES 0 - 0.4 10e9/L - - -   ABSOLUTE NUCLEATED RBC - - - -     Liver Latest Ref Rng & Units 5/4/2022 3/7/2022 7/14/2021   AP 45 - 120 U/L 54 55 60   AP (external) U/L - - -   TBili 0.0 - 1.0 mg/dL 0.9 0.8 1.0   TBili (external) mg/dL - - -   DBili (external) mg/dL - - -   ALT 0 - 45 U/L 21 34 20   ALT (external) U/L - - -   AST 0 - 40 U/L 25 26 25   AST (external) U/L - - -   Tot Protein 6.0 - 8.0 g/dL 7.5 8.9(H) 7.6   Tot Protein (external) g/dL - - -   Albumin 3.5 - 5.0 g/dL 4.0 4.0 3.8   Albumin (external) 3.5 - 5.0 g/dL - - -     Pancreas Latest Ref Rng & Units 8/26/2007 5/9/2007   A1C 4.3 - 6.0 % - 4.2(L)   Amylase 30 - 110 U/L 58 -   Lipase 20 - 250 U/L 186 -     Iron studies Latest Ref Rng & Units 8/28/2007   Iron 35 - 180 ug/dL 23(L)   Ferritin 10 - 300 ng/mL 903(H)     [facility] Txp Virology Latest Ref Rng & Units 3/7/2022 12/28/2021 12/6/2021   CMV IgG EU/mL - - -   CVM DNA Quant - - - -   CMV Quant <100 Copies/mL - - -   CMV QT Log <2.0 Log copies/mL - - -   CMV QUANT IU/ML CMVND [IU]/mL - - -   LOG IU/ML OF CMVQNT <2.1 [Log:IU]/mL - - -   BK Spec - - - -   BK Res <1000 copies/mL - - -   BK Log <3.0 Log copies/mL - - -   EBV IgG - - - -   EBV VCA IGM ANTIBODY (EXTERNAL) Negative - - -   EBV DNA COPIES/ML EBVNEG:EBV DNA Not Detected [Copies]/mL - - -   EBV DNA LOG OF COPIES - 5.3 5.1 5.4   Hep B Core NEG - - -   Hep B Surf 0.0 - 4.9 mIU/mL - - -   HIV 1&2 NEG - - -            Recent Labs   Lab Test 07/23/18  1020 06/14/19  1033 12/20/19  1112   DOSMPA Not Provided 2,230 12/20/2019 @ 3466   MPACID [id number] 0.59* 1.22   MPAG 51.3 31.1 27.8*       Zulma is a 66 year old who is being evaluated via a billable video visit.      How would you like to obtain your  LAURA? Ibeth  If the video visit is dropped, the invitation should be resent by: Send to e-mail at: [email or web address]  Will anyone else be joining your video visit? No      Video-Visit Details    Video Start Time: 9:52 AM    Type of service:  Video Visit    Video End Time:10:06 AM    Originating Location (pt. Location): Home    Distant Location (provider location):  [facility] NEPHROLOGY [facility]     Platform used for Video Visit: [employer]      Again, thank you for allowing me to participate in the care of your patient.      Sincerely,    Wang Rollins MD

## 2022-07-13 NOTE — PATIENT INSTRUCTIONS
Patient Recommendations:  Stop metoprolol  Measure your blood pressure. If it is consistently above 130/80 you should call us to let us know.     Transplant Patient Information  Your Post Transplant Coordinator is: Manju Caro  You and your care team can contact your transplant coordinator Monday - Friday, 8am - 5pm at [phone] (Option 2 to reach the coordinator or Option 4 to schedule an appointment).  You can also reach your care team online via [employer].  After hours for urgent matters, please call [facility] at [phone].

## 2022-07-14 LAB
ANION GAP SERPL CALCULATED.3IONS-SCNC: 3 MMOL/L (ref 3–14)
BUN SERPL-MCNC: 24 MG/DL (ref 7–30)
CALCIUM SERPL-MCNC: 9.7 MG/DL (ref 8.5–10.1)
CHLORIDE BLD-SCNC: 106 MMOL/L (ref 94–109)
CO2 SERPL-SCNC: 28 MMOL/L (ref 20–32)
CREAT SERPL-MCNC: 0.8 MG/DL (ref 0.52–1.04)
EBV DNA COPIES/ML, INSTRUMENT: ABNORMAL COPIES/ML
EBV DNA SPEC NAA+PROBE-LOG#: 4.7 {LOG_COPIES}/ML
GFR SERPL CREATININE-BSD FRML MDRD: 81 ML/MIN/1.73M2
GLUCOSE BLD-MCNC: 105 MG/DL (ref 70–99)
POTASSIUM BLD-SCNC: 4.7 MMOL/L (ref 3.4–5.3)
SODIUM SERPL-SCNC: 137 MMOL/L (ref 133–144)

## 2022-10-04 DIAGNOSIS — I15.1 HTN, KIDNEY TRANSPLANT RELATED: Primary | ICD-10-CM

## 2022-10-04 DIAGNOSIS — Z94.0 HTN, KIDNEY TRANSPLANT RELATED: Primary | ICD-10-CM

## 2022-10-04 RX ORDER — AMLODIPINE BESYLATE 2.5 MG/1
2.5 TABLET ORAL AT BEDTIME
Qty: 90 TABLET | Refills: 0 | Status: SHIPPED | OUTPATIENT
Start: 2022-10-04 | End: 2022-12-22

## 2022-10-21 ENCOUNTER — LAB (OUTPATIENT)
Dept: LAB | Facility: CLINIC | Age: 67
End: 2022-10-21
Payer: MEDICARE

## 2022-10-21 DIAGNOSIS — D84.9 IMMUNOSUPPRESSED STATUS (H): ICD-10-CM

## 2022-10-21 DIAGNOSIS — B27.00 EBV (EPSTEIN-BARR VIRUS) VIREMIA: ICD-10-CM

## 2022-10-21 DIAGNOSIS — Z48.298 AFTERCARE FOLLOWING ORGAN TRANSPLANT: ICD-10-CM

## 2022-10-21 DIAGNOSIS — Z94.0 KIDNEY TRANSPLANTED: ICD-10-CM

## 2022-10-21 DIAGNOSIS — Z94.0 KIDNEY REPLACED BY TRANSPLANT: ICD-10-CM

## 2022-10-21 DIAGNOSIS — Z79.899 ENCOUNTER FOR LONG-TERM CURRENT USE OF MEDICATION: ICD-10-CM

## 2022-10-21 LAB
ALBUMIN MFR UR ELPH: 17 MG/DL
CREAT UR-MCNC: 51.5 MG/DL
CYCLOSPORINE BLD LC/MS/MS-MCNC: 61 UG/L (ref 50–400)
ERYTHROCYTE [DISTWIDTH] IN BLOOD BY AUTOMATED COUNT: 12.2 % (ref 10–15)
HCT VFR BLD AUTO: 39.1 % (ref 35–47)
HGB BLD-MCNC: 12.6 G/DL (ref 11.7–15.7)
MCH RBC QN AUTO: 29.9 PG (ref 26.5–33)
MCHC RBC AUTO-ENTMCNC: 32.2 G/DL (ref 31.5–36.5)
MCV RBC AUTO: 93 FL (ref 78–100)
PLATELET # BLD AUTO: 201 10E3/UL (ref 150–450)
PROT/CREAT 24H UR: 0.33 MG/MG CR (ref 0–0.2)
RBC # BLD AUTO: 4.22 10E6/UL (ref 3.8–5.2)
TME LAST DOSE: NORMAL H
TME LAST DOSE: NORMAL H
WBC # BLD AUTO: 4.1 10E3/UL (ref 4–11)

## 2022-10-21 PROCEDURE — 80158 DRUG ASSAY CYCLOSPORINE: CPT

## 2022-10-21 PROCEDURE — 87799 DETECT AGENT NOS DNA QUANT: CPT

## 2022-10-21 PROCEDURE — 80048 BASIC METABOLIC PNL TOTAL CA: CPT

## 2022-10-21 PROCEDURE — 84156 ASSAY OF PROTEIN URINE: CPT

## 2022-10-21 PROCEDURE — 36415 COLL VENOUS BLD VENIPUNCTURE: CPT

## 2022-10-21 PROCEDURE — 85027 COMPLETE CBC AUTOMATED: CPT

## 2022-10-22 LAB
ANION GAP SERPL CALCULATED.3IONS-SCNC: 3 MMOL/L (ref 3–14)
BUN SERPL-MCNC: 28 MG/DL (ref 7–30)
CALCIUM SERPL-MCNC: 9.9 MG/DL (ref 8.5–10.1)
CHLORIDE BLD-SCNC: 106 MMOL/L (ref 94–109)
CO2 SERPL-SCNC: 29 MMOL/L (ref 20–32)
CREAT SERPL-MCNC: 0.84 MG/DL (ref 0.52–1.04)
GFR SERPL CREATININE-BSD FRML MDRD: 76 ML/MIN/1.73M2
GLUCOSE BLD-MCNC: 117 MG/DL (ref 70–99)
POTASSIUM BLD-SCNC: 4.4 MMOL/L (ref 3.4–5.3)
SODIUM SERPL-SCNC: 138 MMOL/L (ref 133–144)

## 2022-10-24 LAB
EBV DNA COPIES/ML, INSTRUMENT: ABNORMAL COPIES/ML
EBV DNA SPEC NAA+PROBE-LOG#: 5.3 {LOG_COPIES}/ML

## 2022-11-01 DIAGNOSIS — B27.00 EBV (EPSTEIN-BARR VIRUS) VIREMIA: Primary | ICD-10-CM

## 2022-11-01 DIAGNOSIS — Z94.0 KIDNEY REPLACED BY TRANSPLANT: ICD-10-CM

## 2022-11-02 ENCOUNTER — OFFICE VISIT (OUTPATIENT)
Dept: AUDIOLOGY | Facility: CLINIC | Age: 67
End: 2022-11-02
Payer: MEDICARE

## 2022-11-02 DIAGNOSIS — H90.3 SENSORINEURAL HEARING LOSS, BILATERAL: Primary | ICD-10-CM

## 2022-11-02 DIAGNOSIS — Z94.0 KIDNEY TRANSPLANTED: ICD-10-CM

## 2022-11-02 PROCEDURE — V5299 HEARING SERVICE: HCPCS | Performed by: AUDIOLOGIST

## 2022-11-02 RX ORDER — CYCLOSPORINE 25 MG/1
CAPSULE, LIQUID FILLED ORAL
Qty: 540 CAPSULE | Refills: 3 | Status: SHIPPED | OUTPATIENT
Start: 2022-11-02 | End: 2023-10-31

## 2022-11-02 NOTE — PROGRESS NOTES
No charge appointment - hearing aid check on two in-warranty devices (warranty expires 10-14-23). Ms. Lovell requested that her Resound TANISHA devices be cleaned and checked. Both were in good physical repair; wax guards and domes were exchanged, and listening checks following cleaning yielded strong signals, free from distortion in each. Otoscopy was unremarkable in either ear; both ear canals were free of debris.     Ms. Lovell indicated that she fell on the ice in late December of 2021, sustaining a concussion and much bruising. She inquired if that could cause a change of hearing, although she was unaware of any marked changes. Record review indicated her last audiogram was completed 8-12-20; as hearing testing is recommended every 1-2 years when there is known sensorineural hearing loss, scheduling another hearing evaluation is indicated. If changes have occurred at that time, the hearing aid programming may be updated to stay abreast of her needs. She was asked to request a referral from her primary care provider for audiology to prevent any insurance surprises. Ms. Lovell expressed verbal understanding of this information and plan.    Sherine Braswell, [facility]  [place] Licensed Audiologist [id number]

## 2022-11-04 ENCOUNTER — LAB REQUISITION (OUTPATIENT)
Dept: LAB | Facility: CLINIC | Age: 67
End: 2022-11-04
Payer: MEDICARE

## 2022-11-04 DIAGNOSIS — E78.2 MIXED HYPERLIPIDEMIA: ICD-10-CM

## 2022-11-04 LAB
CHOLEST SERPL-MCNC: 165 MG/DL
HDLC SERPL-MCNC: 63 MG/DL
LDLC SERPL CALC-MCNC: 81 MG/DL
NONHDLC SERPL-MCNC: 102 MG/DL
TRIGL SERPL-MCNC: 105 MG/DL

## 2022-11-04 PROCEDURE — 80061 LIPID PANEL: CPT | Mod: ORL | Performed by: FAMILY MEDICINE

## 2022-11-11 ENCOUNTER — HOSPITAL ENCOUNTER (OUTPATIENT)
Dept: CT IMAGING | Facility: CLINIC | Age: 67
Discharge: HOME | End: 2022-11-11
Attending: INTERNAL MEDICINE | Admitting: INTERNAL MEDICINE
Payer: MEDICARE

## 2022-11-11 DIAGNOSIS — Z94.0 KIDNEY REPLACED BY TRANSPLANT: ICD-10-CM

## 2022-11-11 DIAGNOSIS — B27.00 EBV (EPSTEIN-BARR VIRUS) VIREMIA: ICD-10-CM

## 2022-11-11 PROCEDURE — 74177 CT ABD & PELVIS W/CONTRAST: CPT | Mod: MG

## 2022-11-11 PROCEDURE — 250N000011 HC RX IP 250 OP 636: Performed by: INTERNAL MEDICINE

## 2022-11-11 RX ORDER — IOPAMIDOL 755 MG/ML
75 INJECTION, SOLUTION INTRAVASCULAR ONCE
Status: COMPLETED | OUTPATIENT
Start: 2022-11-11 | End: 2022-11-11

## 2022-11-11 RX ADMIN — IOPAMIDOL 75 ML: 755 INJECTION, SOLUTION INTRAVENOUS at 15:26

## 2022-11-15 ENCOUNTER — MYC MEDICAL ADVICE (OUTPATIENT)
Dept: TRANSPLANT | Facility: CLINIC | Age: 67
End: 2022-11-15

## 2022-12-22 DIAGNOSIS — I15.1 HTN, KIDNEY TRANSPLANT RELATED: ICD-10-CM

## 2022-12-22 DIAGNOSIS — Z94.0 HTN, KIDNEY TRANSPLANT RELATED: ICD-10-CM

## 2022-12-22 RX ORDER — AMLODIPINE BESYLATE 2.5 MG/1
2.5 TABLET ORAL AT BEDTIME
Qty: 90 TABLET | Refills: 0 | Status: SHIPPED | OUTPATIENT
Start: 2022-12-22 | End: 2023-02-02

## 2023-01-07 ENCOUNTER — HEALTH MAINTENANCE LETTER (OUTPATIENT)
Age: 68
End: 2023-01-07

## 2023-01-18 ENCOUNTER — LAB (OUTPATIENT)
Dept: LAB | Facility: CLINIC | Age: 68
End: 2023-01-18
Payer: MEDICARE

## 2023-01-18 DIAGNOSIS — Z48.298 AFTERCARE FOLLOWING ORGAN TRANSPLANT: ICD-10-CM

## 2023-01-18 DIAGNOSIS — D84.9 IMMUNOSUPPRESSED STATUS (H): ICD-10-CM

## 2023-01-18 DIAGNOSIS — Z94.0 KIDNEY REPLACED BY TRANSPLANT: ICD-10-CM

## 2023-01-18 DIAGNOSIS — B27.00 EBV (EPSTEIN-BARR VIRUS) VIREMIA: ICD-10-CM

## 2023-01-18 DIAGNOSIS — Z79.899 ENCOUNTER FOR LONG-TERM CURRENT USE OF MEDICATION: ICD-10-CM

## 2023-01-18 DIAGNOSIS — Z94.0 KIDNEY TRANSPLANTED: ICD-10-CM

## 2023-01-18 LAB
ANION GAP SERPL CALCULATED.3IONS-SCNC: 12 MMOL/L (ref 7–15)
BUN SERPL-MCNC: 29.5 MG/DL (ref 8–23)
CALCIUM SERPL-MCNC: 9.7 MG/DL (ref 8.8–10.2)
CHLORIDE SERPL-SCNC: 101 MMOL/L (ref 98–107)
CREAT SERPL-MCNC: 0.92 MG/DL (ref 0.51–0.95)
CYCLOSPORINE BLD LC/MS/MS-MCNC: 50 UG/L (ref 50–400)
DEPRECATED HCO3 PLAS-SCNC: 24 MMOL/L (ref 22–29)
ERYTHROCYTE [DISTWIDTH] IN BLOOD BY AUTOMATED COUNT: 12.7 % (ref 10–15)
GFR SERPL CREATININE-BSD FRML MDRD: 68 ML/MIN/1.73M2
GLUCOSE SERPL-MCNC: 119 MG/DL (ref 70–99)
HCT VFR BLD AUTO: 38.7 % (ref 35–47)
HGB BLD-MCNC: 12.4 G/DL (ref 11.7–15.7)
MCH RBC QN AUTO: 29.9 PG (ref 26.5–33)
MCHC RBC AUTO-ENTMCNC: 32 G/DL (ref 31.5–36.5)
MCV RBC AUTO: 93 FL (ref 78–100)
PLATELET # BLD AUTO: 196 10E3/UL (ref 150–450)
POTASSIUM SERPL-SCNC: 4.8 MMOL/L (ref 3.4–5.3)
RBC # BLD AUTO: 4.15 10E6/UL (ref 3.8–5.2)
SODIUM SERPL-SCNC: 137 MMOL/L (ref 136–145)
TME LAST DOSE: NORMAL H
TME LAST DOSE: NORMAL H
WBC # BLD AUTO: 4.7 10E3/UL (ref 4–11)

## 2023-01-18 PROCEDURE — 36415 COLL VENOUS BLD VENIPUNCTURE: CPT

## 2023-01-18 PROCEDURE — 80048 BASIC METABOLIC PNL TOTAL CA: CPT

## 2023-01-18 PROCEDURE — 87799 DETECT AGENT NOS DNA QUANT: CPT

## 2023-01-18 PROCEDURE — 80158 DRUG ASSAY CYCLOSPORINE: CPT

## 2023-01-18 PROCEDURE — 85027 COMPLETE CBC AUTOMATED: CPT

## 2023-01-19 LAB
EBV DNA COPIES/ML, INSTRUMENT: ABNORMAL COPIES/ML
EBV DNA SPEC NAA+PROBE-LOG#: 5.3 {LOG_COPIES}/ML

## 2023-02-02 DIAGNOSIS — Z94.0 HTN, KIDNEY TRANSPLANT RELATED: ICD-10-CM

## 2023-02-02 DIAGNOSIS — Z94.0 KIDNEY REPLACED BY TRANSPLANT: Primary | ICD-10-CM

## 2023-02-02 DIAGNOSIS — I15.1 HTN, KIDNEY TRANSPLANT RELATED: ICD-10-CM

## 2023-02-02 RX ORDER — MYCOPHENOLATE MOFETIL 250 MG/1
250 CAPSULE ORAL 2 TIMES DAILY
Qty: 180 CAPSULE | Refills: 3 | Status: SHIPPED | OUTPATIENT
Start: 2023-02-02 | End: 2024-01-16

## 2023-02-02 RX ORDER — AMLODIPINE BESYLATE 2.5 MG/1
2.5 TABLET ORAL AT BEDTIME
Qty: 90 TABLET | Refills: 0 | Status: SHIPPED | OUTPATIENT
Start: 2023-02-02 | End: 2023-06-19

## 2023-02-07 ENCOUNTER — LAB REQUISITION (OUTPATIENT)
Dept: LAB | Facility: CLINIC | Age: 68
End: 2023-02-07
Payer: MEDICARE

## 2023-02-07 DIAGNOSIS — Z12.4 ENCOUNTER FOR SCREENING FOR MALIGNANT NEOPLASM OF CERVIX: ICD-10-CM

## 2023-02-07 PROCEDURE — 87624 HPV HI-RISK TYP POOLED RSLT: CPT | Mod: ORL | Performed by: OBSTETRICS & GYNECOLOGY

## 2023-02-07 PROCEDURE — G0145 SCR C/V CYTO,THINLAYER,RESCR: HCPCS | Mod: ORL | Performed by: OBSTETRICS & GYNECOLOGY

## 2023-02-09 ENCOUNTER — LAB (OUTPATIENT)
Dept: LAB | Facility: CLINIC | Age: 68
End: 2023-02-09
Payer: MEDICARE

## 2023-02-09 DIAGNOSIS — B27.90 EBV INFECTION: ICD-10-CM

## 2023-02-09 PROCEDURE — 36415 COLL VENOUS BLD VENIPUNCTURE: CPT

## 2023-02-09 PROCEDURE — 87799 DETECT AGENT NOS DNA QUANT: CPT

## 2023-02-10 LAB
BKR LAB AP GYN ADEQUACY: NORMAL
BKR LAB AP GYN INTERPRETATION: NORMAL
BKR LAB AP HPV REFLEX: NORMAL
BKR LAB AP LMP: NORMAL
BKR LAB AP PREVIOUS ABNL DX: NORMAL
BKR LAB AP PREVIOUS ABNORMAL: NORMAL
EBV DNA COPIES/ML, INSTRUMENT: ABNORMAL COPIES/ML
EBV DNA SPEC NAA+PROBE-LOG#: 5 {LOG_COPIES}/ML
PATH REPORT.COMMENTS IMP SPEC: NORMAL
PATH REPORT.COMMENTS IMP SPEC: NORMAL
PATH REPORT.RELEVANT HX SPEC: NORMAL

## 2023-02-14 LAB
HUMAN PAPILLOMA VIRUS 16 DNA: NEGATIVE
HUMAN PAPILLOMA VIRUS 18 DNA: NEGATIVE
HUMAN PAPILLOMA VIRUS FINAL DIAGNOSIS: NORMAL
HUMAN PAPILLOMA VIRUS OTHER HR: NEGATIVE

## 2023-04-25 ENCOUNTER — LAB (OUTPATIENT)
Dept: LAB | Facility: CLINIC | Age: 68
End: 2023-04-25
Payer: MEDICARE

## 2023-04-25 DIAGNOSIS — Z48.298 AFTERCARE FOLLOWING ORGAN TRANSPLANT: ICD-10-CM

## 2023-04-25 LAB
ANION GAP SERPL CALCULATED.3IONS-SCNC: 11 MMOL/L (ref 7–15)
BUN SERPL-MCNC: 24.7 MG/DL (ref 8–23)
CALCIUM SERPL-MCNC: 10.2 MG/DL (ref 8.8–10.2)
CHLORIDE SERPL-SCNC: 102 MMOL/L (ref 98–107)
CREAT SERPL-MCNC: 0.96 MG/DL (ref 0.51–0.95)
DEPRECATED HCO3 PLAS-SCNC: 26 MMOL/L (ref 22–29)
GFR SERPL CREATININE-BSD FRML MDRD: 65 ML/MIN/1.73M2
GLUCOSE SERPL-MCNC: 107 MG/DL (ref 70–99)
POTASSIUM SERPL-SCNC: 4.7 MMOL/L (ref 3.4–5.3)
SODIUM SERPL-SCNC: 139 MMOL/L (ref 136–145)

## 2023-04-25 PROCEDURE — 80048 BASIC METABOLIC PNL TOTAL CA: CPT

## 2023-04-25 PROCEDURE — 36415 COLL VENOUS BLD VENIPUNCTURE: CPT

## 2023-04-27 DIAGNOSIS — Z94.0 KIDNEY REPLACED BY TRANSPLANT: Primary | ICD-10-CM

## 2023-05-03 ENCOUNTER — LAB (OUTPATIENT)
Dept: LAB | Facility: CLINIC | Age: 68
End: 2023-05-03
Payer: MEDICARE

## 2023-05-03 DIAGNOSIS — Z94.0 KIDNEY REPLACED BY TRANSPLANT: ICD-10-CM

## 2023-05-03 LAB
ALBUMIN MFR UR ELPH: 8.4 MG/DL (ref 1–14)
ANION GAP SERPL CALCULATED.3IONS-SCNC: 12 MMOL/L (ref 7–15)
BUN SERPL-MCNC: 26.4 MG/DL (ref 8–23)
CALCIUM SERPL-MCNC: 9.9 MG/DL (ref 8.8–10.2)
CHLORIDE SERPL-SCNC: 104 MMOL/L (ref 98–107)
CREAT SERPL-MCNC: 0.9 MG/DL (ref 0.51–0.95)
CREAT UR-MCNC: 53.1 MG/DL
CYCLOSPORINE BLD LC/MS/MS-MCNC: 53 UG/L (ref 50–400)
DEPRECATED HCO3 PLAS-SCNC: 23 MMOL/L (ref 22–29)
ERYTHROCYTE [DISTWIDTH] IN BLOOD BY AUTOMATED COUNT: 13 % (ref 10–15)
GFR SERPL CREATININE-BSD FRML MDRD: 70 ML/MIN/1.73M2
GLUCOSE SERPL-MCNC: 106 MG/DL (ref 70–99)
HCT VFR BLD AUTO: 37.7 % (ref 35–47)
HGB BLD-MCNC: 12.3 G/DL (ref 11.7–15.7)
MCH RBC QN AUTO: 33.5 PG (ref 26.5–33)
MCHC RBC AUTO-ENTMCNC: 32.6 G/DL (ref 31.5–36.5)
MCV RBC AUTO: 103 FL (ref 78–100)
PLATELET # BLD AUTO: 211 10E3/UL (ref 150–450)
POTASSIUM SERPL-SCNC: 4.6 MMOL/L (ref 3.4–5.3)
PROT/CREAT 24H UR: 0.16 MG/MG CR (ref 0–0.2)
RBC # BLD AUTO: 3.67 10E6/UL (ref 3.8–5.2)
SODIUM SERPL-SCNC: 139 MMOL/L (ref 136–145)
TME LAST DOSE: NORMAL H
TME LAST DOSE: NORMAL H
WBC # BLD AUTO: 5.3 10E3/UL (ref 4–11)

## 2023-05-03 PROCEDURE — 80048 BASIC METABOLIC PNL TOTAL CA: CPT

## 2023-05-03 PROCEDURE — 85027 COMPLETE CBC AUTOMATED: CPT

## 2023-05-03 PROCEDURE — 36415 COLL VENOUS BLD VENIPUNCTURE: CPT

## 2023-05-03 PROCEDURE — 87799 DETECT AGENT NOS DNA QUANT: CPT

## 2023-05-03 PROCEDURE — 80158 DRUG ASSAY CYCLOSPORINE: CPT

## 2023-05-03 PROCEDURE — 84156 ASSAY OF PROTEIN URINE: CPT

## 2023-05-04 LAB
EBV DNA COPIES/ML, INSTRUMENT: ABNORMAL COPIES/ML
EBV DNA SPEC NAA+PROBE-LOG#: 5.2 {LOG_COPIES}/ML

## 2023-05-12 ENCOUNTER — HOSPITAL ENCOUNTER (OUTPATIENT)
Dept: MAMMOGRAPHY | Facility: CLINIC | Age: 68
Discharge: HOME | End: 2023-05-12
Attending: INTERNAL MEDICINE | Admitting: INTERNAL MEDICINE
Payer: MEDICARE

## 2023-05-12 DIAGNOSIS — Z12.31 VISIT FOR SCREENING MAMMOGRAM: ICD-10-CM

## 2023-05-12 PROCEDURE — 77067 SCR MAMMO BI INCL CAD: CPT

## 2023-06-08 ENCOUNTER — LAB (OUTPATIENT)
Dept: LAB | Facility: CLINIC | Age: 68
End: 2023-06-08
Attending: INTERNAL MEDICINE
Payer: MEDICARE

## 2023-06-08 ENCOUNTER — TELEPHONE (OUTPATIENT)
Dept: TRANSPLANT | Facility: CLINIC | Age: 68
End: 2023-06-08

## 2023-06-08 DIAGNOSIS — Z94.0 KIDNEY REPLACED BY TRANSPLANT: Primary | ICD-10-CM

## 2023-06-08 DIAGNOSIS — Z94.0 KIDNEY REPLACED BY TRANSPLANT: ICD-10-CM

## 2023-06-08 LAB
ANION GAP SERPL CALCULATED.3IONS-SCNC: 12 MMOL/L (ref 7–15)
BUN SERPL-MCNC: 28.4 MG/DL (ref 8–23)
CALCIUM SERPL-MCNC: 10 MG/DL (ref 8.8–10.2)
CHLORIDE SERPL-SCNC: 102 MMOL/L (ref 98–107)
CREAT SERPL-MCNC: 0.9 MG/DL (ref 0.51–0.95)
CYCLOSPORINE BLD LC/MS/MS-MCNC: 70 UG/L (ref 50–400)
DEPRECATED HCO3 PLAS-SCNC: 25 MMOL/L (ref 22–29)
ERYTHROCYTE [DISTWIDTH] IN BLOOD BY AUTOMATED COUNT: 12.1 % (ref 10–15)
GFR SERPL CREATININE-BSD FRML MDRD: 70 ML/MIN/1.73M2
GLUCOSE SERPL-MCNC: 102 MG/DL (ref 70–99)
HCT VFR BLD AUTO: 40.5 % (ref 35–47)
HGB BLD-MCNC: 13 G/DL (ref 11.7–15.7)
MCH RBC QN AUTO: 32.7 PG (ref 26.5–33)
MCHC RBC AUTO-ENTMCNC: 32.1 G/DL (ref 31.5–36.5)
MCV RBC AUTO: 102 FL (ref 78–100)
PLATELET # BLD AUTO: 193 10E3/UL (ref 150–450)
POTASSIUM SERPL-SCNC: 4.4 MMOL/L (ref 3.4–5.3)
RBC # BLD AUTO: 3.97 10E6/UL (ref 3.8–5.2)
SODIUM SERPL-SCNC: 139 MMOL/L (ref 136–145)
TME LAST DOSE: NORMAL H
TME LAST DOSE: NORMAL H
WBC # BLD AUTO: 4.5 10E3/UL (ref 4–11)

## 2023-06-08 PROCEDURE — 85027 COMPLETE CBC AUTOMATED: CPT

## 2023-06-08 PROCEDURE — 36415 COLL VENOUS BLD VENIPUNCTURE: CPT

## 2023-06-08 PROCEDURE — 80048 BASIC METABOLIC PNL TOTAL CA: CPT

## 2023-06-08 PROCEDURE — 80158 DRUG ASSAY CYCLOSPORINE: CPT

## 2023-06-08 NOTE — TELEPHONE ENCOUNTER
Patient calling to report she does not have active labs orders to be drawn today.  Patient is at  lab in [place].  Patient had labs last drawn on 5/3/2023,  Current lab orders are for labs Q3 months.    Patient states she was instructed by scheduling to have labs drawn prior to her appointment with Dr Rollins on Monday 6/12 and a lab appointment was scheduled for her.    BMP, CBC, and CSA level orders placed for today.    Manju Caro RN   Transplant Coordinator  [phone]

## 2023-06-08 NOTE — TELEPHONE ENCOUNTER
Follow up call from my chart this afternoon.  Let patient know that RN supervisor was updated regarding scheduling instructions to obtain labs prior to SOT apt though she was not due for labs.      Extended apology regarding the frustration about scheduling the SOT appointment.  Let patient know RN supervisor will be updated.    Patient appreciated call.    Manju Caro RN   Transplant Coordinator  [phone]

## 2023-06-12 ENCOUNTER — VIRTUAL VISIT (OUTPATIENT)
Dept: NEPHROLOGY | Facility: CLINIC | Age: 68
End: 2023-06-12
Attending: INTERNAL MEDICINE
Payer: MEDICARE

## 2023-06-12 VITALS
SYSTOLIC BLOOD PRESSURE: 147 MMHG | DIASTOLIC BLOOD PRESSURE: 92 MMHG | TEMPERATURE: 98.8 F | HEART RATE: 70 BPM | WEIGHT: 124 LBS | BODY MASS INDEX: 21.45 KG/M2

## 2023-06-12 DIAGNOSIS — Z94.0 HTN, KIDNEY TRANSPLANT RELATED: ICD-10-CM

## 2023-06-12 DIAGNOSIS — D84.9 IMMUNOSUPPRESSED STATUS (H): ICD-10-CM

## 2023-06-12 DIAGNOSIS — Z48.298 AFTERCARE FOLLOWING ORGAN TRANSPLANT: ICD-10-CM

## 2023-06-12 DIAGNOSIS — B27.00 EBV (EPSTEIN-BARR VIRUS) VIREMIA: ICD-10-CM

## 2023-06-12 DIAGNOSIS — I15.1 HTN, KIDNEY TRANSPLANT RELATED: ICD-10-CM

## 2023-06-12 DIAGNOSIS — Z94.0 KIDNEY REPLACED BY TRANSPLANT: Primary | ICD-10-CM

## 2023-06-12 PROCEDURE — 99214 OFFICE O/P EST MOD 30 MIN: CPT | Mod: VID | Performed by: INTERNAL MEDICINE

## 2023-06-12 RX ORDER — TIMOLOL MALEATE 5 MG/ML
1 SOLUTION/ DROPS OPHTHALMIC EVERY MORNING
COMMUNITY
Start: 2023-06-12

## 2023-06-12 RX ORDER — MAGNESIUM OXIDE 400 MG/1
400 TABLET ORAL DAILY
COMMUNITY
Start: 2023-06-12

## 2023-06-12 RX ORDER — VALACYCLOVIR HYDROCHLORIDE 1 G/1
1000 TABLET, FILM COATED ORAL DAILY
COMMUNITY
Start: 2023-06-12

## 2023-06-12 RX ORDER — LOTEPREDNOL ETABONATE 5 MG/ML
1 SUSPENSION/ DROPS OPHTHALMIC AT BEDTIME
COMMUNITY

## 2023-06-12 RX ORDER — OMEGA-3-ACID ETHYL ESTERS 1 G/1
2 CAPSULE, LIQUID FILLED ORAL 2 TIMES DAILY
COMMUNITY
Start: 2023-06-12 | End: 2024-04-10

## 2023-06-12 ASSESSMENT — PAIN SCALES - GENERAL: PAINLEVEL: NO PAIN (0)

## 2023-06-12 NOTE — NURSING NOTE
Is the patient currently in the state of [place]? YES    Visit mode:VIDEO    If the visit is dropped, the patient can be reconnected by: VIDEO VISIT: Send to e-mail at: [email or web address]    Will anyone else be joining the visit? NO      How would you like to obtain your AVS? MyChart    Are changes needed to the allergy or medication list? NO    Reason for visit: FANY Leyva

## 2023-06-12 NOTE — PATIENT INSTRUCTIONS
Patient Recommendations:  - Recommend magnesium oxide 400mg daily at lunchtime  -I will order a CT of chest, abdomen, and pelvis at [facility]    Transplant Patient Information  Your Post Transplant Coordinator is: Manju Caro  For non urgent items, we encourage you to contact your coordinator/care team online via [employer]  You and your care team can also contact your transplant coordinator Monday - Friday, 8am - 5pm at [phone] (Option 2 to reach the coordinator or Option 4 to schedule an appointment).  After hours for urgent matters, please call [facility] at [phone].

## 2023-06-12 NOTE — PROGRESS NOTES
Virtual Visit Details    Type of service:  Video Visit   Video Start Time: 9:44 AM  Video End Time:10:02 AM    Originating Location (pt. Location): Home   Distant Location (provider location):  On-site  Platform used for Video Visit: [facility]      TRANSPLANT NEPHROLOGY CHRONIC POST TRANSPLANT VISIT    Assessment & Plan   # DDKT: Stable   - Baseline Creatinine:  ~ 0.9-1   - Proteinuria: Normal (<0.2 grams)   - Date DSA Last Checked: Aug/2013      Latest DSA: No   - BK Viremia: Not checked recently due to time from transplant   - Kidney Tx Biopsy: Sep 24, 2009; Result:  mesangial immune complex GN and foot process effacement       # Immunosuppression: Cyclosporine (goal 50-75) and Mycophenolate mofetil (dose 250 mg every 12 hours)   - Continue with intensive monitoring of immunosuppression for efficacy and toxicity.   - Changes: Not at this time    # Infection Prophylaxis:   - PJP: None. CD4>200 in 3/2022    # Hypertension: Controlled;  Goal BP: < 130/80   - Changes: Not at this time. Continue amlodipine 2.5mg daily. Metoprolol stopped 2/2 fatigue in 2022    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 Perez. Neg CT C/A/P 2020   -As long as stays between 100-200k, no changes. Most recent 151k on 5/3/23   -Per patient she was seen by [facility] [place], Dr. Pa, in Jan 2023 and in  R eye she had EBV in the fluid. Recommended to stay on valtrex ppx indefinitely. Currently on valtrex 1g daily   -Pt has had unintentional weight loss of about 12 lbs in the past year. Her fatigue is stable. She denies night sweats.    -Repeat CT C/A/P with contrast at [facility] to rule out PTLD     # Muscle cramping:   -Obtain mag level, iron studies, ferritin     # Mineral Bone Disorder:   - Secondary renal  hyperparathyroidism; PTH level: Normal (18-80 pg/ml)        On treatment: None  - Vitamin D; level: Normal        On supplement: No  - Calcium; level: Normal        On supplement: No  - Phosphorus; level: Not checked recently, but was normal last check        On supplement: No    # Electrolytes:   - Potassium; level: Normal        On supplement: No  - Magnesium; level: Not checked recently, but was normal last check        On supplement: No  - Bicarbonate; level: Normal        On supplement: No    # Skin Cancer Risk:    - Discussed sun protection and recommend regular follow up with Dermatology.    # Medical Compliance: Yes    # Health Maintenance and Vaccination Review: Recommend:  COVID bivalent booster    # Transplant History:  Etiology of Kidney Failure: Chronic glomerulonephritis (GN)  Tx: DDKT  Transplant: 8/19/2007 (Kidney)  Significant changes in immunosuppression: None  Significant transplant-related complications: EBV Viremia    Transplant Office Phone Number: [phone]    Assessment and plan was discussed with the patient and she voiced her understanding and agreement.    Return visit: Return in about 1 year (around 6/12/2024).    Wang Rollins MD    Chief Complaint   Ms. Lovell is a 67 year old here for routine follow up, kidney transplant and immunosuppression management.    History of Present Illness   The patient overall feels well. She denies any recent hospitalizations. She denies nausea, vomiting, diarrhea, fever, chills, shortness of breath, chest pain, LE edema, unintentional weight loss, nights sweats, dysuria, hematuria.       Home BP: 120s systolic    Problem List   Patient Active Problem List   Diagnosis     Kidney replaced by transplant     Immunosuppression (H)     Anemia in stage 2 chronic kidney disease     Dyslipidemia     Aftercare following organ transplant     EBV (Bandar-Barr virus) viremia     Vitamin D deficiency     HTN, kidney transplant related     Cervical osteoarthritis      Glaucoma     Hearing loss       Allergies   Allergies   Allergen Reactions     Fenofibrate Other (See Comments)     Pancreatitis (this is fenofibrate)     Fenofibrate Other (See Comments)     Pancreatitis     Simvastatin Muscle Pain (Myalgia) and Cramps       Medications   Current Outpatient Medications   Medication Sig     amLODIPine (NORVASC) 2.5 MG tablet Take 1 tablet (2.5 mg) by mouth At Bedtime     brimonidine (ALPHAGAN) 0.2 % ophthalmic solution Place 1 drop Into the left eye every morning      cycloSPORINE modified (GENERIC EQUIVALENT) 25 MG capsule Take 3 Capsules (75 mg) by mouth every 12 hours.     latanoprost (XALATAN) 0.005 % ophthalmic solution Place 1 drop Into the left eye At Bedtime     loteprednol (LOTEMAX) 0.5 % ophthalmic suspension Lotemax 0.5 % eye drops,suspension   INSTILL 1 DROP INTO AFFECTED EYE(S) BY OPHTHALMIC ROUTE 4 TIMES PER DAY     magnesium oxide (MAG-OX) 400 MG tablet Take 1 tablet (400 mg) by mouth daily     Multiple Vitamins-Minerals (CENTRUM SILVER) per tablet Take 1 tablet by mouth every morning      mycophenolate (GENERIC EQUIVALENT) 250 MG capsule Take 1 capsule (250 mg) by mouth 2 times daily     omega-3 acid ethyl esters (LOVAZA) 1 g capsule Take 2 capsules (2 g) by mouth 2 times daily     Omega-3 Fatty Acids (FISH OIL) 1000 MG CPDR Take 2,000 mg by mouth 2 times daily     PRAVASTATIN SODIUM PO Take 20 mg by mouth At Bedtime      ranibizumab (LUCENTIS) 0.5 MG/0.05ML SOSY Every 7 weeks     timolol maleate (TIMOPTIC) 0.5 % ophthalmic solution Place 1 drop into both eyes every morning     valACYclovir (VALTREX) 1000 mg tablet Take 1 tablet (1,000 mg) by mouth daily     No current facility-administered medications for this visit.     Medications Discontinued During This Encounter   Medication Reason     prednisoLONE acetate (PRED FORTE) 1 % ophthalmic suspension      timolol maleate (TIMOPTIC) 0.5 % ophthalmic solution        Physical Exam   Vital Signs: BP (!) 147/92    Pulse 70   Temp 98.8  F (37.1  C)   Wt 56.2 kg (124 lb)   LMP  (LMP Unknown)   BMI 21.45 kg/m      GENERAL APPEARANCE: alert and no distress  HENT: no obvious abnormalities on appearance  RESP: breathing appears unremarkable with normal rate, no audible wheezing or cough and no apparent shortness of breath with conversation  MS: extremities normal - no gross deformities noted  SKIN: no apparent rash and normal skin tone  NEURO: speech is clear with no obvious neurological deficits  PSYCH: mentation appears normal and affect normal      Data         Latest Ref Rng & Units 6/8/2023    10:45 AM 5/3/2023    10:50 AM 4/25/2023    10:17 AM   Renal   Sodium 136 - 145 mmol/L 139   139   139     K 3.4 - 5.3 mmol/L 4.4   4.6   4.7     Cl 98 - 107 mmol/L 102   104   102     Cl (external) 98 - 107 mmol/L 102   104   102     CO2 22 - 29 mmol/L 25   23   26     Urea Nitrogen 8.0 - 23.0 mg/dL 28.4   26.4   24.7     Creatinine 0.51 - 0.95 mg/dL 0.90   0.90   0.96     Glucose 70 - 99 mg/dL 102   106   107     Calcium 8.8 - 10.2 mg/dL 10.0   9.9   10.2           Latest Ref Rng & Units 5/4/2022    12:18 PM 8/7/2017     1:24 PM 12/8/2015     9:35 AM   Bone Health   Phos (external) 2.5 - 4.5 mg/dL   3.1        Vit D Def 20 - 75 ug/L 43   50          This result is from an external source.         Latest Ref Rng & Units 6/8/2023    10:45 AM 5/3/2023    10:50 AM 1/18/2023    10:32 AM   Heme   WBC 4.0 - 11.0 10e3/uL 4.5   5.3   4.7     Hgb 11.7 - 15.7 g/dL 13.0   12.3   12.4     Plt 150 - 450 10e3/uL 193   211   196           Latest Ref Rng & Units 5/4/2022    12:18 PM 3/7/2022    11:26 AM 7/14/2021    11:40 AM   Liver   AP 45 - 120 U/L 54   55   60     TBili 0.0 - 1.0 mg/dL 0.9   0.8   1.0     ALT 0 - 45 U/L 21   34   20     AST 0 - 40 U/L 25   26   25     Tot Protein 6.0 - 8.0 g/dL 7.5   8.9   7.6     Albumin 3.5 - 5.0 g/dL 4.0   4.0   3.8           Latest Ref Rng & Units 8/26/2007    11:18 AM 5/9/2007     8:11 AM   Pancreas   A1C  4.3 - 6.0 %  4.2     Amylase 30 - 110 U/L 58      Lipase 20 - 250 U/L 186            Latest Ref Rng & Units 8/28/2007     6:32 PM   Iron studies   Iron 35 - 180 ug/dL 23     Ferritin 10 - 300 ng/mL 903           5/3/2023    10:50 AM 2/9/2023    11:17 AM 1/18/2023    10:32 AM   [facility] Txp Virology   EBV DNA LOG OF COPIES 5.2   5.0   5.3              Recent Labs   Lab Test 07/23/18  1020 06/14/19  1033 12/20/19  1112   DOSMPA Not Provided 2,230 12/20/2019 @ 7661   MPACID [id number] 0.59* 1.22   MPAG 51.3 31.1 27.8*

## 2023-06-12 NOTE — LETTER
6/12/2023       RE: Luba Lovell  [address]  [facility] [address]     Dear Colleague,    Thank you for referring your patient, Luba Lovell, to the [facility] [place] at [facility]. Please see a copy of my visit note below.      TRANSPLANT NEPHROLOGY CHRONIC POST TRANSPLANT VISIT    Assessment & Plan   # DDKT: Stable   - Baseline Creatinine:  ~ 0.9-1   - Proteinuria: Normal (<0.2 grams)   - Date DSA Last Checked: Aug/2013      Latest DSA: No   - BK Viremia: Not checked recently due to time from transplant   - Kidney Tx Biopsy: Sep 24, 2009; Result:  mesangial immune complex GN and foot process effacement       # Immunosuppression: Cyclosporine (goal 50-75) and Mycophenolate mofetil (dose 250 mg every 12 hours)   - Continue with intensive monitoring of immunosuppression for efficacy and toxicity.   - Changes: Not at this time    # Infection Prophylaxis:   - PJP: None. CD4>200 in 3/2022    # Hypertension: Controlled;  Goal BP: < 130/80   - Changes: Not at this time. Continue amlodipine 2.5mg daily. Metoprolol stopped 2/2 fatigue in 2022    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 Perez. Neg CT C/A/P 2020   -As long as stays between 100-200k, no changes. Most recent 151k on 5/3/23   -Per patient she was seen by [facility] [place], Dr. Pa, in Jan 2023 and in  R eye she had EBV in the fluid. Recommended to stay on valtrex ppx indefinitely. Currently on valtrex 1g daily   -Pt has had unintentional weight loss of about 12 lbs in the past year. Her fatigue is stable. She denies night sweats.    -Repeat CT C/A/P with contrast at [facility] to rule out PTLD     # Muscle cramping:   -Obtain mag level, iron  studies, ferritin     # Mineral Bone Disorder:   - Secondary renal hyperparathyroidism; PTH level: Normal (18-80 pg/ml)        On treatment: None  - Vitamin D; level: Normal        On supplement: No  - Calcium; level: Normal        On supplement: No  - Phosphorus; level: Not checked recently, but was normal last check        On supplement: No    # Electrolytes:   - Potassium; level: Normal        On supplement: No  - Magnesium; level: Not checked recently, but was normal last check        On supplement: No  - Bicarbonate; level: Normal        On supplement: No    # Skin Cancer Risk:    - Discussed sun protection and recommend regular follow up with Dermatology.    # Medical Compliance: Yes    # Health Maintenance and Vaccination Review: Recommend:  COVID bivalent booster    # Transplant History:  Etiology of Kidney Failure: Chronic glomerulonephritis (GN)  Tx: DDKT  Transplant: 8/19/2007 (Kidney)  Significant changes in immunosuppression: None  Significant transplant-related complications: EBV Viremia    Transplant Office Phone Number: [phone]    Assessment and plan was discussed with the patient and she voiced her understanding and agreement.    Return visit: Return in about 1 year (around 6/12/2024).    Wang Rollins MD    Chief Complaint   Ms. Lovell is a 67 year old here for routine follow up, kidney transplant and immunosuppression management.    History of Present Illness   The patient overall feels well. She denies any recent hospitalizations. She denies nausea, vomiting, diarrhea, fever, chills, shortness of breath, chest pain, LE edema, unintentional weight loss, nights sweats, dysuria, hematuria.       Home BP: 120s systolic    Problem List   Patient Active Problem List   Diagnosis    Kidney replaced by transplant    Immunosuppression (H)    Anemia in stage 2 chronic kidney disease    Dyslipidemia    Aftercare following organ transplant    EBV (Bandar-Barr virus) viremia    Vitamin D deficiency     HTN, kidney transplant related    Cervical osteoarthritis    Glaucoma    Hearing loss       Allergies   Allergies   Allergen Reactions    Fenofibrate Other (See Comments)     Pancreatitis (this is fenofibrate)    Fenofibrate Other (See Comments)     Pancreatitis    Simvastatin Muscle Pain (Myalgia) and Cramps       Medications   Current Outpatient Medications   Medication Sig    amLODIPine (NORVASC) 2.5 MG tablet Take 1 tablet (2.5 mg) by mouth At Bedtime    brimonidine (ALPHAGAN) 0.2 % ophthalmic solution Place 1 drop Into the left eye every morning     cycloSPORINE modified (GENERIC EQUIVALENT) 25 MG capsule Take 3 Capsules (75 mg) by mouth every 12 hours.    latanoprost (XALATAN) 0.005 % ophthalmic solution Place 1 drop Into the left eye At Bedtime    loteprednol (LOTEMAX) 0.5 % ophthalmic suspension Lotemax 0.5 % eye drops,suspension   INSTILL 1 DROP INTO AFFECTED EYE(S) BY OPHTHALMIC ROUTE 4 TIMES PER DAY    magnesium oxide (MAG-OX) 400 MG tablet Take 1 tablet (400 mg) by mouth daily    Multiple Vitamins-Minerals (CENTRUM SILVER) per tablet Take 1 tablet by mouth every morning     mycophenolate (GENERIC EQUIVALENT) 250 MG capsule Take 1 capsule (250 mg) by mouth 2 times daily    omega-3 acid ethyl esters (LOVAZA) 1 g capsule Take 2 capsules (2 g) by mouth 2 times daily    Omega-3 Fatty Acids (FISH OIL) 1000 MG CPDR Take 2,000 mg by mouth 2 times daily    PRAVASTATIN SODIUM PO Take 20 mg by mouth At Bedtime     ranibizumab (LUCENTIS) 0.5 MG/0.05ML SOSY Every 7 weeks    timolol maleate (TIMOPTIC) 0.5 % ophthalmic solution Place 1 drop into both eyes every morning    valACYclovir (VALTREX) 1000 mg tablet Take 1 tablet (1,000 mg) by mouth daily     No current facility-administered medications for this visit.     Medications Discontinued During This Encounter   Medication Reason    prednisoLONE acetate (PRED FORTE) 1 % ophthalmic suspension     timolol maleate (TIMOPTIC) 0.5 % ophthalmic solution        Physical  Exam   Vital Signs: BP (!) 147/92   Pulse 70   Temp 98.8  F (37.1  C)   Wt 56.2 kg (124 lb)   LMP  (LMP Unknown)   BMI 21.45 kg/m      GENERAL APPEARANCE: alert and no distress  HENT: no obvious abnormalities on appearance  RESP: breathing appears unremarkable with normal rate, no audible wheezing or cough and no apparent shortness of breath with conversation  MS: extremities normal - no gross deformities noted  SKIN: no apparent rash and normal skin tone  NEURO: speech is clear with no obvious neurological deficits  PSYCH: mentation appears normal and affect normal      Data         Latest Ref Rng & Units 6/8/2023    10:45 AM 5/3/2023    10:50 AM 4/25/2023    10:17 AM   Renal   Sodium 136 - 145 mmol/L 139   139   139     K 3.4 - 5.3 mmol/L 4.4   4.6   4.7     Cl 98 - 107 mmol/L 102   104   102     Cl (external) 98 - 107 mmol/L 102   104   102     CO2 22 - 29 mmol/L 25   23   26     Urea Nitrogen 8.0 - 23.0 mg/dL 28.4   26.4   24.7     Creatinine 0.51 - 0.95 mg/dL 0.90   0.90   0.96     Glucose 70 - 99 mg/dL 102   106   107     Calcium 8.8 - 10.2 mg/dL 10.0   9.9   10.2           Latest Ref Rng & Units 5/4/2022    12:18 PM 8/7/2017     1:24 PM 12/8/2015     9:35 AM   Bone Health   Phos (external) 2.5 - 4.5 mg/dL   3.1        Vit D Def 20 - 75 ug/L 43   50          This result is from an external source.         Latest Ref Rng & Units 6/8/2023    10:45 AM 5/3/2023    10:50 AM 1/18/2023    10:32 AM   Heme   WBC 4.0 - 11.0 10e3/uL 4.5   5.3   4.7     Hgb 11.7 - 15.7 g/dL 13.0   12.3   12.4     Plt 150 - 450 10e3/uL 193   211   196           Latest Ref Rng & Units 5/4/2022    12:18 PM 3/7/2022    11:26 AM 7/14/2021    11:40 AM   Liver   AP 45 - 120 U/L 54   55   60     TBili 0.0 - 1.0 mg/dL 0.9   0.8   1.0     ALT 0 - 45 U/L 21   34   20     AST 0 - 40 U/L 25   26   25     Tot Protein 6.0 - 8.0 g/dL 7.5   8.9   7.6     Albumin 3.5 - 5.0 g/dL 4.0   4.0   3.8           Latest Ref Rng & Units 8/26/2007    11:18 AM  5/9/2007     8:11 AM   Pancreas   A1C 4.3 - 6.0 %  4.2     Amylase 30 - 110 U/L 58      Lipase 20 - 250 U/L 186            Latest Ref Rng & Units 8/28/2007     6:32 PM   Iron studies   Iron 35 - 180 ug/dL 23     Ferritin 10 - 300 ng/mL 903           5/3/2023    10:50 AM 2/9/2023    11:17 AM 1/18/2023    10:32 AM   [facility] Virology   EBV DNA LOG OF COPIES 5.2   5.0   5.3              Recent Labs   Lab Test 07/23/18  1020 06/14/19  1033 12/20/19  1112   DOSMPA Not Provided 2,230 12/20/2019 @ 6350   MPACID 1.62 0.59* 1.22   MPAG 51.3 31.1 27.8*       Again, thank you for allowing me to participate in the care of your patient.      Sincerely,    Wang Rollins MD

## 2023-06-16 ENCOUNTER — TELEPHONE (OUTPATIENT)
Dept: TRANSPLANT | Facility: CLINIC | Age: 68
End: 2023-06-16
Payer: MEDICARE

## 2023-06-16 DIAGNOSIS — Z94.0 KIDNEY REPLACED BY TRANSPLANT: Primary | ICD-10-CM

## 2023-06-16 DIAGNOSIS — D64.9 ANEMIA, UNSPECIFIED TYPE: ICD-10-CM

## 2023-06-16 NOTE — TELEPHONE ENCOUNTER
Wang Rollins MD Sveiven, Manju, RN  I ordered a CT C/A/P with IV contrast to rule out PTLD Again. It's been 3 years and some of her symptoms are concerning. With next labs please obtain mag level, iron studies, ferritin.     Thanks,   Wang       Orders placed,  See my chart    Manju Caro RN   Transplant Coordinator  [phone]

## 2023-06-19 DIAGNOSIS — I15.1 HTN, KIDNEY TRANSPLANT RELATED: Primary | ICD-10-CM

## 2023-06-19 DIAGNOSIS — Z94.0 KIDNEY REPLACED BY TRANSPLANT: ICD-10-CM

## 2023-06-19 DIAGNOSIS — Z94.0 HTN, KIDNEY TRANSPLANT RELATED: Primary | ICD-10-CM

## 2023-06-19 RX ORDER — AMLODIPINE BESYLATE 2.5 MG/1
2.5 TABLET ORAL AT BEDTIME
Qty: 90 TABLET | Refills: 0 | Status: SHIPPED | OUTPATIENT
Start: 2023-06-19 | End: 2023-09-29

## 2023-06-22 ENCOUNTER — LAB REQUISITION (OUTPATIENT)
Dept: LAB | Facility: CLINIC | Age: 68
End: 2023-06-22
Payer: MEDICARE

## 2023-06-22 DIAGNOSIS — G47.62 SLEEP RELATED LEG CRAMPS: ICD-10-CM

## 2023-06-22 DIAGNOSIS — E55.9 VITAMIN D DEFICIENCY, UNSPECIFIED: ICD-10-CM

## 2023-06-22 LAB — MAGNESIUM SERPL-MCNC: 2 MG/DL (ref 1.7–2.3)

## 2023-06-22 PROCEDURE — 82306 VITAMIN D 25 HYDROXY: CPT | Mod: ORL | Performed by: FAMILY MEDICINE

## 2023-06-22 PROCEDURE — 83735 ASSAY OF MAGNESIUM: CPT | Mod: ORL | Performed by: FAMILY MEDICINE

## 2023-06-23 LAB — DEPRECATED CALCIDIOL+CALCIFEROL SERPL-MC: 46 UG/L (ref 20–75)

## 2023-06-28 ENCOUNTER — HOSPITAL ENCOUNTER (OUTPATIENT)
Dept: CT IMAGING | Facility: CLINIC | Age: 68
Discharge: HOME | End: 2023-06-28
Attending: INTERNAL MEDICINE | Admitting: INTERNAL MEDICINE
Payer: MEDICARE

## 2023-06-28 DIAGNOSIS — B27.00 EBV (EPSTEIN-BARR VIRUS) VIREMIA: ICD-10-CM

## 2023-06-28 PROCEDURE — 250N000011 HC RX IP 250 OP 636: Mod: JZ | Performed by: INTERNAL MEDICINE

## 2023-06-28 PROCEDURE — G1010 CDSM STANSON: HCPCS

## 2023-06-28 PROCEDURE — 74177 CT ABD & PELVIS W/CONTRAST: CPT | Mod: MG

## 2023-06-28 RX ORDER — IOPAMIDOL 755 MG/ML
100 INJECTION, SOLUTION INTRAVASCULAR ONCE
Status: COMPLETED | OUTPATIENT
Start: 2023-06-28 | End: 2023-06-28

## 2023-06-28 RX ADMIN — IOPAMIDOL 100 ML: 755 INJECTION, SOLUTION INTRAVENOUS at 16:53

## 2023-06-29 NOTE — TELEPHONE ENCOUNTER
Message  Received: Yesterday  Wang Rollins MD Sveiven, Sara, RN    Negative CT. Continue to follow EBV, work on gaining weight     OUTCOME: RNCC placed call to Z[facility]. She was glad to hear that the CT was negative. She feels comfortable at weight of 127 lb (noted at last visit; down from previous visit 135+lb). No complaints of poor appetite. Her understanding is to continue monitoring her EBV levels every 3 months. If Dr. Rollins would like more frequent please notify her.     Irma Doherty RN, BSN  Solid Organ Transplant, Post Kidney and Pancreas  Transplant Care Coordinator  [phone]

## 2023-07-27 DIAGNOSIS — Z94.0 KIDNEY TRANSPLANTED: Primary | ICD-10-CM

## 2023-07-27 RX ORDER — PRAVASTATIN SODIUM 20 MG
20 TABLET ORAL DAILY
Qty: 90 TABLET | Refills: 0 | OUTPATIENT
Start: 2023-07-27

## 2023-08-30 ENCOUNTER — TELEPHONE (OUTPATIENT)
Dept: TRANSPLANT | Facility: CLINIC | Age: 68
End: 2023-08-30
Payer: MEDICARE

## 2023-08-30 ENCOUNTER — LAB (OUTPATIENT)
Dept: LAB | Facility: CLINIC | Age: 68
End: 2023-08-30
Attending: INTERNAL MEDICINE
Payer: MEDICARE

## 2023-08-30 DIAGNOSIS — N42.1 CONGESTION AND HEMORRHAGE OF PROSTATE: ICD-10-CM

## 2023-08-30 DIAGNOSIS — Z94.0 KIDNEY REPLACED BY TRANSPLANT: ICD-10-CM

## 2023-08-30 DIAGNOSIS — R50.9 FEVER: ICD-10-CM

## 2023-08-30 DIAGNOSIS — Z94.0 KIDNEY REPLACED BY TRANSPLANT: Primary | ICD-10-CM

## 2023-08-30 PROCEDURE — 87635 SARS-COV-2 COVID-19 AMP PRB: CPT

## 2023-08-30 NOTE — TELEPHONE ENCOUNTER
Patient Call: General  Route to LPN    Reason for call: Zulma's  tested Positive for Covid on Monday, she's experiencing some symptoms, patient stated she has a fever of 100.6 today feeling very congested, slight cough.     Call back needed? Yes    Return Call Needed  Same as documented in contacts section  When to return call?: Same day: Route High Priority

## 2023-08-30 NOTE — TELEPHONE ENCOUNTER
ISSUE:     Cough, congestion, sore throat, fever ( t max 100.6 ) headache.     tested positive for COVID 19 Monday 8/28.  Patient states they have been isolating the best they can.  Symptoms started yesterday 8/29    Patient does not have any home Coivd tests, states she did order some and could test at home tomorrow.  COVID 19 order placed, patient to obtain at local [facility] today.    Manju Caro RN   Transplant Coordinator  [phone]

## 2023-08-31 ENCOUNTER — TELEPHONE (OUTPATIENT)
Dept: PEDIATRICS | Facility: CLINIC | Age: 68
End: 2023-08-31
Payer: MEDICARE

## 2023-08-31 ENCOUNTER — TELEPHONE (OUTPATIENT)
Dept: TRANSPLANT | Facility: CLINIC | Age: 68
End: 2023-08-31
Payer: MEDICARE

## 2023-08-31 ENCOUNTER — TELEPHONE (OUTPATIENT)
Dept: NURSING | Facility: CLINIC | Age: 68
End: 2023-08-31
Payer: MEDICARE

## 2023-08-31 DIAGNOSIS — U07.1 CLINICAL DIAGNOSIS OF COVID-19: Primary | ICD-10-CM

## 2023-08-31 LAB — SARS-COV-2 RNA RESP QL NAA+PROBE: POSITIVE

## 2023-08-31 RX ORDER — DIPHENHYDRAMINE HYDROCHLORIDE 50 MG/ML
50 INJECTION INTRAMUSCULAR; INTRAVENOUS
Status: CANCELLED
Start: 2023-08-31

## 2023-08-31 RX ORDER — MEPERIDINE HYDROCHLORIDE 25 MG/ML
25 INJECTION INTRAMUSCULAR; INTRAVENOUS; SUBCUTANEOUS EVERY 30 MIN PRN
Status: CANCELLED | OUTPATIENT
Start: 2023-08-31

## 2023-08-31 RX ORDER — ALBUTEROL SULFATE 0.83 MG/ML
2.5 SOLUTION RESPIRATORY (INHALATION)
Status: CANCELLED | OUTPATIENT
Start: 2023-08-31

## 2023-08-31 RX ORDER — METHYLPREDNISOLONE SODIUM SUCCINATE 125 MG/2ML
125 INJECTION, POWDER, LYOPHILIZED, FOR SOLUTION INTRAMUSCULAR; INTRAVENOUS
Status: CANCELLED
Start: 2023-08-31

## 2023-08-31 RX ORDER — EPINEPHRINE 1 MG/ML
0.3 INJECTION, SOLUTION, CONCENTRATE INTRAVENOUS EVERY 5 MIN PRN
Status: CANCELLED | OUTPATIENT
Start: 2023-08-31

## 2023-08-31 RX ORDER — HEPARIN SODIUM,PORCINE 10 UNIT/ML
5-20 VIAL (ML) INTRAVENOUS DAILY PRN
Status: CANCELLED | OUTPATIENT
Start: 2023-08-31

## 2023-08-31 RX ORDER — ALBUTEROL SULFATE 90 UG/1
1-2 AEROSOL, METERED RESPIRATORY (INHALATION)
Status: CANCELLED
Start: 2023-08-31

## 2023-08-31 RX ORDER — HEPARIN SODIUM (PORCINE) LOCK FLUSH IV SOLN 100 UNIT/ML 100 UNIT/ML
5 SOLUTION INTRAVENOUS
Status: CANCELLED | OUTPATIENT
Start: 2023-08-31

## 2023-08-31 NOTE — TELEPHONE ENCOUNTER
COVID + 8/30  Symptom onset: Tuesday 8/29    Immunization status: last dose 1/2022, has not received bivalent.      Current symptoms: fever ( t mas 102 ) cough, congestion,  headache    PLAN  Wang Rollins MD Sveiven, Sara, RN  No change in IS As only on CsA 50-75 and MMF 250mg bid. Refer for remdesivir please    Order placed and urgent message sent to [facility] scheduling and RN.    Reviewed the plan with patient.  Instructed her to not take any Paxlovid should someone try to prescribe.  Reviewed CDC recommendations for quarantine.    Manju Caro RN   Transplant Coordinator  [phone]

## 2023-08-31 NOTE — TELEPHONE ENCOUNTER
Pt called requesting covid treatment  Pt does not have a primary in [facility]  Pt's primary is at [facility]    Advised pt to call her primary care provider  Pt verbalized  understanding and agrees to the plan    Luis Eduardo Marie RN on 8/31/2023 at 10:07 AM

## 2023-08-31 NOTE — TELEPHONE ENCOUNTER
Patient classified as COVID treatment eligible by Epic high risk algorithm:  Yes    Coronavirus (COVID-19) Notification    Reason for call  Notify of POSITIVE COVID-19 lab result, assess symptoms,  review [facility] recommendations    Lab Result   Lab test for 2019-nCoV rRt-PCR or SARS-COV-2 PCR  Oropharyngeal AND/OR nasopharyngeal swabs were POSITIVE for 2019-nCoV RNA [OR] SARS-COV-2 RNA (COVID-19) RNA     We have been unable to reach patient by phone at this time to notify of their Positive COVID-19 result.    Left voicemail message requesting a call back to [phone] [facility] for results.        A Positive COVID-19 letter will be sent via [employer] or the mail.    Mavis Aguilera

## 2023-09-01 ENCOUNTER — INFUSION THERAPY VISIT (OUTPATIENT)
Dept: INFUSION THERAPY | Facility: CLINIC | Age: 68
End: 2023-09-01
Attending: INTERNAL MEDICINE
Payer: MEDICARE

## 2023-09-01 ENCOUNTER — TELEPHONE (OUTPATIENT)
Dept: TRANSPLANT | Facility: CLINIC | Age: 68
End: 2023-09-01

## 2023-09-01 VITALS
RESPIRATION RATE: 16 BRPM | HEART RATE: 68 BPM | SYSTOLIC BLOOD PRESSURE: 131 MMHG | DIASTOLIC BLOOD PRESSURE: 80 MMHG | OXYGEN SATURATION: 97 % | TEMPERATURE: 99 F

## 2023-09-01 DIAGNOSIS — U07.1 CLINICAL DIAGNOSIS OF COVID-19: Primary | ICD-10-CM

## 2023-09-01 LAB
ALBUMIN SERPL BCG-MCNC: 4.4 G/DL (ref 3.5–5.2)
ALP SERPL-CCNC: 65 U/L (ref 35–104)
ALT SERPL W P-5'-P-CCNC: 27 U/L (ref 0–50)
ANION GAP SERPL CALCULATED.3IONS-SCNC: 11 MMOL/L (ref 7–15)
AST SERPL W P-5'-P-CCNC: 43 U/L (ref 0–45)
BILIRUB SERPL-MCNC: 0.6 MG/DL
BUN SERPL-MCNC: 24.8 MG/DL (ref 8–23)
CALCIUM SERPL-MCNC: 9.9 MG/DL (ref 8.8–10.2)
CHLORIDE SERPL-SCNC: 102 MMOL/L (ref 98–107)
CREAT SERPL-MCNC: 0.92 MG/DL (ref 0.51–0.95)
DEPRECATED HCO3 PLAS-SCNC: 24 MMOL/L (ref 22–29)
GFR SERPL CREATININE-BSD FRML MDRD: 68 ML/MIN/1.73M2
GLUCOSE SERPL-MCNC: 155 MG/DL (ref 70–99)
INR PPP: 1.03 (ref 0.85–1.15)
POTASSIUM SERPL-SCNC: 4.6 MMOL/L (ref 3.4–5.3)
PROT SERPL-MCNC: 8.3 G/DL (ref 6.4–8.3)
SODIUM SERPL-SCNC: 137 MMOL/L (ref 136–145)

## 2023-09-01 PROCEDURE — 250N000011 HC RX IP 250 OP 636: Mod: JZ | Performed by: INTERNAL MEDICINE

## 2023-09-01 PROCEDURE — 96365 THER/PROPH/DIAG IV INF INIT: CPT

## 2023-09-01 PROCEDURE — 36415 COLL VENOUS BLD VENIPUNCTURE: CPT | Performed by: INTERNAL MEDICINE

## 2023-09-01 PROCEDURE — 258N000003 HC RX IP 258 OP 636: Performed by: INTERNAL MEDICINE

## 2023-09-01 PROCEDURE — 80053 COMPREHEN METABOLIC PANEL: CPT | Performed by: INTERNAL MEDICINE

## 2023-09-01 PROCEDURE — 85610 PROTHROMBIN TIME: CPT | Performed by: INTERNAL MEDICINE

## 2023-09-01 RX ORDER — ALBUTEROL SULFATE 90 UG/1
1-2 AEROSOL, METERED RESPIRATORY (INHALATION)
Status: CANCELLED
Start: 2023-09-02

## 2023-09-01 RX ORDER — HEPARIN SODIUM (PORCINE) LOCK FLUSH IV SOLN 100 UNIT/ML 100 UNIT/ML
5 SOLUTION INTRAVENOUS
Status: CANCELLED | OUTPATIENT
Start: 2023-09-02

## 2023-09-01 RX ORDER — HEPARIN SODIUM,PORCINE 10 UNIT/ML
5-20 VIAL (ML) INTRAVENOUS DAILY PRN
Status: CANCELLED | OUTPATIENT
Start: 2023-09-02

## 2023-09-01 RX ORDER — DIPHENHYDRAMINE HYDROCHLORIDE 50 MG/ML
50 INJECTION INTRAMUSCULAR; INTRAVENOUS
Status: CANCELLED
Start: 2023-09-02

## 2023-09-01 RX ORDER — ALBUTEROL SULFATE 0.83 MG/ML
2.5 SOLUTION RESPIRATORY (INHALATION)
Status: CANCELLED | OUTPATIENT
Start: 2023-09-02

## 2023-09-01 RX ORDER — METHYLPREDNISOLONE SODIUM SUCCINATE 125 MG/2ML
125 INJECTION, POWDER, LYOPHILIZED, FOR SOLUTION INTRAMUSCULAR; INTRAVENOUS
Status: CANCELLED
Start: 2023-09-02

## 2023-09-01 RX ORDER — EPINEPHRINE 1 MG/ML
0.3 INJECTION, SOLUTION INTRAMUSCULAR; SUBCUTANEOUS EVERY 5 MIN PRN
Status: CANCELLED | OUTPATIENT
Start: 2023-09-02

## 2023-09-01 RX ORDER — MEPERIDINE HYDROCHLORIDE 25 MG/ML
25 INJECTION INTRAMUSCULAR; INTRAVENOUS; SUBCUTANEOUS EVERY 30 MIN PRN
Status: CANCELLED | OUTPATIENT
Start: 2023-09-02

## 2023-09-01 RX ADMIN — SODIUM CHLORIDE 250 ML: 9 INJECTION, SOLUTION INTRAVENOUS at 11:27

## 2023-09-01 RX ADMIN — REMDESIVIR 200 MG: 100 INJECTION, POWDER, LYOPHILIZED, FOR SOLUTION INTRAVENOUS at 10:48

## 2023-09-01 NOTE — PROGRESS NOTES
Infusion Nursing Note:  Luba Lovell presents today for Remdesivir.    Patient seen by provider today: No   present during visit today: Not Applicable.    Note: Pt received Remdesivir over 30 minutes.  First dose education given--handout given and mechanism of action, side effects and signs of infusion reaction reviewed with patient and her , Alberto, who verbalized understanding.  Questions answered.  Pt observed for 60 minutes post infusion.  Tolerated without incident.      Intravenous Access:  Labs drawn without difficulty.  Peripheral IV placed.    Treatment Conditions:  Lab Results   Component Value Date     09/01/2023    POTASSIUM 4.6 09/01/2023    MAG 2.0 06/22/2023    CR 0.92 09/01/2023    AICHA 9.9 09/01/2023    BILITOTAL 0.6 09/01/2023    ALBUMIN 4.4 09/01/2023    ALT 27 09/01/2023    AST 43 09/01/2023       Results reviewed, labs MET treatment parameters, ok to proceed with treatment.  INR within normal range also.      Post Infusion Assessment:  Patient tolerated infusion without incident.  Patient observed for 60 minutes post Remdesivir per protocol.  Blood return noted pre and post infusion.  Site patent and intact, free from redness, edema or discomfort.  No evidence of extravasations.  Access discontinued per protocol.       Discharge Plan:   Discharge instructions reviewed with: Patient and Family.  Patient and/or family verbalized understanding of discharge instructions and all questions answered.  AVS to patient via Yorn[employer].  Patient will return 09/02 for next appointment.   Patient discharged in stable condition accompanied by: .  Departure Mode: Ambulatory.    Administrations This Visit       0.9% sodium chloride BOLUS       Admin Date  09/01/2023 Action  $New Bag Dose  250 mL Route  Intravenous Administered By  Lien Vela RN              remdesivir 200 mg in sodium chloride 0.9 % 250 mL intermittent infusion       Admin Date  09/01/2023 Action  $New Bag  Dose  200 mg Rate  500 mL/hr Route  Intravenous Administered By  Lien Vela, RN                        Lien Vela, RN

## 2023-09-01 NOTE — PATIENT INSTRUCTIONS
Dear Luba Lovell    Thank you for choosing [facility] ([facility]) for your infusion.  The following information is a summary of our appointment as well as important reminders.        If you are a transplant patient and require transplant education, please click on this link: [email or web address].    We look forward in seeing you on your next appointment here at [facility] ([facility]).  Please don t hesitate to call us at [phone] to reschedule any of your appointments or to speak with one of the [facility] registered nurses.  It was a pleasure taking care of you today.    Sincerely,    [facility]  [facility]  [address] [place] [address]  [address]  Phone:  [phone]

## 2023-09-01 NOTE — TELEPHONE ENCOUNTER
Returned patients phone call.  Patient states she is currently at [facility] to receive remdesvir.  Her questions regarding the infusion have been answered by [facility] staff.      Patient states her cough continues however fever is resolved.      Will reach out next week to reassess Covid symptoms.    Manju Caro RN   Transplant Coordinator  [phone]

## 2023-09-01 NOTE — TELEPHONE ENCOUNTER
Wanted to ask care team a  question(she has infusion this am)  please call   patient would not say what the question was.

## 2023-09-02 ENCOUNTER — INFUSION THERAPY VISIT (OUTPATIENT)
Dept: INFUSION THERAPY | Facility: CLINIC | Age: 68
End: 2023-09-02
Attending: INTERNAL MEDICINE
Payer: MEDICARE

## 2023-09-02 VITALS
SYSTOLIC BLOOD PRESSURE: 122 MMHG | TEMPERATURE: 98.9 F | RESPIRATION RATE: 16 BRPM | OXYGEN SATURATION: 97 % | HEART RATE: 54 BPM | DIASTOLIC BLOOD PRESSURE: 79 MMHG

## 2023-09-02 DIAGNOSIS — U07.1 CLINICAL DIAGNOSIS OF COVID-19: Primary | ICD-10-CM

## 2023-09-02 PROCEDURE — 96365 THER/PROPH/DIAG IV INF INIT: CPT

## 2023-09-02 PROCEDURE — 258N000003 HC RX IP 258 OP 636: Performed by: INTERNAL MEDICINE

## 2023-09-02 PROCEDURE — 250N000011 HC RX IP 250 OP 636: Mod: JZ | Performed by: INTERNAL MEDICINE

## 2023-09-02 RX ORDER — MEPERIDINE HYDROCHLORIDE 25 MG/ML
25 INJECTION INTRAMUSCULAR; INTRAVENOUS; SUBCUTANEOUS EVERY 30 MIN PRN
Status: CANCELLED | OUTPATIENT
Start: 2023-09-03

## 2023-09-02 RX ORDER — ALBUTEROL SULFATE 90 UG/1
1-2 AEROSOL, METERED RESPIRATORY (INHALATION)
Status: CANCELLED
Start: 2023-09-03

## 2023-09-02 RX ORDER — DIPHENHYDRAMINE HYDROCHLORIDE 50 MG/ML
50 INJECTION INTRAMUSCULAR; INTRAVENOUS
Status: CANCELLED
Start: 2023-09-03

## 2023-09-02 RX ORDER — METHYLPREDNISOLONE SODIUM SUCCINATE 125 MG/2ML
125 INJECTION, POWDER, LYOPHILIZED, FOR SOLUTION INTRAMUSCULAR; INTRAVENOUS
Status: CANCELLED
Start: 2023-09-03

## 2023-09-02 RX ORDER — HEPARIN SODIUM,PORCINE 10 UNIT/ML
5-20 VIAL (ML) INTRAVENOUS DAILY PRN
Status: CANCELLED | OUTPATIENT
Start: 2023-09-03

## 2023-09-02 RX ORDER — ALBUTEROL SULFATE 0.83 MG/ML
2.5 SOLUTION RESPIRATORY (INHALATION)
Status: CANCELLED | OUTPATIENT
Start: 2023-09-03

## 2023-09-02 RX ORDER — EPINEPHRINE 1 MG/ML
0.3 INJECTION, SOLUTION INTRAMUSCULAR; SUBCUTANEOUS EVERY 5 MIN PRN
Status: CANCELLED | OUTPATIENT
Start: 2023-09-03

## 2023-09-02 RX ORDER — HEPARIN SODIUM (PORCINE) LOCK FLUSH IV SOLN 100 UNIT/ML 100 UNIT/ML
5 SOLUTION INTRAVENOUS
Status: CANCELLED | OUTPATIENT
Start: 2023-09-03

## 2023-09-02 RX ADMIN — REMDESIVIR 100 MG: 100 INJECTION, POWDER, LYOPHILIZED, FOR SOLUTION INTRAVENOUS at 10:59

## 2023-09-02 NOTE — PROGRESS NOTES
Nursing Note  Luba Lovell [facility] for:   Chief Complaint   Patient presents with    Infusion     IV Remdesivir     During today's [facility] appointment, orders from Dr BRANDI Rollins were completed.  Frequency: today is dose 2 of 3 total    Progress note:  Patient identification verified by name and date of birth.  Assessment completed.  Vitals recorded in Doc Flowsheets.  Patient was provided with education regarding medication/procedure and possible side effects.  Patient verbalized understanding.     present during visit today: Not Applicable.    Treatment Conditions: Non-applicable.    Premedications: were not ordered.    Drug Waste Record: No    Infusion length and rate:  infusion given over approximately  30 minutes    Labs: were not ordered for this appointment.    Vascular access: peripheral IV placed today.    Is the next appt scheduled? Yes    Post Infusion Assessment:  Patient tolerated infusion without incident.  Patient observed for 60 minutes post infusion per orders.  Blood return noted pre and post infusion.  Site patent and intact, free from redness, edema or discomfort.  No evidence of extravasations.  Access discontinued per protocol.     Discharge Plan:   Follow up plan of care with: ongoing infusions at [facility].  Discharge instructions were reviewed with patient.  Patient/representative verbalized understanding of discharge instructions and all questions answered.  Patient discharged from [facility] in stable condition.  Patient declined AVS.    Vonda Jeffrey RN    Administrations This Visit       remdesivir 100 mg in sodium chloride 0.9 % 250 mL intermittent infusion       Admin Date  09/02/2023 Action  $New Bag Dose  100 mg Rate  500 mL/hr Route  Intravenous Administered By  Vonda Jeffrey RN                    /80 (BP Location:  Right arm, Patient Position: Semi-Bang's, Cuff Size: Adult Regular)   Pulse 63   Temp 98.9  F (37.2  C) (Oral)   Resp 16   LMP  (LMP Unknown)   SpO2 96%

## 2023-09-03 ENCOUNTER — INFUSION THERAPY VISIT (OUTPATIENT)
Dept: INFUSION THERAPY | Facility: CLINIC | Age: 68
End: 2023-09-03
Attending: INTERNAL MEDICINE
Payer: MEDICARE

## 2023-09-03 VITALS
SYSTOLIC BLOOD PRESSURE: 112 MMHG | HEART RATE: 53 BPM | TEMPERATURE: 98.6 F | RESPIRATION RATE: 16 BRPM | DIASTOLIC BLOOD PRESSURE: 63 MMHG | OXYGEN SATURATION: 98 %

## 2023-09-03 DIAGNOSIS — U07.1 CLINICAL DIAGNOSIS OF COVID-19: Primary | ICD-10-CM

## 2023-09-03 PROCEDURE — 250N000011 HC RX IP 250 OP 636: Mod: JZ | Performed by: INTERNAL MEDICINE

## 2023-09-03 PROCEDURE — 96365 THER/PROPH/DIAG IV INF INIT: CPT

## 2023-09-03 PROCEDURE — 258N000003 HC RX IP 258 OP 636: Performed by: INTERNAL MEDICINE

## 2023-09-03 RX ORDER — HEPARIN SODIUM (PORCINE) LOCK FLUSH IV SOLN 100 UNIT/ML 100 UNIT/ML
5 SOLUTION INTRAVENOUS
Status: CANCELLED | OUTPATIENT
Start: 2023-09-03

## 2023-09-03 RX ORDER — DIPHENHYDRAMINE HYDROCHLORIDE 50 MG/ML
50 INJECTION INTRAMUSCULAR; INTRAVENOUS
Status: CANCELLED
Start: 2023-09-03

## 2023-09-03 RX ORDER — EPINEPHRINE 1 MG/ML
0.3 INJECTION, SOLUTION INTRAMUSCULAR; SUBCUTANEOUS EVERY 5 MIN PRN
Status: CANCELLED | OUTPATIENT
Start: 2023-09-03

## 2023-09-03 RX ORDER — ALBUTEROL SULFATE 90 UG/1
1-2 AEROSOL, METERED RESPIRATORY (INHALATION)
Status: CANCELLED
Start: 2023-09-03

## 2023-09-03 RX ORDER — HEPARIN SODIUM,PORCINE 10 UNIT/ML
5-20 VIAL (ML) INTRAVENOUS DAILY PRN
Status: CANCELLED | OUTPATIENT
Start: 2023-09-03

## 2023-09-03 RX ORDER — METHYLPREDNISOLONE SODIUM SUCCINATE 125 MG/2ML
125 INJECTION, POWDER, LYOPHILIZED, FOR SOLUTION INTRAMUSCULAR; INTRAVENOUS
Status: CANCELLED
Start: 2023-09-03

## 2023-09-03 RX ORDER — ALBUTEROL SULFATE 0.83 MG/ML
2.5 SOLUTION RESPIRATORY (INHALATION)
Status: CANCELLED | OUTPATIENT
Start: 2023-09-03

## 2023-09-03 RX ORDER — MEPERIDINE HYDROCHLORIDE 25 MG/ML
25 INJECTION INTRAMUSCULAR; INTRAVENOUS; SUBCUTANEOUS EVERY 30 MIN PRN
Status: CANCELLED | OUTPATIENT
Start: 2023-09-03

## 2023-09-03 RX ADMIN — SODIUM CHLORIDE 250 ML: 9 INJECTION, SOLUTION INTRAVENOUS at 10:10

## 2023-09-03 RX ADMIN — REMDESIVIR 100 MG: 100 INJECTION, POWDER, LYOPHILIZED, FOR SOLUTION INTRAVENOUS at 10:00

## 2023-09-03 NOTE — PROGRESS NOTES
Infusion Nursing Note:  Luba Lovell presents today for Patient presents with:  Infusion: Remdesivir  .    Patient seen by provider today: No   present during visit today: Not Applicable.    Note: Patient identification verified by name and date of birth.  Assessment completed.  Vitals recorded in Doc Flowsheets.  Patient was provided with education regarding medication/procedure and possible side effects.  Patient verbalized understanding.    During today's [facility] appointment, orders from Wang Rollins MD * were completed.  Frequency: every 3 doses, today is 3 of 3. Patient has tolerated infusions. .   .  Administrations This Visit       0.9% sodium chloride BOLUS       Admin Date  09/03/2023 Action  $New Bag Dose  250 mL Route  Intravenous Administered By  Sarah Alvarez RN              remdesivir 100 mg in sodium chloride 0.9 % 250 mL intermittent infusion       Admin Date  09/03/2023 Action  $New Bag Dose  100 mg Rate  500 mL/hr Route  Intravenous Administered By  Sarah Alvarez RN                      Intravenous Access:  Peripheral IV placed. Sarah Alvarez RN      Treatment Conditions:  Not Applicable.      Post Infusion Assessment:  Patient tolerated infusion without incident.Patient observed x 1 hour post infusion as ordered.  Site patent and intact, free from redness, edema or discomfort.  No evidence of extravasations.  Access discontinued per protocol.       Discharge Plan:   Discharge instructions reviewed with: Patient.  AVS to patient via [email or web address].     Patient discharged in stable condition accompanied by: self.  Departure Mode: Ambulatory.      Sarah Alvarez RN

## 2023-09-03 NOTE — PATIENT INSTRUCTIONS
Dear Luba Lovell    Thank you for choosing [facility] ([facility]) for your infusion.  The following information is a summary of our appointment as well as important reminders.      If you are a transplant patient and require transplant education, please click on this link: [email or web address].    We look forward in seeing you on your next appointment here at [facility] ([facility]).  Please don t hesitate to call us at [phone] to reschedule any of your appointments or to speak with one of the [facility] registered nurses.  It was a pleasure taking care of you today.    Sincerely,    [facility]  [facility]  [address] [place] [address]  [address]  Phone:  [phone]

## 2023-09-29 ENCOUNTER — LAB (OUTPATIENT)
Dept: LAB | Facility: CLINIC | Age: 68
End: 2023-09-29
Payer: MEDICARE

## 2023-09-29 DIAGNOSIS — Z94.0 KIDNEY REPLACED BY TRANSPLANT: ICD-10-CM

## 2023-09-29 DIAGNOSIS — Z94.0 HTN, KIDNEY TRANSPLANT RELATED: Primary | ICD-10-CM

## 2023-09-29 DIAGNOSIS — D64.9 ANEMIA, UNSPECIFIED TYPE: ICD-10-CM

## 2023-09-29 DIAGNOSIS — I15.1 HTN, KIDNEY TRANSPLANT RELATED: Primary | ICD-10-CM

## 2023-09-29 LAB
ANION GAP SERPL CALCULATED.3IONS-SCNC: 11 MMOL/L (ref 7–15)
BUN SERPL-MCNC: 23.4 MG/DL (ref 8–23)
CALCIUM SERPL-MCNC: 9.9 MG/DL (ref 8.8–10.2)
CHLORIDE SERPL-SCNC: 102 MMOL/L (ref 98–107)
CREAT SERPL-MCNC: 0.83 MG/DL (ref 0.51–0.95)
CYCLOSPORINE BLD LC/MS/MS-MCNC: 66 UG/L (ref 50–400)
DEPRECATED HCO3 PLAS-SCNC: 24 MMOL/L (ref 22–29)
EGFRCR SERPLBLD CKD-EPI 2021: 77 ML/MIN/1.73M2
ERYTHROCYTE [DISTWIDTH] IN BLOOD BY AUTOMATED COUNT: 12.7 % (ref 10–15)
FERRITIN SERPL-MCNC: 321 NG/ML (ref 11–328)
GLUCOSE SERPL-MCNC: 90 MG/DL (ref 70–99)
HCT VFR BLD AUTO: 38.1 % (ref 35–47)
HGB BLD-MCNC: 12.4 G/DL (ref 11.7–15.7)
IRON BINDING CAPACITY (ROCHE): 253 UG/DL (ref 240–430)
IRON SATN MFR SERPL: 29 % (ref 15–46)
IRON SERPL-MCNC: 74 UG/DL (ref 37–145)
MAGNESIUM SERPL-MCNC: 2 MG/DL (ref 1.7–2.3)
MCH RBC QN AUTO: 32.7 PG (ref 26.5–33)
MCHC RBC AUTO-ENTMCNC: 32.5 G/DL (ref 31.5–36.5)
MCV RBC AUTO: 101 FL (ref 78–100)
PLATELET # BLD AUTO: 212 10E3/UL (ref 150–450)
POTASSIUM SERPL-SCNC: 4.6 MMOL/L (ref 3.4–5.3)
RBC # BLD AUTO: 3.79 10E6/UL (ref 3.8–5.2)
SODIUM SERPL-SCNC: 137 MMOL/L (ref 135–145)
TME LAST DOSE: NORMAL H
TME LAST DOSE: NORMAL H
WBC # BLD AUTO: 5.6 10E3/UL (ref 4–11)

## 2023-09-29 PROCEDURE — 80048 BASIC METABOLIC PNL TOTAL CA: CPT

## 2023-09-29 PROCEDURE — 87799 DETECT AGENT NOS DNA QUANT: CPT

## 2023-09-29 PROCEDURE — 82728 ASSAY OF FERRITIN: CPT

## 2023-09-29 PROCEDURE — 36415 COLL VENOUS BLD VENIPUNCTURE: CPT

## 2023-09-29 PROCEDURE — 85027 COMPLETE CBC AUTOMATED: CPT

## 2023-09-29 PROCEDURE — 80158 DRUG ASSAY CYCLOSPORINE: CPT

## 2023-09-29 PROCEDURE — 83550 IRON BINDING TEST: CPT

## 2023-09-29 PROCEDURE — 83735 ASSAY OF MAGNESIUM: CPT

## 2023-09-29 PROCEDURE — 83540 ASSAY OF IRON: CPT

## 2023-09-29 RX ORDER — AMLODIPINE BESYLATE 2.5 MG/1
2.5 TABLET ORAL DAILY
Qty: 90 TABLET | Refills: 0 | Status: SHIPPED | OUTPATIENT
Start: 2023-09-29 | End: 2023-12-18

## 2023-10-02 LAB
EBV DNA COPIES/ML, INSTRUMENT: ABNORMAL COPIES/ML
EBV DNA SPEC NAA+PROBE-LOG#: 5.2 {LOG_COPIES}/ML

## 2023-10-17 PROBLEM — C44.311 BASAL CELL CARCINOMA OF NOSE: Status: ACTIVE | Noted: 2022-07-15

## 2023-10-30 DIAGNOSIS — Z94.0 KIDNEY TRANSPLANTED: ICD-10-CM

## 2023-10-31 RX ORDER — CYCLOSPORINE 25 MG/1
CAPSULE, LIQUID FILLED ORAL
Qty: 540 CAPSULE | Refills: 3 | Status: SHIPPED | OUTPATIENT
Start: 2023-10-31 | End: 2024-01-26

## 2023-12-07 ENCOUNTER — LAB REQUISITION (OUTPATIENT)
Dept: LAB | Facility: CLINIC | Age: 68
End: 2023-12-07
Payer: MEDICARE

## 2023-12-07 DIAGNOSIS — R35.0 FREQUENCY OF MICTURITION: ICD-10-CM

## 2023-12-07 PROCEDURE — 87086 URINE CULTURE/COLONY COUNT: CPT | Mod: ORL | Performed by: NURSE PRACTITIONER

## 2023-12-09 LAB — BACTERIA UR CULT: ABNORMAL

## 2023-12-15 DIAGNOSIS — Z94.0 HTN, KIDNEY TRANSPLANT RELATED: ICD-10-CM

## 2023-12-15 DIAGNOSIS — Z94.0 KIDNEY REPLACED BY TRANSPLANT: ICD-10-CM

## 2023-12-15 DIAGNOSIS — I15.1 HTN, KIDNEY TRANSPLANT RELATED: ICD-10-CM

## 2023-12-18 RX ORDER — AMLODIPINE BESYLATE 2.5 MG/1
2.5 TABLET ORAL DAILY
Qty: 90 TABLET | Refills: 0 | Status: SHIPPED | OUTPATIENT
Start: 2023-12-18 | End: 2024-04-05

## 2023-12-27 ENCOUNTER — LAB (OUTPATIENT)
Dept: LAB | Facility: CLINIC | Age: 68
End: 2023-12-27
Payer: MEDICARE

## 2023-12-27 ENCOUNTER — TELEPHONE (OUTPATIENT)
Dept: TRANSPLANT | Facility: CLINIC | Age: 68
End: 2023-12-27

## 2023-12-27 DIAGNOSIS — Z94.0 KIDNEY REPLACED BY TRANSPLANT: ICD-10-CM

## 2023-12-27 LAB
ALBUMIN MFR UR ELPH: 9.9 MG/DL
ANION GAP SERPL CALCULATED.3IONS-SCNC: 9 MMOL/L (ref 7–15)
BUN SERPL-MCNC: 29.7 MG/DL (ref 8–23)
CALCIUM SERPL-MCNC: 10.5 MG/DL (ref 8.8–10.2)
CHLORIDE SERPL-SCNC: 101 MMOL/L (ref 98–107)
CREAT SERPL-MCNC: 0.89 MG/DL (ref 0.51–0.95)
CREAT UR-MCNC: 12.8 MG/DL
CYCLOSPORINE BLD LC/MS/MS-MCNC: 59 UG/L (ref 50–400)
DEPRECATED HCO3 PLAS-SCNC: 28 MMOL/L (ref 22–29)
EGFRCR SERPLBLD CKD-EPI 2021: 71 ML/MIN/1.73M2
ERYTHROCYTE [DISTWIDTH] IN BLOOD BY AUTOMATED COUNT: 12.8 % (ref 10–15)
GLUCOSE SERPL-MCNC: 105 MG/DL (ref 70–99)
HCT VFR BLD AUTO: 39.8 % (ref 35–47)
HGB BLD-MCNC: 12.5 G/DL (ref 11.7–15.7)
MCH RBC QN AUTO: 32.1 PG (ref 26.5–33)
MCHC RBC AUTO-ENTMCNC: 31.4 G/DL (ref 31.5–36.5)
MCV RBC AUTO: 102 FL (ref 78–100)
PLATELET # BLD AUTO: 203 10E3/UL (ref 150–450)
POTASSIUM SERPL-SCNC: 4.9 MMOL/L (ref 3.4–5.3)
PROT/CREAT 24H UR: 0.77 MG/MG CR (ref 0–0.2)
RBC # BLD AUTO: 3.89 10E6/UL (ref 3.8–5.2)
SODIUM SERPL-SCNC: 138 MMOL/L (ref 135–145)
TME LAST DOSE: NORMAL H
TME LAST DOSE: NORMAL H
WBC # BLD AUTO: 4.6 10E3/UL (ref 4–11)

## 2023-12-27 PROCEDURE — 80048 BASIC METABOLIC PNL TOTAL CA: CPT

## 2023-12-27 PROCEDURE — 85027 COMPLETE CBC AUTOMATED: CPT

## 2023-12-27 PROCEDURE — 80158 DRUG ASSAY CYCLOSPORINE: CPT

## 2023-12-27 PROCEDURE — 84156 ASSAY OF PROTEIN URINE: CPT

## 2023-12-27 PROCEDURE — 87799 DETECT AGENT NOS DNA QUANT: CPT

## 2023-12-27 PROCEDURE — 36415 COLL VENOUS BLD VENIPUNCTURE: CPT

## 2023-12-27 NOTE — TELEPHONE ENCOUNTER
Patient Contacted   Appointment type: RKT  Provider: EDI COYLE  Return date: 6/13/24  Specialty phone number: [phone]  Additional appointment(s) needed: NA  Additonal Notes: RESCHEDULED.

## 2023-12-28 ENCOUNTER — TELEPHONE (OUTPATIENT)
Dept: TRANSPLANT | Facility: CLINIC | Age: 68
End: 2023-12-28
Payer: MEDICARE

## 2023-12-28 DIAGNOSIS — Z94.0 KIDNEY REPLACED BY TRANSPLANT: Primary | ICD-10-CM

## 2023-12-28 LAB
EBV DNA COPIES/ML, INSTRUMENT: ABNORMAL COPIES/ML
EBV DNA SPEC NAA+PROBE-LOG#: 5.4 {LOG_COPIES}/ML

## 2023-12-28 NOTE — TELEPHONE ENCOUNTER
Wang Rollins MD Sveiven, Sara, RN  Any further unintentional weight loss? How is she feeling? Please repeat EBV PCR in 1 month    OUTCOME: patient states her weight today is 129 lbs,  ~120 lbs June 2023.      Patient states she has a good appetite.      Denies night sweats but does wake up hot occasional.    No swollen lymph nodes.    V/U of repeating labs in one month.    Orders placed.    Manju Caro RN   Transplant Coordinator  [phone]

## 2023-12-28 NOTE — TELEPHONE ENCOUNTER
ISSUE: Ca+ 10.5 and UPCR above baseline at 0.77    PLAN: call to assess for UTI S/S, recent illness, assess BP.  Has patient been taking TUMS or any calcium  or Vitamin D supplements?    OUTCOME:call attempted, unable to LM.  My chart sent    Manju Caro RN   Transplant Coordinator  [phone]

## 2024-01-12 ENCOUNTER — TELEPHONE (OUTPATIENT)
Dept: TRANSPLANT | Facility: CLINIC | Age: 69
End: 2024-01-12
Payer: MEDICARE

## 2024-01-12 DIAGNOSIS — Z94.0 KIDNEY REPLACED BY TRANSPLANT: Primary | ICD-10-CM

## 2024-01-12 NOTE — TELEPHONE ENCOUNTER
Phone call to pt.  She had sent [employer] message response as well.  Will plan to repeat labs - she has appt scheduled 1.24.2024, will place orders for repeats for same date.  She had no additional questions.

## 2024-01-12 NOTE — TELEPHONE ENCOUNTER
Patient Call: General  Route to LPN    Reason for call: Zulma is responding to the [employer] message left. Patient stated she will reach out through [employer].    Call back needed? No

## 2024-01-16 DIAGNOSIS — Z94.0 KIDNEY REPLACED BY TRANSPLANT: Primary | ICD-10-CM

## 2024-01-16 DIAGNOSIS — I15.1 HTN, KIDNEY TRANSPLANT RELATED: ICD-10-CM

## 2024-01-16 DIAGNOSIS — Z94.0 HTN, KIDNEY TRANSPLANT RELATED: ICD-10-CM

## 2024-01-16 RX ORDER — MYCOPHENOLATE MOFETIL 250 MG/1
250 CAPSULE ORAL 2 TIMES DAILY
Qty: 180 CAPSULE | Refills: 3 | Status: SHIPPED | OUTPATIENT
Start: 2024-01-16 | End: 2024-01-26 | Stop reason: ALTCHOICE

## 2024-01-24 ENCOUNTER — LAB (OUTPATIENT)
Dept: LAB | Facility: CLINIC | Age: 69
End: 2024-01-24
Payer: MEDICARE

## 2024-01-24 DIAGNOSIS — Z94.0 KIDNEY REPLACED BY TRANSPLANT: ICD-10-CM

## 2024-01-24 LAB
ALBUMIN MFR UR ELPH: 12.7 MG/DL
ANION GAP SERPL CALCULATED.3IONS-SCNC: 11 MMOL/L (ref 7–15)
BUN SERPL-MCNC: 28.8 MG/DL (ref 8–23)
CALCIUM SERPL-MCNC: 10.3 MG/DL (ref 8.8–10.2)
CHLORIDE SERPL-SCNC: 102 MMOL/L (ref 98–107)
CREAT SERPL-MCNC: 0.89 MG/DL (ref 0.51–0.95)
CREAT UR-MCNC: 46.8 MG/DL
CYCLOSPORINE BLD LC/MS/MS-MCNC: 64 UG/L (ref 50–400)
DEPRECATED HCO3 PLAS-SCNC: 25 MMOL/L (ref 22–29)
EGFRCR SERPLBLD CKD-EPI 2021: 71 ML/MIN/1.73M2
ERYTHROCYTE [DISTWIDTH] IN BLOOD BY AUTOMATED COUNT: 12.7 % (ref 10–15)
GLUCOSE SERPL-MCNC: 92 MG/DL (ref 70–99)
HCT VFR BLD AUTO: 41.1 % (ref 35–47)
HGB BLD-MCNC: 12.7 G/DL (ref 11.7–15.7)
MCH RBC QN AUTO: 31.4 PG (ref 26.5–33)
MCHC RBC AUTO-ENTMCNC: 30.9 G/DL (ref 31.5–36.5)
MCV RBC AUTO: 102 FL (ref 78–100)
PLATELET # BLD AUTO: 205 10E3/UL (ref 150–450)
POTASSIUM SERPL-SCNC: 5 MMOL/L (ref 3.4–5.3)
PROT/CREAT 24H UR: 0.27 MG/MG CR (ref 0–0.2)
RBC # BLD AUTO: 4.05 10E6/UL (ref 3.8–5.2)
SODIUM SERPL-SCNC: 138 MMOL/L (ref 135–145)
TME LAST DOSE: NORMAL H
TME LAST DOSE: NORMAL H
WBC # BLD AUTO: 5 10E3/UL (ref 4–11)

## 2024-01-24 PROCEDURE — 85027 COMPLETE CBC AUTOMATED: CPT

## 2024-01-24 PROCEDURE — 36415 COLL VENOUS BLD VENIPUNCTURE: CPT

## 2024-01-24 PROCEDURE — 80048 BASIC METABOLIC PNL TOTAL CA: CPT

## 2024-01-24 PROCEDURE — 80158 DRUG ASSAY CYCLOSPORINE: CPT

## 2024-01-24 PROCEDURE — 84156 ASSAY OF PROTEIN URINE: CPT

## 2024-01-24 PROCEDURE — 87799 DETECT AGENT NOS DNA QUANT: CPT

## 2024-01-25 LAB
EBV DNA COPIES/ML, INSTRUMENT: ABNORMAL COPIES/ML
EBV DNA SPEC NAA+PROBE-LOG#: 5.5 {LOG_COPIES}/ML

## 2024-01-26 ENCOUNTER — TELEPHONE (OUTPATIENT)
Dept: TRANSPLANT | Facility: CLINIC | Age: 69
End: 2024-01-26
Payer: MEDICARE

## 2024-01-26 DIAGNOSIS — Z94.0 KIDNEY REPLACED BY TRANSPLANT: Primary | ICD-10-CM

## 2024-01-26 DIAGNOSIS — Z94.0 KIDNEY TRANSPLANTED: ICD-10-CM

## 2024-01-26 RX ORDER — ALBUTEROL SULFATE 0.83 MG/ML
2.5 SOLUTION RESPIRATORY (INHALATION)
Status: CANCELLED | OUTPATIENT
Start: 2024-01-29

## 2024-01-26 RX ORDER — DIPHENHYDRAMINE HYDROCHLORIDE 50 MG/ML
50 INJECTION INTRAMUSCULAR; INTRAVENOUS
Status: CANCELLED
Start: 2024-01-29

## 2024-01-26 RX ORDER — METHYLPREDNISOLONE SODIUM SUCCINATE 125 MG/2ML
125 INJECTION, POWDER, LYOPHILIZED, FOR SOLUTION INTRAMUSCULAR; INTRAVENOUS ONCE
Status: CANCELLED | OUTPATIENT
Start: 2024-01-29

## 2024-01-26 RX ORDER — DIPHENHYDRAMINE HCL 25 MG
50 CAPSULE ORAL ONCE
Status: CANCELLED
Start: 2024-01-29

## 2024-01-26 RX ORDER — PREDNISONE 5 MG/1
5 TABLET ORAL DAILY
Qty: 30 TABLET | Refills: 11 | Status: SHIPPED | OUTPATIENT
Start: 2024-01-26 | End: 2024-02-02

## 2024-01-26 RX ORDER — MEPERIDINE HYDROCHLORIDE 25 MG/ML
25 INJECTION INTRAMUSCULAR; INTRAVENOUS; SUBCUTANEOUS EVERY 30 MIN PRN
Status: CANCELLED | OUTPATIENT
Start: 2024-01-29

## 2024-01-26 RX ORDER — EPINEPHRINE 1 MG/ML
0.3 INJECTION, SOLUTION, CONCENTRATE INTRAVENOUS EVERY 5 MIN PRN
Status: CANCELLED | OUTPATIENT
Start: 2024-01-29

## 2024-01-26 RX ORDER — ALBUTEROL SULFATE 90 UG/1
1-2 AEROSOL, METERED RESPIRATORY (INHALATION)
Status: CANCELLED
Start: 2024-01-29

## 2024-01-26 RX ORDER — ACETAMINOPHEN 325 MG/1
650 TABLET ORAL ONCE
Status: CANCELLED
Start: 2024-01-29

## 2024-01-26 RX ORDER — METHYLPREDNISOLONE SODIUM SUCCINATE 125 MG/2ML
125 INJECTION, POWDER, LYOPHILIZED, FOR SOLUTION INTRAMUSCULAR; INTRAVENOUS
Status: CANCELLED
Start: 2024-01-29

## 2024-01-26 RX ORDER — HEPARIN SODIUM,PORCINE 10 UNIT/ML
5-20 VIAL (ML) INTRAVENOUS DAILY PRN
Status: CANCELLED | OUTPATIENT
Start: 2024-01-29

## 2024-01-26 RX ORDER — HEPARIN SODIUM (PORCINE) LOCK FLUSH IV SOLN 100 UNIT/ML 100 UNIT/ML
5 SOLUTION INTRAVENOUS
Status: CANCELLED | OUTPATIENT
Start: 2024-01-29

## 2024-01-26 RX ORDER — CYCLOSPORINE 25 MG/1
CAPSULE, LIQUID FILLED ORAL
Qty: 540 CAPSULE | Refills: 3 | Status: SHIPPED | OUTPATIENT
Start: 2024-01-26 | End: 2024-02-26

## 2024-01-26 NOTE — TELEPHONE ENCOUNTER
ISSUE: trend up in EBV            Component  Ref Range & Units 2 d ago  (1/24/24) 1 mo ago  (12/27/23) 3 mo ago  (9/29/23) 8 mo ago  (5/3/23) 11 mo ago  (2/9/23) 1 yr ago  (1/18/23) 1 yr ago  (10/21/22)   EBV DNA Copies/mL  <=0 copies/mL 300,259 High  269,648 High  170,618 High  151,727 High  105,020 High  221,059 High  191,951 High    EBV log 5.5 5.4 5.2 5.2 5.0 5.3 5.3          PLAN:  Wang Rollins MD Sveiven, Manju, RN  Stop MMF, start pred 5, CsA goal ~50, order ritux 375mg/m2 x1 please.    OUTCOME:  Patient V/U of stopping MMF, starting pred 5 mg daily- rx sent to preferred pharmacy.      Cyclosporine level 64 at 12 hours, on 1/24/2024.  Goal ~50.   Current dose 75 mg in AM, 75 mg in PM    Dose changed to 75 mg in AM, 50 mg in PM   Recheck level in 1-2 weeks    Message sent to infusion  pool to review coverage for ritux- therapy plan placed.        Discussed with Luba Caro RN   Transplant Coordinator  [phone]

## 2024-01-30 ENCOUNTER — TRANSFERRED RECORDS (OUTPATIENT)
Dept: HEALTH INFORMATION MANAGEMENT | Facility: CLINIC | Age: 69
End: 2024-01-30

## 2024-01-30 ENCOUNTER — LAB REQUISITION (OUTPATIENT)
Dept: LAB | Facility: CLINIC | Age: 69
End: 2024-01-30
Payer: MEDICARE

## 2024-01-30 DIAGNOSIS — R00.2 PALPITATIONS: ICD-10-CM

## 2024-01-30 PROCEDURE — 83735 ASSAY OF MAGNESIUM: CPT | Mod: ORL | Performed by: FAMILY MEDICINE

## 2024-01-30 PROCEDURE — 80053 COMPREHEN METABOLIC PANEL: CPT | Mod: ORL | Performed by: FAMILY MEDICINE

## 2024-01-30 PROCEDURE — 84443 ASSAY THYROID STIM HORMONE: CPT | Mod: ORL | Performed by: FAMILY MEDICINE

## 2024-01-31 LAB
ALBUMIN SERPL BCG-MCNC: 4.7 G/DL (ref 3.5–5.2)
ALP SERPL-CCNC: 58 U/L (ref 40–150)
ALT SERPL W P-5'-P-CCNC: 22 U/L (ref 0–50)
ANION GAP SERPL CALCULATED.3IONS-SCNC: 10 MMOL/L (ref 7–15)
AST SERPL W P-5'-P-CCNC: 27 U/L (ref 0–45)
BILIRUB SERPL-MCNC: 0.8 MG/DL
BUN SERPL-MCNC: 31 MG/DL (ref 8–23)
CALCIUM SERPL-MCNC: 10.2 MG/DL (ref 8.8–10.2)
CHLORIDE SERPL-SCNC: 104 MMOL/L (ref 98–107)
CREAT SERPL-MCNC: 1.01 MG/DL (ref 0.51–0.95)
DEPRECATED HCO3 PLAS-SCNC: 27 MMOL/L (ref 22–29)
EGFRCR SERPLBLD CKD-EPI 2021: 61 ML/MIN/1.73M2
GLUCOSE SERPL-MCNC: 110 MG/DL (ref 70–99)
MAGNESIUM SERPL-MCNC: 2.1 MG/DL (ref 1.7–2.3)
POTASSIUM SERPL-SCNC: 4.4 MMOL/L (ref 3.4–5.3)
PROT SERPL-MCNC: 8.6 G/DL (ref 6.4–8.3)
SODIUM SERPL-SCNC: 141 MMOL/L (ref 135–145)
TSH SERPL DL<=0.005 MIU/L-ACNC: 1.92 UIU/ML (ref 0.3–4.2)

## 2024-02-01 DIAGNOSIS — T86.10 COMPLICATIONS, KIDNEY TRANSPLANT: Primary | ICD-10-CM

## 2024-02-02 ENCOUNTER — TELEPHONE (OUTPATIENT)
Dept: TRANSPLANT | Facility: CLINIC | Age: 69
End: 2024-02-02
Payer: MEDICARE

## 2024-02-02 RX ORDER — PREDNISONE 5 MG/1
5 TABLET ORAL DAILY
Qty: 30 TABLET | Refills: 11 | Status: SHIPPED | OUTPATIENT
Start: 2024-02-02

## 2024-02-02 NOTE — TELEPHONE ENCOUNTER
PA Initiation    Medication: CYCLOSPORINE 25 MG PO CAPS  Insurance Company: [employer] Part D - Phone [phone] Fax [phone]  Pharmacy Filling the Rx: [facility] - [place], [place] - [address]   Filling Pharmacy Phone:    Filling Pharmacy Fax:    Start Date: 2/2/2024  CASE# [id number]

## 2024-02-02 NOTE — TELEPHONE ENCOUNTER
Albaro, Manju Cifuentes, STEPHEN; [facility]   Patient financially secured to get infusion.    Thanks.    Davin Irvin  Infusion     OUTCOME:    Patient scheduled for 2/19/2024    Manju Caro RN   Transplant Coordinator  [phone]

## 2024-02-05 ENCOUNTER — TELEPHONE (OUTPATIENT)
Dept: TRANSPLANT | Facility: CLINIC | Age: 69
End: 2024-02-05
Payer: MEDICARE

## 2024-02-05 ENCOUNTER — MYC MEDICAL ADVICE (OUTPATIENT)
Dept: OTHER | Age: 69
End: 2024-02-05

## 2024-02-05 DIAGNOSIS — B27.90 EBV INFECTION: Primary | ICD-10-CM

## 2024-02-05 DIAGNOSIS — Z94.0 KIDNEY TRANSPLANTED: ICD-10-CM

## 2024-02-05 NOTE — TELEPHONE ENCOUNTER
PRIOR AUTHORIZATION DENIED    Medication: CYCLOSPORINE 25 MG PO CAPS  Insurance Company: [employer] Part D - Phone [phone] Fax [phone]  Denial Date: 2/2/2024    Denial Reason(s):     Appeal Information:           Patient Notified: no

## 2024-02-05 NOTE — TELEPHONE ENCOUNTER
Medication Appeal Initiation    Medication: CYCLOSPORINE 25 MG PO CAPS  Appeal Start Date:  2/5/2024  Insurance Company: [employer] Phone: [phone]  Insurance Fax: [phone]  Comments:

## 2024-02-05 NOTE — TELEPHONE ENCOUNTER
PA Initiation    Medication: PREDNISONE 5 MG PO TABS  Insurance Company: [employer] Part D - Phone [phone] Fax [phone]  Pharmacy Filling the Rx: [id number] - [place] [id number] [facility]  Filling Pharmacy Phone:    Filling Pharmacy Fax:    Start Date: 2/5/2024  CASE: [id number]  CAN TAKE UP TO 72 HOURS TO HEAR BACK

## 2024-02-06 ENCOUNTER — TRANSFERRED RECORDS (OUTPATIENT)
Dept: HEALTH INFORMATION MANAGEMENT | Facility: CLINIC | Age: 69
End: 2024-02-06

## 2024-02-06 NOTE — TELEPHONE ENCOUNTER
Medication Appeal Initiation    Medication: PREDNISONE 5 MG PO TABS  Appeal Start Date:  2/6/2024  Insurance Company: [employer]  Insurance Phone:   Insurance Fax:[phone]  Comments:

## 2024-02-06 NOTE — TELEPHONE ENCOUNTER
PRIOR AUTHORIZATION DENIED    Medication: PREDNISONE 5 MG PO TABS  Insurance Company: [employer] Part D - Phone [phone] Fax [phone]  Denial Date: 2/6/2024  Denial Reason(s):     Appeal Information:             Patient Notified: yes

## 2024-02-07 NOTE — TELEPHONE ENCOUNTER
Per Dr Arciniega CT c/a/p with contrast after trend up in EBV.  Patient aware.  Orders placed.  Image scheduling phone number sent to patient via my chart.    Manju Caro RN   Transplant Coordinator  [phone]

## 2024-02-09 NOTE — TELEPHONE ENCOUNTER
MEDICATION APPEAL APPROVED    Medication: PREDNISONE 5 MG PO TABS  Authorization Effective Date: 1/1/2024  Authorization Expiration Date: 12/31/2024  Approved Dose/Quantity: UD  Reference #:     Appeal Insurance Company: angel  Expected CoPay: $       CoPay Card Available: No  Financial Assistance Needed: no  Filling Pharmacy: [id number] - [place], [place] - [address] [facility]  Patient Notified: yes  Comments:

## 2024-02-13 ENCOUNTER — LAB REQUISITION (OUTPATIENT)
Dept: LAB | Facility: CLINIC | Age: 69
End: 2024-02-13
Payer: MEDICARE

## 2024-02-13 DIAGNOSIS — Z12.4 ENCOUNTER FOR SCREENING FOR MALIGNANT NEOPLASM OF CERVIX: ICD-10-CM

## 2024-02-13 PROCEDURE — 87624 HPV HI-RISK TYP POOLED RSLT: CPT | Mod: ORL | Performed by: OBSTETRICS & GYNECOLOGY

## 2024-02-13 PROCEDURE — G0145 SCR C/V CYTO,THINLAYER,RESCR: HCPCS | Mod: ORL | Performed by: OBSTETRICS & GYNECOLOGY

## 2024-02-14 ENCOUNTER — MEDICAL CORRESPONDENCE (OUTPATIENT)
Dept: SCHEDULING | Facility: CLINIC | Age: 69
End: 2024-02-14
Payer: MEDICARE

## 2024-02-14 NOTE — TELEPHONE ENCOUNTER
Medication Appeal Initiation    Medication: CYCLOSPORINE 25 MG PO CAPS  Appeal Start Date:  2/5/2024  Insurance Company: [employer] Phone: [phone]  Insurance Fax:[phone]  Comments:

## 2024-02-16 LAB
BKR LAB AP GYN ADEQUACY: NORMAL
BKR LAB AP GYN INTERPRETATION: NORMAL
BKR LAB AP HPV REFLEX: NORMAL
BKR LAB AP LMP: NORMAL
BKR LAB AP PREVIOUS ABNL DX: NORMAL
BKR LAB AP PREVIOUS ABNORMAL: NORMAL
PATH REPORT.COMMENTS IMP SPEC: NORMAL
PATH REPORT.COMMENTS IMP SPEC: NORMAL
PATH REPORT.RELEVANT HX SPEC: NORMAL

## 2024-02-16 NOTE — TELEPHONE ENCOUNTER
MEDICATION APPEAL APPROVED    Medication: CYCLOSPORINE 25 MG PO CAPS  Authorization Effective Date:  1/1/2024  Authorization Expiration Date:  12/31/2024  Approved Dose/Quantity: UD  Reference #:     Appeal Insurance Company: [employer]     CoPay Card Available: No  Financial Assistance Needed: N/A  Filling Pharmacy: [address]  Patient Notified: YES  Comments:

## 2024-02-19 ENCOUNTER — INFUSION THERAPY VISIT (OUTPATIENT)
Dept: INFUSION THERAPY | Facility: CLINIC | Age: 69
End: 2024-02-19
Attending: INTERNAL MEDICINE
Payer: MEDICARE

## 2024-02-19 VITALS
TEMPERATURE: 98.9 F | SYSTOLIC BLOOD PRESSURE: 142 MMHG | OXYGEN SATURATION: 97 % | BODY MASS INDEX: 22.18 KG/M2 | WEIGHT: 129.9 LBS | DIASTOLIC BLOOD PRESSURE: 76 MMHG | RESPIRATION RATE: 16 BRPM | HEIGHT: 64 IN | HEART RATE: 63 BPM

## 2024-02-19 DIAGNOSIS — T86.10 COMPLICATION OF TRANSPLANTED KIDNEY, UNSPECIFIED COMPLICATION: Primary | ICD-10-CM

## 2024-02-19 PROCEDURE — 96375 TX/PRO/DX INJ NEW DRUG ADDON: CPT

## 2024-02-19 PROCEDURE — 250N000013 HC RX MED GY IP 250 OP 250 PS 637: Performed by: INTERNAL MEDICINE

## 2024-02-19 PROCEDURE — 258N000003 HC RX IP 258 OP 636: Performed by: INTERNAL MEDICINE

## 2024-02-19 PROCEDURE — 250N000011 HC RX IP 250 OP 636: Mod: JZ | Performed by: INTERNAL MEDICINE

## 2024-02-19 PROCEDURE — 96415 CHEMO IV INFUSION ADDL HR: CPT

## 2024-02-19 PROCEDURE — 96413 CHEMO IV INFUSION 1 HR: CPT

## 2024-02-19 RX ORDER — DIPHENHYDRAMINE HYDROCHLORIDE 50 MG/ML
50 INJECTION INTRAMUSCULAR; INTRAVENOUS
Status: CANCELLED
Start: 2024-02-19

## 2024-02-19 RX ORDER — HEPARIN SODIUM,PORCINE 10 UNIT/ML
5-20 VIAL (ML) INTRAVENOUS DAILY PRN
Status: CANCELLED | OUTPATIENT
Start: 2024-02-19

## 2024-02-19 RX ORDER — ACETAMINOPHEN 325 MG/1
650 TABLET ORAL ONCE
Status: CANCELLED
Start: 2024-02-19

## 2024-02-19 RX ORDER — MEPERIDINE HYDROCHLORIDE 25 MG/ML
25 INJECTION INTRAMUSCULAR; INTRAVENOUS; SUBCUTANEOUS EVERY 30 MIN PRN
Status: CANCELLED | OUTPATIENT
Start: 2024-02-19

## 2024-02-19 RX ORDER — EPINEPHRINE 1 MG/ML
0.3 INJECTION, SOLUTION INTRAMUSCULAR; SUBCUTANEOUS EVERY 5 MIN PRN
Status: CANCELLED | OUTPATIENT
Start: 2024-02-19

## 2024-02-19 RX ORDER — ALBUTEROL SULFATE 0.83 MG/ML
2.5 SOLUTION RESPIRATORY (INHALATION)
Status: CANCELLED | OUTPATIENT
Start: 2024-02-19

## 2024-02-19 RX ORDER — HEPARIN SODIUM (PORCINE) LOCK FLUSH IV SOLN 100 UNIT/ML 100 UNIT/ML
5 SOLUTION INTRAVENOUS
Status: CANCELLED | OUTPATIENT
Start: 2024-02-19

## 2024-02-19 RX ORDER — ACETAMINOPHEN 325 MG/1
650 TABLET ORAL ONCE
Status: COMPLETED | OUTPATIENT
Start: 2024-02-19 | End: 2024-02-19

## 2024-02-19 RX ORDER — DIPHENHYDRAMINE HCL 25 MG
50 CAPSULE ORAL ONCE
Status: COMPLETED | OUTPATIENT
Start: 2024-02-19 | End: 2024-02-19

## 2024-02-19 RX ORDER — METHYLPREDNISOLONE SODIUM SUCCINATE 125 MG/2ML
125 INJECTION, POWDER, LYOPHILIZED, FOR SOLUTION INTRAMUSCULAR; INTRAVENOUS ONCE
Status: CANCELLED | OUTPATIENT
Start: 2024-02-19

## 2024-02-19 RX ORDER — METHYLPREDNISOLONE SODIUM SUCCINATE 125 MG/2ML
125 INJECTION, POWDER, LYOPHILIZED, FOR SOLUTION INTRAMUSCULAR; INTRAVENOUS
Status: CANCELLED
Start: 2024-02-19

## 2024-02-19 RX ORDER — DIPHENHYDRAMINE HCL 25 MG
50 CAPSULE ORAL ONCE
Status: CANCELLED
Start: 2024-02-19

## 2024-02-19 RX ORDER — ALBUTEROL SULFATE 90 UG/1
1-2 AEROSOL, METERED RESPIRATORY (INHALATION)
Status: CANCELLED
Start: 2024-02-19

## 2024-02-19 RX ORDER — METHYLPREDNISOLONE SODIUM SUCCINATE 125 MG/2ML
125 INJECTION, POWDER, LYOPHILIZED, FOR SOLUTION INTRAMUSCULAR; INTRAVENOUS ONCE
Status: COMPLETED | OUTPATIENT
Start: 2024-02-19 | End: 2024-02-19

## 2024-02-19 RX ADMIN — DIPHENHYDRAMINE HYDROCHLORIDE 25 MG: 25 CAPSULE ORAL at 10:29

## 2024-02-19 RX ADMIN — METHYLPREDNISOLONE SODIUM SUCCINATE 125 MG: 125 INJECTION, POWDER, FOR SOLUTION INTRAMUSCULAR; INTRAVENOUS at 10:29

## 2024-02-19 RX ADMIN — RITUXIMAB-ABBS 600 MG: 10 INJECTION, SOLUTION INTRAVENOUS at 10:54

## 2024-02-19 ASSESSMENT — PAIN SCALES - GENERAL: PAINLEVEL: NO PAIN (0)

## 2024-02-19 NOTE — PROGRESS NOTES
Infusion Nursing Note:  Luba Lovell presents today for   Chief Complaint   Patient presents with    Infusion     riTUXimab-abbs (TRUXIMA)       Patient seen by provider today: No   present during visit today: Not Applicable.    Note:   -Orders from Wang Rollins MD completed. Frequency: once.  -Premeds:   25mg Benadryl PO - 50mg ordered, patient reports reaction to higher dose and this is what she took last time (2018)   125mg Solu-medrol IVP   -Did not give Tylenol as patient reports taking some at home for a headache at 8:30.  -600mg Truxima IV over ~2.5hrs. Initial rate 100ml/hr x30min, then increased by 100ml/hr every 30min to max rate of 400ml/hr for remainder of infusion.    Intravenous Access:  Peripheral IV placed in Rt wrist by RN.    Treatment Conditions:  Biological Infusion Checklist:  ~~~ NOTE: If the patient answers yes to any of the questions below, hold the infusion and contact ordering provider or on-call provider.    Have you recently had an elevated temperature, fever, chills, productive cough, coughing for 3 weeks or longer or hemoptysis,  abnormal vital signs, night sweats,  chest pain or have you noticed a decrease in your appetite, unexplained weight loss or fatigue? No  Do you have any open wounds or new incisions? No  Do you have any upcoming hospitalizations or surgeries? Does not include esophagogastroduodenoscopy, colonoscopy, endoscopic retrograde cholangiopancreatography (ERCP), endoscopic ultrasound (EUS), dental procedures or joint aspiration/steroid injections No  Do you currently have any signs of illness or infection or are you on any antibiotics? No  Have you had any new, sudden or worsening abdominal pain? No  Have you or anyone in your household received a live vaccination in the past 4 weeks? Please note: No live vaccines while on biologic/chemotherapy until 6 months after the last treatment. Patient can receive the flu vaccine (shot only), pneumovax and the Covid  "vaccine. It is optimal for the patient to get these vaccines mid cycle, but they can be given at any time as long as it is not on the day of the infusion. No  Have you recently been diagnosed with any new nervous system diseases (ie. Multiple sclerosis, Guillain [place], seizures, neurological changes) or cancer diagnosis? Are you on any form of radiation or chemotherapy? No  Are you pregnant or breast feeding or do you have plans of pregnancy in the future? N/A  Have you been having any signs of worsening depression or suicidal ideations?  (benlysta only) N/A  Have there been any other new onset medical symptoms? No  Have you had any new blood clots? (IVIG only) N/A    Post Infusion Assessment:  Patient tolerated infusion without incident.  Blood return noted pre and post infusion.  Site patent and intact, free from redness, edema or discomfort.  No evidence of extravasations.  Access discontinued per protocol.     Discharge Plan:   Discharge instructions reviewed with: Patient.  Patient and/or family verbalized understanding of discharge instructions and all questions answered.  AVS to patient via [employer].      Patient will follow up with provider; therapy completed.     Patient discharged in stable condition accompanied by: .  Departure Mode: Ambulatory.      Susy Lugo RN    BP (!) 142/76 (BP Location: Right arm, Patient Position: Semi-Bang's, Cuff Size: Adult Regular)   Pulse 63   Temp 98.9  F (37.2  C)   Resp 16   Ht 1.619 m (5' 3.74\")   Wt 58.9 kg (129 lb 14.4 oz)   LMP  (LMP Unknown)   SpO2 97%   BMI 22.48 kg/m      Administrations This Visit       diphenhydrAMINE (BENADRYL) capsule 50 mg       Admin Date  02/19/2024 Action  $Given Dose  25 mg Route  Oral Documented By  Susy Lugo RN              methylPREDNISolone sodium succinate (solu-MEDROL) injection 125 mg       Admin Date  02/19/2024 Action  $Given Dose  125 mg Route  Intravenous Documented By  Susy Lugo, STEPHEN              " riTUXimab-abbs (TRUXIMA) 600 mg in sodium chloride 0.9 % 600 mL NON-oncology infusion       Admin Date  02/19/2024 Action  $New Bag Dose  600 mg Route  Intravenous Documented By  Susy Lugo, RN

## 2024-02-19 NOTE — PATIENT INSTRUCTIONS
Dear Luba Lovell    Thank you for choosing [facility] ([facility]) for your Rituximab infusion.  The following information is a summary of your appointment as well as important reminders.      EDUCATION POST BIOLOGICAL/CHEMOTHERAPY INFUSION  Call the triage nurse at your clinic or seek medical attention if you have chills and/or temperature greater than or equal to 100.5, uncontrolled nausea/vomiting, diarrhea, constipation, dizziness, shortness of breath, chest pain, heart palpitations, weakness or any other new or concerning symptoms, questions or concerns.  You can not have any live virus vaccines prior to or during treatment or up to 6 months post infusion.  If you have an upcoming surgery, medical procedure or dental procedure during treatment, this should be discussed with your ordering physician and your surgeon/dentist.  If you are having any concerning symptom, if you are unsure if you should get your next infusion or wish to speak to a provider before your next infusion, please call your care coordinator or triage nurse at your clinic to notify them so we can adequately serve you.     We look forward to seeing you at your next appointment here at [facility]).  Please don t hesitate to call us at [phone] to reschedule any of your appointments or to speak with one of the [facility] registered nurses.  It was a pleasure taking care of you today.    Sincerely,    [facility]  [facility]  [address] [place] [address]  [id number]  Phone:  [phone]

## 2024-02-22 ENCOUNTER — ANCILLARY PROCEDURE (OUTPATIENT)
Dept: BONE DENSITY | Facility: CLINIC | Age: 69
End: 2024-02-22
Attending: OBSTETRICS & GYNECOLOGY
Payer: MEDICARE

## 2024-02-22 DIAGNOSIS — E28.39 DECREASED ESTROGEN LEVEL: ICD-10-CM

## 2024-02-22 PROCEDURE — 77080 DXA BONE DENSITY AXIAL: CPT | Mod: TC

## 2024-02-24 ENCOUNTER — LAB (OUTPATIENT)
Dept: LAB | Facility: CLINIC | Age: 69
End: 2024-02-24
Payer: MEDICARE

## 2024-02-24 DIAGNOSIS — Z94.0 KIDNEY REPLACED BY TRANSPLANT: ICD-10-CM

## 2024-02-24 DIAGNOSIS — B27.90 EBV INFECTION: ICD-10-CM

## 2024-02-24 DIAGNOSIS — Z94.0 KIDNEY TRANSPLANTED: ICD-10-CM

## 2024-02-24 LAB
ANION GAP SERPL CALCULATED.3IONS-SCNC: 11 MMOL/L (ref 7–15)
BUN SERPL-MCNC: 31.6 MG/DL (ref 8–23)
CALCIUM SERPL-MCNC: 9.7 MG/DL (ref 8.8–10.2)
CHLORIDE SERPL-SCNC: 100 MMOL/L (ref 98–107)
CREAT SERPL-MCNC: 0.97 MG/DL (ref 0.51–0.95)
DEPRECATED HCO3 PLAS-SCNC: 26 MMOL/L (ref 22–29)
EGFRCR SERPLBLD CKD-EPI 2021: 64 ML/MIN/1.73M2
ERYTHROCYTE [DISTWIDTH] IN BLOOD BY AUTOMATED COUNT: 12.6 % (ref 10–15)
GLUCOSE SERPL-MCNC: 127 MG/DL (ref 70–99)
HCT VFR BLD AUTO: 38.9 % (ref 35–47)
HGB BLD-MCNC: 11.9 G/DL (ref 11.7–15.7)
MCH RBC QN AUTO: 31.4 PG (ref 26.5–33)
MCHC RBC AUTO-ENTMCNC: 30.6 G/DL (ref 31.5–36.5)
MCV RBC AUTO: 103 FL (ref 78–100)
PLATELET # BLD AUTO: 185 10E3/UL (ref 150–450)
POTASSIUM SERPL-SCNC: 4.2 MMOL/L (ref 3.4–5.3)
RBC # BLD AUTO: 3.79 10E6/UL (ref 3.8–5.2)
SODIUM SERPL-SCNC: 137 MMOL/L (ref 135–145)
WBC # BLD AUTO: 5.6 10E3/UL (ref 4–11)

## 2024-02-24 PROCEDURE — 36415 COLL VENOUS BLD VENIPUNCTURE: CPT

## 2024-02-24 PROCEDURE — 85027 COMPLETE CBC AUTOMATED: CPT

## 2024-02-24 PROCEDURE — 80158 DRUG ASSAY CYCLOSPORINE: CPT

## 2024-02-24 PROCEDURE — 87799 DETECT AGENT NOS DNA QUANT: CPT

## 2024-02-24 PROCEDURE — 80048 BASIC METABOLIC PNL TOTAL CA: CPT

## 2024-02-25 LAB
CYCLOSPORINE BLD LC/MS/MS-MCNC: 33 UG/L (ref 50–400)
TME LAST DOSE: ABNORMAL H
TME LAST DOSE: ABNORMAL H

## 2024-02-26 ENCOUNTER — TELEPHONE (OUTPATIENT)
Dept: TRANSPLANT | Facility: CLINIC | Age: 69
End: 2024-02-26
Payer: MEDICARE

## 2024-02-26 ENCOUNTER — HOSPITAL ENCOUNTER (OUTPATIENT)
Dept: CT IMAGING | Facility: CLINIC | Age: 69
Discharge: HOME | End: 2024-02-26
Attending: INTERNAL MEDICINE | Admitting: INTERNAL MEDICINE
Payer: MEDICARE

## 2024-02-26 DIAGNOSIS — B27.90 EBV INFECTION: ICD-10-CM

## 2024-02-26 DIAGNOSIS — Z94.0 KIDNEY TRANSPLANTED: Primary | ICD-10-CM

## 2024-02-26 LAB
EBV DNA COPIES/ML, INSTRUMENT: ABNORMAL COPIES/ML
EBV DNA SPEC NAA+PROBE-LOG#: 4.5 {LOG_COPIES}/ML

## 2024-02-26 PROCEDURE — 250N000011 HC RX IP 250 OP 636: Performed by: INTERNAL MEDICINE

## 2024-02-26 PROCEDURE — 71260 CT THORAX DX C+: CPT | Mod: MG

## 2024-02-26 RX ORDER — IOPAMIDOL 755 MG/ML
90 INJECTION, SOLUTION INTRAVASCULAR ONCE
Status: COMPLETED | OUTPATIENT
Start: 2024-02-26 | End: 2024-02-26

## 2024-02-26 RX ADMIN — IOPAMIDOL 90 ML: 755 INJECTION, SOLUTION INTRAVENOUS at 16:06

## 2024-02-26 NOTE — TELEPHONE ENCOUNTER
Patient returned call. Confirms current dose and accurate trough level. Denies any recent illness, diarrhea or medication changes. Patient v\u to increase Cyclosporine dose to 75 mg bid and repeat level in one week. Order/rx sent

## 2024-02-26 NOTE — TELEPHONE ENCOUNTER
ISSUE:   Cyclosporine level 33 on 2/24/2024, goal 50-75, dose 75 mg in the AM and 50 mg in the PM.    LPN TASK/ PLAN:   Call Patientand confirm this was an accurate 12-hour trough.   Verify Cyclosporine dose 75 mg in the AM and 50 mg in the PM.   Confirm no new medications or illness.   Confirm no missed doses.   If accurate trough and accurate dose, increase Cyclosporine dose to 75 mg BID     Is this more than a 50% increase or decrease in current IS dose: No  If YES, justification: na    Repeat labs in 1 weeks.  *If > 50% change in immunosuppression dose, repeat labs in 1 week.     Please update RX as well as place repeat labs orders. Thank you  Manju Caro RN   Transplant Coordinator  [phone]

## 2024-02-27 ENCOUNTER — TELEPHONE (OUTPATIENT)
Dept: TRANSPLANT | Facility: CLINIC | Age: 69
End: 2024-02-27
Payer: MEDICARE

## 2024-02-27 DIAGNOSIS — Z94.0 KIDNEY TRANSPLANTED: Primary | ICD-10-CM

## 2024-02-27 RX ORDER — CYCLOSPORINE 25 MG/1
75 CAPSULE, LIQUID FILLED ORAL 2 TIMES DAILY
Qty: 540 CAPSULE | Refills: 3 | Status: SHIPPED | OUTPATIENT
Start: 2024-02-27

## 2024-02-27 NOTE — TELEPHONE ENCOUNTER
Wang Rollins MD Sveiven, Sara, RN  Agree. Nothing to do for now. Please continue to trend EBV PCR q2 months          Previous Messages       ----- Message -----  From: Manju Caro RN  Sent: 2/27/2024   8:29 AM CST  To: Wang Rollins MD    CT looks good.  CT obtained after uptrend in EBV.    OUTCOME:    Patient notified via my chart.    Lab orders updated.    Manju Caro RN   Transplant Coordinator  [phone]

## 2024-03-05 ENCOUNTER — LAB (OUTPATIENT)
Dept: LAB | Facility: CLINIC | Age: 69
End: 2024-03-05
Payer: MEDICARE

## 2024-03-05 DIAGNOSIS — Z94.0 KIDNEY TRANSPLANTED: ICD-10-CM

## 2024-03-05 LAB
CYCLOSPORINE BLD LC/MS/MS-MCNC: 51 UG/L (ref 50–400)
ERYTHROCYTE [DISTWIDTH] IN BLOOD BY AUTOMATED COUNT: 12.3 % (ref 10–15)
HCT VFR BLD AUTO: 41.1 % (ref 35–47)
HGB BLD-MCNC: 12.9 G/DL (ref 11.7–15.7)
MCH RBC QN AUTO: 32.1 PG (ref 26.5–33)
MCHC RBC AUTO-ENTMCNC: 31.4 G/DL (ref 31.5–36.5)
MCV RBC AUTO: 102 FL (ref 78–100)
PLATELET # BLD AUTO: 213 10E3/UL (ref 150–450)
RBC # BLD AUTO: 4.02 10E6/UL (ref 3.8–5.2)
TME LAST DOSE: NORMAL H
TME LAST DOSE: NORMAL H
WBC # BLD AUTO: 6.4 10E3/UL (ref 4–11)

## 2024-03-05 PROCEDURE — 80158 DRUG ASSAY CYCLOSPORINE: CPT

## 2024-03-05 PROCEDURE — 85027 COMPLETE CBC AUTOMATED: CPT

## 2024-03-05 PROCEDURE — 80048 BASIC METABOLIC PNL TOTAL CA: CPT

## 2024-03-05 PROCEDURE — 87799 DETECT AGENT NOS DNA QUANT: CPT

## 2024-03-05 PROCEDURE — 36415 COLL VENOUS BLD VENIPUNCTURE: CPT

## 2024-03-06 LAB
ANION GAP SERPL CALCULATED.3IONS-SCNC: 9 MMOL/L (ref 7–15)
BUN SERPL-MCNC: 28.2 MG/DL (ref 8–23)
CALCIUM SERPL-MCNC: 9.9 MG/DL (ref 8.8–10.2)
CHLORIDE SERPL-SCNC: 101 MMOL/L (ref 98–107)
CREAT SERPL-MCNC: 0.95 MG/DL (ref 0.51–0.95)
DEPRECATED HCO3 PLAS-SCNC: 28 MMOL/L (ref 22–29)
EBV DNA COPIES/ML, INSTRUMENT: ABNORMAL COPIES/ML
EBV DNA SPEC NAA+PROBE-LOG#: 4.1 {LOG_COPIES}/ML
EGFRCR SERPLBLD CKD-EPI 2021: 65 ML/MIN/1.73M2
GLUCOSE SERPL-MCNC: 97 MG/DL (ref 70–99)
POTASSIUM SERPL-SCNC: 4.2 MMOL/L (ref 3.4–5.3)
SODIUM SERPL-SCNC: 138 MMOL/L (ref 135–145)

## 2024-03-08 ENCOUNTER — TRANSFERRED RECORDS (OUTPATIENT)
Dept: HEALTH INFORMATION MANAGEMENT | Facility: CLINIC | Age: 69
End: 2024-03-08
Payer: MEDICARE

## 2024-03-13 ENCOUNTER — MEDICAL CORRESPONDENCE (OUTPATIENT)
Dept: HEALTH INFORMATION MANAGEMENT | Facility: CLINIC | Age: 69
End: 2024-03-13
Payer: MEDICARE

## 2024-04-04 DIAGNOSIS — Z94.0 KIDNEY REPLACED BY TRANSPLANT: ICD-10-CM

## 2024-04-04 DIAGNOSIS — I15.1 HTN, KIDNEY TRANSPLANT RELATED: Primary | ICD-10-CM

## 2024-04-04 DIAGNOSIS — Z94.0 HTN, KIDNEY TRANSPLANT RELATED: Primary | ICD-10-CM

## 2024-04-05 RX ORDER — AMLODIPINE BESYLATE 2.5 MG/1
2.5 TABLET ORAL DAILY
Qty: 90 TABLET | Refills: 0 | Status: SHIPPED | OUTPATIENT
Start: 2024-04-05 | End: 2024-09-10 | Stop reason: DRUGHIGH

## 2024-04-09 ENCOUNTER — OFFICE VISIT (OUTPATIENT)
Dept: AUDIOLOGY | Facility: CLINIC | Age: 69
End: 2024-04-09
Payer: MEDICARE

## 2024-04-09 DIAGNOSIS — H90.3 SENSORINEURAL HEARING LOSS, BILATERAL: Primary | ICD-10-CM

## 2024-04-09 PROCEDURE — V5299 HEARING SERVICE: HCPCS | Performed by: AUDIOLOGIST

## 2024-04-09 NOTE — PROGRESS NOTES
No charge appointment. Ms. Lovell presented today for hearing evaluation and hearing aid check. She feels that some change to hearing has occurred since her last test (8-12-20). She has Medicare as her primary insurance carrier, and did not have orders for audiology testing from primary care in place for today's appointment. She was presented options to proceed (with Medicare possibly denying coverage), or to postpone testing until orders are in place. She elected the latter, and preferred to have the hearing aids checked when new test data is available. She will plan to request orders and reschedule today's appointment. She expressed verbal understanding of this information and plan.    Shaunna Braswell., [facility]  [place] Licensed Audiologist 1977

## 2024-04-10 ENCOUNTER — OFFICE VISIT (OUTPATIENT)
Dept: CARDIOLOGY | Facility: CLINIC | Age: 69
End: 2024-04-10
Payer: MEDICARE

## 2024-04-10 ENCOUNTER — TRANSCRIBE ORDERS (OUTPATIENT)
Dept: OTHER | Age: 69
End: 2024-04-10

## 2024-04-10 ENCOUNTER — MEDICAL CORRESPONDENCE (OUTPATIENT)
Dept: HEALTH INFORMATION MANAGEMENT | Facility: CLINIC | Age: 69
End: 2024-04-10

## 2024-04-10 VITALS
OXYGEN SATURATION: 97 % | RESPIRATION RATE: 16 BRPM | SYSTOLIC BLOOD PRESSURE: 126 MMHG | DIASTOLIC BLOOD PRESSURE: 64 MMHG | BODY MASS INDEX: 22.91 KG/M2 | WEIGHT: 132.4 LBS | HEART RATE: 55 BPM

## 2024-04-10 DIAGNOSIS — E78.5 DYSLIPIDEMIA: ICD-10-CM

## 2024-04-10 DIAGNOSIS — H91.93 DECREASED HEARING, BILATERAL: Primary | ICD-10-CM

## 2024-04-10 DIAGNOSIS — I47.19 ATRIAL TACHYCARDIA (H): Primary | ICD-10-CM

## 2024-04-10 DIAGNOSIS — Z94.0 KIDNEY REPLACED BY TRANSPLANT: ICD-10-CM

## 2024-04-10 DIAGNOSIS — I15.1 HTN, KIDNEY TRANSPLANT RELATED: ICD-10-CM

## 2024-04-10 DIAGNOSIS — Z94.0 HTN, KIDNEY TRANSPLANT RELATED: ICD-10-CM

## 2024-04-10 PROCEDURE — 99204 OFFICE O/P NEW MOD 45 MIN: CPT | Performed by: INTERNAL MEDICINE

## 2024-04-10 RX ORDER — METOPROLOL SUCCINATE 25 MG/1
25 TABLET, EXTENDED RELEASE ORAL DAILY
COMMUNITY
Start: 2024-02-28 | End: 2024-04-10

## 2024-04-10 RX ORDER — ACETAMINOPHEN 325 MG/1
650 TABLET ORAL EVERY 6 HOURS PRN
COMMUNITY

## 2024-04-10 NOTE — PROGRESS NOTES
[facility] Office Consult     Assessment:   (I47.19) Atrial tachycardia (H24)  (primary encounter diagnosis)  Comment: Holter monitor showed atrial tachycardia, nonsustained and no significant atrial fibrillation.  Did not tolerate metoprolol succinate.  Symptoms have since resolved.  Will check echocardiogram making sure there is no structural heart disease, electrolytes showed no abnormalities and normal magnesium.  She does not use any stimulants, was under a fair amount of stress about an upcoming trip.  At this point time would continue to monitor, suggest she get an Apple Watch or a UClass to monitor for A-fib since there is a strong family history and she does have an elevated [id number] score of at least 4 and would need anticoagulation.  At this point time given limited episodes would not start this.  Would recommend metoprolol tartrate 12 and half twice daily should she have symptomatic recurrences.    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    (E78.5) Dyslipidemia  Comment: Total cholesterol 165 on pravastatin which is acceptable.    (Z94.0) Kidney replaced by transplant  Comment: 16 years ago, currently on cyclosporine and prednisone for rejection prophylaxis and current creatinine well-preserved at 0.95.     Plan:   1.  Check echocardiogram look for structural heart disease.  2.  If she has recurrence consider metoprolol tartrate 12.5 twice daily.  3.  Home monitoring for atrial fibrillation and if seen consider anticoagulation.  4.  Follow-up with me as needed.    History of Present Illness:   Thank you for asking the [facility] team to see Luba Lovell a 68 year old female  in consultation  to evaluate SVT.   Patient has been in her usual state of health till around January when she was under a fair amount of mental stress getting ready for a trip.  She states there was a period about 4 days where she  would feel her heart racing and pounding up into her neck.  There is no shortness of breath or lightheadedness with this.  She subsequently saw her provider and obtained a heart monitor showing atrial tachycardia with brief episodes and is here to discuss further.  She states since then they have resolved and she is physically active at home going up and down 4 flights of steps without chest pain, palpitations, shortness of breath, PND, with apnea, syncope or presyncope.    Past Medical History:   Diagnosis Date    Anemia in chronic kidney disease(285.21)     Basal cell carcinoma of nose 7/15/2022    Dyslipidemia     High risk medications (not anticoagulants) long-term use     Hypertension     Immunosuppressed status (H24)     Kidney replaced by transplant     Shingles      Past history is negative for cancer, tuberculosis, diabetes mellitus, myocardial infarction,  rheumatic fever, cerebrovascular accident, peptic ulcer disease, chronic obstructive pulmonary disease, or thyroid disorder.    Past Surgical History:     Past Surgical History:   Procedure Laterality Date    APPENDECTOMY      CATARACT IOL, RT/LT Bilateral     IR MISCELLANEOUS PROCEDURE  1/15/2004    IR MISCELLANEOUS PROCEDURE  3/29/2007    kidney transplant      TONSILLECTOMY, ADENOIDECTOMY, COMBINED Bilateral 7/29/2020    Procedure: BILATERAL TONSILLECTOMY AND ADENOIDECTOMY;  Surgeon: Tammy Haines MD;  Location:  OR     Family History:   Family history negative for coronary artery disease with a positive for atrial fibrillation in her mother and her father and her brother.    Social History:   She lives at home independently with her , drinks maybe a small caffeinated beverage daily, reports that she has never smoked. She has never used smokeless tobacco. She reports that she does not drink alcohol and does not use drugs. The primary care physician is Rolly Raymond    Meds:   Scheduled Meds:  Current Outpatient Medications    Medication Sig Dispense Refill    acetaminophen (TYLENOL) 325 MG tablet Take 650 mg by mouth every 6 hours as needed for mild pain or fever      amLODIPine (NORVASC) 2.5 MG tablet Take 1 tablet (2.5 mg) by mouth daily 90 tablet 0    brimonidine (ALPHAGAN) 0.2 % ophthalmic solution Place 1 drop Into the left eye every morning       cycloSPORINE modified (GENERIC EQUIVALENT) 25 MG capsule Take 3 capsules (75 mg) by mouth 2 times daily 540 capsule 3    latanoprost (XALATAN) 0.005 % ophthalmic solution Place 1 drop Into the left eye At Bedtime      loteprednol (LOTEMAX) 0.5 % ophthalmic suspension Lotemax 0.5 % eye drops,suspension   INSTILL 1 DROP INTO AFFECTED EYE(S) BY OPHTHALMIC ROUTE 4 TIMES PER DAY      magnesium oxide (MAG-OX) 400 MG tablet Take 1 tablet (400 mg) by mouth daily      Multiple Vitamins-Minerals (CENTRUM SILVER) per tablet Take 1 tablet by mouth every morning       Omega-3 Fatty Acids (FISH OIL) 1000 MG CPDR Take 2,000 mg by mouth 2 times daily      PRAVASTATIN SODIUM PO Take 20 mg by mouth At Bedtime       predniSONE (DELTASONE) 5 MG tablet Take 1 tablet (5 mg) by mouth daily 30 tablet 11    timolol maleate (TIMOPTIC) 0.5 % ophthalmic solution Place 1 drop into both eyes every morning      valACYclovir (VALTREX) 1000 mg tablet Take 1 tablet (1,000 mg) by mouth daily         PRN Meds:.  No current facility-administered medications for this visit.       Allergies:   Fenofibrate, Fenofibrate, Metoprolol, and Simvastatin    Objective:      Physical Exam  60.1 kg (132 lb 6.4 oz)     Body mass index is 22.91 kg/m .  /64 (BP Location: Left arm, Patient Position: Sitting, Cuff Size: Adult Regular)   Pulse 55   Resp 16   Wt 60.1 kg (132 lb 6.4 oz)   LMP  (LMP Unknown)   SpO2 97%   BMI 22.91 kg/m      General Appearance:   Alert, cooperative and in no acute distress.   HEENT:  No scleral icterus; the mucous membranes were pink and moist.   Neck: JVP normal. No thyromegaly. No HJR   Chest: The  "spine was straight. The chest was symmetric.   Lungs:   Respirations unlabored; the lungs are clear to auscultation.   Cardiovascular:   S1 and S2 without murmur, clicks or rubs. Brachial, radial, carotid and posterior tibial pulses are intact and symetrical.  No carotid bruits noted   Abdomen:  No organomegaly, masses, bruits, or tenderness. Bowels sounds are present   Extremities: No cyanosis, clubbing, or edema.   Skin: No xanthelasma.   Neurologic: Mood and affect are appropriate.         Lab Reviewed Personally by myself  Lab Results   Component Value Date     03/05/2024     06/30/2021    CO2 28 03/05/2024    CO2 29 10/21/2022    CO2 28 06/30/2021    BUN 28.2 03/05/2024    BUN 28 10/21/2022    BUN 29 06/30/2021     Lab Results   Component Value Date    WBC 6.4 03/05/2024    WBC 4.4 06/30/2021    HGB 12.9 03/05/2024    HGB 12.0 06/30/2021    HCT 41.1 03/05/2024    HCT 37.8 06/30/2021     03/05/2024    MCV 93 06/30/2021     03/05/2024     06/30/2021     Lab Results   Component Value Date    CHOL 165 11/04/2022    CHOL 213 05/09/2007    TRIG 105 11/04/2022    TRIG 156 07/20/2015    TRIG 213 05/09/2007    HDL 63 11/04/2022    HDL 43 05/09/2007     No results found for: \"BNP\"    ECG personally reviewed by myself shows sinus rhythm, within normal limits.         Review of Systems:     Review of Systems:   Enc Vitals  BP: 126/64  Pulse: 55  Resp: 16  SpO2: 97 %  Weight: 60.1 kg (132 lb 6.4 oz)                                          "

## 2024-04-10 NOTE — PATIENT INSTRUCTIONS
Ms. Luba Dinger,  It certainly was nice to meet you today.  Per our conversation you're having some skipped beats in the chest.  This is an abnormal heartbeat called atrial tachycardia and the reason I am checking the ultrasound of the heart.  We will call you the results of these tests.  In the interim I would consider some type of home monitoring in the future to monitor for a fib with maybe watch or [employer] mobile.  If recurrent could try metoprolol tartrate.  I will plan on seeing you as needed.  Leo Ochoa

## 2024-04-10 NOTE — LETTER
4/10/2024    Rolly Raymond MD  [address] [facility] [address]    RE: Luba Lovell       Dear Colleague,     I had the pleasure of seeing Luba Lovell in the [facility].    [facility] Office Consult     Assessment:   (I47.19) Atrial tachycardia (H24)  (primary encounter diagnosis)  Comment: Holter monitor showed atrial tachycardia, nonsustained and no significant atrial fibrillation.  Did not tolerate metoprolol succinate.  Symptoms have since resolved.  Will check echocardiogram making sure there is no structural heart disease, electrolytes showed no abnormalities and normal magnesium.  She does not use any stimulants, was under a fair amount of stress about an upcoming trip.  At this point time would continue to monitor, suggest she get an Apple Watch or a [employer] mobile to monitor for A-fib since there is a strong family history and she does have an elevated [id number] score of at least 4 and would need anticoagulation.  At this point time given limited episodes would not start this.  Would recommend metoprolol tartrate 12 and half twice daily should she have symptomatic recurrences.    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    (E78.5) Dyslipidemia  Comment: Total cholesterol 165 on pravastatin which is acceptable.    (Z94.0) Kidney replaced by transplant  Comment: 16 years ago, currently on cyclosporine and prednisone for rejection prophylaxis and current creatinine well-preserved at 0.95.     Plan:   1.  Check echocardiogram look for structural heart disease.  2.  If she has recurrence consider metoprolol tartrate 12.5 twice daily.  3.  Home monitoring for atrial fibrillation and if seen consider anticoagulation.  4.  Follow-up with me as needed.    History of Present Illness:   Thank you for asking the [facility] team to see Luba Lovell a 68 year old female  in consultation  to  evaluate SVT.   Patient has been in her usual state of health till around January when she was under a fair amount of mental stress getting ready for a trip.  She states there was a period about 4 days where she would feel her heart racing and pounding up into her neck.  There is no shortness of breath or lightheadedness with this.  She subsequently saw her provider and obtained a heart monitor showing atrial tachycardia with brief episodes and is here to discuss further.  She states since then they have resolved and she is physically active at home going up and down 4 flights of steps without chest pain, palpitations, shortness of breath, PND, with apnea, syncope or presyncope.    Past Medical History:   Diagnosis Date    Anemia in chronic kidney disease(285.21)     Basal cell carcinoma of nose 7/15/2022    Dyslipidemia     High risk medications (not anticoagulants) long-term use     Hypertension     Immunosuppressed status (H24)     Kidney replaced by transplant     Shingles      Past history is negative for cancer, tuberculosis, diabetes mellitus, myocardial infarction,  rheumatic fever, cerebrovascular accident, peptic ulcer disease, chronic obstructive pulmonary disease, or thyroid disorder.    Past Surgical History:     Past Surgical History:   Procedure Laterality Date    APPENDECTOMY      CATARACT IOL, RT/LT Bilateral     IR MISCELLANEOUS PROCEDURE  1/15/2004    IR MISCELLANEOUS PROCEDURE  3/29/2007    kidney transplant      TONSILLECTOMY, ADENOIDECTOMY, COMBINED Bilateral 7/29/2020    Procedure: BILATERAL TONSILLECTOMY AND ADENOIDECTOMY;  Surgeon: Tammy Haines MD;  Location:  OR     Family History:   Family history negative for coronary artery disease with a positive for atrial fibrillation in her mother and her father and her brother.    Social History:   She lives at home independently with her , drinks maybe a small caffeinated beverage daily, reports that she has never smoked. She  has never used smokeless tobacco. She reports that she does not drink alcohol and does not use drugs. The primary care physician is Rolly Raymond    Meds:   Scheduled Meds:  Current Outpatient Medications   Medication Sig Dispense Refill    acetaminophen (TYLENOL) 325 MG tablet Take 650 mg by mouth every 6 hours as needed for mild pain or fever      amLODIPine (NORVASC) 2.5 MG tablet Take 1 tablet (2.5 mg) by mouth daily 90 tablet 0    brimonidine (ALPHAGAN) 0.2 % ophthalmic solution Place 1 drop Into the left eye every morning       cycloSPORINE modified (GENERIC EQUIVALENT) 25 MG capsule Take 3 capsules (75 mg) by mouth 2 times daily 540 capsule 3    latanoprost (XALATAN) 0.005 % ophthalmic solution Place 1 drop Into the left eye At Bedtime      loteprednol (LOTEMAX) 0.5 % ophthalmic suspension Lotemax 0.5 % eye drops,suspension   INSTILL 1 DROP INTO AFFECTED EYE(S) BY OPHTHALMIC ROUTE 4 TIMES PER DAY      magnesium oxide (MAG-OX) 400 MG tablet Take 1 tablet (400 mg) by mouth daily      Multiple Vitamins-Minerals (CENTRUM SILVER) per tablet Take 1 tablet by mouth every morning       Omega-3 Fatty Acids (FISH OIL) 1000 MG CPDR Take 2,000 mg by mouth 2 times daily      PRAVASTATIN SODIUM PO Take 20 mg by mouth At Bedtime       predniSONE (DELTASONE) 5 MG tablet Take 1 tablet (5 mg) by mouth daily 30 tablet 11    timolol maleate (TIMOPTIC) 0.5 % ophthalmic solution Place 1 drop into both eyes every morning      valACYclovir (VALTREX) 1000 mg tablet Take 1 tablet (1,000 mg) by mouth daily         PRN Meds:.  No current facility-administered medications for this visit.       Allergies:   Fenofibrate, Fenofibrate, Metoprolol, and Simvastatin    Objective:      Physical Exam  60.1 kg (132 lb 6.4 oz)     Body mass index is 22.91 kg/m .  /64 (BP Location: Left arm, Patient Position: Sitting, Cuff Size: Adult Regular)   Pulse 55   Resp 16   Wt 60.1 kg (132 lb 6.4 oz)   LMP  (LMP Unknown)   SpO2 97%   BMI  "22.91 kg/m      General Appearance:   Alert, cooperative and in no acute distress.   HEENT:  No scleral icterus; the mucous membranes were pink and moist.   Neck: JVP normal. No thyromegaly. No HJR   Chest: The spine was straight. The chest was symmetric.   Lungs:   Respirations unlabored; the lungs are clear to auscultation.   Cardiovascular:   S1 and S2 without murmur, clicks or rubs. Brachial, radial, carotid and posterior tibial pulses are intact and symetrical.  No carotid bruits noted   Abdomen:  No organomegaly, masses, bruits, or tenderness. Bowels sounds are present   Extremities: No cyanosis, clubbing, or edema.   Skin: No xanthelasma.   Neurologic: Mood and affect are appropriate.         Lab Reviewed Personally by myself  Lab Results   Component Value Date     03/05/2024     06/30/2021    CO2 28 03/05/2024    CO2 29 10/21/2022    CO2 28 06/30/2021    BUN 28.2 03/05/2024    BUN 28 10/21/2022    BUN 29 06/30/2021     Lab Results   Component Value Date    WBC 6.4 03/05/2024    WBC 4.4 06/30/2021    HGB 12.9 03/05/2024    HGB 12.0 06/30/2021    HCT 41.1 03/05/2024    HCT 37.8 06/30/2021     03/05/2024    MCV 93 06/30/2021     03/05/2024     06/30/2021     Lab Results   Component Value Date    CHOL 165 11/04/2022    CHOL 213 05/09/2007    TRIG 105 11/04/2022    TRIG 156 07/20/2015    TRIG 213 05/09/2007    HDL 63 11/04/2022    HDL 43 05/09/2007     No results found for: \"BNP\"    ECG personally reviewed by myself shows sinus rhythm, within normal limits.         Review of Systems:     Review of Systems:   Enc Vitals  BP: 126/64  Pulse: 55  Resp: 16  SpO2: 97 %  Weight: 60.1 kg (132 lb 6.4 oz)                                              Thank you for allowing me to participate in the care of your patient.      Sincerely,     GARRETT BURT MD     [facility]  cc:   Rolly Raymond MD  [address] [place] [address] [address] " [facility] [place] [address]

## 2024-04-19 ENCOUNTER — HOSPITAL ENCOUNTER (OUTPATIENT)
Dept: CARDIOLOGY | Facility: CLINIC | Age: 69
Discharge: HOME | End: 2024-04-19
Attending: INTERNAL MEDICINE | Admitting: INTERNAL MEDICINE
Payer: MEDICARE

## 2024-04-19 DIAGNOSIS — I47.19 ATRIAL TACHYCARDIA (H): ICD-10-CM

## 2024-04-19 PROCEDURE — 93306 TTE W/DOPPLER COMPLETE: CPT

## 2024-04-19 PROCEDURE — 93306 TTE W/DOPPLER COMPLETE: CPT | Mod: 26 | Performed by: INTERNAL MEDICINE

## 2024-04-20 ENCOUNTER — HEALTH MAINTENANCE LETTER (OUTPATIENT)
Age: 69
End: 2024-04-20

## 2024-05-14 ENCOUNTER — HOSPITAL ENCOUNTER (OUTPATIENT)
Dept: MAMMOGRAPHY | Facility: CLINIC | Age: 69
Discharge: HOME | End: 2024-05-14
Attending: FAMILY MEDICINE | Admitting: FAMILY MEDICINE
Payer: MEDICARE

## 2024-05-14 DIAGNOSIS — Z12.31 VISIT FOR SCREENING MAMMOGRAM: ICD-10-CM

## 2024-05-14 PROCEDURE — 77067 SCR MAMMO BI INCL CAD: CPT

## 2024-05-20 ENCOUNTER — TELEPHONE (OUTPATIENT)
Dept: CARDIOLOGY | Facility: CLINIC | Age: 69
End: 2024-05-20
Payer: MEDICARE

## 2024-05-20 DIAGNOSIS — I47.19 ATRIAL TACHYCARDIA (H): Primary | ICD-10-CM

## 2024-05-20 RX ORDER — METOPROLOL TARTRATE 25 MG/1
TABLET, FILM COATED ORAL
Qty: 30 TABLET | Refills: 1 | Status: SHIPPED | OUTPATIENT
Start: 2024-05-20 | End: 2024-06-18

## 2024-05-20 NOTE — TELEPHONE ENCOUNTER
Called Zulma and updated on below. Rx sent to [facility]. She will try to send in a strip with the help of family. Encouraged her to call if she has to use PRN dosing for more than a day so update can be sent to [facility]. Understanding verbalized. -[facility] Conversations: Call Back  (Newest Message First)  May 20, 2024  Leo Ochoa MD   to Me       5/20/24  1:45 PM  I agree with metoprolol tartrate 25 mg tablets, one half every 8 hours as needed for palpitations.  Is it possible that she can download her Apple Watch A-fib strip and send it into us via [employer]?  Given that the episode was less than an hour no anticoagulation just yet.

## 2024-05-20 NOTE — TELEPHONE ENCOUNTER
" Plan:   1.  Check echocardiogram look for structural heart disease.  2.  If she has recurrence consider metoprolol tartrate 12.5 twice daily.  3.  Home monitoring for atrial fibrillation and if seen consider anticoagulation.  4.  Follow-up with me as needed.    Per 4/10/24 OV with LBF above.             Called Zulma to address her concerns. She states that over the weekend, she hosted a big birthday family party. It was very loud and she was in the kitchen cooking/cleaning a lot. She says she was pretty stressed out about it.    Early Sunday morning, around 3 am, she was up (not unusual for her) and she felt like her heart was beating hard. She otherwise had no other symptoms. She took an ECG on her watch, and it showed Afib with HR of 120. After about 45 minutes, she no longer felt the hard heart beating and her ECG was make in sinus rhythm. She felt ok the rest of the day; occasional sensation of a stronger heart beat but no Afib on [employer] mobile. Today she has felt fine.     She is a little worried about it occurring again and it not going away on its own. She is due to have a lot family up at the cabin this weekend with grand kids included. She wonders about an \"as needed\" medication as her cabin [place] somewhat [place] and further away from main health care systems. Will route. -[facility]        Dr. Ochoa,  See above. Zulma inquires about as needed medication if she were to flip into Afib again like this weekend. It went away on its own after 45 minutes but was triggered, she believes, due to some stress with family gathering at her home. She will be having a large group at her cabin this weekend.  Thanks,  Mal   "

## 2024-05-20 NOTE — TELEPHONE ENCOUNTER
[facility] Call Center    Phone Message    May a detailed message be left on voicemail: yes     Reason for Call: Other: Pt called to let care team know she was in afib, bveats readt at 120, wanted to discuss medication change please sonido sanchez for further discussion.     Action Taken: Other: cardiology    Travel Screening: Not Applicable      Thank you!  [facility]

## 2024-05-21 ENCOUNTER — ALLIED HEALTH/NURSE VISIT (OUTPATIENT)
Dept: CARDIOLOGY | Facility: CLINIC | Age: 69
End: 2024-05-21
Payer: MEDICARE

## 2024-05-21 ENCOUNTER — TELEPHONE (OUTPATIENT)
Dept: CARDIOLOGY | Facility: CLINIC | Age: 69
End: 2024-05-21

## 2024-05-21 VITALS
HEIGHT: 62 IN | DIASTOLIC BLOOD PRESSURE: 80 MMHG | RESPIRATION RATE: 16 BRPM | SYSTOLIC BLOOD PRESSURE: 144 MMHG | WEIGHT: 134.8 LBS | BODY MASS INDEX: 24.8 KG/M2 | HEART RATE: 60 BPM

## 2024-05-21 DIAGNOSIS — I47.19 ATRIAL TACHYCARDIA (H): Primary | ICD-10-CM

## 2024-05-21 DIAGNOSIS — I47.19 ATRIAL TACHYCARDIA (H): ICD-10-CM

## 2024-05-21 PROCEDURE — 93000 ELECTROCARDIOGRAM COMPLETE: CPT | Performed by: INTERNAL MEDICINE

## 2024-05-21 PROCEDURE — 99207 PR NO CHARGE NURSE ONLY: CPT

## 2024-05-21 RX ORDER — PRAVASTATIN SODIUM 20 MG
20 TABLET ORAL DAILY
COMMUNITY
Start: 2024-05-20

## 2024-05-21 NOTE — NURSING NOTE
EKG given to Emelia for review, per Emelia EKG NSR Ok for patient to go, Nurse will forward to Dr. Don Reed, A

## 2024-05-21 NOTE — PROGRESS NOTES
Pt seen in clinic for rhythm check ECG- sinus rhythm with PAC's. No Afib. Pt reported feeling a little tired today, but believes weather may be the cause now. We went over how to use as needed Metoprolol again. Understanding verbalized -[facility]

## 2024-05-22 LAB
ATRIAL RATE - MUSE: 60 BPM
DIASTOLIC BLOOD PRESSURE - MUSE: NORMAL MMHG
INTERPRETATION ECG - MUSE: NORMAL
P AXIS - MUSE: 57 DEGREES
PR INTERVAL - MUSE: 144 MS
QRS DURATION - MUSE: 82 MS
QT - MUSE: 398 MS
QTC - MUSE: 398 MS
R AXIS - MUSE: 30 DEGREES
SYSTOLIC BLOOD PRESSURE - MUSE: NORMAL MMHG
T AXIS - MUSE: 47 DEGREES
VENTRICULAR RATE- MUSE: 60 BPM

## 2024-05-29 ENCOUNTER — OFFICE VISIT (OUTPATIENT)
Dept: AUDIOLOGY | Facility: CLINIC | Age: 69
End: 2024-05-29
Payer: MEDICARE

## 2024-05-29 DIAGNOSIS — H91.93 DECREASED HEARING, BILATERAL: ICD-10-CM

## 2024-05-29 DIAGNOSIS — H90.3 SENSORINEURAL HEARING LOSS, BILATERAL: Primary | ICD-10-CM

## 2024-05-29 PROCEDURE — 92557 COMPREHENSIVE HEARING TEST: CPT | Performed by: AUDIOLOGIST

## 2024-05-29 PROCEDURE — 92550 TYMPANOMETRY & REFLEX THRESH: CPT | Mod: 52 | Performed by: AUDIOLOGIST

## 2024-05-29 PROCEDURE — V5014 HEARING AID REPAIR/MODIFYING: HCPCS | Mod: RT | Performed by: AUDIOLOGIST

## 2024-05-29 NOTE — PROGRESS NOTES
"AUDIOLOGY REPORT    SUBJECTIVE:  Luba Lovell is a 68 year old female who was seen in the Audiology Clinic at the [facility] for audiologic evaluation, referred by Rolly Raymond MD. The patient has been seen previously in this clinic on 8-12-20 for assessment and results indicated mild, sloping to severe sensorineural hearing loss, bilaterally. She was subsequently fit binaurally with [employer] 5 TANISHA rechargeable devices on 12-2-20 at the [facility], which are both currently out of warranty. She is uncertain if her hearing ability has changed, but has noted a louder \"buzzing\" in her right ear occasionally. She reported recent lightheaded dizziness, but denied true vertigo. She endorsed right-sided aural fullness, but denied otalgia, or otorrhea.     OBJECTIVE:  Abuse Screening:  Do you feel unsafe at home or work/school? No  Do you feel threatened by someone? No  Does anyone try to keep you from having contact with others, or doing things outside of your home? No  Physical signs of abuse present? No     Fall Risk Screen:  1. Have you fallen two or more times in the past year? No  2. Have you fallen and had an injury in the past year? No    Is patient a fall risk? No  Referral initiated: No  Fall Risk Assessment Completed by Audiology    Otoscopic exam indicates ears are clear of cerumen, bilaterally.     Pure Tone Thresholds assessed using conventional audiometry with good  reliability from 250-8000 Hz bilaterally using insert earphones and circumaural headphones.     RIGHT:  mild sloping to severe sensorineural hearing loss    LEFT:    borderline-normal sloping to severe sensorineural hearing loss    Tympanogram:    RIGHT: normal eardrum mobility    LEFT:   normal/shallow eardrum mobility    Reflexes for 1000 Hz (reported by stimulus ear):  RIGHT: Ipsilateral is present at normal levels  RIGHT: Contralateral could not be assessed/equipment issues  LEFT:   Ipsilateral is present at normal " levels  LEFT:   Contralateral could not be assessed/equipment issues      Speech Reception Threshold:    RIGHT: 40 dB HL    LEFT:   35 dB HL  Word Recognition Score:     RIGHT: 72% at 85 dB HL using NU-6 recorded word list.    LEFT:   92% at 80 dB HL using NU-6 recorded word list.      ASSESSMENT:     ICD-10-CM    1. Sensorineural hearing loss, bilateral  H90.3       2. Decreased hearing, bilateral  H91.93 Adult Audiology  Referral          Compared to patient's previous audiogram dated 8-12-20, pure tone hearing thresholds have remained essentially stable, bilaterally. Word recognition ability in the right ear has declined from 84% correct in 2020 to 72% correct today. Today s results were discussed with the patient in detail. Appropriate communication strategies were discussed at length, as were reasonable expectations regarding hearing at a distance, in noisy environments, or when attention is diverted elsewhere.    Hearing aid check:  Devices were cleaned and domes were replaced. Listening check following cleaning yielded strong signals, free from distortion in each. An attempt was made to update the devices' programming by connecting them to the 's fitting software. While the software did identify both devices, no further connection for programming could be made. Ms. Lovell's phone was checked; the devices were not paired to the phone or an shanell on her phone; it may be possible they were inadequately charged to connect. She was offered the option of sending one or both devices to the  to address Bluetooth concerns (this would involve a cost to the patient, as the devices are out of warranty). She declined to do this at this time, but will drop off the devices at the  when she is able to do so for repairs.    PLAN:  Patient was counseled regarding hearing loss and impact on communication.  Ms. Lovell should return annually for hearing assessment, or per medical  management. Wear hearing protection consistently in noise to preserve residual hearing sensitivity and to minimize the effects of noise on tinnitus. Ms. Lovell expressed verbal understanding of this information and plan.  Please call this clinic with questions regarding these results or recommendations.        Shaunna Braswell., [facility]  [place] Licensed Audiologist [id number]

## 2024-06-10 ENCOUNTER — LAB (OUTPATIENT)
Dept: LAB | Facility: CLINIC | Age: 69
End: 2024-06-10
Payer: MEDICARE

## 2024-06-10 DIAGNOSIS — Z94.0 KIDNEY TRANSPLANTED: ICD-10-CM

## 2024-06-10 LAB
ANION GAP SERPL CALCULATED.3IONS-SCNC: 12 MMOL/L (ref 7–15)
BUN SERPL-MCNC: 31.8 MG/DL (ref 8–23)
CALCIUM SERPL-MCNC: 9.7 MG/DL (ref 8.8–10.2)
CHLORIDE SERPL-SCNC: 102 MMOL/L (ref 98–107)
CREAT SERPL-MCNC: 0.95 MG/DL (ref 0.51–0.95)
CYCLOSPORINE BLD LC/MS/MS-MCNC: 60 UG/L (ref 50–400)
DEPRECATED HCO3 PLAS-SCNC: 27 MMOL/L (ref 22–29)
EGFRCR SERPLBLD CKD-EPI 2021: 65 ML/MIN/1.73M2
ERYTHROCYTE [DISTWIDTH] IN BLOOD BY AUTOMATED COUNT: 12.9 % (ref 10–15)
GLUCOSE SERPL-MCNC: 92 MG/DL (ref 70–99)
HCT VFR BLD AUTO: 38.5 % (ref 35–47)
HGB BLD-MCNC: 12.4 G/DL (ref 11.7–15.7)
MCH RBC QN AUTO: 32.9 PG (ref 26.5–33)
MCHC RBC AUTO-ENTMCNC: 32.2 G/DL (ref 31.5–36.5)
MCV RBC AUTO: 102 FL (ref 78–100)
PLATELET # BLD AUTO: 181 10E3/UL (ref 150–450)
POTASSIUM SERPL-SCNC: 3.9 MMOL/L (ref 3.4–5.3)
RBC # BLD AUTO: 3.77 10E6/UL (ref 3.8–5.2)
SODIUM SERPL-SCNC: 141 MMOL/L (ref 135–145)
TME LAST DOSE: NORMAL H
TME LAST DOSE: NORMAL H
WBC # BLD AUTO: 5.1 10E3/UL (ref 4–11)

## 2024-06-10 PROCEDURE — 80158 DRUG ASSAY CYCLOSPORINE: CPT

## 2024-06-10 PROCEDURE — 85027 COMPLETE CBC AUTOMATED: CPT

## 2024-06-10 PROCEDURE — 80048 BASIC METABOLIC PNL TOTAL CA: CPT

## 2024-06-10 PROCEDURE — 36415 COLL VENOUS BLD VENIPUNCTURE: CPT

## 2024-06-10 PROCEDURE — 87799 DETECT AGENT NOS DNA QUANT: CPT

## 2024-06-11 LAB
EBV DNA SERPL NAA+PROBE-ACNC: <35 IU/ML
EBV DNA SERPL NAA+PROBE-LOG#: <1.5 {LOG_COPIES}/ML

## 2024-06-13 ENCOUNTER — OFFICE VISIT (OUTPATIENT)
Dept: TRANSPLANT | Facility: CLINIC | Age: 69
End: 2024-06-13
Attending: INTERNAL MEDICINE
Payer: MEDICARE

## 2024-06-13 VITALS
WEIGHT: 135.4 LBS | SYSTOLIC BLOOD PRESSURE: 149 MMHG | BODY MASS INDEX: 24.76 KG/M2 | DIASTOLIC BLOOD PRESSURE: 80 MMHG | TEMPERATURE: 98.5 F | HEART RATE: 56 BPM | OXYGEN SATURATION: 97 %

## 2024-06-13 DIAGNOSIS — B27.00 EBV (EPSTEIN-BARR VIRUS) VIREMIA: ICD-10-CM

## 2024-06-13 DIAGNOSIS — I15.1 HTN, KIDNEY TRANSPLANT RELATED: ICD-10-CM

## 2024-06-13 DIAGNOSIS — Z94.0 HTN, KIDNEY TRANSPLANT RELATED: ICD-10-CM

## 2024-06-13 DIAGNOSIS — Z48.298 AFTERCARE FOLLOWING ORGAN TRANSPLANT: Primary | ICD-10-CM

## 2024-06-13 DIAGNOSIS — Z94.0 KIDNEY REPLACED BY TRANSPLANT: ICD-10-CM

## 2024-06-13 DIAGNOSIS — D84.9 IMMUNOSUPPRESSION (H): ICD-10-CM

## 2024-06-13 DIAGNOSIS — C44.311 BASAL CELL CARCINOMA OF NOSE: ICD-10-CM

## 2024-06-13 PROCEDURE — G0463 HOSPITAL OUTPT CLINIC VISIT: HCPCS | Performed by: INTERNAL MEDICINE

## 2024-06-13 PROCEDURE — G2211 COMPLEX E/M VISIT ADD ON: HCPCS | Performed by: INTERNAL MEDICINE

## 2024-06-13 PROCEDURE — 99214 OFFICE O/P EST MOD 30 MIN: CPT | Performed by: INTERNAL MEDICINE

## 2024-06-13 ASSESSMENT — PAIN SCALES - GENERAL: PAINLEVEL: NO PAIN (0)

## 2024-06-13 NOTE — NURSING NOTE
Chief Complaint   Patient presents with    RECHECK     K/P POST RETURN TXP NEPH - Return in about 1 year (around 6/12/2024); records in Epic; scheduled with pt; location verified         Vitals:    06/13/24 1504   BP: (!) 154/81   BP Location: Right arm   Patient Position: Sitting   Cuff Size: Adult Regular   Pulse: 56   Temp: 98.5  F (36.9  C)   TempSrc: Oral   SpO2: 97%   Weight: 61.4 kg (135 lb 6.4 oz)       BP Readings from Last 3 Encounters:   06/13/24 (!) 154/81   05/21/24 (!) 144/80   04/10/24 126/64       BP (!) 154/81 (BP Location: Right arm, Patient Position: Sitting, Cuff Size: Adult Regular)   Pulse 56   Temp 98.5  F (36.9  C) (Oral)   Wt 61.4 kg (135 lb 6.4 oz)   LMP  (LMP Unknown)   SpO2 97%   BMI 24.76 kg/m       Julio Gallego, [facility]

## 2024-06-13 NOTE — LETTER
6/13/2024      Luba Lovell  [address]  [facility] [id number]      Dear Colleague,    Thank you for referring your patient, Luba Lovell, to the [facility] TRANSPLANT [facility]. Please see a copy of my visit note below.    TRANSPLANT NEPHROLOGY CHRONIC POST TRANSPLANT VISIT    Assessment & Plan  # DDKT: Stable   - Baseline Creatinine:  ~ 0.9-1   - Proteinuria: Normal (<0.2 grams)   - Date DSA Last Checked: Aug/2013      Latest DSA: No   - BK Viremia: Not checked recently due to time from transplant   - Kidney Tx Biopsy: Sep 24, 2009; Result:  mesangial immune complex GN and foot process effacement       # Immunosuppression: Cyclosporine (goal 50-75) and Prednisone (dose 5 mg daily)   - Continue with intensive monitoring of immunosuppression for efficacy and toxicity.   - Changes: Not at this time    # Infection Prophylaxis:   - PJP: None. CD4>200 in 3/2022  - EBV: valtrex indefinitely (    # Hypertension: Controlled;  Goal BP: < 130/80   - Changes: Not at this time. Continue amlodipine 2.5mg daily. 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    -history of rituximab x2 in the past, tonsillectomy/adenoidectomy 7/2020 after discussion with Dr. Perez. Neg CT C/A/P 2020   -Most recent 11 K 3/2024   -Per patient she was seen by [facility] [place], Dr. Pa, in Jan 2023 and in  R eye she had EBV in the fluid. Recommended to stay on valtrex ppx indefinitely. Currently on valtrex 1g daily   -Pt has had unintentional weight loss of about 12 lbs in the past year. Her fatigue is stable. She denies night sweats.    - CT C/A/P with contrast Feb 2024: neg for masses or lymphadenopathy    # Atrial tachycardia:   - Holter: non sustained atrial tachycardia, no Afib   - echo: mild to moderate aortic insufficiency ef:   - on metoprolol tartrate 12.5 mg  po prn for palpitations per cards (didn't tolerate succinate)   - f/up cards    # Mineral Bone Disorder:   - Secondary renal hyperparathyroidism; PTH level: Normal (18-80 pg/ml)        On treatment: None  - Vitamin D; level: Normal        On supplement: No  - Calcium; level: Normal        On supplement: No  - Phosphorus; level: Not checked recently, but was normal last check        On supplement: No    # Electrolytes:   - Potassium; level: Normal        On supplement: No  - Magnesium; level: Not checked recently, but was normal last check        On supplement: No  - Bicarbonate; level: Normal        On supplement: No    # Skin Cancer Risk: hx of basal cell Ca   - Discussed sun protection and recommend regular follow up with Dermatology.    # Medical Compliance: Yes    # Health Maintenance and Vaccination Review:  up to date    # Transplant History:  Etiology of Kidney Failure: Chronic glomerulonephritis (GN)  Tx: DDKT  Transplant: 8/19/2007 (Kidney)  Significant changes in immunosuppression: None  Significant transplant-related complications: EBV Viremia    Transplant Office Phone Number: [phone]    Assessment and plan was discussed with the patient and she voiced her understanding and agreement.    Return visit: Return in about 1 year (around 6/13/2025).    Jenny Maguire MD    The longitudinal plan of care for the diagnosis(es)/condition(s) as documented were addressed during this visit. Due to the added complexity in care, I will continue to support Zulma in the subsequent management and with ongoing continuity of care.      Chief Complaint  Ms. Lovell is a 68 year old here for routine follow up, kidney transplant and immunosuppression management.    History of Present Illness    Feels good overall. She had an episode of palpitations in Jan 2024 found to have atrial tachycardia on EKG, seen cards, Holter monitor showed non sustained atrial tachycardia, no Afib; echocardiogram showed mild to moderate aortic  insufficiency, nl ef. She was prescribed metoprolol tartrate to use prn for palpitations but has not had a recurrence since.    Energy level is good. Denies any fevers, chills, weight loss, night sweats. No nausea, vomiting, abdominal pain, diarrhea. No chest pain, dyspnea, leg swelling. No recent illness or hospitalization.     IS CSA 75/75 pred 5  Ppx: valcyte  Ca screen: UTD with PCP  Vaccines: UTD    Home BP:  130s systolic    Problem List  Patient Active Problem List   Diagnosis     Kidney replaced by transplant     Immunosuppressed status (H24)     Anemia in stage 2 chronic kidney disease     Dyslipidemia     Aftercare following organ transplant     EBV (Bandar-Barr virus) viremia     Vitamin D deficiency     HTN, kidney transplant related     Cervical osteoarthritis     Glaucoma     Hearing loss     Clinical diagnosis of COVID-19     Basal cell carcinoma of nose     Complications, kidney transplant     Atrial tachycardia (H24)     Sensorineural hearing loss, bilateral       Allergies  Allergies   Allergen Reactions     Fenofibrate Other (See Comments)     Pancreatitis (this is fenofibrate)     Fenofibrate Other (See Comments)     Pancreatitis     Metoprolol Dizziness     Succinate      Simvastatin Muscle Pain (Myalgia) and Cramps       Medications  Current Outpatient Medications   Medication Sig Dispense Refill     acetaminophen (TYLENOL) 325 MG tablet Take 650 mg by mouth every 6 hours as needed for mild pain or fever       amLODIPine (NORVASC) 2.5 MG tablet Take 1 tablet (2.5 mg) by mouth daily 90 tablet 0     brimonidine (ALPHAGAN) 0.2 % ophthalmic solution Place 1 drop Into the left eye every morning        cycloSPORINE modified (GENERIC EQUIVALENT) 25 MG capsule Take 3 capsules (75 mg) by mouth 2 times daily 540 capsule 3     latanoprost (XALATAN) 0.005 % ophthalmic solution Place 1 drop Into the left eye At Bedtime       loteprednol (LOTEMAX) 0.5 % ophthalmic suspension Place 1 drop Into the left eye  at bedtime       magnesium oxide (MAG-OX) 400 MG tablet Take 1 tablet (400 mg) by mouth daily       metoprolol tartrate (LOPRESSOR) 25 MG tablet Take 12.5 mg every 8 hours as needed for palpitations 30 tablet 1     Multiple Vitamins-Minerals (CENTRUM SILVER) per tablet Take 1 tablet by mouth every morning        NONFORMULARY once every eight weeks Eyelea injection into the eye       Omega-3 Fatty Acids (FISH OIL) 1000 MG CPDR Take 2,000 mg by mouth 2 times daily       pravastatin (PRAVACHOL) 20 MG tablet Take 20 mg by mouth daily       predniSONE (DELTASONE) 5 MG tablet Take 1 tablet (5 mg) by mouth daily 30 tablet 11     timolol maleate (TIMOPTIC) 0.5 % ophthalmic solution Place 1 drop into both eyes every morning       valACYclovir (VALTREX) 1000 mg tablet Take 1 tablet (1,000 mg) by mouth daily       No current facility-administered medications for this visit.     There are no discontinued medications.      Physical Exam  Vital Signs: LMP  (LMP Unknown)     GENERAL APPEARANCE: alert and no distress  HENT: no obvious abnormalities on appearance  RESP: breathing appears unremarkable with normal rate, no audible wheezing or cough and no apparent shortness of breath with conversation  MS: extremities normal - no gross deformities noted  SKIN: no apparent rash and normal skin tone  NEURO: speech is clear with no obvious neurological deficits  PSYCH: mentation appears normal and affect normal      Data        Latest Ref Rng & Units 6/10/2024    10:23 AM 3/5/2024    10:14 AM 2/24/2024    10:51 AM   Renal   Sodium 135 - 145 mmol/L 141  138  137    K 3.4 - 5.3 mmol/L 3.9  4.2  4.2    Cl 98 - 107 mmol/L 102  101  100    Cl (external) 98 - 107 mmol/L 102  101  100    CO2 22 - 29 mmol/L 27  28  26    Urea Nitrogen 8.0 - 23.0 mg/dL 31.8  28.2  31.6    Creatinine 0.51 - 0.95 mg/dL 0.95  0.95  0.97    Glucose 70 - 99 mg/dL 92  97  127    Calcium 8.8 - 10.2 mg/dL 9.7  9.9  9.7          Latest Ref Rng & Units 6/22/2023     1:45  PM 5/4/2022    12:18 PM 8/7/2017     1:24 PM   Bone Health   Vit D Def 20 - 75 ug/L 46  43  50          Latest Ref Rng & Units 6/10/2024    10:23 AM 3/5/2024    10:14 AM 2/24/2024    10:51 AM   Heme   WBC 4.0 - 11.0 10e3/uL 5.1  6.4  5.6    Hgb 11.7 - 15.7 g/dL 12.4  12.9  11.9    Plt 150 - 450 10e3/uL 181  213  185          Latest Ref Rng & Units 1/30/2024     8:50 AM 9/1/2023     9:33 AM 5/4/2022    12:18 PM   Liver   AP 40 - 150 U/L 58  65  54    TBili <=1.2 mg/dL 0.8  0.6  0.9    ALT 0 - 50 U/L 22  27  21    AST 0 - 45 U/L 27  43  25    Tot Protein 6.4 - 8.3 g/dL 8.6  8.3  7.5    Albumin 3.5 - 5.0 g/dL   4.0    Albumin 3.5 - 5.2 g/dL 4.7  4.4           Latest Ref Rng & Units 8/26/2007    11:18 AM 5/9/2007     8:11 AM   Pancreas   A1C 4.3 - 6.0 %  4.2    Amylase 30 - 110 U/L 58     Lipase 20 - 250 U/L 186           Latest Ref Rng & Units 9/29/2023    10:31 AM 8/28/2007     6:32 PM   Iron studies   Iron 37 - 145 ug/dL 74  23    Iron Sat Index 15 - 46 % 29     Ferritin 11 - 328 ng/mL 321  903          3/5/2024    10:14 AM 2/24/2024    10:51 AM 1/24/2024    11:00 AM   [facility] Txp Virology   EBV DNA LOG OF COPIES 4.1  4.5  5.5      Failed to redirect to the Timeline version of the REVFS SmartLink.      Recent Labs   Lab Test 07/23/18  1020 06/14/19  1033 12/20/19  1112   DOSMPA Not Provided 2,230 12/20/2019 @ 4990   MPACID [id number] 0.59* 1.22   MPAG 51.3 31.1 27.8*         Again, thank you for allowing me to participate in the care of your patient.        Sincerely,        Jenny Maguire MD

## 2024-06-13 NOTE — PROGRESS NOTES
TRANSPLANT NEPHROLOGY CHRONIC POST TRANSPLANT VISIT    Assessment & Plan   # DDKT: Stable   - Baseline Creatinine:  ~ 0.9-1   - Proteinuria: Normal (<0.2 grams)   - Date DSA Last Checked: Aug/2013      Latest DSA: No   - BK Viremia: Not checked recently due to time from transplant   - Kidney Tx Biopsy: Sep 24, 2009; Result:  mesangial immune complex GN and foot process effacement       # Immunosuppression: Cyclosporine (goal 50-75) and Prednisone (dose 5 mg daily)   - Continue with intensive monitoring of immunosuppression for efficacy and toxicity.   - Changes: Not at this time    # Infection Prophylaxis:   - PJP: None. CD4>200 in 3/2022  - EBV: valtrex indefinitely (    # Hypertension: Controlled;  Goal BP: < 130/80   - Changes: Not at this time. Continue amlodipine 2.5mg daily. 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    -history of rituximab x2 in the past, tonsillectomy/adenoidectomy 7/2020 after discussion with Dr. Perez. Neg CT C/A/P 2020   -Most recent 11 K 3/2024   -Per patient she was seen by [facility] [place], Dr. Pa, in Jan 2023 and in  R eye she had EBV in the fluid. Recommended to stay on valtrex ppx indefinitely. Currently on valtrex 1g daily   -Pt has had unintentional weight loss of about 12 lbs in the past year. Her fatigue is stable. She denies night sweats.    - CT C/A/P with contrast Feb 2024: neg for masses or lymphadenopathy    # Atrial tachycardia:   - Holter: non sustained atrial tachycardia, no Afib   - echo: mild to moderate aortic insufficiency ef:   - on metoprolol tartrate 12.5 mg po prn for palpitations per cards (didn't tolerate succinate)   - f/up cards    # Mineral Bone Disorder:   - Secondary renal hyperparathyroidism; PTH level: Normal (18-80 pg/ml)        On treatment: None  - Vitamin D; level: Normal        On  supplement: No  - Calcium; level: Normal        On supplement: No  - Phosphorus; level: Not checked recently, but was normal last check        On supplement: No    # Electrolytes:   - Potassium; level: Normal        On supplement: No  - Magnesium; level: Not checked recently, but was normal last check        On supplement: No  - Bicarbonate; level: Normal        On supplement: No    # Skin Cancer Risk: hx of basal cell Ca   - Discussed sun protection and recommend regular follow up with Dermatology.    # Medical Compliance: Yes    # Health Maintenance and Vaccination Review:  up to date    # Transplant History:  Etiology of Kidney Failure: Chronic glomerulonephritis (GN)  Tx: DDKT  Transplant: 8/19/2007 (Kidney)  Significant changes in immunosuppression: None  Significant transplant-related complications: EBV Viremia    Transplant Office Phone Number: [phone]    Assessment and plan was discussed with the patient and she voiced her understanding and agreement.    Return visit: Return in about 1 year (around 6/13/2025).    Jenny Maguire MD    The longitudinal plan of care for the diagnosis(es)/condition(s) as documented were addressed during this visit. Due to the added complexity in care, I will continue to support Zulma in the subsequent management and with ongoing continuity of care.      Chief Complaint   Ms. Lovell is a 68 year old here for routine follow up, kidney transplant and immunosuppression management.    History of Present Illness     Feels good overall. She had an episode of palpitations in Jan 2024 found to have atrial tachycardia on EKG, seen cards, Holter monitor showed non sustained atrial tachycardia, no Afib; echocardiogram showed mild to moderate aortic insufficiency, nl ef. She was prescribed metoprolol tartrate to use prn for palpitations but has not had a recurrence since.    Energy level is good. Denies any fevers, chills, weight loss, night sweats. No nausea, vomiting, abdominal  pain, diarrhea. No chest pain, dyspnea, leg swelling. No recent illness or hospitalization.     IS CSA 75/75 pred 5  Ppx: valcyte  Ca screen: UTD with PCP  Vaccines: UTD    Home BP:  130s systolic    Problem List   Patient Active Problem List   Diagnosis    Kidney replaced by transplant    Immunosuppressed status (H24)    Anemia in stage 2 chronic kidney disease    Dyslipidemia    Aftercare following organ transplant    EBV (Bandar-Barr virus) viremia    Vitamin D deficiency    HTN, kidney transplant related    Cervical osteoarthritis    Glaucoma    Hearing loss    Clinical diagnosis of COVID-19    Basal cell carcinoma of nose    Complications, kidney transplant    Atrial tachycardia (H24)    Sensorineural hearing loss, bilateral       Allergies   Allergies   Allergen Reactions    Fenofibrate Other (See Comments)     Pancreatitis (this is fenofibrate)    Fenofibrate Other (See Comments)     Pancreatitis    Metoprolol Dizziness     Succinate     Simvastatin Muscle Pain (Myalgia) and Cramps       Medications   Current Outpatient Medications   Medication Sig Dispense Refill    acetaminophen (TYLENOL) 325 MG tablet Take 650 mg by mouth every 6 hours as needed for mild pain or fever      amLODIPine (NORVASC) 2.5 MG tablet Take 1 tablet (2.5 mg) by mouth daily 90 tablet 0    brimonidine (ALPHAGAN) 0.2 % ophthalmic solution Place 1 drop Into the left eye every morning       cycloSPORINE modified (GENERIC EQUIVALENT) 25 MG capsule Take 3 capsules (75 mg) by mouth 2 times daily 540 capsule 3    latanoprost (XALATAN) 0.005 % ophthalmic solution Place 1 drop Into the left eye At Bedtime      loteprednol (LOTEMAX) 0.5 % ophthalmic suspension Place 1 drop Into the left eye at bedtime      magnesium oxide (MAG-OX) 400 MG tablet Take 1 tablet (400 mg) by mouth daily      metoprolol tartrate (LOPRESSOR) 25 MG tablet Take 12.5 mg every 8 hours as needed for palpitations 30 tablet 1    Multiple Vitamins-Minerals (CENTRUM SILVER) per  tablet Take 1 tablet by mouth every morning       NONFORMULARY once every eight weeks Eyelea injection into the eye      Omega-3 Fatty Acids (FISH OIL) 1000 MG CPDR Take 2,000 mg by mouth 2 times daily      pravastatin (PRAVACHOL) 20 MG tablet Take 20 mg by mouth daily      predniSONE (DELTASONE) 5 MG tablet Take 1 tablet (5 mg) by mouth daily 30 tablet 11    timolol maleate (TIMOPTIC) 0.5 % ophthalmic solution Place 1 drop into both eyes every morning      valACYclovir (VALTREX) 1000 mg tablet Take 1 tablet (1,000 mg) by mouth daily       No current facility-administered medications for this visit.     There are no discontinued medications.      Physical Exam   Vital Signs: LMP  (LMP Unknown)     GENERAL APPEARANCE: alert and no distress  HENT: no obvious abnormalities on appearance  RESP: breathing appears unremarkable with normal rate, no audible wheezing or cough and no apparent shortness of breath with conversation  MS: extremities normal - no gross deformities noted  SKIN: no apparent rash and normal skin tone  NEURO: speech is clear with no obvious neurological deficits  PSYCH: mentation appears normal and affect normal      Data         Latest Ref Rng & Units 6/10/2024    10:23 AM 3/5/2024    10:14 AM 2/24/2024    10:51 AM   Renal   Sodium 135 - 145 mmol/L 141  138  137    K 3.4 - 5.3 mmol/L 3.9  4.2  4.2    Cl 98 - 107 mmol/L 102  101  100    Cl (external) 98 - 107 mmol/L 102  101  100    CO2 22 - 29 mmol/L 27  28  26    Urea Nitrogen 8.0 - 23.0 mg/dL 31.8  28.2  31.6    Creatinine 0.51 - 0.95 mg/dL 0.95  0.95  0.97    Glucose 70 - 99 mg/dL 92  97  127    Calcium 8.8 - 10.2 mg/dL 9.7  9.9  9.7          Latest Ref Rng & Units 6/22/2023     1:45 PM 5/4/2022    12:18 PM 8/7/2017     1:24 PM   Bone Health   Vit D Def 20 - 75 ug/L 46  43  50          Latest Ref Rng & Units 6/10/2024    10:23 AM 3/5/2024    10:14 AM 2/24/2024    10:51 AM   Heme   WBC 4.0 - 11.0 10e3/uL 5.1  6.4  5.6    Hgb 11.7 - 15.7 g/dL 12.4   12.9  11.9    Plt 150 - 450 10e3/uL 181  213  185          Latest Ref Rng & Units 1/30/2024     8:50 AM 9/1/2023     9:33 AM 5/4/2022    12:18 PM   Liver   AP 40 - 150 U/L 58  65  54    TBili <=1.2 mg/dL 0.8  0.6  0.9    ALT 0 - 50 U/L 22  27  21    AST 0 - 45 U/L 27  43  25    Tot Protein 6.4 - 8.3 g/dL 8.6  8.3  7.5    Albumin 3.5 - 5.0 g/dL   4.0    Albumin 3.5 - 5.2 g/dL 4.7  4.4           Latest Ref Rng & Units 8/26/2007    11:18 AM 5/9/2007     8:11 AM   Pancreas   A1C 4.3 - 6.0 %  4.2    Amylase 30 - 110 U/L 58     Lipase 20 - 250 U/L 186           Latest Ref Rng & Units 9/29/2023    10:31 AM 8/28/2007     6:32 PM   Iron studies   Iron 37 - 145 ug/dL 74  23    Iron Sat Index 15 - 46 % 29     Ferritin 11 - 328 ng/mL 321  903          3/5/2024    10:14 AM 2/24/2024    10:51 AM 1/24/2024    11:00 AM   [facility] Txp Virology   EBV DNA LOG OF COPIES 4.1  4.5  5.5      Failed to redirect to the Timeline version of the REVFS SmartLink.      Recent Labs   Lab Test 07/23/18  1020 06/14/19  1033 12/20/19  1112   DOSMPA Not Provided 2,230 12/20/2019 @ 2330   MPACID [id number] 0.59* 1.22   MPAG 51.3 31.1 27.8*

## 2024-06-13 NOTE — PATIENT INSTRUCTIONS
Transplant Patient Information  Your Post Transplant Coordinator is: Manju Caro  You and your care team can contact your transplant coordinator Monday - Friday, 8am - 5pm at [phone] (Option 2 to reach the coordinator or Option 4 to schedule an appointment).  You can also reach your care team online via [employer].  After hours for urgent matters, please call [facility] at [phone].

## 2024-06-18 ENCOUNTER — TELEPHONE (OUTPATIENT)
Dept: CARDIOLOGY | Facility: CLINIC | Age: 69
End: 2024-06-18
Payer: MEDICARE

## 2024-06-18 DIAGNOSIS — I47.19 ATRIAL TACHYCARDIA (H): ICD-10-CM

## 2024-06-18 RX ORDER — METOPROLOL TARTRATE 25 MG/1
TABLET, FILM COATED ORAL
Qty: 30 TABLET | Refills: 1 | Status: SHIPPED | OUTPATIENT
Start: 2024-06-18 | End: 2024-09-27

## 2024-06-18 NOTE — TELEPHONE ENCOUNTER
[facility] Call Center    Phone Message    May a detailed message be left on voicemail: yes     Reason for Call: Other: Luba would like a call back to discuss a recall letter she received about her metoprolol tartrate.     Action Taken: Other: Cardiology    Travel Screening: Not Applicable    Thank you!  [facility]

## 2024-06-18 NOTE — TELEPHONE ENCOUNTER
Called Zulma to address concerns. She received a recall letter from [employer] stating that her recent bottle of Metoprolol Tartrate with her LOT # was recalled for a potential contaminate. She was told to call us to see what to do. The  was [employer]. There are many companies that manufacture generic Metoprolol. Writer processed a refill and put in the note to not use this company. She was advised to call in and show Pharmacist the letter and they can dispense a new batch from another company. Understanding verbalized. Will update LBF on advice and see about any other recommendations.      Patient's Metoprolol is as needed for palpitations or Afib. She has used twice since last fill. -[facility] [place]

## 2024-06-19 ENCOUNTER — LAB REQUISITION (OUTPATIENT)
Dept: LAB | Facility: CLINIC | Age: 69
End: 2024-06-19
Payer: MEDICARE

## 2024-06-19 ENCOUNTER — TELEPHONE (OUTPATIENT)
Dept: CARDIOLOGY | Facility: CLINIC | Age: 69
End: 2024-06-19
Payer: MEDICARE

## 2024-06-19 DIAGNOSIS — E78.2 MIXED HYPERLIPIDEMIA: ICD-10-CM

## 2024-06-19 PROCEDURE — 80061 LIPID PANEL: CPT | Mod: ORL | Performed by: FAMILY MEDICINE

## 2024-06-19 NOTE — TELEPHONE ENCOUNTER
[facility] care coordinator called on behalf of Dr. Raymond. He wonders about starting anticoagulation on this patient due to her personal reports of more Afib on her apple watch. Pt is following up as needed with LBF. Recommended PMD address in the interim of coming back to be seen, but recommended confirming rhythm with study as last holter study showed Atrial tachycardia and then getting a visit in clinic. She will let Dr. Raymond know. -[facility] [place]

## 2024-06-20 ENCOUNTER — TRANSFERRED RECORDS (OUTPATIENT)
Dept: HEALTH INFORMATION MANAGEMENT | Facility: CLINIC | Age: 69
End: 2024-06-20

## 2024-06-20 LAB
CHOLEST SERPL-MCNC: 198 MG/DL
FASTING STATUS PATIENT QL REPORTED: NORMAL
HDLC SERPL-MCNC: 86 MG/DL
LDLC SERPL CALC-MCNC: 84 MG/DL
NONHDLC SERPL-MCNC: 112 MG/DL
TRIGL SERPL-MCNC: 141 MG/DL

## 2024-07-05 ENCOUNTER — TELEPHONE (OUTPATIENT)
Dept: AUDIOLOGY | Facility: CLINIC | Age: 69
End: 2024-07-05
Payer: MEDICARE

## 2024-07-10 ENCOUNTER — TELEPHONE (OUTPATIENT)
Dept: AUDIOLOGY | Facility: CLINIC | Age: 69
End: 2024-07-10
Payer: MEDICARE

## 2024-07-10 NOTE — TELEPHONE ENCOUNTER
Patient had dropped off two Resound hearing aids for repair, indicating they no longer connect to Bluetooth. I am unable to correct this problem in office; Bluetooth issues will need to be resolved by the . Both devices are currently out of warranty, which will incur a cost of $550.00 (12 month additional repair warranty) or $450.00 (6 month additional repair warranty) for both devices to be repaired.     Requested patient contact me with approval for these charges before I send them in - [phone], or via a My Chart message. Questions may be directed to me through these two options, as well.    Shaunna Braswell., [facility]  [place] Licensed Audiologist [id number]

## 2024-07-23 ENCOUNTER — ALLIED HEALTH/NURSE VISIT (OUTPATIENT)
Dept: AUDIOLOGY | Facility: CLINIC | Age: 69
End: 2024-07-23

## 2024-07-23 DIAGNOSIS — H90.3 SENSORINEURAL HEARING LOSS, BILATERAL: Primary | ICD-10-CM

## 2024-07-23 PROCEDURE — V5014 HEARING AID REPAIR/MODIFYING: HCPCS | Mod: RT | Performed by: AUDIOLOGIST

## 2024-07-24 NOTE — PROGRESS NOTES
repair of two out of warranty devices (with additional 12-month repair warranty included in repair); patient cost $550.00. Resound TANISHA devices; serial numbers [id number]; [id number]. New warranty on both devices expires 7-. Devices are available for patient to  at the [facility] ; patient notified by phone/spoke directly with patient. Devices will need to be charged by patient prior to use. Patient expressed verbal understanding of this information and plan; patient was not seen by provider today.    Sherine Braswell, [facility]  [place] Licensed Audiologist [id number]

## 2024-07-29 ENCOUNTER — TELEPHONE (OUTPATIENT)
Dept: CARDIOLOGY | Facility: CLINIC | Age: 69
End: 2024-07-29
Payer: MEDICARE

## 2024-07-29 DIAGNOSIS — I47.19 ATRIAL TACHYCARDIA (H): Primary | ICD-10-CM

## 2024-07-29 NOTE — TELEPHONE ENCOUNTER
----- Message -----  From: Ally Ochoa MD  Sent: 7/25/2024   4:54 PM CDT  To: Emelia Owens RN    Not sure why this is coming to me, abnormal monitor from last month.  Although I saw her in consult several months ago.  Based on this I would not hesitate to start metoprolol 12.5 tartrate twice daily, we can maybe connect with her and ask if she is coming to see me and if so we can start this prior to her seeing me.  At this point time given brief atrial fibrillation would not start Eliquis but suspect we may need to do this in the future.  If she is not coming to see me, then I guess we will defer this to the primary.  LF        ==noted. Follow up order placed. Routed to schedulers to see if patient plans on follow up. Pt is already on Metoprolol. [place] [place]          Called patient as instructed above. She has not heard from her [facility] physician, whom ordered the monitor. She will call them for the results. She will discuss with them and set up a visit and if needed, she will call back to arrange a visit with LBF. No appt needed at this time. -[facility] [place]

## 2024-07-29 NOTE — TELEPHONE ENCOUNTER
Discussed rhythm strips with LBF. He requested that patient come in for EKG. EKG ordered and pt will come in around 4 pm this afternoon for EKG. -bren    Jered, not currently taking new patients

## 2024-08-21 ENCOUNTER — LAB (OUTPATIENT)
Dept: LAB | Facility: CLINIC | Age: 69
End: 2024-08-21
Payer: MEDICARE

## 2024-08-21 DIAGNOSIS — Z94.0 KIDNEY TRANSPLANTED: ICD-10-CM

## 2024-08-21 LAB
ANION GAP SERPL CALCULATED.3IONS-SCNC: 11 MMOL/L (ref 7–15)
BUN SERPL-MCNC: 27.9 MG/DL (ref 8–23)
CALCIUM SERPL-MCNC: 9.8 MG/DL (ref 8.8–10.4)
CHLORIDE SERPL-SCNC: 102 MMOL/L (ref 98–107)
CREAT SERPL-MCNC: 0.98 MG/DL (ref 0.51–0.95)
CYCLOSPORINE BLD LC/MS/MS-MCNC: 259 UG/L (ref 50–400)
EGFRCR SERPLBLD CKD-EPI 2021: 63 ML/MIN/1.73M2
ERYTHROCYTE [DISTWIDTH] IN BLOOD BY AUTOMATED COUNT: 12.8 % (ref 10–15)
GLUCOSE SERPL-MCNC: 134 MG/DL (ref 70–99)
HCO3 SERPL-SCNC: 27 MMOL/L (ref 22–29)
HCT VFR BLD AUTO: 40.2 % (ref 35–47)
HGB BLD-MCNC: 13 G/DL (ref 11.7–15.7)
MCH RBC QN AUTO: 33 PG (ref 26.5–33)
MCHC RBC AUTO-ENTMCNC: 32.3 G/DL (ref 31.5–36.5)
MCV RBC AUTO: 102 FL (ref 78–100)
PLATELET # BLD AUTO: 195 10E3/UL (ref 150–450)
POTASSIUM SERPL-SCNC: 4.3 MMOL/L (ref 3.4–5.3)
RBC # BLD AUTO: 3.94 10E6/UL (ref 3.8–5.2)
SODIUM SERPL-SCNC: 140 MMOL/L (ref 135–145)
TME LAST DOSE: NORMAL H
TME LAST DOSE: NORMAL H
WBC # BLD AUTO: 5.9 10E3/UL (ref 4–11)

## 2024-08-21 PROCEDURE — 36415 COLL VENOUS BLD VENIPUNCTURE: CPT

## 2024-08-21 PROCEDURE — 80158 DRUG ASSAY CYCLOSPORINE: CPT

## 2024-08-21 PROCEDURE — 85027 COMPLETE CBC AUTOMATED: CPT

## 2024-08-21 PROCEDURE — 80048 BASIC METABOLIC PNL TOTAL CA: CPT

## 2024-08-21 PROCEDURE — 87799 DETECT AGENT NOS DNA QUANT: CPT

## 2024-08-22 ENCOUNTER — MYC MEDICAL ADVICE (OUTPATIENT)
Dept: TRANSPLANT | Facility: CLINIC | Age: 69
End: 2024-08-22
Payer: MEDICARE

## 2024-08-22 DIAGNOSIS — Z48.298 AFTERCARE FOLLOWING ORGAN TRANSPLANT: Primary | ICD-10-CM

## 2024-08-22 LAB — EBV DNA SERPL NAA+PROBE-ACNC: NOT DETECTED IU/ML

## 2024-08-29 ENCOUNTER — LAB (OUTPATIENT)
Dept: LAB | Facility: CLINIC | Age: 69
End: 2024-08-29
Payer: MEDICARE

## 2024-08-29 DIAGNOSIS — Z48.298 AFTERCARE FOLLOWING ORGAN TRANSPLANT: ICD-10-CM

## 2024-08-29 LAB
CYCLOSPORINE BLD LC/MS/MS-MCNC: 86 UG/L (ref 50–400)
TME LAST DOSE: NORMAL H
TME LAST DOSE: NORMAL H

## 2024-08-29 PROCEDURE — 36415 COLL VENOUS BLD VENIPUNCTURE: CPT

## 2024-08-29 PROCEDURE — 80158 DRUG ASSAY CYCLOSPORINE: CPT

## 2024-09-05 ENCOUNTER — OFFICE VISIT (OUTPATIENT)
Dept: AUDIOLOGY | Facility: CLINIC | Age: 69
End: 2024-09-05
Payer: MEDICARE

## 2024-09-05 DIAGNOSIS — H90.3 SENSORINEURAL HEARING LOSS, BILATERAL: Primary | ICD-10-CM

## 2024-09-05 PROCEDURE — 99207 PR ASSESSMENT FOR HEARING AID: CPT | Performed by: AUDIOLOGIST

## 2024-09-05 NOTE — PROGRESS NOTES
AUDIOLOGY REPORT    SUBJECTIVE:Luba Lovell is a 68 year old female who was seen in the Audiology Clinic at the [facility] on 9/5/2024  for a follow-up check of their hearing aids. Previous results have revealed mild sloping to severe sensorineural hearing loss bilaterally.  The patient has been seen previously in this clinic and was fit with fit with [employer] 5 TANISHA on 10/02/2020.  Luba reports that both hearing aids were sent in for repair in July and all electronics were replaced. However since getting them back, they have sounded weak or as if they are not working.     OBJECTIVE:   Biological listening check reveals hearing aids sound weak. Wax filter is cleaned. Suspect wrong programming in the device after repair. Resent last programming from software into hearing aids. Hearing aids sound much louder and patient feels she can hear very well now.       ASSESSMENT: A follow-up appointment for hearing aid's was completed today. Hearing aids are reprogrammed to our settings after a  repair came back with wrong settings.    No charge visit today (in warranty hearing aid check).      PLAN:Luba will return for follow-up as needed, or at least every 9-12 months for cleaning and assessment of hearing aid.  . Please call this clinic with any questions regarding today s appointment.        Sherine Crowley, [facility]  Licensed Audiologist  [place] #[id number]

## 2024-09-10 ENCOUNTER — OFFICE VISIT (OUTPATIENT)
Dept: CARDIOLOGY | Facility: CLINIC | Age: 69
End: 2024-09-10
Payer: MEDICARE

## 2024-09-10 VITALS
SYSTOLIC BLOOD PRESSURE: 140 MMHG | OXYGEN SATURATION: 98 % | BODY MASS INDEX: 24.97 KG/M2 | WEIGHT: 136.5 LBS | DIASTOLIC BLOOD PRESSURE: 73 MMHG | HEART RATE: 57 BPM

## 2024-09-10 DIAGNOSIS — Z94.0 HTN, KIDNEY TRANSPLANT RELATED: ICD-10-CM

## 2024-09-10 DIAGNOSIS — E78.5 DYSLIPIDEMIA: ICD-10-CM

## 2024-09-10 DIAGNOSIS — I15.1 HTN, KIDNEY TRANSPLANT RELATED: ICD-10-CM

## 2024-09-10 DIAGNOSIS — I47.19 ATRIAL TACHYCARDIA (H): ICD-10-CM

## 2024-09-10 DIAGNOSIS — Z94.0 KIDNEY REPLACED BY TRANSPLANT: ICD-10-CM

## 2024-09-10 DIAGNOSIS — I48.0 PAROXYSMAL ATRIAL FIBRILLATION (H): Primary | ICD-10-CM

## 2024-09-10 PROCEDURE — G2211 COMPLEX E/M VISIT ADD ON: HCPCS | Performed by: INTERNAL MEDICINE

## 2024-09-10 PROCEDURE — 99214 OFFICE O/P EST MOD 30 MIN: CPT | Performed by: INTERNAL MEDICINE

## 2024-09-10 RX ORDER — FLECAINIDE ACETATE 50 MG/1
25 TABLET ORAL 2 TIMES DAILY
Qty: 10 TABLET | Refills: 1 | Status: SHIPPED | OUTPATIENT
Start: 2024-09-10 | End: 2024-10-01

## 2024-09-10 RX ORDER — AMLODIPINE BESYLATE 5 MG/1
5 TABLET ORAL DAILY
COMMUNITY
Start: 2024-06-19

## 2024-09-10 NOTE — PATIENT INSTRUCTIONS
Ms Luba Nas,  I enjoyed visiting with you again today.  I am sorry to hear of the a fib.  Per our conversation let us start ELIQUIS 5 mg twice a day if okay with kidney doc as well as FLECAINIDE 25 mg twice a day.  Will get a stress nuclear given TAMBOCOR.  We will consider ablation.  I will plan on seeing you several months.  Leo Ochoa

## 2024-09-10 NOTE — LETTER
9/10/2024    Rolly Raymond MD  [address] [address]    RE: Luba Lovell       Dear Colleague,     I had the pleasure of seeing Luba Lovell in the [facility].      [facility]  Heart Care Clinic Follow-up Note    Assessment & Plan        (I48.0) Paroxysmal atrial fibrillation (H)  (primary encounter diagnosis)  Comment: Symptomatic, not valvular based on echo, paroxysmal possibly persistent, on a daily basis now according to patient and documented by [employer] patch monitor as well as Apple watch.  Metoprolol causes her to become to bradycardia 12.5, discussed ablation and she has no interest in this just yet.  Will therefore start Tambocor, low-dose 25 mg twice daily, check exercise stress nuclear to make sure there is no ischemia, and if need be consider ablation.  Ideally would like to use Tambocor with metoprolol but she is bradycardic, she has timolol eyedrops and hopefully this would help.  Will need to check ECG after a week or 2.  Will also start Eliquis 5 mg by mouth twice daily, will check with nephrology to make sure it is okay.    (I47.19) Atrial tachycardia (H24)  Comment: Did have atrial tachycardia on prior Holter, did not tolerate metoprolol succinate, suspect a fair amount of atrial fibrillation is what she is experiencing.    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    (E78.5) Dyslipidemia  Comment: So noted with total cholesterol 165 on pravastatin which is acceptable.    (Z94.0) Kidney replaced by transplant  Comment: So noted 16 years ago, on cyclosporine and prednisone, creatinine well-preserved.  Run above medications by nephrology.    Plan  1.  Given recurrent daily atrial fibrillation would recommend Eliquis 5 mg by mouth twice daily, will check with nephrology to confirm okay.  2.  Given above we will check nuclear stress test  exercise and start flecainide 25 mg by mouth twice daily if okay with nephrology.  3.  Hold off on ablation just yet.  4.  No beta-blocker while on flecainide other than timolol eyedrops.  5.  Follow-up me in several months given all these issues, low threshold to refer to EP for ablation.    The longitudinal plan of care for the diagnosis(es)/condition(s) as documented were addressed during this visit. Due to the added complexity in care, I will continue to support Zulma in the subsequent management and with ongoing continuity of care.     Subjective  CC: 68-year-old white female here for follow-up visit, she is here with her  today.  Since I seen her she is now having almost daily episodes where she feels palpitations in her chest and neck or if she documents heart rates of 150 at nighttime.  She is not necessarily symptomatic with those.  There is no significant syncope or presyncope, chest pains, significant shortness breath, PND, thumping or peripheral edema.    Medications  Current Outpatient Medications   Medication Sig Dispense Refill     acetaminophen (TYLENOL) 325 MG tablet Take 650 mg by mouth every 6 hours as needed for mild pain or fever       amLODIPine (NORVASC) 5 MG tablet Take 5 mg by mouth daily.       apixaban ANTICOAGULANT (ELIQUIS ANTICOAGULANT) 5 MG tablet Take 1 tablet (5 mg) by mouth 2 times daily. 20 tablet 1     brimonidine (ALPHAGAN) 0.2 % ophthalmic solution Place 1 drop Into the left eye every morning        cycloSPORINE modified (GENERIC EQUIVALENT) 25 MG capsule Take 3 capsules (75 mg) by mouth 2 times daily 540 capsule 3     flecainide (TAMBOCOR) 50 MG tablet Take 0.5 tablets (25 mg) by mouth 2 times daily. 10 tablet 1     latanoprost (XALATAN) 0.005 % ophthalmic solution Place 1 drop Into the left eye At Bedtime       loteprednol (LOTEMAX) 0.5 % ophthalmic suspension Place 1 drop Into the left eye at bedtime       magnesium oxide (MAG-OX) 400 MG tablet Take 1 tablet (400 mg)  by mouth daily       metoprolol tartrate (LOPRESSOR) 25 MG tablet Take 12.5 mg every 8 hours as needed for palpitations 30 tablet 1     Multiple Vitamins-Minerals (CENTRUM SILVER) per tablet Take 1 tablet by mouth every morning        NONFORMULARY once every eight weeks Eyelea injection into the eye       Omega-3 Fatty Acids (FISH OIL) 1000 MG CPDR Take 2,000 mg by mouth 2 times daily       pravastatin (PRAVACHOL) 20 MG tablet Take 20 mg by mouth daily       predniSONE (DELTASONE) 5 MG tablet Take 1 tablet (5 mg) by mouth daily 30 tablet 11     timolol maleate (TIMOPTIC) 0.5 % ophthalmic solution Place 1 drop into both eyes every morning       valACYclovir (VALTREX) 1000 mg tablet Take 1 tablet (1,000 mg) by mouth daily         Objective  BP (!) 140/73 (BP Location: Left arm, Patient Position: Sitting, Cuff Size: Adult Regular)   Pulse 57   Wt 61.9 kg (136 lb 8 oz)   LMP  (LMP Unknown)   SpO2 98%   BMI 24.97 kg/m      General Appearance:    Alert, cooperative, no distress, appears stated age   Head:    Normocephalic, without obvious abnormality, atraumatic   Throat:   Lips, mucosa, and tongue normal; teeth and gums normal   Neck:   Supple, symmetrical, trachea midline, no adenopathy;        thyroid:  No enlargement/tenderness/nodules; no carotid    bruit or JVD   Back:     Symmetric, no curvature, ROM normal, no CVA tenderness   Lungs:     Clear to auscultation bilaterally, respirations unlabored   Chest wall:    No tenderness or deformity   Heart:    Regular rate and rhythm, S1 and S2 normal, no murmur, rub   or gallop   Abdomen:     Soft, non-tender, bowel sounds active all four quadrants,     no masses, no organomegaly   Extremities:   Normal, atraumatic, no cyanosis or edema   Pulses:   2+ and symmetric all extremities   Skin:   Skin color, texture, turgor normal, no rashes or lesions     Results    Lab Results personally reviewed   Lab Results   Component Value Date    CHOL 198 06/19/2024    CHOL 165  "11/04/2022     Lab Results   Component Value Date    HDL 86 06/19/2024    HDL 63 11/04/2022     No components found for: \"LDLCALC\"  Lab Results   Component Value Date    TRIG 141 06/19/2024    TRIG 105 11/04/2022     Lab Results   Component Value Date    WBC 5.9 08/21/2024    HGB 13.0 08/21/2024    HCT 40.2 08/21/2024     08/21/2024     Lab Results   Component Value Date    BUN 27.9 (H) 08/21/2024     08/21/2024    CO2 27 08/21/2024       Reviewed electrocardiogram Zio patch does show atrial fibrillation              Thank you for allowing me to participate in the care of your patient.      Sincerely,     GARRETT OCHOA MD     [facility]  cc:   Ally Ochoa MD  [address], [address]  [place] [address]      "

## 2024-09-10 NOTE — PROGRESS NOTES
[facility]  Heart Care Clinic Follow-up Note    Assessment & Plan        (I48.0) Paroxysmal atrial fibrillation (H)  (primary encounter diagnosis)  Comment: Symptomatic, not valvular based on echo, paroxysmal possibly persistent, on a daily basis now according to patient and documented by [employer] patch monitor as well as Apple watch.  Metoprolol causes her to become to bradycardia 12.5, discussed ablation and she has no interest in this just yet.  Will therefore start Tambocor, low-dose 25 mg twice daily, check exercise stress nuclear to make sure there is no ischemia, and if need be consider ablation.  Ideally would like to use Tambocor with metoprolol but she is bradycardic, she has timolol eyedrops and hopefully this would help.  Will need to check ECG after a week or 2.  Will also start Eliquis 5 mg by mouth twice daily, will check with nephrology to make sure it is okay.    (I47.19) Atrial tachycardia (H24)  Comment: Did have atrial tachycardia on prior Holter, did not tolerate metoprolol succinate, suspect a fair amount of atrial fibrillation is what she is experiencing.    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    (E78.5) Dyslipidemia  Comment: So noted with total cholesterol 165 on pravastatin which is acceptable.    (Z94.0) Kidney replaced by transplant  Comment: So noted 16 years ago, on cyclosporine and prednisone, creatinine well-preserved.  Run above medications by nephrology.    Plan  1.  Given recurrent daily atrial fibrillation would recommend Eliquis 5 mg by mouth twice daily, will check with nephrology to confirm okay.  2.  Given above we will check nuclear stress test exercise and start flecainide 25 mg by mouth twice daily if okay with nephrology.  3.  Hold off on ablation just yet.  4.  No beta-blocker while on flecainide other than timolol eyedrops.  5.  Follow-up me in  several months given all these issues, low threshold to refer to EP for ablation.    The longitudinal plan of care for the diagnosis(es)/condition(s) as documented were addressed during this visit. Due to the added complexity in care, I will continue to support Zulma in the subsequent management and with ongoing continuity of care.     Subjective  CC: 68-year-old white female here for follow-up visit, she is here with her  today.  Since I seen her she is now having almost daily episodes where she feels palpitations in her chest and neck or if she documents heart rates of 150 at nighttime.  She is not necessarily symptomatic with those.  There is no significant syncope or presyncope, chest pains, significant shortness breath, PND, thumping or peripheral edema.    Medications  Current Outpatient Medications   Medication Sig Dispense Refill    acetaminophen (TYLENOL) 325 MG tablet Take 650 mg by mouth every 6 hours as needed for mild pain or fever      amLODIPine (NORVASC) 5 MG tablet Take 5 mg by mouth daily.      apixaban ANTICOAGULANT (ELIQUIS ANTICOAGULANT) 5 MG tablet Take 1 tablet (5 mg) by mouth 2 times daily. 20 tablet 1    brimonidine (ALPHAGAN) 0.2 % ophthalmic solution Place 1 drop Into the left eye every morning       cycloSPORINE modified (GENERIC EQUIVALENT) 25 MG capsule Take 3 capsules (75 mg) by mouth 2 times daily 540 capsule 3    flecainide (TAMBOCOR) 50 MG tablet Take 0.5 tablets (25 mg) by mouth 2 times daily. 10 tablet 1    latanoprost (XALATAN) 0.005 % ophthalmic solution Place 1 drop Into the left eye At Bedtime      loteprednol (LOTEMAX) 0.5 % ophthalmic suspension Place 1 drop Into the left eye at bedtime      magnesium oxide (MAG-OX) 400 MG tablet Take 1 tablet (400 mg) by mouth daily      metoprolol tartrate (LOPRESSOR) 25 MG tablet Take 12.5 mg every 8 hours as needed for palpitations 30 tablet 1    Multiple Vitamins-Minerals (CENTRUM SILVER) per tablet Take 1 tablet by mouth every  "morning       NONFORMULARY once every eight weeks Eyelea injection into the eye      Omega-3 Fatty Acids (FISH OIL) 1000 MG CPDR Take 2,000 mg by mouth 2 times daily      pravastatin (PRAVACHOL) 20 MG tablet Take 20 mg by mouth daily      predniSONE (DELTASONE) 5 MG tablet Take 1 tablet (5 mg) by mouth daily 30 tablet 11    timolol maleate (TIMOPTIC) 0.5 % ophthalmic solution Place 1 drop into both eyes every morning      valACYclovir (VALTREX) 1000 mg tablet Take 1 tablet (1,000 mg) by mouth daily         Objective  BP (!) 140/73 (BP Location: Left arm, Patient Position: Sitting, Cuff Size: Adult Regular)   Pulse 57   Wt 61.9 kg (136 lb 8 oz)   LMP  (LMP Unknown)   SpO2 98%   BMI 24.97 kg/m      General Appearance:    Alert, cooperative, no distress, appears stated age   Head:    Normocephalic, without obvious abnormality, atraumatic   Throat:   Lips, mucosa, and tongue normal; teeth and gums normal   Neck:   Supple, symmetrical, trachea midline, no adenopathy;        thyroid:  No enlargement/tenderness/nodules; no carotid    bruit or JVD   Back:     Symmetric, no curvature, ROM normal, no CVA tenderness   Lungs:     Clear to auscultation bilaterally, respirations unlabored   Chest wall:    No tenderness or deformity   Heart:    Regular rate and rhythm, S1 and S2 normal, no murmur, rub   or gallop   Abdomen:     Soft, non-tender, bowel sounds active all four quadrants,     no masses, no organomegaly   Extremities:   Normal, atraumatic, no cyanosis or edema   Pulses:   2+ and symmetric all extremities   Skin:   Skin color, texture, turgor normal, no rashes or lesions     Results    Lab Results personally reviewed   Lab Results   Component Value Date    CHOL 198 06/19/2024    CHOL 165 11/04/2022     Lab Results   Component Value Date    HDL 86 06/19/2024    HDL 63 11/04/2022     No components found for: \"LDLCALC\"  Lab Results   Component Value Date    TRIG 141 06/19/2024    TRIG 105 11/04/2022     Lab Results "   Component Value Date    WBC 5.9 08/21/2024    HGB 13.0 08/21/2024    HCT 40.2 08/21/2024     08/21/2024     Lab Results   Component Value Date    BUN 27.9 (H) 08/21/2024     08/21/2024    CO2 27 08/21/2024       Reviewed electrocardiogram Zio patch does show atrial fibrillation

## 2024-09-12 ENCOUNTER — TELEPHONE (OUTPATIENT)
Dept: CARDIOLOGY | Facility: CLINIC | Age: 69
End: 2024-09-12
Payer: MEDICARE

## 2024-09-12 NOTE — TELEPHONE ENCOUNTER
Called Zulma and updated on response from Dr. Ochoa and her kidney transplant team. Understanding verbalized. Reiterated no beta blocker (metoprolol prn) while on flecainide. She will follow up with LBF in January- msg to schedulers. -[facility]              ----- Message -----  From: Ally Ochoa MD  Sent: 9/12/2024   3:25 PM CDT  To: Emelia Owens RN    Can we make sure that Eliquis was in fact started as well as Tambocor.  LF  ----- Message -----  From: Eemlia Owens RN  Sent: 9/12/2024   2:53 PM CDT  To: Ally Ochoa MD    Tidy up basket day! Kidney team is OK with Eliquis. -[facility]  ----- Message -----  From: Jenny Stephen MD  Sent: 9/12/2024   2:51 PM CDT  To: Emelia Owens RN; Manju Caro RN    Ok to use eliquis.  Thanks for the update  ----- Message -----  From: Emelia Owens RN  Sent: 9/12/2024  12:33 PM CDT  To: Jenny Lopez MD; Manju Caro RN    ----- Message from Emelia Owens RN sent at 9/12/2024 12:33 PM CDT -----  Hello,  Forwarding on a message from Zulma Fletcher's cardiologist. He wanted to make sure it was OK for her to be on Eliquis 5 mg BID from your standpoint.   Thanks,  Emelia MITCHELL

## 2024-09-12 NOTE — TELEPHONE ENCOUNTER
----- Message from GARRETT BURT sent at 9/10/2024 12:17 PM CDT -----  Can we connect with this patient's nephrology office, I we okay starting Eliquis as well as Tambocor in this lady who has had a renal transplant?  It is the nephrologist listed in the top left corner on her  Page.  LF

## 2024-09-18 ENCOUNTER — HOSPITAL ENCOUNTER (OUTPATIENT)
Dept: CARDIOLOGY | Facility: CLINIC | Age: 69
Discharge: HOME | End: 2024-09-18
Attending: INTERNAL MEDICINE
Payer: MEDICARE

## 2024-09-18 ENCOUNTER — HOSPITAL ENCOUNTER (OUTPATIENT)
Dept: NUCLEAR MEDICINE | Facility: CLINIC | Age: 69
Discharge: HOME | End: 2024-09-18
Attending: INTERNAL MEDICINE
Payer: MEDICARE

## 2024-09-18 DIAGNOSIS — I48.0 PAROXYSMAL ATRIAL FIBRILLATION (H): ICD-10-CM

## 2024-09-18 DIAGNOSIS — I47.19 ATRIAL TACHYCARDIA (H): ICD-10-CM

## 2024-09-18 LAB
CV STRESS CURRENT BP HE: NORMAL
CV STRESS CURRENT HR HE: 66
CV STRESS CURRENT HR HE: 67
CV STRESS CURRENT HR HE: 69
CV STRESS CURRENT HR HE: 71
CV STRESS CURRENT HR HE: 72
CV STRESS CURRENT HR HE: 72
CV STRESS CURRENT HR HE: 75
CV STRESS CURRENT HR HE: 77
CV STRESS CURRENT HR HE: 78
CV STRESS CURRENT HR HE: 82
CV STRESS CURRENT HR HE: 83
CV STRESS CURRENT HR HE: 83
CV STRESS CURRENT HR HE: 84
CV STRESS CURRENT HR HE: 91
CV STRESS CURRENT HR HE: 91
CV STRESS CURRENT HR HE: 92
CV STRESS DEVIATION TIME HE: NORMAL
CV STRESS ECHO PERCENT HR HE: NORMAL
CV STRESS EXERCISE STAGE HE: NORMAL
CV STRESS FINAL RESTING BP HE: NORMAL
CV STRESS FINAL RESTING HR HE: 72
CV STRESS MAX HR HE: 93
CV STRESS MAX TREADMILL GRADE HE: 0
CV STRESS MAX TREADMILL SPEED HE: 0
CV STRESS PEAK DIA BP HE: NORMAL
CV STRESS PEAK SYS BP HE: NORMAL
CV STRESS PHASE HE: NORMAL
CV STRESS PROTOCOL HE: NORMAL
CV STRESS RESTING PT POSITION HE: NORMAL
CV STRESS ST DEVIATION AMOUNT HE: NORMAL
CV STRESS ST DEVIATION ELEVATION HE: NORMAL
CV STRESS ST EVELATION AMOUNT HE: NORMAL
CV STRESS TEST TYPE HE: NORMAL
CV STRESS TOTAL STAGE TIME MIN 1 HE: NORMAL
NUC STRESS EJECTION FRACTION: 63 %
RATE PRESSURE PRODUCT: NORMAL
STRESS ECHO BASELINE DIASTOLIC HE: 96
STRESS ECHO BASELINE HR: 68
STRESS ECHO BASELINE SYSTOLIC BP: 194
STRESS ECHO CALCULATED PERCENT HR: 61 %
STRESS ECHO LAST STRESS DIASTOLIC BP: 79
STRESS ECHO LAST STRESS HR: 83
STRESS ECHO LAST STRESS SYSTOLIC BP: 160
STRESS ECHO TARGET HR: 153

## 2024-09-18 PROCEDURE — 93016 CV STRESS TEST SUPVJ ONLY: CPT | Performed by: INTERNAL MEDICINE

## 2024-09-18 PROCEDURE — 93018 CV STRESS TEST I&R ONLY: CPT | Performed by: INTERNAL MEDICINE

## 2024-09-18 PROCEDURE — 250N000011 HC RX IP 250 OP 636: Performed by: INTERNAL MEDICINE

## 2024-09-18 PROCEDURE — 78452 HT MUSCLE IMAGE SPECT MULT: CPT | Mod: 26 | Performed by: INTERNAL MEDICINE

## 2024-09-18 PROCEDURE — 343N000001 HC RX 343: Performed by: INTERNAL MEDICINE

## 2024-09-18 PROCEDURE — A9500 TC99M SESTAMIBI: HCPCS | Performed by: INTERNAL MEDICINE

## 2024-09-18 PROCEDURE — G1010 CDSM STANSON: HCPCS | Performed by: INTERNAL MEDICINE

## 2024-09-18 PROCEDURE — 78452 HT MUSCLE IMAGE SPECT MULT: CPT | Mod: MF

## 2024-09-18 PROCEDURE — 93017 CV STRESS TEST TRACING ONLY: CPT

## 2024-09-18 RX ORDER — REGADENOSON 0.08 MG/ML
0.4 INJECTION, SOLUTION INTRAVENOUS ONCE
Status: COMPLETED | OUTPATIENT
Start: 2024-09-18 | End: 2024-09-18

## 2024-09-18 RX ORDER — AMINOPHYLLINE 25 MG/ML
50-100 INJECTION, SOLUTION INTRAVENOUS
Status: DISCONTINUED | OUTPATIENT
Start: 2024-09-18 | End: 2024-09-19 | Stop reason: HOSPADM

## 2024-09-18 RX ADMIN — REGADENOSON 0.4 MG: 0.08 INJECTION, SOLUTION INTRAVENOUS at 09:20

## 2024-09-18 RX ADMIN — Medication 8.3 MILLICURIE: at 08:45

## 2024-09-18 RX ADMIN — Medication 33 MILLICURIE: at 09:25

## 2024-09-20 ENCOUNTER — MYC MEDICAL ADVICE (OUTPATIENT)
Dept: CARDIOLOGY | Facility: CLINIC | Age: 69
End: 2024-09-20
Payer: MEDICARE

## 2024-09-20 DIAGNOSIS — Z94.0 HTN, KIDNEY TRANSPLANT RELATED: Primary | ICD-10-CM

## 2024-09-20 DIAGNOSIS — I48.0 PAROXYSMAL ATRIAL FIBRILLATION (H): ICD-10-CM

## 2024-09-20 DIAGNOSIS — I15.1 HTN, KIDNEY TRANSPLANT RELATED: Primary | ICD-10-CM

## 2024-09-20 DIAGNOSIS — Z51.81 ENCOUNTER FOR THERAPEUTIC DRUG MONITORING: ICD-10-CM

## 2024-09-20 NOTE — TELEPHONE ENCOUNTER
Orders placed. Msg to schedulers-[facility]         ----- Message -----  From: Ally Ochoa MD  Sent: 9/20/2024  12:55 PM CDT  To: Emelia Owens RN  Subject: FW: NM study                                     Yes, lets get an ECG in a week.  Will also need renal profile.  LF

## 2024-09-27 ENCOUNTER — LAB (OUTPATIENT)
Dept: CARDIOLOGY | Facility: CLINIC | Age: 69
End: 2024-09-27
Payer: MEDICARE

## 2024-09-27 ENCOUNTER — TELEPHONE (OUTPATIENT)
Dept: CARDIOLOGY | Facility: CLINIC | Age: 69
End: 2024-09-27

## 2024-09-27 ENCOUNTER — ALLIED HEALTH/NURSE VISIT (OUTPATIENT)
Dept: CARDIOLOGY | Facility: CLINIC | Age: 69
End: 2024-09-27
Attending: INTERNAL MEDICINE
Payer: MEDICARE

## 2024-09-27 VITALS
HEIGHT: 62 IN | SYSTOLIC BLOOD PRESSURE: 136 MMHG | WEIGHT: 138.3 LBS | DIASTOLIC BLOOD PRESSURE: 64 MMHG | BODY MASS INDEX: 25.45 KG/M2 | HEART RATE: 76 BPM | RESPIRATION RATE: 12 BRPM

## 2024-09-27 DIAGNOSIS — I48.0 PAROXYSMAL ATRIAL FIBRILLATION (H): ICD-10-CM

## 2024-09-27 DIAGNOSIS — I48.0 PAROXYSMAL ATRIAL FIBRILLATION (H): Primary | ICD-10-CM

## 2024-09-27 DIAGNOSIS — I15.1 HTN, KIDNEY TRANSPLANT RELATED: ICD-10-CM

## 2024-09-27 DIAGNOSIS — Z94.0 HTN, KIDNEY TRANSPLANT RELATED: ICD-10-CM

## 2024-09-27 DIAGNOSIS — Z51.81 ENCOUNTER FOR THERAPEUTIC DRUG MONITORING: ICD-10-CM

## 2024-09-27 LAB
ANION GAP SERPL CALCULATED.3IONS-SCNC: 11 MMOL/L (ref 7–15)
ATRIAL RATE - MUSE: 76 BPM
BUN SERPL-MCNC: 28.6 MG/DL (ref 8–23)
CALCIUM SERPL-MCNC: 9.8 MG/DL (ref 8.8–10.4)
CHLORIDE SERPL-SCNC: 104 MMOL/L (ref 98–107)
CREAT SERPL-MCNC: 0.92 MG/DL (ref 0.51–0.95)
DIASTOLIC BLOOD PRESSURE - MUSE: NORMAL MMHG
EGFRCR SERPLBLD CKD-EPI 2021: 68 ML/MIN/1.73M2
GLUCOSE SERPL-MCNC: 122 MG/DL (ref 70–99)
HCO3 SERPL-SCNC: 26 MMOL/L (ref 22–29)
INTERPRETATION ECG - MUSE: NORMAL
P AXIS - MUSE: 53 DEGREES
POTASSIUM SERPL-SCNC: 3.7 MMOL/L (ref 3.4–5.3)
PR INTERVAL - MUSE: 150 MS
QRS DURATION - MUSE: 84 MS
QT - MUSE: 376 MS
QTC - MUSE: 423 MS
R AXIS - MUSE: 40 DEGREES
SODIUM SERPL-SCNC: 141 MMOL/L (ref 135–145)
SYSTOLIC BLOOD PRESSURE - MUSE: NORMAL MMHG
T AXIS - MUSE: 44 DEGREES
VENTRICULAR RATE- MUSE: 76 BPM

## 2024-09-27 PROCEDURE — 99207 PR NO CHARGE NURSE ONLY: CPT

## 2024-09-27 PROCEDURE — 93000 ELECTROCARDIOGRAM COMPLETE: CPT | Performed by: INTERNAL MEDICINE

## 2024-09-27 PROCEDURE — 36415 COLL VENOUS BLD VENIPUNCTURE: CPT

## 2024-09-27 PROCEDURE — 80048 BASIC METABOLIC PNL TOTAL CA: CPT

## 2024-09-27 NOTE — NURSING NOTE
See telephone encounter dated 9/27/24. -mjc        ---- Message -----  From: Ally Ochoa MD  Sent: 9/27/2024  12:28 PM CDT  To: Emeila Owens RN    This is not a regular side effect of the Tambocor, continue to monitor over the weekend.  If still swollen on Monday let us get renal profile and BNP as well as limited echo.  LF  ----- Message -----  From: Emelia Owens, RN  Sent: 9/27/2024  10:30 AM CDT  To: Ally Ochoa MD    See my note + CMA note- Zulma is reporting some new ankle swelling and abdominal bloating/feeling just like she needs to change into sweats over the last 3-4 days. Could this be related to new Tambocor started 9/20? -mal

## 2024-09-27 NOTE — Clinical Note
See my note + CMA note- Zulma is reporting some new ankle swelling and abdominal bloating/feeling just like she needs to change into sweats over the last 3-4 days. Could this be related to new Tambocor started 9/20? -mal

## 2024-09-27 NOTE — TELEPHONE ENCOUNTER
"Left a detailed message outlining below. Will also send via Kno. No orders placed at this time- will place if patient reports continued symptoms after the weekend. -[facility]         Per EKG documentation:    Nursing Note  Emelia Owens, RN (Registered Nurse)  See telephone encounter dated 9/27/24. -mjc           ---- Message -----  From: Ally Ochoa MD  Sent: 9/27/2024  12:28 PM CDT  To: Emelia Owens RN     This is not a regular side effect of the Tambocor, continue to monitor over the weekend.  If still swollen on Monday let us get renal profile and BNP as well as limited echo.  LF  ----- Message -----  From: Emelia Owens RN  Sent: 9/27/2024  10:30 AM CDT  To: Ally Ochoa MD     See my note + CMA note- Zulma is reporting some new ankle swelling and abdominal bloating/feeling just like she needs to change into sweats over the last 3-4 days. Could this be related to new Tambocor started 9/20? -[facility]        Nursing Note  Emelia Owens, RN (Registered Nurse)  Called patient to go over EKG and reports from [facility] today. Pt started Tambocor on September 20th after normal stress test. Overall- Patient reports less palpitations with Tambocor. She doesn't feel as much \"pounding\". If she feels a flutter, it is brief.      However, Zulma reports some sensation of \"swelling\" over the last 3-4 days. She notes that she feels more abdominal bloating and like she wants to \"change out of her pants to sweats\" by the end of the day. She has noticed some ankle swelling- marks from her socks, which is unusual for her. She is unsure if this could be related to Tambocor or not, but certainly warrants discussion. Pt denies any significant weight gain- around 136-138 lbs, but she doesn't weigh herself daily.      She denies feeling like her tongue or throat are swollen or tight. However, she reports seasonal allergies and a tickle in her throat but notes this is due to weather and has been occurring for awhile. " She cannot attribute this to new medication.     Will route to [facility] as this is new to starting Tambocor. -[facility]           Nursing Note  Tammy Reed RMA (Medical Assistant)  Patient came in today for her EKG per Darshana MITCHELL okay for patient to go, patient notified that Dr. Ochoa and Nurse Emelia will get in touch with patient as to what the next steps will be.  Patient is okay with the plan and understands.  Patient does have concerns regarding swelling for the last 3-4 days.  She has concerns for all over swelling retaining fluid.  Wondering if this is related to her medications.  Please advise patient      CARLEY Cadet

## 2024-09-27 NOTE — NURSING NOTE
Patient came in today for her EKG per Darshana RN okay for patient to go, patient notified that Dr. Ochoa and Nurse Emelia will get in touch with patient as to what the next steps will be.  Patient is okay with the plan and understands.  Patient does have concerns regarding swelling for the last 3-4 days.  She has concerns for all over swelling retaining fluid.  Wondering if this is related to her medications.  Please advise patient     CARLEY Cadet

## 2024-09-27 NOTE — NURSING NOTE
"Called patient to go over EKG and reports from [facility] today. Pt started Tambocor on September 20th after normal stress test. Overall- Patient reports less palpitations with Tambocor. She doesn't feel as much \"pounding\". If she feels a flutter, it is brief.     However, Zulma reports some sensation of \"swelling\" over the last 3-4 days. She notes that she feels more abdominal bloating and like she wants to \"change out of her pants to sweats\" by the end of the day. She has noticed some ankle swelling- marks from her socks, which is unusual for her. She is unsure if this could be related to Tambocor or not, but certainly warrants discussion. Pt denies any significant weight gain- around 136-138 lbs, but she doesn't weigh herself daily.     She denies feeling like her tongue or throat are swollen or tight. However, she reports seasonal allergies and a tickle in her throat but notes this is due to weather and has been occurring for awhile. She cannot attribute this to new medication.    Will route to LBF as this is new to starting Tambocor. -[facility]   "

## 2024-10-01 ENCOUNTER — MYC MEDICAL ADVICE (OUTPATIENT)
Dept: CARDIOLOGY | Facility: CLINIC | Age: 69
End: 2024-10-01
Payer: MEDICARE

## 2024-10-01 DIAGNOSIS — I47.19 ATRIAL TACHYCARDIA (H): ICD-10-CM

## 2024-10-01 DIAGNOSIS — I48.0 PAROXYSMAL ATRIAL FIBRILLATION (H): ICD-10-CM

## 2024-10-01 DIAGNOSIS — I48.0 PAROXYSMAL ATRIAL FIBRILLATION (H): Primary | ICD-10-CM

## 2024-10-01 RX ORDER — FLECAINIDE ACETATE 50 MG/1
25 TABLET ORAL 2 TIMES DAILY
Qty: 90 TABLET | Refills: 4 | Status: SHIPPED | OUTPATIENT
Start: 2024-10-01

## 2024-10-01 NOTE — TELEPHONE ENCOUNTER
"Per [facility] msg between Dr. Ochoa and pt: \"I renewed her prescriptions, can we get her a 1 week Zio patch monitor, maybe send out in 2-3 weeks?\"    Order placed for 7 day Zio patch. Spoke with pt who verbalized understanding and is agreeable to plan. Discussed zio patch application and return. aj      "

## 2024-10-15 ENCOUNTER — ORDERS ONLY (AUTO-RELEASED) (OUTPATIENT)
Dept: CARDIOLOGY | Facility: CLINIC | Age: 69
End: 2024-10-15
Payer: MEDICARE

## 2024-10-15 DIAGNOSIS — I47.19 ATRIAL TACHYCARDIA (H): ICD-10-CM

## 2024-10-15 DIAGNOSIS — I48.0 PAROXYSMAL ATRIAL FIBRILLATION (H): ICD-10-CM

## 2024-10-25 ENCOUNTER — LAB (OUTPATIENT)
Dept: LAB | Facility: CLINIC | Age: 69
End: 2024-10-25
Payer: MEDICARE

## 2024-10-25 DIAGNOSIS — Z48.298 AFTERCARE FOLLOWING ORGAN TRANSPLANT: ICD-10-CM

## 2024-10-25 LAB
ANION GAP SERPL CALCULATED.3IONS-SCNC: 11 MMOL/L (ref 7–15)
BUN SERPL-MCNC: 24.1 MG/DL (ref 8–23)
CALCIUM SERPL-MCNC: 9.5 MG/DL (ref 8.8–10.4)
CHLORIDE SERPL-SCNC: 102 MMOL/L (ref 98–107)
CREAT SERPL-MCNC: 1.05 MG/DL (ref 0.51–0.95)
CYCLOSPORINE BLD LC/MS/MS-MCNC: 54 UG/L (ref 50–400)
EGFRCR SERPLBLD CKD-EPI 2021: 58 ML/MIN/1.73M2
ERYTHROCYTE [DISTWIDTH] IN BLOOD BY AUTOMATED COUNT: 12.8 % (ref 10–15)
GLUCOSE SERPL-MCNC: 111 MG/DL (ref 70–99)
HCO3 SERPL-SCNC: 24 MMOL/L (ref 22–29)
HCT VFR BLD AUTO: 38.1 % (ref 35–47)
HGB BLD-MCNC: 12.1 G/DL (ref 11.7–15.7)
MCH RBC QN AUTO: 32.5 PG (ref 26.5–33)
MCHC RBC AUTO-ENTMCNC: 31.8 G/DL (ref 31.5–36.5)
MCV RBC AUTO: 102 FL (ref 78–100)
PLATELET # BLD AUTO: 168 10E3/UL (ref 150–450)
POTASSIUM SERPL-SCNC: 4.1 MMOL/L (ref 3.4–5.3)
RBC # BLD AUTO: 3.72 10E6/UL (ref 3.8–5.2)
SODIUM SERPL-SCNC: 137 MMOL/L (ref 135–145)
TME LAST DOSE: NORMAL H
TME LAST DOSE: NORMAL H
WBC # BLD AUTO: 5 10E3/UL (ref 4–11)

## 2024-10-25 PROCEDURE — 85027 COMPLETE CBC AUTOMATED: CPT

## 2024-10-25 PROCEDURE — 36415 COLL VENOUS BLD VENIPUNCTURE: CPT

## 2024-10-25 PROCEDURE — 80048 BASIC METABOLIC PNL TOTAL CA: CPT

## 2024-10-25 PROCEDURE — 80158 DRUG ASSAY CYCLOSPORINE: CPT

## 2024-10-25 PROCEDURE — 87799 DETECT AGENT NOS DNA QUANT: CPT

## 2024-10-27 LAB — EBV DNA SERPL NAA+PROBE-ACNC: NOT DETECTED IU/ML

## 2024-11-01 PROCEDURE — 93244 EXT ECG>48HR<7D REV&INTERPJ: CPT | Performed by: INTERNAL MEDICINE

## 2024-11-04 DIAGNOSIS — I48.0 PAROXYSMAL ATRIAL FIBRILLATION (H): Primary | ICD-10-CM

## 2024-11-04 RX ORDER — PINDOLOL 5 MG/1
TABLET ORAL
Qty: 30 TABLET | Refills: 0 | Status: SHIPPED | OUTPATIENT
Start: 2024-11-04

## 2024-11-06 PROBLEM — B96.20 E. COLI UTI: Status: ACTIVE | Noted: 2023-12-07

## 2024-11-06 PROBLEM — N39.0 E. COLI UTI: Status: ACTIVE | Noted: 2023-12-07

## 2024-11-08 ENCOUNTER — LAB REQUISITION (OUTPATIENT)
Dept: LAB | Facility: CLINIC | Age: 69
End: 2024-11-08
Payer: MEDICARE

## 2024-11-08 ENCOUNTER — TELEPHONE (OUTPATIENT)
Dept: CARDIOLOGY | Facility: CLINIC | Age: 69
End: 2024-11-08
Payer: MEDICARE

## 2024-11-08 DIAGNOSIS — N30.01 ACUTE CYSTITIS WITH HEMATURIA: ICD-10-CM

## 2024-11-08 PROCEDURE — 87086 URINE CULTURE/COLONY COUNT: CPT | Mod: ORL

## 2024-11-08 PROCEDURE — 87186 SC STD MICRODIL/AGAR DIL: CPT | Mod: ORL

## 2024-11-08 NOTE — TELEPHONE ENCOUNTER
"Received missed call and VM from pt regarding recent urinary urgency at night and if flecainide would be a contributing factor.     Spoke with pharmacist who reports that flecainide should not be causing urinary urgency.    Called pt who reports that for the last three night she has been up almost every half hour \"running\" to the bathroom with urgency. Shared with her that the pharmacist looked into it and that urinary urgency is not a side effect of flecainide. Advised her to seek care with her PCP as urinary urgency can be a side effect of a UTI. Pt was appreciative of the information and understanding. She had no further needs at this time. aj      "

## 2024-11-09 LAB — BACTERIA UR CULT: ABNORMAL

## 2024-12-09 ENCOUNTER — TELEPHONE (OUTPATIENT)
Dept: CARDIOLOGY | Facility: CLINIC | Age: 69
End: 2024-12-09
Payer: MEDICARE

## 2024-12-09 DIAGNOSIS — I48.0 PAROXYSMAL ATRIAL FIBRILLATION (H): ICD-10-CM

## 2024-12-09 RX ORDER — PINDOLOL 5 MG/1
TABLET ORAL
Qty: 45 TABLET | Refills: 2 | Status: SHIPPED | OUTPATIENT
Start: 2024-12-09

## 2024-12-09 NOTE — TELEPHONE ENCOUNTER
Spoke with pt who is requesting a 90 day supply of pindolol. Rx refill sent to preferred pharmacy. nilda

## 2024-12-20 ENCOUNTER — LAB REQUISITION (OUTPATIENT)
Dept: LAB | Facility: CLINIC | Age: 69
End: 2024-12-20
Payer: MEDICARE

## 2024-12-20 ENCOUNTER — TRANSFERRED RECORDS (OUTPATIENT)
Dept: HEALTH INFORMATION MANAGEMENT | Facility: CLINIC | Age: 69
End: 2024-12-20

## 2024-12-20 DIAGNOSIS — E78.2 MIXED HYPERLIPIDEMIA: ICD-10-CM

## 2024-12-20 LAB
ALBUMIN SERPL BCG-MCNC: 4 G/DL (ref 3.5–5.2)
ALP SERPL-CCNC: 48 U/L (ref 40–150)
ALT SERPL W P-5'-P-CCNC: 26 U/L (ref 0–50)
ANION GAP SERPL CALCULATED.3IONS-SCNC: 12 MMOL/L (ref 7–15)
AST SERPL W P-5'-P-CCNC: 25 U/L (ref 0–45)
BILIRUB SERPL-MCNC: 0.8 MG/DL
BUN SERPL-MCNC: 26.8 MG/DL (ref 8–23)
CALCIUM SERPL-MCNC: 10 MG/DL (ref 8.8–10.4)
CHLORIDE SERPL-SCNC: 104 MMOL/L (ref 98–107)
CREAT SERPL-MCNC: 1.06 MG/DL (ref 0.51–0.95)
EGFRCR SERPLBLD CKD-EPI 2021: 57 ML/MIN/1.73M2
GLUCOSE SERPL-MCNC: 113 MG/DL (ref 70–99)
HCO3 SERPL-SCNC: 24 MMOL/L (ref 22–29)
POTASSIUM SERPL-SCNC: 4.3 MMOL/L (ref 3.4–5.3)
PROT SERPL-MCNC: 7.2 G/DL (ref 6.4–8.3)
SODIUM SERPL-SCNC: 140 MMOL/L (ref 135–145)

## 2024-12-20 PROCEDURE — 80053 COMPREHEN METABOLIC PANEL: CPT | Mod: ORL

## 2024-12-23 ENCOUNTER — TELEPHONE (OUTPATIENT)
Dept: TRANSPLANT | Facility: CLINIC | Age: 69
End: 2024-12-23
Payer: MEDICARE

## 2024-12-23 DIAGNOSIS — Z94.0 KIDNEY TRANSPLANTED: ICD-10-CM

## 2024-12-23 DIAGNOSIS — C44.311 BASAL CELL CARCINOMA OF NOSE: ICD-10-CM

## 2024-12-23 RX ORDER — CYCLOSPORINE 25 MG/1
75 CAPSULE, LIQUID FILLED ORAL 2 TIMES DAILY
Qty: 540 CAPSULE | Refills: 3 | Status: SHIPPED | OUTPATIENT
Start: 2024-12-23

## 2024-12-23 NOTE — TELEPHONE ENCOUNTER
Patient Call: Medication Refill  Route to LPN  Instruct the patient to first contact their pharmacy. If they have called their pharmacy and require further assistance, route to LPN.    Pharmacy Name: [facility]   Pharmacy Location: - [address]   Name of Medication: cycloSPORINE modified Dose: 25 MG capsule  Patient stated her insurance company will only cover 30 day supply, will be leaving town on Thursday 12/26/ and needs it before then      When will the patient be out of this medication?: Less than 24 hours ([facility], then page if no answer)

## 2025-01-02 ENCOUNTER — TELEPHONE (OUTPATIENT)
Dept: ONCOLOGY | Facility: CLINIC | Age: 70
End: 2025-01-02
Payer: MEDICARE

## 2025-01-02 NOTE — TELEPHONE ENCOUNTER
Prior Authorization Not Needed per Insurance    Medication: CYCLOSPORINE 25 MG PO CAPS  Insurance Company: [employer] - Phone [phone] Fax [phone]  Expected CoPay: $ 73.23  Pharmacy Filling the Rx:    Pharmacy Notified: n/a  Patient Notified: n/a

## 2025-01-07 ENCOUNTER — TELEPHONE (OUTPATIENT)
Dept: TRANSPLANT | Facility: CLINIC | Age: 70
End: 2025-01-07

## 2025-01-07 DIAGNOSIS — Z94.0 KIDNEY TRANSPLANTED: Primary | ICD-10-CM

## 2025-01-07 DIAGNOSIS — C44.311 BASAL CELL CARCINOMA OF NOSE: ICD-10-CM

## 2025-01-07 NOTE — TELEPHONE ENCOUNTER
[facility] Call Center    Phone Message    May a detailed message be left on voicemail: yes     Reason for Call: Other: Patient called to schedule RKT. Scheduled for 6/12 with Dr. Walter Lopez, please place order so we can attach, thanks.     Action Taken: Message routed to:  [facility] JO ANN SOT    Travel Screening: Not Applicable     Date of Service:

## 2025-01-16 ENCOUNTER — OFFICE VISIT (OUTPATIENT)
Dept: CARDIOLOGY | Facility: CLINIC | Age: 70
End: 2025-01-16
Attending: INTERNAL MEDICINE
Payer: MEDICARE

## 2025-01-16 ENCOUNTER — TELEPHONE (OUTPATIENT)
Dept: CARDIOLOGY | Facility: CLINIC | Age: 70
End: 2025-01-16

## 2025-01-16 VITALS
BODY MASS INDEX: 25.13 KG/M2 | HEART RATE: 55 BPM | OXYGEN SATURATION: 97 % | DIASTOLIC BLOOD PRESSURE: 62 MMHG | SYSTOLIC BLOOD PRESSURE: 130 MMHG | WEIGHT: 137.4 LBS | RESPIRATION RATE: 16 BRPM

## 2025-01-16 DIAGNOSIS — Z94.0 KIDNEY REPLACED BY TRANSPLANT: ICD-10-CM

## 2025-01-16 DIAGNOSIS — E78.5 DYSLIPIDEMIA: ICD-10-CM

## 2025-01-16 DIAGNOSIS — I48.0 PAROXYSMAL ATRIAL FIBRILLATION (H): Primary | ICD-10-CM

## 2025-01-16 DIAGNOSIS — I15.1 HTN, KIDNEY TRANSPLANT RELATED: ICD-10-CM

## 2025-01-16 DIAGNOSIS — Z94.0 HTN, KIDNEY TRANSPLANT RELATED: ICD-10-CM

## 2025-01-16 PROBLEM — H91.90 HEARING LOSS: Status: RESOLVED | Noted: 2020-07-09 | Resolved: 2025-01-16

## 2025-01-16 NOTE — TELEPHONE ENCOUNTER
----- Message from Fatoumata Guajardo sent at 1/16/2025 11:28 AM CST -----  General phone call:    Caller: PATIENT  Primary cardiologist: DR BURT  Detailed reason for call: SHOULD PATIENT COME IN SOONER THAN OCTAVIANO VISIT ON 02-21-25 FOR AN EKG?  New or active symptoms? NO  Best phone number:  [phone]  Best time to contact: ANY TIME  Ok to leave a detailedmessage? YES  Device? NO    Additional Info:

## 2025-01-16 NOTE — PROGRESS NOTES
[facility]  Heart Care Clinic Follow-up Note    Assessment & Plan     (I48.0) Paroxysmal atrial fibrillation (H)  (primary encounter diagnosis)  Comment: Symptomatic, not valvular based on echo, paroxysmal possibly persistent, was daily.  Metoprolol caused her to become to bradycardia 12.5, discussed ablation and she has no interest in this just yet.  Controlled on low-dose Tambocor as well as low-dose Visken.  Stress nuclear looks okay so okay with Tambocor. Eliquis 5 mg by mouth twice daily, once again asked to pursue ablation and she has no desire.    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      (E78.5) Dyslipidemia  Comment: So noted with total cholesterol 165 on pravastatin which is acceptable.     (Z94.0) Kidney replaced by transplant  Comment: So noted 16 years ago, on cyclosporine and prednisone, creatinine well-preserved.  Most recent creatinine 1.04.    Plan  1.  Discussed with her ablation which she declines.  2.  I have asked that he discuss her various medications with nephrology and transplant team.  3.  Follow-up with me in 1 year or sooner if needed.    The longitudinal plan of care for the diagnosis(es)/condition(s) as documented were addressed during this visit. Due to the added complexity in care, I will continue to support Zulma in the subsequent management and with ongoing continuity of care.     Subjective  CC: 69-year-old white female here for follow-up visit.  Since starting Tambocor and pindolol she has had no recurrent atrial fibrillation.  She however tells me that she has nocturia, maybe 2 or 3 times a night as well as some bipedal edema at the end of the day.  There is no shortness of breath, PND, orthopnea, syncope, dizziness.    Medications  Current Outpatient Medications   Medication Sig Dispense Refill    acetaminophen (TYLENOL) 325 MG tablet Take 650 mg by mouth every 6 hours as needed for  mild pain or fever      amLODIPine (NORVASC) 5 MG tablet Take 5 mg by mouth daily.      apixaban ANTICOAGULANT (ELIQUIS ANTICOAGULANT) 5 MG tablet Take 1 tablet (5 mg) by mouth 2 times daily. 180 tablet 4    brimonidine (ALPHAGAN) 0.2 % ophthalmic solution Place 1 drop Into the left eye every morning       cycloSPORINE modified (GENERIC EQUIVALENT) 25 MG capsule Take 3 capsules (75 mg) by mouth 2 times daily. 540 capsule 3    flecainide (TAMBOCOR) 50 MG tablet Take 0.5 tablets (25 mg) by mouth 2 times daily. 90 tablet 4    latanoprost (XALATAN) 0.005 % ophthalmic solution Place 1 drop Into the left eye At Bedtime      loteprednol (LOTEMAX) 0.5 % ophthalmic suspension Place 1 drop Into the left eye at bedtime      magnesium oxide (MAG-OX) 400 MG tablet Take 1 tablet (400 mg) by mouth daily      Multiple Vitamins-Minerals (CENTRUM SILVER) per tablet Take 1 tablet by mouth every morning       NONFORMULARY once every eight weeks Eyelea injection into the eye      Omega-3 Fatty Acids (FISH OIL) 1000 MG CPDR Take 2,000 mg by mouth 2 times daily      pindolol (VISKEN) 5 MG tablet Take 1/2 tablet (2.5mg) once daily in the evening 45 tablet 2    pravastatin (PRAVACHOL) 20 MG tablet Take 20 mg by mouth daily      predniSONE (DELTASONE) 5 MG tablet Take 1 tablet (5 mg) by mouth daily 30 tablet 11    timolol maleate (TIMOPTIC) 0.5 % ophthalmic solution Place 1 drop into both eyes every morning      valACYclovir (VALTREX) 1000 mg tablet Take 1 tablet (1,000 mg) by mouth daily         Objective  /62 (BP Location: Left arm, Patient Position: Sitting, Cuff Size: Adult Regular)   Pulse 55   Resp 16   Wt 62.3 kg (137 lb 6.4 oz)   LMP  (LMP Unknown)   SpO2 97%   BMI 25.13 kg/m      General Appearance:    Alert, cooperative, no distress, appears stated age   Head:    Normocephalic, without obvious abnormality, atraumatic   Throat:   Lips, mucosa, and tongue normal; teeth and gums normal   Neck:   Supple, symmetrical,  "trachea midline, no adenopathy;        thyroid:  No enlargement/tenderness/nodules; no carotid    bruit or JVD   Back:     Symmetric, no curvature, ROM normal, no CVA tenderness   Lungs:     Clear to auscultation bilaterally, respirations unlabored   Chest wall:    No tenderness or deformity   Heart:    Regular rate and rhythm, S1 and S2 normal, no murmur, rub   or gallop   Abdomen:     Soft, non-tender, bowel sounds active all four quadrants,     no masses, no organomegaly   Extremities:   Normal, atraumatic, no cyanosis or edema   Pulses:   2+ and symmetric all extremities   Skin:   Skin color, texture, turgor normal, no rashes or lesions     Results    Lab Results personally reviewed   Lab Results   Component Value Date    CHOL 198 06/19/2024    CHOL 165 11/04/2022     Lab Results   Component Value Date    HDL 86 06/19/2024    HDL 63 11/04/2022     No components found for: \"LDLCALC\"  Lab Results   Component Value Date    TRIG 141 06/19/2024    TRIG 105 11/04/2022     Lab Results   Component Value Date    WBC 6.1 01/10/2025    HGB 12.7 01/10/2025    HCT 39.1 01/10/2025     01/10/2025     Lab Results   Component Value Date    BUN 26.8 (H) 01/10/2025     01/10/2025    CO2 24 01/10/2025       Reviewed electrocardiogram unable to be obtained in office today          "

## 2025-01-16 NOTE — PATIENT INSTRUCTIONS
Ms Luba TIN Lovell,  I enjoyed visiting with you again today.  I am glad to hear you are doing well other than some fluid retention toward the end of the day and frequent urination at nighttime.  This could be due to the amlodipine or drinking excessive amounts of fluid and I would discuss that with your nephrology team.  Per our conversation strongly consider an ablation for the atrial fibrillation as we might be able to stop the pindolol and Tambocor.  I will arrange for you to see the nurse practitioner to discuss ablation.  I will plan on seeing you thereafter.  Leo Ochoa

## 2025-01-16 NOTE — TELEPHONE ENCOUNTER
Spoke with pt regarding Dr. Ochoa's comments. She is understanding and will plan to have EKG completed upon rooming at her upcoming appt with KD on 2/21. She had no other needs at this time. aj    -------------------------------------------------------  Msg received:  From: Ally Ochoa MD  Sent: 1/16/2025  12:30 PM CST  To: Sunitha Ngo RN    No, she can get the ECG when she comes in to see electrophysiology.  ECGs were down in clinic today.  LF

## 2025-01-16 NOTE — LETTER
1/16/2025    Rolly Raymond MD  [address] [address]    RE: Luba Lovell       Dear Colleague,     I had the pleasure of seeing Luba Lovell in the [facility].      [facility]  Heart Care Clinic Follow-up Note    Assessment & Plan     (I48.0) Paroxysmal atrial fibrillation (H)  (primary encounter diagnosis)  Comment: Symptomatic, not valvular based on echo, paroxysmal possibly persistent, was daily.  Metoprolol caused her to become to bradycardia 12.5, discussed ablation and she has no interest in this just yet.  Controlled on low-dose Tambocor as well as low-dose Visken.  Stress nuclear looks okay so okay with Tambocor. Eliquis 5 mg by mouth twice daily, once again asked to pursue ablation and she has no desire.    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      (E78.5) Dyslipidemia  Comment: So noted with total cholesterol 165 on pravastatin which is acceptable.     (Z94.0) Kidney replaced by transplant  Comment: So noted 16 years ago, on cyclosporine and prednisone, creatinine well-preserved.  Most recent creatinine 1.04.    Plan  1.  Discussed with her ablation which she declines.  2.  I have asked that he discuss her various medications with nephrology and transplant team.  3.  Follow-up with me in 1 year or sooner if needed.    The longitudinal plan of care for the diagnosis(es)/condition(s) as documented were addressed during this visit. Due to the added complexity in care, I will continue to support Zulma in the subsequent management and with ongoing continuity of care.     Subjective  CC: 69-year-old white female here for follow-up visit.  Since starting Tambocor and pindolol she has had no recurrent atrial fibrillation.  She however tells me that she has nocturia, maybe 2 or 3 times a night as well as some bipedal edema at the end of the day.  There is no shortness of breath, PND,  orthopnea, syncope, dizziness.    Medications  Current Outpatient Medications   Medication Sig Dispense Refill     acetaminophen (TYLENOL) 325 MG tablet Take 650 mg by mouth every 6 hours as needed for mild pain or fever       amLODIPine (NORVASC) 5 MG tablet Take 5 mg by mouth daily.       apixaban ANTICOAGULANT (ELIQUIS ANTICOAGULANT) 5 MG tablet Take 1 tablet (5 mg) by mouth 2 times daily. 180 tablet 4     brimonidine (ALPHAGAN) 0.2 % ophthalmic solution Place 1 drop Into the left eye every morning        cycloSPORINE modified (GENERIC EQUIVALENT) 25 MG capsule Take 3 capsules (75 mg) by mouth 2 times daily. 540 capsule 3     flecainide (TAMBOCOR) 50 MG tablet Take 0.5 tablets (25 mg) by mouth 2 times daily. 90 tablet 4     latanoprost (XALATAN) 0.005 % ophthalmic solution Place 1 drop Into the left eye At Bedtime       loteprednol (LOTEMAX) 0.5 % ophthalmic suspension Place 1 drop Into the left eye at bedtime       magnesium oxide (MAG-OX) 400 MG tablet Take 1 tablet (400 mg) by mouth daily       Multiple Vitamins-Minerals (CENTRUM SILVER) per tablet Take 1 tablet by mouth every morning        NONFORMULARY once every eight weeks Eyelea injection into the eye       Omega-3 Fatty Acids (FISH OIL) 1000 MG CPDR Take 2,000 mg by mouth 2 times daily       pindolol (VISKEN) 5 MG tablet Take 1/2 tablet (2.5mg) once daily in the evening 45 tablet 2     pravastatin (PRAVACHOL) 20 MG tablet Take 20 mg by mouth daily       predniSONE (DELTASONE) 5 MG tablet Take 1 tablet (5 mg) by mouth daily 30 tablet 11     timolol maleate (TIMOPTIC) 0.5 % ophthalmic solution Place 1 drop into both eyes every morning       valACYclovir (VALTREX) 1000 mg tablet Take 1 tablet (1,000 mg) by mouth daily         Objective  /62 (BP Location: Left arm, Patient Position: Sitting, Cuff Size: Adult Regular)   Pulse 55   Resp 16   Wt 62.3 kg (137 lb 6.4 oz)   LMP  (LMP Unknown)   SpO2 97%   BMI 25.13 kg/m      General Appearance:     "Alert, cooperative, no distress, appears stated age   Head:    Normocephalic, without obvious abnormality, atraumatic   Throat:   Lips, mucosa, and tongue normal; teeth and gums normal   Neck:   Supple, symmetrical, trachea midline, no adenopathy;        thyroid:  No enlargement/tenderness/nodules; no carotid    bruit or JVD   Back:     Symmetric, no curvature, ROM normal, no CVA tenderness   Lungs:     Clear to auscultation bilaterally, respirations unlabored   Chest wall:    No tenderness or deformity   Heart:    Regular rate and rhythm, S1 and S2 normal, no murmur, rub   or gallop   Abdomen:     Soft, non-tender, bowel sounds active all four quadrants,     no masses, no organomegaly   Extremities:   Normal, atraumatic, no cyanosis or edema   Pulses:   2+ and symmetric all extremities   Skin:   Skin color, texture, turgor normal, no rashes or lesions     Results    Lab Results personally reviewed   Lab Results   Component Value Date    CHOL 198 06/19/2024    CHOL 165 11/04/2022     Lab Results   Component Value Date    HDL 86 06/19/2024    HDL 63 11/04/2022     No components found for: \"LDLCALC\"  Lab Results   Component Value Date    TRIG 141 06/19/2024    TRIG 105 11/04/2022     Lab Results   Component Value Date    WBC 6.1 01/10/2025    HGB 12.7 01/10/2025    HCT 39.1 01/10/2025     01/10/2025     Lab Results   Component Value Date    BUN 26.8 (H) 01/10/2025     01/10/2025    CO2 24 01/10/2025       Reviewed electrocardiogram unable to be obtained in office today              Thank you for allowing me to participate in the care of your patient.      Sincerely,     GARRETT OCHOA MD     [facility]  cc:   Ally Ochoa MD  [address], [address]  [place] [address]      "

## 2025-01-16 NOTE — TELEPHONE ENCOUNTER
Dr. Ochoa -- you just saw this pt. I guess she is wanting to know if she needs to have that EKG you ordered completed sooner than her appt with EP on 2/21? Thanks. aj

## 2025-02-18 DIAGNOSIS — C44.311 BASAL CELL CARCINOMA OF NOSE: ICD-10-CM

## 2025-02-18 DIAGNOSIS — Z94.0 KIDNEY REPLACED BY TRANSPLANT: ICD-10-CM

## 2025-02-19 RX ORDER — PREDNISONE 5 MG/1
5 TABLET ORAL DAILY
Qty: 30 TABLET | Refills: 11 | Status: SHIPPED | OUTPATIENT
Start: 2025-02-19

## 2025-03-04 ENCOUNTER — MYC MEDICAL ADVICE (OUTPATIENT)
Dept: OTHER | Age: 70
End: 2025-03-04

## 2025-03-04 DIAGNOSIS — R19.7 DIARRHEA: ICD-10-CM

## 2025-03-04 DIAGNOSIS — Z94.0 KIDNEY TRANSPLANTED: Primary | ICD-10-CM

## 2025-03-11 ENCOUNTER — MYC REFILL (OUTPATIENT)
Dept: TRANSPLANT | Facility: CLINIC | Age: 70
End: 2025-03-11
Payer: MEDICARE

## 2025-03-11 DIAGNOSIS — Z94.0 KIDNEY REPLACED BY TRANSPLANT: Primary | ICD-10-CM

## 2025-03-11 DIAGNOSIS — B27.00 EBV (EPSTEIN-BARR VIRUS) VIREMIA: ICD-10-CM

## 2025-03-11 DIAGNOSIS — C44.311 BASAL CELL CARCINOMA OF NOSE: ICD-10-CM

## 2025-03-11 RX ORDER — VALACYCLOVIR HYDROCHLORIDE 1 G/1
1000 TABLET, FILM COATED ORAL DAILY
Qty: 90 TABLET | Refills: 3 | Status: SHIPPED | OUTPATIENT
Start: 2025-03-11

## 2025-03-18 ENCOUNTER — ANCILLARY ORDERS (OUTPATIENT)
Dept: MAMMOGRAPHY | Facility: CLINIC | Age: 70
End: 2025-03-18

## 2025-03-18 ENCOUNTER — LAB REQUISITION (OUTPATIENT)
Dept: LAB | Facility: CLINIC | Age: 70
End: 2025-03-18
Payer: MEDICARE

## 2025-03-18 DIAGNOSIS — D84.821 IMMUNODEFICIENCY DUE TO DRUGS (CODE): ICD-10-CM

## 2025-03-18 DIAGNOSIS — Z12.31 VISIT FOR SCREENING MAMMOGRAM: Primary | ICD-10-CM

## 2025-03-18 DIAGNOSIS — Z79.899 OTHER LONG TERM (CURRENT) DRUG THERAPY: ICD-10-CM

## 2025-03-18 PROCEDURE — G0145 SCR C/V CYTO,THINLAYER,RESCR: HCPCS | Mod: ORL | Performed by: OBSTETRICS & GYNECOLOGY

## 2025-03-18 PROCEDURE — 87624 HPV HI-RISK TYP POOLED RSLT: CPT | Mod: ORL | Performed by: OBSTETRICS & GYNECOLOGY

## 2025-03-19 ENCOUNTER — PATIENT OUTREACH (OUTPATIENT)
Dept: CARE COORDINATION | Facility: CLINIC | Age: 70
End: 2025-03-19
Payer: MEDICARE

## 2025-03-19 DIAGNOSIS — I48.0 PAROXYSMAL ATRIAL FIBRILLATION (H): ICD-10-CM

## 2025-03-19 RX ORDER — PINDOLOL 5 MG/1
TABLET ORAL
Qty: 45 TABLET | Refills: 1 | Status: SHIPPED | OUTPATIENT
Start: 2025-03-19

## 2025-03-20 LAB
HPV HR 12 DNA CVX QL NAA+PROBE: NEGATIVE
HPV16 DNA CVX QL NAA+PROBE: NEGATIVE
HPV18 DNA CVX QL NAA+PROBE: NEGATIVE
HUMAN PAPILLOMA VIRUS FINAL DIAGNOSIS: NORMAL

## 2025-03-21 LAB
BKR AP ASSOCIATED HPV REPORT: NORMAL
BKR LAB AP GYN ADEQUACY: NORMAL
BKR LAB AP GYN INTERPRETATION: NORMAL
BKR LAB AP LMP: NORMAL
BKR LAB AP PREVIOUS ABNL DX: NORMAL
BKR LAB AP PREVIOUS ABNORMAL: NORMAL
PATH REPORT.COMMENTS IMP SPEC: NORMAL
PATH REPORT.COMMENTS IMP SPEC: NORMAL
PATH REPORT.RELEVANT HX SPEC: NORMAL

## 2025-04-03 NOTE — LETTER
Called [employer] at [phone]. They stated that the appeal was started and approved. They will re-fax the appeal approval information.     Thank you,     Joey Darden, [facility]  Pharmacy Clinic [facility]  [email or web address]   Phone: [phone]  Fax: [phone]   PHYSICIAN ORDERS      DATE & TIME ISSUED: 2017 10:03 AM  PATIENT NAME: Zulma Lovell   : 1955     [facility] MR#  [id number]     DIAGNOSIS:  Kidney Transplant  ICD-10 CODE: Z94.0     Please complete the following lab on whole blood, also quantitative. We really need the EBV count.       EBV PCR Quantitative on whole blood           Any questions please call: [phone]    Please fax these results to [phone].

## 2025-04-16 ENCOUNTER — LAB (OUTPATIENT)
Dept: LAB | Facility: CLINIC | Age: 70
End: 2025-04-16
Payer: MEDICARE

## 2025-04-16 DIAGNOSIS — Z94.0 KIDNEY TRANSPLANTED: ICD-10-CM

## 2025-04-16 DIAGNOSIS — Z48.298 AFTERCARE FOLLOWING ORGAN TRANSPLANT: ICD-10-CM

## 2025-04-16 DIAGNOSIS — R19.7 DIARRHEA: ICD-10-CM

## 2025-04-16 LAB
ANION GAP SERPL CALCULATED.3IONS-SCNC: 12 MMOL/L (ref 7–15)
BUN SERPL-MCNC: 33.8 MG/DL (ref 8–23)
CALCIUM SERPL-MCNC: 9.9 MG/DL (ref 8.8–10.4)
CHLORIDE SERPL-SCNC: 102 MMOL/L (ref 98–107)
CREAT SERPL-MCNC: 1.06 MG/DL (ref 0.51–0.95)
CYCLOSPORINE BLD LC/MS/MS-MCNC: 84 UG/L (ref 50–400)
EGFRCR SERPLBLD CKD-EPI 2021: 57 ML/MIN/1.73M2
ERYTHROCYTE [DISTWIDTH] IN BLOOD BY AUTOMATED COUNT: 13.2 % (ref 10–15)
GLUCOSE SERPL-MCNC: 93 MG/DL (ref 70–99)
HCO3 SERPL-SCNC: 24 MMOL/L (ref 22–29)
HCT VFR BLD AUTO: 38.2 % (ref 35–47)
HGB BLD-MCNC: 12.3 G/DL (ref 11.7–15.7)
MCH RBC QN AUTO: 32.9 PG (ref 26.5–33)
MCHC RBC AUTO-ENTMCNC: 32.2 G/DL (ref 31.5–36.5)
MCV RBC AUTO: 102 FL (ref 78–100)
PLATELET # BLD AUTO: 203 10E3/UL (ref 150–450)
POTASSIUM SERPL-SCNC: 4.3 MMOL/L (ref 3.4–5.3)
RBC # BLD AUTO: 3.74 10E6/UL (ref 3.8–5.2)
SODIUM SERPL-SCNC: 138 MMOL/L (ref 135–145)
TME LAST DOSE: NORMAL H
TME LAST DOSE: NORMAL H
WBC # BLD AUTO: 6 10E3/UL (ref 4–11)

## 2025-04-16 PROCEDURE — 87799 DETECT AGENT NOS DNA QUANT: CPT

## 2025-04-16 PROCEDURE — 80048 BASIC METABOLIC PNL TOTAL CA: CPT

## 2025-04-16 PROCEDURE — 36415 COLL VENOUS BLD VENIPUNCTURE: CPT

## 2025-04-16 PROCEDURE — 80158 DRUG ASSAY CYCLOSPORINE: CPT

## 2025-04-16 PROCEDURE — 85027 COMPLETE CBC AUTOMATED: CPT

## 2025-04-17 LAB
CMV DNA SPEC NAA+PROBE-ACNC: NOT DETECTED IU/ML
EBV DNA SERPL NAA+PROBE-ACNC: <35 IU/ML
EBV DNA SERPL NAA+PROBE-LOG#: <1.5 {LOG_COPIES}/ML
SPECIMEN TYPE: NORMAL

## 2025-04-19 ENCOUNTER — HEALTH MAINTENANCE LETTER (OUTPATIENT)
Age: 70
End: 2025-04-19

## 2025-04-30 ENCOUNTER — MYC MEDICAL ADVICE (OUTPATIENT)
Dept: OTHER | Age: 70
End: 2025-04-30

## 2025-04-30 DIAGNOSIS — Z94.0 KIDNEY TRANSPLANTED: Primary | ICD-10-CM

## 2025-05-16 ENCOUNTER — HOSPITAL ENCOUNTER (OUTPATIENT)
Dept: MAMMOGRAPHY | Facility: CLINIC | Age: 70
Discharge: HOME | End: 2025-05-16
Attending: OBSTETRICS & GYNECOLOGY | Admitting: OBSTETRICS & GYNECOLOGY
Payer: MEDICARE

## 2025-05-16 DIAGNOSIS — Z12.31 VISIT FOR SCREENING MAMMOGRAM: ICD-10-CM

## 2025-05-16 PROCEDURE — 77063 BREAST TOMOSYNTHESIS BI: CPT

## 2025-06-09 ENCOUNTER — TELEPHONE (OUTPATIENT)
Dept: CARDIOLOGY | Facility: CLINIC | Age: 70
End: 2025-06-09
Payer: MEDICARE

## 2025-06-09 NOTE — TELEPHONE ENCOUNTER
Noted. Pt aware I would only call should [facility] have alternate recommendations. aa    ------------------------------------------------------------------  Msg received:  From: Ally Ochoa MD  Sent: 6/9/2025  12:17 PM CDT  To: Sunitha Mahan RN    Agree with assessment, do not stop Eliquis, if gets worse needs to be seen medically.  LF

## 2025-06-09 NOTE — TELEPHONE ENCOUNTER
"Dr. Ochoa -- please see update below. Any additional concerns/recommendations? Thanks. aa  -----------------------------------------------------------------------  Spoke with pt who reports that when she was getting dressed this morning she noticed a very deep purple bruise about the size of a tennis ball on the medial aspect of her left leg by her knee. She reports that there is a small \"knot\" in the middle and she is unsure how she injured it. She denies any numbness, tingling, pain, or swelling. Encouraged her to just continue to watch it and report should it worsen or should she develop any additional symptoms. aa    ----------------------------------------------------------------------------------  Msg received:  From: Jesse Dave  Sent: 6/9/2025  11:15 AM CDT  To: [facility] Cv Rn Team Z    Hello ,       I received a call from the patient above requesting a call back as she has a big bruise on he leg and want to chat about it as she is on apixaban ANTICOAGULANT (ELIQUIS ANTICOAGULANT) 5 MG tablet she want to make the team aware .     Thanks Jesse  "

## 2025-06-20 ENCOUNTER — LAB REQUISITION (OUTPATIENT)
Dept: LAB | Facility: CLINIC | Age: 70
End: 2025-06-20
Payer: MEDICARE

## 2025-06-20 ENCOUNTER — TRANSFERRED RECORDS (OUTPATIENT)
Dept: HEALTH INFORMATION MANAGEMENT | Facility: CLINIC | Age: 70
End: 2025-06-20

## 2025-06-20 DIAGNOSIS — E78.2 MIXED HYPERLIPIDEMIA: ICD-10-CM

## 2025-06-20 PROCEDURE — 80061 LIPID PANEL: CPT | Mod: ORL | Performed by: FAMILY MEDICINE

## 2025-06-21 LAB
CHOLEST SERPL-MCNC: 215 MG/DL
FASTING STATUS PATIENT QL REPORTED: ABNORMAL
HDLC SERPL-MCNC: 88 MG/DL
LDLC SERPL CALC-MCNC: 90 MG/DL
NONHDLC SERPL-MCNC: 127 MG/DL
TRIGL SERPL-MCNC: 187 MG/DL

## 2025-07-02 ENCOUNTER — LAB (OUTPATIENT)
Dept: LAB | Facility: CLINIC | Age: 70
End: 2025-07-02
Payer: MEDICARE

## 2025-07-02 DIAGNOSIS — Z94.0 KIDNEY TRANSPLANTED: ICD-10-CM

## 2025-07-02 DIAGNOSIS — Z48.298 AFTERCARE FOLLOWING ORGAN TRANSPLANT: ICD-10-CM

## 2025-07-02 LAB
ANION GAP SERPL CALCULATED.3IONS-SCNC: 11 MMOL/L (ref 7–15)
BUN SERPL-MCNC: 33 MG/DL (ref 8–23)
CALCIUM SERPL-MCNC: 9.7 MG/DL (ref 8.8–10.4)
CHLORIDE SERPL-SCNC: 102 MMOL/L (ref 98–107)
CREAT SERPL-MCNC: 1.07 MG/DL (ref 0.51–0.95)
CYCLOSPORINE BLD LC/MS/MS-MCNC: 59 UG/L (ref 50–400)
EBV DNA SERPL NAA+PROBE-ACNC: <35 IU/ML
EBV DNA SERPL NAA+PROBE-LOG#: <1.5 {LOG_COPIES}/ML
EGFRCR SERPLBLD CKD-EPI 2021: 56 ML/MIN/1.73M2
ERYTHROCYTE [DISTWIDTH] IN BLOOD BY AUTOMATED COUNT: 13 % (ref 10–15)
GLUCOSE SERPL-MCNC: 112 MG/DL (ref 70–99)
HCO3 SERPL-SCNC: 25 MMOL/L (ref 22–29)
HCT VFR BLD AUTO: 36.7 % (ref 35–47)
HGB BLD-MCNC: 11.8 G/DL (ref 11.7–15.7)
MCH RBC QN AUTO: 32.7 PG (ref 26.5–33)
MCHC RBC AUTO-ENTMCNC: 32.2 G/DL (ref 31.5–36.5)
MCV RBC AUTO: 102 FL (ref 78–100)
PLATELET # BLD AUTO: 206 10E3/UL (ref 150–450)
POTASSIUM SERPL-SCNC: 4.1 MMOL/L (ref 3.4–5.3)
RBC # BLD AUTO: 3.61 10E6/UL (ref 3.8–5.2)
SODIUM SERPL-SCNC: 138 MMOL/L (ref 135–145)
TME LAST DOSE: NORMAL H
TME LAST DOSE: NORMAL H
WBC # BLD AUTO: 6.8 10E3/UL (ref 4–11)

## 2025-07-02 PROCEDURE — 87799 DETECT AGENT NOS DNA QUANT: CPT

## 2025-07-02 PROCEDURE — 85027 COMPLETE CBC AUTOMATED: CPT

## 2025-07-02 PROCEDURE — 80158 DRUG ASSAY CYCLOSPORINE: CPT

## 2025-07-02 PROCEDURE — 80048 BASIC METABOLIC PNL TOTAL CA: CPT

## 2025-07-02 PROCEDURE — 36415 COLL VENOUS BLD VENIPUNCTURE: CPT

## 2025-07-02 PROCEDURE — 84156 ASSAY OF PROTEIN URINE: CPT

## 2025-07-03 ENCOUNTER — RESULTS FOLLOW-UP (OUTPATIENT)
Dept: TRANSPLANT | Facility: CLINIC | Age: 70
End: 2025-07-03

## 2025-07-03 DIAGNOSIS — C44.311 BASAL CELL CARCINOMA OF NOSE: ICD-10-CM

## 2025-07-03 LAB
ALBUMIN MFR UR ELPH: 29.8 MG/DL
CREAT UR-MCNC: 126 MG/DL
PROT/CREAT 24H UR: 0.24 MG/MG CR (ref 0–0.2)

## 2025-07-28 PROBLEM — I49.1 PREMATURE ATRIAL CONTRACTIONS: Status: ACTIVE | Noted: 2025-07-28

## 2025-07-28 PROBLEM — I49.5 SINUS NODE DYSFUNCTION (H): Status: ACTIVE | Noted: 2025-07-28

## 2025-07-28 NOTE — PROGRESS NOTES
[facility]  Cardiac Electrophysiology  [address]  [place] [address]   Office: [phone]  Fax: [phone]     Patient: Luba Lovell   : 1955       CHIEF COMPLAINT/REASON FOR VISIT  Paroxysmal atrial fibrillation      Assessment/Recommendations     Stage 3A/Paroxysmal atrial fibrillation - symptomatic, associated with increased risk of stroke, heart failure and mortality.  She also has palpitations related to PACs and nonsustained AT.  She has a degree of SND.  [id number] 3  We reviewed atrial fibrillation, managing stroke risk via anticoagulation, managing heart failure risk, rate control, antiarrhythmic drug therapy, and catheter ablation.  Antiarrhythmic drug options are limited given co-occurrence with SND.  We discussed atrial fibrillation ablation procedures, anticipated success rates, the potential need for re-do ablation vs addition of anti-arrhythmic drugs, procedural risks (including groin bleeding, vascular injury, tamponade, phrenic or esophageal injury, stroke, pulmonary vein stenosis) and recovery expectations.  She would prefer flecainide dose escalation as first step  - increase flecainide to 50mg twice daily  - continue pindolol 5mg daily  - Zio monitoring, 14d, mail out (to be applied after at least 7 days on flecainide 50mg twice daily)  - continue apixaban 5mg twice daily    Frequent PACs - possibly symptomatic  - continue pindolol, flecainide for now    Sinus node dysfunction - worsened sinus bradycardia previously on low dose metoprolol  - expectant management    Follow up: as above.  The longitudinal plan of care was addressed during this visit. Due to the added complexity in care, our team will remain engaged subsequent management of this condition and ongoing continuity of care           History of Present Illness   Luba Lovell is a 69 year old female with paroxysmal atrial fibrillation, frequent PACs, sinus node dysfunction,  HTN, renal transplant, osteoarthritis referred for consultation regarding atrial fibrillation.    Mrs. Lovell's atrial fibrillation history is as summarized below:  Symptoms: palpitations  Symptom onset date: summer 2024 (longer history of brief palpitations for some years prior)  Diagnosis date: 6/2024 Zio - <1% pAF  Admissions/ER visits: none  Prior medical therapies: flecainide (9/10/2024-present)  Prior DCCVs: none  Prior ablations: none  Percutaneous left atrial appendage occlusion: none    She notes no known AF since starting flecainide and has note reduced though ongoing isolated palpitations.  She is not very active limited by osteoarthritis, though does some light activities around the house.  She denies chest pain, syncope.       Physical Examination  Review of Systems   VITALS: BP (!) 178/78 (BP Location: Left arm, Patient Position: Sitting, Cuff Size: Adult Regular)   Pulse 67   Wt 63 kg (139 lb)   LMP  (LMP Unknown)   SpO2 98%   BMI 24.82 kg/m    Wt Readings from Last 3 Encounters:   02/21/25 62.4 kg (137 lb 8 oz)   01/16/25 62.3 kg (137 lb 6.4 oz)   09/27/24 62.7 kg (138 lb 4.8 oz)     CONSTITUTIONAL: well nourished, comfortable, no distress  EYES:  Conjunctivae pink, sclerae clear.    E/N/T:  Oral mucosa pink  RESPIRATORY:  Respiratory effort is normal  CARDIOVASCULAR:  normal S1 and S2  GASTROINTESTINAL:  Abdomen without masses or tenderness  EXTREMITIES:  No clubbing or cyanosis.    MUSCULOSKELETAL:  Overall grossly normal muscle strength  SKIN:  Overall, skin warm and dry, no lesions.  NEURO/PSYCH:  Oriented x 3 with normal affect.   Constitutional:  No weight loss or loss of appetite    Eyes:  No difficulty with vision, no double vision, no dry eyes  ENT:  No sore throat, difficulty swallowing; changes in hearing or tinnitus  Cardiovascular: As detailed above  Respiratory:  No cough  Musculoskeletal  No joint pain, muscle aches  Neurologic:  No syncope, lightheadedness, fainting spells    Hematologic: No easy bruising, excessive bleeding tendency   Gastrointestinal:  No jaundice, abdominal pain or abdominal bloating  Genitourinary: No changes in urinary habits, no trouble urinating    Psychiatric: No anxiety or depression      Medical History  Surgical History   Past Medical History:   Diagnosis Date    Anemia in chronic kidney disease(285.21)     Basal cell carcinoma of nose 07/15/2022    Dyslipidemia     E. coli UTI 12/07/2023    High risk medications (not anticoagulants) long-term use     Hypertension     Immunosuppressed status     Kidney replaced by transplant     Shingles     Past Surgical History:   Procedure Laterality Date    APPENDECTOMY      CATARACT IOL, RT/LT Bilateral     IR MISCELLANEOUS PROCEDURE  1/15/2004    IR MISCELLANEOUS PROCEDURE  3/29/2007    kidney transplant      TONSILLECTOMY, ADENOIDECTOMY, COMBINED Bilateral 7/29/2020    Procedure: BILATERAL TONSILLECTOMY AND ADENOIDECTOMY;  Surgeon: Tammy Haines MD;  Location:  OR         Family History Social History   Family History   Problem Relation Age of Onset    Alzheimer Disease Mother     Alzheimer Disease Father     Anesthesia Reaction No family hx of     Deep Vein Thrombosis (DVT) No family hx of     Cancer Maternal Grandfather         Thinks it was Stomach Cancer        Social History     Tobacco Use    Smoking status: Never     Passive exposure: Never    Smokeless tobacco: Never   Vaping Use    Vaping status: Never Used   Substance Use Topics    Alcohol use: No     Alcohol/week: 0.0 standard drinks of alcohol    Drug use: No         Medications  Allergies     Current Outpatient Medications:     acetaminophen (TYLENOL) 325 MG tablet, Take 650 mg by mouth every 6 hours as needed for mild pain or fever, Disp: , Rfl:     amLODIPine (NORVASC) 5 MG tablet, Take 5 mg by mouth daily., Disp: , Rfl:     apixaban ANTICOAGULANT (ELIQUIS ANTICOAGULANT) 5 MG tablet, Take 1 tablet (5 mg) by mouth 2 times daily., Disp: 180  tablet, Rfl: 4    brimonidine (ALPHAGAN) 0.2 % ophthalmic solution, Place 1 drop Into the left eye every morning , Disp: , Rfl:     cycloSPORINE modified (GENERIC EQUIVALENT) 25 MG capsule, Take 3 capsules (75 mg) by mouth 2 times daily., Disp: 540 capsule, Rfl: 3    flecainide (TAMBOCOR) 50 MG tablet, Take 0.5 tablets (25 mg) by mouth 2 times daily., Disp: 90 tablet, Rfl: 4    latanoprost (XALATAN) 0.005 % ophthalmic solution, Place 1 drop Into the left eye At Bedtime, Disp: , Rfl:     loteprednol (LOTEMAX) 0.5 % ophthalmic suspension, Place 1 drop Into the left eye at bedtime, Disp: , Rfl:     magnesium oxide (MAG-OX) 400 MG tablet, Take 1 tablet (400 mg) by mouth daily, Disp: , Rfl:     Multiple Vitamins-Minerals (CENTRUM SILVER) per tablet, Take 1 tablet by mouth every morning , Disp: , Rfl:     NONFORMULARY, once every eight weeks Eyelea injection into the eye, Disp: , Rfl:     Omega-3 Fatty Acids (FISH OIL) 1000 MG CPDR, Take 2,000 mg by mouth 2 times daily, Disp: , Rfl:     pindolol (VISKEN) 5 MG tablet, Take one-half tablet (2.5 mg) by mouth once daily in the evening., Disp: 45 tablet, Rfl: 1    pravastatin (PRAVACHOL) 20 MG tablet, Take 20 mg by mouth daily, Disp: , Rfl:     predniSONE (DELTASONE) 5 MG tablet, Take 1 tablet (5 mg) by mouth daily., Disp: 30 tablet, Rfl: 11    timolol maleate (TIMOPTIC) 0.5 % ophthalmic solution, Place 1 drop into both eyes every morning, Disp: , Rfl:     valACYclovir (VALTREX) 1000 mg tablet, Take 1 tablet (1,000 mg) by mouth daily., Disp: 90 tablet, Rfl: 3     Allergies   Allergen Reactions    Fenofibrate Other (See Comments)     Pancreatitis (this is fenofibrate)    Metoprolol Dizziness     Succinate     Simvastatin Muscle Pain (Myalgia) and Cramps          Lab Results    Chemistry CBC Cardiac Enzymes/BNP/TSH/INR   Recent Labs   Lab Test 07/02/25  1113      POTASSIUM 4.1   CHLORIDE 102   CO2 25   *   BUN 33.0*   CR 1.07*   GFRESTIMATED 56*   AICHA 9.7     Recent  "Labs   Lab Test 07/02/25  1113 04/16/25  1017 01/10/25  1053   CR 1.07* 1.06* 1.04*          Recent Labs   Lab Test 07/02/25  1113   WBC 6.8   HGB 11.8   HCT 36.7   *        Recent Labs   Lab Test 07/02/25  1113 04/16/25  1017 01/10/25  1053   HGB 11.8 12.3 12.7    No results for input(s): \"TROPONINI\" in the last 86704 hours.  No results for input(s): \"BNP\", \"NTBNPI\", \"NTBNP\" in the last 64228 hours.  Recent Labs   Lab Test 01/30/24  0850   TSH 1.92     Recent Labs   Lab Test 09/01/23  0933   INR 1.03         Data Review    ECGs (tracings independently reviewed)  2/21/2025 - SR 63bpm, PACs, QTC 415ms    Zio monitoring 6/20/2024 to 7/4/2024 (independently reviewed)  SR 42-118bpm, avg 63bpm.  31 NS-SVT  pAF <1% burden, longest 3h 15m, 59-162bpm, avg 86bpm.  Frequent PACs - isolated 9.4%, couplets 1.8%, triplets <1.0%.    4/19/2024 TTE  1. Normal left ventricular size and systolic performance with a visually  estimated ejection fraction of 60-65%.  2. There is mild to moderate aortic insufficiency.  3. Normal right ventricular size and systolic performance.  4. There is mild left atrial enlargement.    9/18/2024 MPI    The nuclear stress test is negative for inducible myocardial ischemia or infarction.    The patient is at a low risk of future cardiac ischemic events.    Left ventricular function is normal.    The left ventricular ejection fraction at stress is 63%.    There is no prior study for comparison.       Cc: Ally Ochoa MD, Juancarlos Holland MD Amila Dilusha William, MD  7/29/2025  1:03 PM      "

## 2025-07-29 ENCOUNTER — OFFICE VISIT (OUTPATIENT)
Dept: CARDIOLOGY | Facility: CLINIC | Age: 70
End: 2025-07-29
Payer: MEDICARE

## 2025-07-29 VITALS
HEART RATE: 67 BPM | OXYGEN SATURATION: 98 % | SYSTOLIC BLOOD PRESSURE: 178 MMHG | BODY MASS INDEX: 24.82 KG/M2 | WEIGHT: 139 LBS | DIASTOLIC BLOOD PRESSURE: 78 MMHG

## 2025-07-29 DIAGNOSIS — I48.0 PAROXYSMAL ATRIAL FIBRILLATION (H): Primary | ICD-10-CM

## 2025-07-29 DIAGNOSIS — I49.5 SINUS NODE DYSFUNCTION (H): ICD-10-CM

## 2025-07-29 DIAGNOSIS — I49.1 PREMATURE ATRIAL CONTRACTIONS: ICD-10-CM

## 2025-07-29 PROCEDURE — G2211 COMPLEX E/M VISIT ADD ON: HCPCS | Performed by: INTERNAL MEDICINE

## 2025-07-29 PROCEDURE — 99204 OFFICE O/P NEW MOD 45 MIN: CPT | Performed by: INTERNAL MEDICINE

## 2025-07-29 PROCEDURE — 3078F DIAST BP <80 MM HG: CPT | Performed by: INTERNAL MEDICINE

## 2025-07-29 PROCEDURE — 3077F SYST BP >= 140 MM HG: CPT | Performed by: INTERNAL MEDICINE

## 2025-07-29 RX ORDER — FLECAINIDE ACETATE 50 MG/1
50 TABLET ORAL 2 TIMES DAILY
Qty: 180 TABLET | Refills: 3 | Status: SHIPPED | OUTPATIENT
Start: 2025-07-29

## 2025-07-29 NOTE — LETTER
2025    SHENA VERGARA MD  [address]  [address] [facility] [address]    RE: Luba Lovell       Dear Colleague,     I had the pleasure of seeing Luba Lovell in the [facility].     [facility]  Cardiac Electrophysiology  [address]  [place] [address]   Office: [phone]  Fax: [phone]     Patient: Luba Lovell   : 1955       CHIEF COMPLAINT/REASON FOR VISIT  Paroxysmal atrial fibrillation      Assessment/Recommendations     Stage 3A/Paroxysmal atrial fibrillation - symptomatic, associated with increased risk of stroke, heart failure and mortality.  She also has palpitations related to PACs and nonsustained AT.  She has a degree of SND.  [id number] 3  We reviewed atrial fibrillation, managing stroke risk via anticoagulation, managing heart failure risk, rate control, antiarrhythmic drug therapy, and catheter ablation.  Antiarrhythmic drug options are limited given co-occurrence with SND.  We discussed atrial fibrillation ablation procedures, anticipated success rates, the potential need for re-do ablation vs addition of anti-arrhythmic drugs, procedural risks (including groin bleeding, vascular injury, tamponade, phrenic or esophageal injury, stroke, pulmonary vein stenosis) and recovery expectations.  She would prefer flecainide dose escalation as first step  - increase flecainide to 50mg twice daily  - continue pindolol 5mg daily  - Zio monitoring, 14d, mail out (to be applied after at least 7 days on flecainide 50mg twice daily)  - continue apixaban 5mg twice daily    Frequent PACs - possibly symptomatic  - continue pindolol, flecainide for now    Sinus node dysfunction - worsened sinus bradycardia previously on low dose metoprolol  - expectant management    Follow up: as above.  The longitudinal plan of care was addressed during this visit. Due to the added complexity in care, our team will remain engaged subsequent management  of this condition and ongoing continuity of care           History of Present Illness   Luba Lovell is a 69 year old female with paroxysmal atrial fibrillation, frequent PACs, sinus node dysfunction, HTN, renal transplant, osteoarthritis referred for consultation regarding atrial fibrillation.    Mrs. Lovell's atrial fibrillation history is as summarized below:  Symptoms: palpitations  Symptom onset date: summer 2024 (longer history of brief palpitations for some years prior)  Diagnosis date: 6/2024 Zio - <1% pAF  Admissions/ER visits: none  Prior medical therapies: flecainide (9/10/2024-present)  Prior DCCVs: none  Prior ablations: none  Percutaneous left atrial appendage occlusion: none    She notes no known AF since starting flecainide and has note reduced though ongoing isolated palpitations.  She is not very active limited by osteoarthritis, though does some light activities around the house.  She denies chest pain, syncope.       Physical Examination  Review of Systems   VITALS: BP (!) 178/78 (BP Location: Left arm, Patient Position: Sitting, Cuff Size: Adult Regular)   Pulse 67   Wt 63 kg (139 lb)   LMP  (LMP Unknown)   SpO2 98%   BMI 24.82 kg/m    Wt Readings from Last 3 Encounters:   02/21/25 62.4 kg (137 lb 8 oz)   01/16/25 62.3 kg (137 lb 6.4 oz)   09/27/24 62.7 kg (138 lb 4.8 oz)     CONSTITUTIONAL: well nourished, comfortable, no distress  EYES:  Conjunctivae pink, sclerae clear.    E/N/T:  Oral mucosa pink  RESPIRATORY:  Respiratory effort is normal  CARDIOVASCULAR:  normal S1 and S2  GASTROINTESTINAL:  Abdomen without masses or tenderness  EXTREMITIES:  No clubbing or cyanosis.    MUSCULOSKELETAL:  Overall grossly normal muscle strength  SKIN:  Overall, skin warm and dry, no lesions.  NEURO/PSYCH:  Oriented x 3 with normal affect.   Constitutional:  No weight loss or loss of appetite    Eyes:  No difficulty with vision, no double vision, no dry eyes  ENT:  No sore throat, difficulty  swallowing; changes in hearing or tinnitus  Cardiovascular: As detailed above  Respiratory:  No cough  Musculoskeletal  No joint pain, muscle aches  Neurologic:  No syncope, lightheadedness, fainting spells   Hematologic: No easy bruising, excessive bleeding tendency   Gastrointestinal:  No jaundice, abdominal pain or abdominal bloating  Genitourinary: No changes in urinary habits, no trouble urinating    Psychiatric: No anxiety or depression      Medical History  Surgical History   Past Medical History:   Diagnosis Date     Anemia in chronic kidney disease(285.21)      Basal cell carcinoma of nose 07/15/2022     Dyslipidemia      E. coli UTI 12/07/2023     High risk medications (not anticoagulants) long-term use      Hypertension      Immunosuppressed status      Kidney replaced by transplant      Shingles     Past Surgical History:   Procedure Laterality Date     APPENDECTOMY       CATARACT IOL, RT/LT Bilateral      IR MISCELLANEOUS PROCEDURE  1/15/2004     IR MISCELLANEOUS PROCEDURE  3/29/2007     kidney transplant       TONSILLECTOMY, ADENOIDECTOMY, COMBINED Bilateral 7/29/2020    Procedure: BILATERAL TONSILLECTOMY AND ADENOIDECTOMY;  Surgeon: Tammy Haines MD;  Location:  OR         Family History Social History   Family History   Problem Relation Age of Onset     Alzheimer Disease Mother      Alzheimer Disease Father      Anesthesia Reaction No family hx of      Deep Vein Thrombosis (DVT) No family hx of      Cancer Maternal Grandfather         Thinks it was Stomach Cancer        Social History     Tobacco Use     Smoking status: Never     Passive exposure: Never     Smokeless tobacco: Never   Vaping Use     Vaping status: Never Used   Substance Use Topics     Alcohol use: No     Alcohol/week: 0.0 standard drinks of alcohol     Drug use: No         Medications  Allergies     Current Outpatient Medications:      acetaminophen (TYLENOL) 325 MG tablet, Take 650 mg by mouth every 6 hours as needed  for mild pain or fever, Disp: , Rfl:      amLODIPine (NORVASC) 5 MG tablet, Take 5 mg by mouth daily., Disp: , Rfl:      apixaban ANTICOAGULANT (ELIQUIS ANTICOAGULANT) 5 MG tablet, Take 1 tablet (5 mg) by mouth 2 times daily., Disp: 180 tablet, Rfl: 4     brimonidine (ALPHAGAN) 0.2 % ophthalmic solution, Place 1 drop Into the left eye every morning , Disp: , Rfl:      cycloSPORINE modified (GENERIC EQUIVALENT) 25 MG capsule, Take 3 capsules (75 mg) by mouth 2 times daily., Disp: 540 capsule, Rfl: 3     flecainide (TAMBOCOR) 50 MG tablet, Take 0.5 tablets (25 mg) by mouth 2 times daily., Disp: 90 tablet, Rfl: 4     latanoprost (XALATAN) 0.005 % ophthalmic solution, Place 1 drop Into the left eye At Bedtime, Disp: , Rfl:      loteprednol (LOTEMAX) 0.5 % ophthalmic suspension, Place 1 drop Into the left eye at bedtime, Disp: , Rfl:      magnesium oxide (MAG-OX) 400 MG tablet, Take 1 tablet (400 mg) by mouth daily, Disp: , Rfl:      Multiple Vitamins-Minerals (CENTRUM SILVER) per tablet, Take 1 tablet by mouth every morning , Disp: , Rfl:      NONFORMULARY, once every eight weeks Eyelea injection into the eye, Disp: , Rfl:      Omega-3 Fatty Acids (FISH OIL) 1000 MG CPDR, Take 2,000 mg by mouth 2 times daily, Disp: , Rfl:      pindolol (VISKEN) 5 MG tablet, Take one-half tablet (2.5 mg) by mouth once daily in the evening., Disp: 45 tablet, Rfl: 1     pravastatin (PRAVACHOL) 20 MG tablet, Take 20 mg by mouth daily, Disp: , Rfl:      predniSONE (DELTASONE) 5 MG tablet, Take 1 tablet (5 mg) by mouth daily., Disp: 30 tablet, Rfl: 11     timolol maleate (TIMOPTIC) 0.5 % ophthalmic solution, Place 1 drop into both eyes every morning, Disp: , Rfl:      valACYclovir (VALTREX) 1000 mg tablet, Take 1 tablet (1,000 mg) by mouth daily., Disp: 90 tablet, Rfl: 3     Allergies   Allergen Reactions     Fenofibrate Other (See Comments)     Pancreatitis (this is fenofibrate)     Metoprolol Dizziness     Succinate      Simvastatin Muscle  "Pain (Myalgia) and Cramps          Lab Results    Chemistry CBC Cardiac Enzymes/BNP/TSH/INR   Recent Labs   Lab Test 07/02/25  1113      POTASSIUM 4.1   CHLORIDE 102   CO2 25   *   BUN 33.0*   CR 1.07*   GFRESTIMATED 56*   AICHA 9.7     Recent Labs   Lab Test 07/02/25  1113 04/16/25  1017 01/10/25  1053   CR 1.07* 1.06* 1.04*          Recent Labs   Lab Test 07/02/25  1113   WBC 6.8   HGB 11.8   HCT 36.7   *        Recent Labs   Lab Test 07/02/25  1113 04/16/25  1017 01/10/25  1053   HGB 11.8 12.3 12.7    No results for input(s): \"TROPONINI\" in the last 96568 hours.  No results for input(s): \"BNP\", \"NTBNPI\", \"NTBNP\" in the last 53308 hours.  Recent Labs   Lab Test 01/30/24  0850   TSH 1.92     Recent Labs   Lab Test 09/01/23  0933   INR 1.03         Data Review    ECGs (tracings independently reviewed)  2/21/2025 - SR 63bpm, PACs, QTC 415ms    Zio monitoring 6/20/2024 to 7/4/2024 (independently reviewed)  SR 42-118bpm, avg 63bpm.  31 NS-SVT  pAF <1% burden, longest 3h 15m, 59-162bpm, avg 86bpm.  Frequent PACs - isolated 9.4%, couplets 1.8%, triplets <1.0%.    4/19/2024 TTE  1. Normal left ventricular size and systolic performance with a visually  estimated ejection fraction of 60-65%.  2. There is mild to moderate aortic insufficiency.  3. Normal right ventricular size and systolic performance.  4. There is mild left atrial enlargement.    9/18/2024 MPI     The nuclear stress test is negative for inducible myocardial ischemia or infarction.     The patient is at a low risk of future cardiac ischemic events.     Left ventricular function is normal.     The left ventricular ejection fraction at stress is 63%.     There is no prior study for comparison.       Cc: Ally Ochoa MD, Juancarlos Holland MD Amila Dilusha William, MD  7/29/2025  1:03 PM        Thank you for allowing me to participate in the care of your patient.      Sincerely,     Serena Cody MD     [facility]" of [facility]  cc:   Ally Ocoha MD  [address], [address]  [place] [address]

## 2025-07-29 NOTE — PATIENT INSTRUCTIONS
[facility]  Cardiac Electrophysiology  [address]  [place] [address]   Office: [phone]  Fax: [phone]     Thank you for seeing us in clinic today - it is a pleasure to be a part of your care team.  Below is a summary of our plan from today's visit.       You have atrial fibrillation, and have had premature atrial contractions and nonsustained atrial tachycardia.  We reviewed atrial fibrillation, treatment options (ablation vs antiarrhythmic drug therapy), and long term stroke risk prevention (blood thinner).    We will plan for the following:  - increase flecainide to 50mg twice daily  - continue pindolol 5mg daily  - Zio monitoring, 14d, mail out (to be applied after at least 7 days on flecainide 50mg twice daily)  - continue apixaban 5mg twice daily     Please do not hesitate to be in touch with our office at [phone] with any questions that may arise.       Thank you for trusting us with your care,    Serena Cody MD  Clinical Cardiac Electrophysiology  [facility]  [address]  [place] [address]   Office: [phone]  Fax: [phone]        ATRIAL FIBRILLATION: Patient Information    What is atrial fibrillation?  Atrial fibrillation (AF, A-fib) is a common heart rhythm problem (arrhythmia) occurring within the upper chambers of the heart (the atria).  In normal rhythm, the upper and lower chambers of the heart are electrically driven to contract in a coordinated sequence.  In atrial fibrillation, the atria lose their ability to contract because of rapid and chaotic electrical activity.  The lower chambers of the heart (the ventricles) continue to pump blood throughout the body, though with irregular and often faster rate due to the chaotic activity within the atria.        How do I know if I have atrial fibrillation?   Some people may feel their heart beating faster, harder, or irregularly while in  atrial fibrillation.  Others may be lightheaded, fatigued, feel weak or tired or become more short of breath especially with activities.  Some patients have no symptoms at all.  Atrial fibrillation may be found due to an irregular pulse or on an electrocardiogram (ECG). Atrial fibrillation can start and stop on its own, and episodes can last from seconds to several months.      How common is atrial fibrillation?   An estimated 3-6 million people in the [place] have atrial fibrillation.  Atrial fibrillation is a common heart rhythm problem for older persons, affecting as estimated 12-15% of people over the age of 65 years of age.    What causes atrial fibrillation?   Age is the most important risk factor for atrial fibrillation.  Atrial fibrillation is more common in people with other heart disease, high blood pressure, diabetes, obesity, sleep apnea and in people who regularly consume alcohol.  Surgery, lung disease, or thyroid problems can lead to atrial fibrillation.  Atrial fibrillation has multiple possible causes, and in most cases a single cause cannot be found.  Atrial fibrillation is a progressive condition, usually starting with at an early stage with short and infrequent episodes.  In later stages of disease, more frequent and longer lasting episodes of atrial fibrillation occur, ultimately culminating in episodes which do not spontaneously terminate.  Generally, more enlargement and scarring within the upper chambers of the heart is observed as atrial fibrillation progresses from early to late-stage disease.    How is atrial fibrillation diagnosed and evaluated?    Because of its start-stop nature, atrial fibrillation can be challenging to diagnose.  Atrial fibrillation is most commonly diagnosed via cardiac rhythm recordings - either an ECG or wearable cardiac rhythm monitor.  For patients with pacemakers, defibrillators or implantable loop recorders, atrial fibrillation may be recorded via these  devices.  Recently, commercially available devices (eg. Apple Watch, Power Liens device, certain FitBit devices, others) can allow patients to take 30 second cardiac rhythm recordings which may document atrial fibrillation.  Once atrial fibrillation is diagnosed, additional tests include blood tests and an echocardiogram.  The echocardiogram uses ultrasound to look at your heart to assess your cardiac function and evaluate for other heart disease.  Additional evaluation may include CT or MRI studies.    Is atrial fibrillation dangerous?   Atrial fibrillation is not usually a life-threatening arrhythmia.  The most serious consequences of atrial fibrillation including stroke and worsening of overall cardiac function.  While in atrial fibrillation, the upper cardiac chambers do not contract normally, resulting in slower blood flow and increased risk of clot formation.  If this blood clot becomes detached from the heart a stroke can occur.  Unfortunately, stroke can be the first sign of atrial fibrillation for some people.  With a stroke, you may notice abnormal sensation, weakness on one side of the body or face, changes in your vision or speech.  If you have any of these signs, you should contact EMS and be evaluated in an emergency room as soon as possible.      How is atrial fibrillation treated?     Several treatment options exist for suppressing atrial fibrillation - however, it is not an easily curable arrhythmia.  The first goal in managing atrial fibrillation is to minimize stroke risk.  The second goal is to improve symptoms associated with atrial fibrillation.  Finally, in patients with reduced cardiac function, maintaining normal rhythm can help improve cardiac function.      Blood thinners are used to reduce the risk of stroke in patients with high estimated stroke risk related to atrial fibrillation.  For patients at higher risk of bleeding related to blood thinner use, implantable devices may be an option to  reduce stroke risk without the need for long term blood thinner use.      Atrial fibrillation can be managed via two strategies: rate control and rhythm control.  In a rate control strategy, continued atrial fibrillation is accepted and medications (eg. beta-blockers or calcium channel blockers) are used to control the lower chamber rate.  In a rhythm control strategy, anti-arrhythmic medications or catheter ablation are used to maintain normal cardiac rhythm and slow disease progression by suppressing atrial fibrillation.  A procedure called a cardioversion, in which an electric shock is delivered through patches placed on the chest wall while under deep sedation, can be performed to temporarily restore normal cardiac rhythm, though does not address the chance of atrial fibrillation recurrence.  Treatments are more effective for earlier rather than later stage atrial fibrillation.  Lifestyle modifications (maintaining a healthy weight, aerobic exercise, diagnosing and treating sleep apnea, and minimizing alcohol intake) are important elements of atrial fibrillation rhythm control.     What is catheter ablation for atrial fibrillation?  Cardiac catheter ablation is a commonly performed, minimally invasive procedure performed by a cardiac electrophysiologist to treat many different cardiac rhythm abnormalities.  During catheter ablation, long, thin, flexible tubes are advanced into the heart via small sheaths inserted into the femoral veins and thermal energy (either heating or cooling) is applied within the heart to disrupt abnormal electrical activity.  Atrial fibrillation ablation is performed under general anesthesia, with procedures generally taking approximately 2-3 hours.  Patients are typically observed for 3-5 hours after the ablation, and in most cases can be discharged home the same day.  Atrial fibrillation ablation is associated with better outcomes (mortality, cardiovascular hospitalizations, atrial  arrhythmia recurrences) compared to antiarrhythmic drug therapy.  However, atrial fibrillation recurrences are not uncommon, and repeat catheter ablation may be offered.  Your electrophysiology team can review atrial fibrillation ablation, anticipated success rates, risks, and recovery expectations with you.    What are anti-arrhythmic medications?  Anti-arrhythmic medications are specialized drugs which alter cardiac electrical functioning to suppress arrhythmias.  There are several anti-arrhythmic medications available, each with its own success rate and side effects.  Some anti-arrhythmic medications are less effective though safer to use, others are more effective though have serious potential toxicities.  Atrial fibrillation recurrences are common and may require dose adjustment or change in antiarrhythmic therapy.  Your electrophysiology team will carefully consider which medication would be the best and safest for your particular case.      Can I live a normal life?    The goal of atrial fibrillation management is for patients to live normal lives without being limited by symptoms related to atrial fibrillation.    Are any additional educational resources available?  There are a number of excellent atrial fibrillation education resources available to you online.  A few options you may wish to review include:  hrsonline.org/guide-atrial-fibrillation  afibmatters.org  getsmartaboutafib.com  stopaf.com    What comes next?    Consider your management options and let us know how we can help in your decision process.  Please take medications as they have been prescribed.  You should also get any tests that may have been ordered for you.      When to Call Your Doctor or seek emergency care:  Call your doctor or seek emergency care if you have any significant changes with the following:  Weakness  Dizziness  Fainting  Fatigue  Shortness of breath  Chest pain with increased activity  If you are concerned that your  heart rate is too fast or too slow  Bleeding that does not stop in 10 minutes  Coughing or throwing up blood  Bloody diarrhea or bleeding hemorrhoids  Dark-colored urine or black stool  Allergic reactions:  Rash  Itching  Swelling  Trouble breathing or swallowing      Please call the [facility] at [phone] if you have concerns about your symptoms, your medicines, or your follow-up appointments.

## 2025-07-30 ENCOUNTER — ORDERS ONLY (AUTO-RELEASED) (OUTPATIENT)
Dept: CARDIOLOGY | Facility: CLINIC | Age: 70
End: 2025-07-30
Payer: MEDICARE

## 2025-07-30 DIAGNOSIS — I48.0 PAROXYSMAL ATRIAL FIBRILLATION (H): ICD-10-CM

## 2025-08-02 ENCOUNTER — HEALTH MAINTENANCE LETTER (OUTPATIENT)
Age: 70
End: 2025-08-02

## 2025-08-16 DIAGNOSIS — I48.0 PAROXYSMAL ATRIAL FIBRILLATION (H): ICD-10-CM

## 2025-08-18 RX ORDER — PINDOLOL 5 MG/1
TABLET ORAL
Qty: 45 TABLET | Refills: 1 | Status: SHIPPED | OUTPATIENT
Start: 2025-08-18

## 2025-09-02 LAB — CV ZIO PRELIM RESULTS: NORMAL

## (undated) DEVICE — ESU SUCTION CAUTERY 10FR FOOT CONTROL E2505-10FR

## (undated) DEVICE — ESU PENCIL W/COATED BLADE E2450H

## (undated) DEVICE — GLOVE PROTEXIS POWDER FREE SMT 6.5  2D72PT65X

## (undated) DEVICE — SUCTION MANIFOLD DORNOCH ULTRA CART UL-CL500

## (undated) DEVICE — SPONGE TONSIL W/STRING MED 23275-680

## (undated) DEVICE — SYR 10ML FINGER CONTROL W/O NDL 309695

## (undated) DEVICE — LINEN TOWEL PACK X5 5464

## (undated) DEVICE — ESU GROUND PAD ADULT W/CORD E7507

## (undated) DEVICE — ESU ELEC BLADE 2.75" COATED/INSULATED E1455

## (undated) DEVICE — TUBING SUCTION MEDI-VAC SOFT 3/16"X20' N520A

## (undated) DEVICE — PACK ENT ENDOSCOPY UMMC

## (undated) DEVICE — Device

## (undated) RX ORDER — ONDANSETRON 2 MG/ML
INJECTION INTRAMUSCULAR; INTRAVENOUS
Status: DISPENSED
Start: 2020-07-29

## (undated) RX ORDER — SODIUM CHLORIDE, SODIUM LACTATE, POTASSIUM CHLORIDE, CALCIUM CHLORIDE 600; 310; 30; 20 MG/100ML; MG/100ML; MG/100ML; MG/100ML
INJECTION, SOLUTION INTRAVENOUS
Status: DISPENSED
Start: 2020-07-29

## (undated) RX ORDER — FENTANYL CITRATE 50 UG/ML
INJECTION, SOLUTION INTRAMUSCULAR; INTRAVENOUS
Status: DISPENSED
Start: 2020-07-29

## (undated) RX ORDER — BUPIVACAINE HYDROCHLORIDE AND EPINEPHRINE 2.5; 5 MG/ML; UG/ML
INJECTION, SOLUTION EPIDURAL; INFILTRATION; INTRACAUDAL; PERINEURAL
Status: DISPENSED
Start: 2020-07-29

## (undated) RX ORDER — HYDRALAZINE HYDROCHLORIDE 20 MG/ML
INJECTION INTRAMUSCULAR; INTRAVENOUS
Status: DISPENSED
Start: 2020-07-29